# Patient Record
Sex: MALE | Race: WHITE | Employment: OTHER | URBAN - METROPOLITAN AREA
[De-identification: names, ages, dates, MRNs, and addresses within clinical notes are randomized per-mention and may not be internally consistent; named-entity substitution may affect disease eponyms.]

---

## 2017-05-05 ENCOUNTER — HOME HEALTH ADMISSION (OUTPATIENT)
Dept: HOME HEALTH SERVICES | Facility: HOME HEALTH | Age: 77
End: 2017-05-05
Payer: MEDICARE

## 2017-05-08 ENCOUNTER — HOME CARE VISIT (OUTPATIENT)
Dept: SCHEDULING | Facility: HOME HEALTH | Age: 77
End: 2017-05-08
Payer: MEDICARE

## 2017-05-08 PROCEDURE — 3331090001 HH PPS REVENUE CREDIT

## 2017-05-08 PROCEDURE — 400013 HH SOC

## 2017-05-08 PROCEDURE — G0299 HHS/HOSPICE OF RN EA 15 MIN: HCPCS

## 2017-05-08 PROCEDURE — 3331090002 HH PPS REVENUE DEBIT

## 2017-05-09 VITALS
TEMPERATURE: 97.7 F | RESPIRATION RATE: 18 BRPM | OXYGEN SATURATION: 96 % | SYSTOLIC BLOOD PRESSURE: 132 MMHG | DIASTOLIC BLOOD PRESSURE: 78 MMHG | HEART RATE: 66 BPM

## 2017-05-09 PROCEDURE — 3331090002 HH PPS REVENUE DEBIT

## 2017-05-09 PROCEDURE — 3331090001 HH PPS REVENUE CREDIT

## 2017-05-10 ENCOUNTER — HOME CARE VISIT (OUTPATIENT)
Dept: SCHEDULING | Facility: HOME HEALTH | Age: 77
End: 2017-05-10
Payer: MEDICARE

## 2017-05-10 VITALS
SYSTOLIC BLOOD PRESSURE: 110 MMHG | TEMPERATURE: 96.8 F | RESPIRATION RATE: 18 BRPM | HEART RATE: 76 BPM | HEIGHT: 72 IN | BODY MASS INDEX: 32.51 KG/M2 | WEIGHT: 240 LBS | DIASTOLIC BLOOD PRESSURE: 62 MMHG

## 2017-05-10 PROCEDURE — 3331090001 HH PPS REVENUE CREDIT

## 2017-05-10 PROCEDURE — G0151 HHCP-SERV OF PT,EA 15 MIN: HCPCS

## 2017-05-10 PROCEDURE — 3331090002 HH PPS REVENUE DEBIT

## 2017-05-10 PROCEDURE — G0299 HHS/HOSPICE OF RN EA 15 MIN: HCPCS

## 2017-05-11 ENCOUNTER — HOME CARE VISIT (OUTPATIENT)
Dept: SCHEDULING | Facility: HOME HEALTH | Age: 77
End: 2017-05-11
Payer: MEDICARE

## 2017-05-11 VITALS — OXYGEN SATURATION: 98 % | HEART RATE: 93 BPM | SYSTOLIC BLOOD PRESSURE: 120 MMHG | DIASTOLIC BLOOD PRESSURE: 70 MMHG

## 2017-05-11 PROCEDURE — 3331090001 HH PPS REVENUE CREDIT

## 2017-05-11 PROCEDURE — 3331090002 HH PPS REVENUE DEBIT

## 2017-05-11 PROCEDURE — G0152 HHCP-SERV OF OT,EA 15 MIN: HCPCS

## 2017-05-12 ENCOUNTER — HOME CARE VISIT (OUTPATIENT)
Dept: HOME HEALTH SERVICES | Facility: HOME HEALTH | Age: 77
End: 2017-05-12
Payer: MEDICARE

## 2017-05-12 ENCOUNTER — HOME CARE VISIT (OUTPATIENT)
Dept: SCHEDULING | Facility: HOME HEALTH | Age: 77
End: 2017-05-12
Payer: MEDICARE

## 2017-05-12 VITALS
RESPIRATION RATE: 18 BRPM | DIASTOLIC BLOOD PRESSURE: 68 MMHG | HEART RATE: 97 BPM | SYSTOLIC BLOOD PRESSURE: 118 MMHG | TEMPERATURE: 97.3 F

## 2017-05-12 PROCEDURE — G0156 HHCP-SVS OF AIDE,EA 15 MIN: HCPCS

## 2017-05-12 PROCEDURE — G0157 HHC PT ASSISTANT EA 15: HCPCS

## 2017-05-12 PROCEDURE — 3331090002 HH PPS REVENUE DEBIT

## 2017-05-12 PROCEDURE — G0158 HHC OT ASSISTANT EA 15: HCPCS

## 2017-05-12 PROCEDURE — 3331090001 HH PPS REVENUE CREDIT

## 2017-05-13 PROCEDURE — 3331090001 HH PPS REVENUE CREDIT

## 2017-05-13 PROCEDURE — 3331090002 HH PPS REVENUE DEBIT

## 2017-05-14 PROCEDURE — 3331090002 HH PPS REVENUE DEBIT

## 2017-05-14 PROCEDURE — 3331090001 HH PPS REVENUE CREDIT

## 2017-05-15 ENCOUNTER — HOME CARE VISIT (OUTPATIENT)
Dept: SCHEDULING | Facility: HOME HEALTH | Age: 77
End: 2017-05-15
Payer: MEDICARE

## 2017-05-15 VITALS
TEMPERATURE: 97.8 F | SYSTOLIC BLOOD PRESSURE: 116 MMHG | RESPIRATION RATE: 16 BRPM | DIASTOLIC BLOOD PRESSURE: 78 MMHG | DIASTOLIC BLOOD PRESSURE: 70 MMHG | HEART RATE: 70 BPM | TEMPERATURE: 96.4 F | HEART RATE: 70 BPM | SYSTOLIC BLOOD PRESSURE: 114 MMHG | RESPIRATION RATE: 18 BRPM

## 2017-05-15 VITALS
HEART RATE: 102 BPM | RESPIRATION RATE: 18 BRPM | TEMPERATURE: 96.9 F | SYSTOLIC BLOOD PRESSURE: 132 MMHG | DIASTOLIC BLOOD PRESSURE: 78 MMHG

## 2017-05-15 VITALS
RESPIRATION RATE: 18 BRPM | HEART RATE: 70 BPM | TEMPERATURE: 97.8 F | DIASTOLIC BLOOD PRESSURE: 70 MMHG | SYSTOLIC BLOOD PRESSURE: 114 MMHG

## 2017-05-15 VITALS — HEART RATE: 97 BPM | RESPIRATION RATE: 18 BRPM | SYSTOLIC BLOOD PRESSURE: 118 MMHG | DIASTOLIC BLOOD PRESSURE: 68 MMHG

## 2017-05-15 PROCEDURE — G0156 HHCP-SVS OF AIDE,EA 15 MIN: HCPCS

## 2017-05-15 PROCEDURE — 3331090002 HH PPS REVENUE DEBIT

## 2017-05-15 PROCEDURE — G0158 HHC OT ASSISTANT EA 15: HCPCS

## 2017-05-15 PROCEDURE — G0155 HHCP-SVS OF CSW,EA 15 MIN: HCPCS

## 2017-05-15 PROCEDURE — 3331090001 HH PPS REVENUE CREDIT

## 2017-05-16 ENCOUNTER — HOME CARE VISIT (OUTPATIENT)
Dept: HOME HEALTH SERVICES | Facility: HOME HEALTH | Age: 77
End: 2017-05-16
Payer: MEDICARE

## 2017-05-16 ENCOUNTER — HOME CARE VISIT (OUTPATIENT)
Dept: SCHEDULING | Facility: HOME HEALTH | Age: 77
End: 2017-05-16
Payer: MEDICARE

## 2017-05-16 PROCEDURE — G0299 HHS/HOSPICE OF RN EA 15 MIN: HCPCS

## 2017-05-16 PROCEDURE — 3331090001 HH PPS REVENUE CREDIT

## 2017-05-16 PROCEDURE — 3331090002 HH PPS REVENUE DEBIT

## 2017-05-17 ENCOUNTER — HOME CARE VISIT (OUTPATIENT)
Dept: HOME HEALTH SERVICES | Facility: HOME HEALTH | Age: 77
End: 2017-05-17
Payer: MEDICARE

## 2017-05-17 ENCOUNTER — HOME CARE VISIT (OUTPATIENT)
Dept: SCHEDULING | Facility: HOME HEALTH | Age: 77
End: 2017-05-17
Payer: MEDICARE

## 2017-05-17 VITALS
TEMPERATURE: 97 F | HEART RATE: 78 BPM | DIASTOLIC BLOOD PRESSURE: 68 MMHG | RESPIRATION RATE: 18 BRPM | SYSTOLIC BLOOD PRESSURE: 120 MMHG | OXYGEN SATURATION: 94 %

## 2017-05-17 PROCEDURE — G0157 HHC PT ASSISTANT EA 15: HCPCS

## 2017-05-17 PROCEDURE — G0156 HHCP-SVS OF AIDE,EA 15 MIN: HCPCS

## 2017-05-17 PROCEDURE — 3331090001 HH PPS REVENUE CREDIT

## 2017-05-17 PROCEDURE — 3331090002 HH PPS REVENUE DEBIT

## 2017-05-18 ENCOUNTER — HOME CARE VISIT (OUTPATIENT)
Dept: SCHEDULING | Facility: HOME HEALTH | Age: 77
End: 2017-05-18
Payer: MEDICARE

## 2017-05-18 ENCOUNTER — HOSPITAL ENCOUNTER (OUTPATIENT)
Dept: LAB | Age: 77
Discharge: HOME | End: 2017-05-18
Attending: FAMILY MEDICINE
Payer: MEDICARE

## 2017-05-18 VITALS — HEART RATE: 78 BPM | RESPIRATION RATE: 17 BRPM | SYSTOLIC BLOOD PRESSURE: 122 MMHG | DIASTOLIC BLOOD PRESSURE: 72 MMHG

## 2017-05-18 VITALS
DIASTOLIC BLOOD PRESSURE: 64 MMHG | HEART RATE: 94 BPM | TEMPERATURE: 98.2 F | RESPIRATION RATE: 16 BRPM | OXYGEN SATURATION: 97 % | SYSTOLIC BLOOD PRESSURE: 110 MMHG

## 2017-05-18 VITALS
RESPIRATION RATE: 16 BRPM | HEART RATE: 72 BPM | SYSTOLIC BLOOD PRESSURE: 120 MMHG | DIASTOLIC BLOOD PRESSURE: 68 MMHG | TEMPERATURE: 97.8 F

## 2017-05-18 LAB
ALBUMIN SERPL BCP-MCNC: 3.4 G/DL (ref 3.2–4.6)
ALBUMIN/GLOB SERPL: 0.9 {RATIO}
ALP SERPL-CCNC: 60 U/L (ref 50–136)
ALT SERPL-CCNC: 18 U/L (ref 12–65)
ANION GAP BLD CALC-SCNC: 10 MMOL/L
AST SERPL W P-5'-P-CCNC: 17 U/L (ref 15–37)
BILIRUB SERPL-MCNC: 0.2 MG/DL (ref 0.2–1.1)
BUN SERPL-MCNC: 23 MG/DL (ref 8–23)
CALCIUM SERPL-MCNC: 8.8 MG/DL (ref 8.3–10.4)
CHLORIDE SERPL-SCNC: 105 MMOL/L (ref 98–107)
CO2 SERPL-SCNC: 24 MMOL/L (ref 21–32)
CREAT SERPL-MCNC: 0.9 MG/DL (ref 0.8–1.5)
ERYTHROCYTE [DISTWIDTH] IN BLOOD BY AUTOMATED COUNT: 17.2 % (ref 11.9–14.6)
FERRITIN SERPL-MCNC: 956 NG/ML (ref 8–388)
GLOBULIN SER CALC-MCNC: 3.9 G/DL
GLUCOSE SERPL-MCNC: 94 MG/DL (ref 65–100)
HCT VFR BLD AUTO: 34.4 % (ref 41.1–50.3)
HGB BLD-MCNC: 10.9 G/DL (ref 13.6–17.2)
MCH RBC QN AUTO: 30.7 PG (ref 26.1–32.9)
MCHC RBC AUTO-ENTMCNC: 31.7 G/DL (ref 31.4–35)
MCV RBC AUTO: 96.9 FL (ref 79.6–97.8)
PLATELET # BLD AUTO: 249 K/UL (ref 150–450)
PMV BLD AUTO: 8.8 FL (ref 10.8–14.1)
POTASSIUM SERPL-SCNC: 3.8 MMOL/L (ref 3.5–5.1)
PROT SERPL-MCNC: 7.3 G/DL (ref 6.3–8.2)
RBC # BLD AUTO: 3.55 M/UL (ref 4.23–5.67)
SODIUM SERPL-SCNC: 139 MMOL/L (ref 136–145)
TSH SERPL DL<=0.005 MIU/L-ACNC: 3.82 UIU/ML (ref 0.36–3.74)
WBC # BLD AUTO: 15.2 K/UL (ref 4.3–11.1)

## 2017-05-18 PROCEDURE — 84443 ASSAY THYROID STIM HORMONE: CPT | Performed by: FAMILY MEDICINE

## 2017-05-18 PROCEDURE — 3331090002 HH PPS REVENUE DEBIT

## 2017-05-18 PROCEDURE — 85027 COMPLETE CBC AUTOMATED: CPT | Performed by: FAMILY MEDICINE

## 2017-05-18 PROCEDURE — 80053 COMPREHEN METABOLIC PANEL: CPT | Performed by: FAMILY MEDICINE

## 2017-05-18 PROCEDURE — 3331090001 HH PPS REVENUE CREDIT

## 2017-05-18 PROCEDURE — G0299 HHS/HOSPICE OF RN EA 15 MIN: HCPCS

## 2017-05-18 PROCEDURE — 82728 ASSAY OF FERRITIN: CPT | Performed by: FAMILY MEDICINE

## 2017-05-19 ENCOUNTER — HOME CARE VISIT (OUTPATIENT)
Dept: SCHEDULING | Facility: HOME HEALTH | Age: 77
End: 2017-05-19
Payer: MEDICARE

## 2017-05-19 VITALS
HEART RATE: 91 BPM | DIASTOLIC BLOOD PRESSURE: 60 MMHG | SYSTOLIC BLOOD PRESSURE: 102 MMHG | TEMPERATURE: 98.2 F | RESPIRATION RATE: 18 BRPM

## 2017-05-19 PROCEDURE — G0157 HHC PT ASSISTANT EA 15: HCPCS

## 2017-05-19 PROCEDURE — 3331090002 HH PPS REVENUE DEBIT

## 2017-05-19 PROCEDURE — G0158 HHC OT ASSISTANT EA 15: HCPCS

## 2017-05-19 PROCEDURE — 3331090001 HH PPS REVENUE CREDIT

## 2017-05-20 PROCEDURE — 3331090001 HH PPS REVENUE CREDIT

## 2017-05-20 PROCEDURE — 3331090002 HH PPS REVENUE DEBIT

## 2017-05-21 PROCEDURE — 3331090002 HH PPS REVENUE DEBIT

## 2017-05-21 PROCEDURE — 3331090001 HH PPS REVENUE CREDIT

## 2017-05-22 ENCOUNTER — HOME CARE VISIT (OUTPATIENT)
Dept: SCHEDULING | Facility: HOME HEALTH | Age: 77
End: 2017-05-22
Payer: MEDICARE

## 2017-05-22 VITALS
RESPIRATION RATE: 16 BRPM | HEART RATE: 89 BPM | DIASTOLIC BLOOD PRESSURE: 62 MMHG | TEMPERATURE: 97.6 F | SYSTOLIC BLOOD PRESSURE: 108 MMHG

## 2017-05-22 VITALS
DIASTOLIC BLOOD PRESSURE: 78 MMHG | SYSTOLIC BLOOD PRESSURE: 120 MMHG | RESPIRATION RATE: 17 BRPM | HEART RATE: 76 BPM | TEMPERATURE: 97.1 F

## 2017-05-22 PROCEDURE — G0158 HHC OT ASSISTANT EA 15: HCPCS

## 2017-05-22 PROCEDURE — 3331090001 HH PPS REVENUE CREDIT

## 2017-05-22 PROCEDURE — 3331090002 HH PPS REVENUE DEBIT

## 2017-05-22 PROCEDURE — G0156 HHCP-SVS OF AIDE,EA 15 MIN: HCPCS

## 2017-05-23 ENCOUNTER — HOME CARE VISIT (OUTPATIENT)
Dept: SCHEDULING | Facility: HOME HEALTH | Age: 77
End: 2017-05-23
Payer: MEDICARE

## 2017-05-23 PROCEDURE — 3331090002 HH PPS REVENUE DEBIT

## 2017-05-23 PROCEDURE — 3331090001 HH PPS REVENUE CREDIT

## 2017-05-23 PROCEDURE — G0299 HHS/HOSPICE OF RN EA 15 MIN: HCPCS

## 2017-05-24 ENCOUNTER — HOME CARE VISIT (OUTPATIENT)
Dept: HOME HEALTH SERVICES | Facility: HOME HEALTH | Age: 77
End: 2017-05-24
Payer: MEDICARE

## 2017-05-24 VITALS
SYSTOLIC BLOOD PRESSURE: 112 MMHG | DIASTOLIC BLOOD PRESSURE: 70 MMHG | RESPIRATION RATE: 16 BRPM | HEART RATE: 90 BPM | OXYGEN SATURATION: 98 % | TEMPERATURE: 97.5 F

## 2017-05-24 VITALS
HEART RATE: 88 BPM | TEMPERATURE: 98 F | SYSTOLIC BLOOD PRESSURE: 110 MMHG | RESPIRATION RATE: 20 BRPM | DIASTOLIC BLOOD PRESSURE: 56 MMHG

## 2017-05-24 PROCEDURE — 3331090002 HH PPS REVENUE DEBIT

## 2017-05-24 PROCEDURE — 3331090001 HH PPS REVENUE CREDIT

## 2017-05-25 ENCOUNTER — HOME CARE VISIT (OUTPATIENT)
Dept: SCHEDULING | Facility: HOME HEALTH | Age: 77
End: 2017-05-25
Payer: MEDICARE

## 2017-05-25 VITALS
TEMPERATURE: 96.8 F | DIASTOLIC BLOOD PRESSURE: 60 MMHG | SYSTOLIC BLOOD PRESSURE: 120 MMHG | HEART RATE: 80 BPM | RESPIRATION RATE: 18 BRPM

## 2017-05-25 VITALS
SYSTOLIC BLOOD PRESSURE: 120 MMHG | HEART RATE: 80 BPM | RESPIRATION RATE: 20 BRPM | DIASTOLIC BLOOD PRESSURE: 60 MMHG | TEMPERATURE: 96.6 F

## 2017-05-25 VITALS
RESPIRATION RATE: 18 BRPM | DIASTOLIC BLOOD PRESSURE: 66 MMHG | SYSTOLIC BLOOD PRESSURE: 112 MMHG | HEART RATE: 86 BPM | TEMPERATURE: 96.6 F

## 2017-05-25 PROCEDURE — G0151 HHCP-SERV OF PT,EA 15 MIN: HCPCS

## 2017-05-25 PROCEDURE — G0156 HHCP-SVS OF AIDE,EA 15 MIN: HCPCS

## 2017-05-25 PROCEDURE — 3331090001 HH PPS REVENUE CREDIT

## 2017-05-25 PROCEDURE — 3331090002 HH PPS REVENUE DEBIT

## 2017-05-25 PROCEDURE — G0158 HHC OT ASSISTANT EA 15: HCPCS

## 2017-05-26 ENCOUNTER — HOME CARE VISIT (OUTPATIENT)
Dept: HOME HEALTH SERVICES | Facility: HOME HEALTH | Age: 77
End: 2017-05-26
Payer: MEDICARE

## 2017-05-26 PROCEDURE — 3331090002 HH PPS REVENUE DEBIT

## 2017-05-26 PROCEDURE — 3331090001 HH PPS REVENUE CREDIT

## 2017-05-27 PROCEDURE — 3331090001 HH PPS REVENUE CREDIT

## 2017-05-27 PROCEDURE — 3331090002 HH PPS REVENUE DEBIT

## 2017-05-28 PROCEDURE — 3331090002 HH PPS REVENUE DEBIT

## 2017-05-28 PROCEDURE — 3331090001 HH PPS REVENUE CREDIT

## 2017-05-29 ENCOUNTER — HOME CARE VISIT (OUTPATIENT)
Dept: SCHEDULING | Facility: HOME HEALTH | Age: 77
End: 2017-05-29
Payer: MEDICARE

## 2017-05-29 VITALS
DIASTOLIC BLOOD PRESSURE: 64 MMHG | RESPIRATION RATE: 18 BRPM | TEMPERATURE: 97.1 F | SYSTOLIC BLOOD PRESSURE: 114 MMHG | HEART RATE: 94 BPM

## 2017-05-29 PROCEDURE — 3331090002 HH PPS REVENUE DEBIT

## 2017-05-29 PROCEDURE — 3331090001 HH PPS REVENUE CREDIT

## 2017-05-29 PROCEDURE — G0158 HHC OT ASSISTANT EA 15: HCPCS

## 2017-05-30 ENCOUNTER — HOME CARE VISIT (OUTPATIENT)
Dept: HOME HEALTH SERVICES | Facility: HOME HEALTH | Age: 77
End: 2017-05-30
Payer: MEDICARE

## 2017-05-30 PROCEDURE — 3331090002 HH PPS REVENUE DEBIT

## 2017-05-30 PROCEDURE — 3331090001 HH PPS REVENUE CREDIT

## 2017-05-31 PROCEDURE — 3331090001 HH PPS REVENUE CREDIT

## 2017-05-31 PROCEDURE — 3331090002 HH PPS REVENUE DEBIT

## 2017-06-01 ENCOUNTER — HOME CARE VISIT (OUTPATIENT)
Dept: SCHEDULING | Facility: HOME HEALTH | Age: 77
End: 2017-06-01
Payer: MEDICARE

## 2017-06-01 VITALS — HEART RATE: 99 BPM | SYSTOLIC BLOOD PRESSURE: 118 MMHG | DIASTOLIC BLOOD PRESSURE: 60 MMHG | OXYGEN SATURATION: 97 %

## 2017-06-01 VITALS
DIASTOLIC BLOOD PRESSURE: 70 MMHG | HEART RATE: 80 BPM | SYSTOLIC BLOOD PRESSURE: 118 MMHG | RESPIRATION RATE: 18 BRPM | TEMPERATURE: 98.5 F

## 2017-06-01 PROCEDURE — 3331090001 HH PPS REVENUE CREDIT

## 2017-06-01 PROCEDURE — G0156 HHCP-SVS OF AIDE,EA 15 MIN: HCPCS

## 2017-06-01 PROCEDURE — 3331090002 HH PPS REVENUE DEBIT

## 2017-06-01 PROCEDURE — G0152 HHCP-SERV OF OT,EA 15 MIN: HCPCS

## 2017-06-01 PROCEDURE — G0299 HHS/HOSPICE OF RN EA 15 MIN: HCPCS

## 2017-06-02 PROCEDURE — 3331090002 HH PPS REVENUE DEBIT

## 2017-06-02 PROCEDURE — 3331090001 HH PPS REVENUE CREDIT

## 2017-06-03 PROCEDURE — 3331090001 HH PPS REVENUE CREDIT

## 2017-06-03 PROCEDURE — 3331090002 HH PPS REVENUE DEBIT

## 2017-06-04 PROCEDURE — 3331090001 HH PPS REVENUE CREDIT

## 2017-06-04 PROCEDURE — 3331090002 HH PPS REVENUE DEBIT

## 2017-06-05 PROCEDURE — 3331090002 HH PPS REVENUE DEBIT

## 2017-06-05 PROCEDURE — 3331090001 HH PPS REVENUE CREDIT

## 2017-06-06 ENCOUNTER — HOME CARE VISIT (OUTPATIENT)
Dept: SCHEDULING | Facility: HOME HEALTH | Age: 77
End: 2017-06-06
Payer: MEDICARE

## 2017-06-06 VITALS
DIASTOLIC BLOOD PRESSURE: 64 MMHG | SYSTOLIC BLOOD PRESSURE: 110 MMHG | HEART RATE: 93 BPM | RESPIRATION RATE: 17 BRPM | TEMPERATURE: 97.9 F

## 2017-06-06 VITALS
HEART RATE: 93 BPM | SYSTOLIC BLOOD PRESSURE: 110 MMHG | RESPIRATION RATE: 18 BRPM | DIASTOLIC BLOOD PRESSURE: 64 MMHG | TEMPERATURE: 97.9 F

## 2017-06-06 PROCEDURE — 3331090002 HH PPS REVENUE DEBIT

## 2017-06-06 PROCEDURE — G0157 HHC PT ASSISTANT EA 15: HCPCS

## 2017-06-06 PROCEDURE — G0158 HHC OT ASSISTANT EA 15: HCPCS

## 2017-06-06 PROCEDURE — 3331090001 HH PPS REVENUE CREDIT

## 2017-06-07 PROCEDURE — 3331090001 HH PPS REVENUE CREDIT

## 2017-06-07 PROCEDURE — 3331090002 HH PPS REVENUE DEBIT

## 2017-06-08 ENCOUNTER — HOME CARE VISIT (OUTPATIENT)
Dept: SCHEDULING | Facility: HOME HEALTH | Age: 77
End: 2017-06-08
Payer: MEDICARE

## 2017-06-08 VITALS
OXYGEN SATURATION: 97 % | DIASTOLIC BLOOD PRESSURE: 68 MMHG | RESPIRATION RATE: 16 BRPM | HEART RATE: 86 BPM | SYSTOLIC BLOOD PRESSURE: 108 MMHG | TEMPERATURE: 97.2 F

## 2017-06-08 VITALS
SYSTOLIC BLOOD PRESSURE: 124 MMHG | TEMPERATURE: 96.5 F | RESPIRATION RATE: 18 BRPM | HEART RATE: 91 BPM | DIASTOLIC BLOOD PRESSURE: 68 MMHG

## 2017-06-08 VITALS — RESPIRATION RATE: 18 BRPM | HEART RATE: 70 BPM | SYSTOLIC BLOOD PRESSURE: 120 MMHG | DIASTOLIC BLOOD PRESSURE: 70 MMHG

## 2017-06-08 PROCEDURE — G0299 HHS/HOSPICE OF RN EA 15 MIN: HCPCS

## 2017-06-08 PROCEDURE — G0158 HHC OT ASSISTANT EA 15: HCPCS

## 2017-06-08 PROCEDURE — 3331090002 HH PPS REVENUE DEBIT

## 2017-06-08 PROCEDURE — G0157 HHC PT ASSISTANT EA 15: HCPCS

## 2017-06-08 PROCEDURE — 3331090001 HH PPS REVENUE CREDIT

## 2017-06-09 PROCEDURE — 3331090002 HH PPS REVENUE DEBIT

## 2017-06-09 PROCEDURE — 3331090001 HH PPS REVENUE CREDIT

## 2017-06-10 PROCEDURE — 3331090002 HH PPS REVENUE DEBIT

## 2017-06-10 PROCEDURE — 3331090001 HH PPS REVENUE CREDIT

## 2017-06-11 PROCEDURE — 3331090002 HH PPS REVENUE DEBIT

## 2017-06-11 PROCEDURE — 3331090001 HH PPS REVENUE CREDIT

## 2017-06-12 ENCOUNTER — HOME CARE VISIT (OUTPATIENT)
Dept: SCHEDULING | Facility: HOME HEALTH | Age: 77
End: 2017-06-12
Payer: MEDICARE

## 2017-06-12 ENCOUNTER — HOME CARE VISIT (OUTPATIENT)
Dept: HOME HEALTH SERVICES | Facility: HOME HEALTH | Age: 77
End: 2017-06-12
Payer: MEDICARE

## 2017-06-12 VITALS
RESPIRATION RATE: 18 BRPM | HEART RATE: 86 BPM | DIASTOLIC BLOOD PRESSURE: 66 MMHG | SYSTOLIC BLOOD PRESSURE: 114 MMHG | TEMPERATURE: 97.2 F

## 2017-06-12 PROCEDURE — 3331090002 HH PPS REVENUE DEBIT

## 2017-06-12 PROCEDURE — 3331090001 HH PPS REVENUE CREDIT

## 2017-06-12 PROCEDURE — G0158 HHC OT ASSISTANT EA 15: HCPCS

## 2017-06-13 ENCOUNTER — HOME CARE VISIT (OUTPATIENT)
Dept: SCHEDULING | Facility: HOME HEALTH | Age: 77
End: 2017-06-13
Payer: MEDICARE

## 2017-06-13 PROCEDURE — G0299 HHS/HOSPICE OF RN EA 15 MIN: HCPCS

## 2017-06-13 PROCEDURE — 3331090001 HH PPS REVENUE CREDIT

## 2017-06-13 PROCEDURE — 3331090002 HH PPS REVENUE DEBIT

## 2017-06-14 ENCOUNTER — HOME CARE VISIT (OUTPATIENT)
Dept: HOME HEALTH SERVICES | Facility: HOME HEALTH | Age: 77
End: 2017-06-14
Payer: MEDICARE

## 2017-06-14 ENCOUNTER — HOME CARE VISIT (OUTPATIENT)
Dept: SCHEDULING | Facility: HOME HEALTH | Age: 77
End: 2017-06-14
Payer: MEDICARE

## 2017-06-14 VITALS
HEART RATE: 78 BPM | SYSTOLIC BLOOD PRESSURE: 100 MMHG | RESPIRATION RATE: 15 BRPM | TEMPERATURE: 97.5 F | DIASTOLIC BLOOD PRESSURE: 58 MMHG

## 2017-06-14 PROCEDURE — 3331090001 HH PPS REVENUE CREDIT

## 2017-06-14 PROCEDURE — G0151 HHCP-SERV OF PT,EA 15 MIN: HCPCS

## 2017-06-14 PROCEDURE — G0152 HHCP-SERV OF OT,EA 15 MIN: HCPCS

## 2017-06-14 PROCEDURE — 3331090002 HH PPS REVENUE DEBIT

## 2017-06-15 VITALS
RESPIRATION RATE: 18 BRPM | DIASTOLIC BLOOD PRESSURE: 58 MMHG | TEMPERATURE: 97.5 F | SYSTOLIC BLOOD PRESSURE: 100 MMHG | HEART RATE: 78 BPM

## 2017-06-15 PROCEDURE — 3331090001 HH PPS REVENUE CREDIT

## 2017-06-15 PROCEDURE — 3331090002 HH PPS REVENUE DEBIT

## 2017-06-16 ENCOUNTER — HOME CARE VISIT (OUTPATIENT)
Dept: SCHEDULING | Facility: HOME HEALTH | Age: 77
End: 2017-06-16
Payer: MEDICARE

## 2017-06-16 VITALS
TEMPERATURE: 97 F | OXYGEN SATURATION: 69 % | SYSTOLIC BLOOD PRESSURE: 102 MMHG | HEART RATE: 84 BPM | RESPIRATION RATE: 16 BRPM | DIASTOLIC BLOOD PRESSURE: 68 MMHG

## 2017-06-16 PROCEDURE — 3331090001 HH PPS REVENUE CREDIT

## 2017-06-16 PROCEDURE — G0151 HHCP-SERV OF PT,EA 15 MIN: HCPCS

## 2017-06-16 PROCEDURE — 3331090002 HH PPS REVENUE DEBIT

## 2017-06-16 PROCEDURE — 3331090003 HH PPS REVENUE ADJ

## 2017-06-17 PROCEDURE — 3331090002 HH PPS REVENUE DEBIT

## 2017-06-17 PROCEDURE — 3331090001 HH PPS REVENUE CREDIT

## 2017-06-18 PROCEDURE — 3331090001 HH PPS REVENUE CREDIT

## 2017-06-18 PROCEDURE — 3331090002 HH PPS REVENUE DEBIT

## 2017-06-19 PROCEDURE — 3331090001 HH PPS REVENUE CREDIT

## 2017-06-19 PROCEDURE — 3331090002 HH PPS REVENUE DEBIT

## 2017-06-30 PROBLEM — N31.9 NEUROGENIC BLADDER: Status: ACTIVE | Noted: 2017-06-30

## 2017-10-15 ENCOUNTER — HOSPITAL ENCOUNTER (EMERGENCY)
Age: 77
Discharge: HOME | End: 2017-10-15
Attending: EMERGENCY MEDICINE
Payer: MEDICARE

## 2017-10-15 ENCOUNTER — APPOINTMENT (OUTPATIENT)
Dept: GENERAL RADIOLOGY | Age: 77
End: 2017-10-15
Attending: EMERGENCY MEDICINE
Payer: MEDICARE

## 2017-10-15 VITALS
HEART RATE: 76 BPM | BODY MASS INDEX: 33.86 KG/M2 | HEIGHT: 72 IN | WEIGHT: 250 LBS | RESPIRATION RATE: 16 BRPM | OXYGEN SATURATION: 98 % | SYSTOLIC BLOOD PRESSURE: 132 MMHG | TEMPERATURE: 98.1 F | DIASTOLIC BLOOD PRESSURE: 68 MMHG

## 2017-10-15 DIAGNOSIS — N30.00 ACUTE CYSTITIS WITHOUT HEMATURIA: Primary | ICD-10-CM

## 2017-10-15 LAB
ALBUMIN SERPL-MCNC: 3.9 G/DL (ref 3.2–4.6)
ALBUMIN/GLOB SERPL: 1 {RATIO} (ref 1.2–3.5)
ALP SERPL-CCNC: 60 U/L (ref 50–136)
ALT SERPL-CCNC: 24 U/L (ref 12–65)
ANION GAP SERPL CALC-SCNC: 8 MMOL/L (ref 7–16)
AST SERPL-CCNC: 27 U/L (ref 15–37)
BACTERIA URNS QL MICRO: ABNORMAL /HPF
BASOPHILS # BLD: 0.1 K/UL (ref 0–0.2)
BASOPHILS NFR BLD: 1 % (ref 0–2)
BILIRUB SERPL-MCNC: 0.5 MG/DL (ref 0.2–1.1)
BUN SERPL-MCNC: 18 MG/DL (ref 8–23)
CALCIUM SERPL-MCNC: 8.9 MG/DL (ref 8.3–10.4)
CASTS URNS QL MICRO: 0 /LPF
CHLORIDE SERPL-SCNC: 108 MMOL/L (ref 98–107)
CO2 SERPL-SCNC: 25 MMOL/L (ref 21–32)
CREAT SERPL-MCNC: 0.85 MG/DL (ref 0.8–1.5)
CRYSTALS URNS QL MICRO: 0 /LPF
DIFFERENTIAL METHOD BLD: ABNORMAL
EOSINOPHIL # BLD: 0.2 K/UL (ref 0–0.8)
EOSINOPHIL NFR BLD: 2 % (ref 0.5–7.8)
EPI CELLS #/AREA URNS HPF: 0 /HPF
ERYTHROCYTE [DISTWIDTH] IN BLOOD BY AUTOMATED COUNT: 17.1 % (ref 11.9–14.6)
GLOBULIN SER CALC-MCNC: 3.9 G/DL (ref 2.3–3.5)
GLUCOSE SERPL-MCNC: 83 MG/DL (ref 65–100)
HCT VFR BLD AUTO: 36.8 % (ref 41.1–50.3)
HGB BLD-MCNC: 12.4 G/DL (ref 13.6–17.2)
IMM GRANULOCYTES # BLD: 0.2 K/UL (ref 0–0.5)
IMM GRANULOCYTES NFR BLD: 1.5 % (ref 0–5)
LYMPHOCYTES # BLD: 1.8 K/UL (ref 0.5–4.6)
LYMPHOCYTES NFR BLD: 14 % (ref 13–44)
MCH RBC QN AUTO: 30.5 PG (ref 26.1–32.9)
MCHC RBC AUTO-ENTMCNC: 33.7 G/DL (ref 31.4–35)
MCV RBC AUTO: 90.6 FL (ref 79.6–97.8)
MONOCYTES # BLD: 1.2 K/UL (ref 0.1–1.3)
MONOCYTES NFR BLD: 9 % (ref 4–12)
MUCOUS THREADS URNS QL MICRO: 0 /LPF
NEUTS SEG # BLD: 9.4 K/UL (ref 1.7–8.2)
NEUTS SEG NFR BLD: 73 % (ref 43–78)
PLATELET # BLD AUTO: 206 K/UL (ref 150–450)
PMV BLD AUTO: 9.3 FL (ref 10.8–14.1)
POTASSIUM SERPL-SCNC: 4.5 MMOL/L (ref 3.5–5.1)
PROT SERPL-MCNC: 7.8 G/DL (ref 6.3–8.2)
RBC # BLD AUTO: 4.06 M/UL (ref 4.23–5.67)
RBC #/AREA URNS HPF: ABNORMAL /HPF
SODIUM SERPL-SCNC: 141 MMOL/L (ref 136–145)
WBC # BLD AUTO: 12.9 K/UL (ref 4.3–11.1)
WBC URNS QL MICRO: >100 /HPF
YEAST URNS QL MICRO: ABNORMAL

## 2017-10-15 PROCEDURE — 85025 COMPLETE CBC W/AUTO DIFF WBC: CPT | Performed by: EMERGENCY MEDICINE

## 2017-10-15 PROCEDURE — 87086 URINE CULTURE/COLONY COUNT: CPT | Performed by: EMERGENCY MEDICINE

## 2017-10-15 PROCEDURE — 74011250636 HC RX REV CODE- 250/636: Performed by: EMERGENCY MEDICINE

## 2017-10-15 PROCEDURE — 74011250637 HC RX REV CODE- 250/637: Performed by: EMERGENCY MEDICINE

## 2017-10-15 PROCEDURE — 87088 URINE BACTERIA CULTURE: CPT | Performed by: EMERGENCY MEDICINE

## 2017-10-15 PROCEDURE — 81015 MICROSCOPIC EXAM OF URINE: CPT | Performed by: EMERGENCY MEDICINE

## 2017-10-15 PROCEDURE — 74022 RADEX COMPL AQT ABD SERIES: CPT

## 2017-10-15 PROCEDURE — 80053 COMPREHEN METABOLIC PANEL: CPT | Performed by: EMERGENCY MEDICINE

## 2017-10-15 PROCEDURE — 99284 EMERGENCY DEPT VISIT MOD MDM: CPT | Performed by: EMERGENCY MEDICINE

## 2017-10-15 PROCEDURE — 87186 SC STD MICRODIL/AGAR DIL: CPT | Performed by: EMERGENCY MEDICINE

## 2017-10-15 PROCEDURE — 96365 THER/PROPH/DIAG IV INF INIT: CPT | Performed by: EMERGENCY MEDICINE

## 2017-10-15 PROCEDURE — 74011000258 HC RX REV CODE- 258: Performed by: EMERGENCY MEDICINE

## 2017-10-15 PROCEDURE — 81003 URINALYSIS AUTO W/O SCOPE: CPT | Performed by: EMERGENCY MEDICINE

## 2017-10-15 RX ORDER — HYDROCODONE BITARTRATE AND ACETAMINOPHEN 5; 325 MG/1; MG/1
1 TABLET ORAL ONCE
Status: COMPLETED | OUTPATIENT
Start: 2017-10-15 | End: 2017-10-15

## 2017-10-15 RX ORDER — NITROFURANTOIN 25; 75 MG/1; MG/1
100 CAPSULE ORAL 2 TIMES DAILY
Qty: 6 CAP | Refills: 0 | Status: SHIPPED | OUTPATIENT
Start: 2017-10-15 | End: 2017-10-18

## 2017-10-15 RX ADMIN — HYDROCODONE BITARTRATE AND ACETAMINOPHEN 1 TABLET: 5; 325 TABLET ORAL at 14:37

## 2017-10-15 RX ADMIN — CEFTRIAXONE SODIUM 1 G: 1 INJECTION, POWDER, FOR SOLUTION INTRAMUSCULAR; INTRAVENOUS at 15:37

## 2017-10-15 NOTE — ED TRIAGE NOTES
Patient from home via EMS for Sharp stabbing ULQ abdominal pain since last night. EMS reports a \"scratching sound\" lower left lobe. BGL 83, VSS for EMS.

## 2017-10-15 NOTE — ED NOTES
Patient has AAA repair in January and states that he spoke with his physical therapist and that it \"might be a hernia\"

## 2017-10-15 NOTE — DISCHARGE INSTRUCTIONS

## 2017-10-15 NOTE — ED NOTES
I have reviewed discharge instructions with the patient and spouse. The patient and spouse verbalized understanding. Patient left ED via Discharge Method: wheelchair to Home with wife. Opportunity for questions and clarification provided. Patient given 1 scripts.

## 2017-10-16 NOTE — ED PROVIDER NOTES
HPI Comments: Patient complains of LUQ abdominal pain. Bhavya Ambika and comes in a wave. Brief and resolves. He has been having this pain intermittently since aortic aneurysm repair in [facility] in January. He also had a more steady pain in the area today. Nausea with no vomiting. Now resolved. No fever, no cough, no SOB. He has neurogenic bladder and leg weakness after the surgery. He does self caths. Has recurrent UTI's. Patient is a 65 y.o. male presenting with abdominal pain. The history is provided by the patient and medical records. Abdominal Pain    This is a recurrent problem. The current episode started 1 to 2 hours ago. The problem has been gradually improving. The pain is associated with an unknown factor. The pain is located in the LUQ. The quality of the pain is sharp. The pain is moderate. Associated symptoms include back pain. Pertinent negatives include no fever, no diarrhea, no nausea, no vomiting, no constipation, no dysuria and no chest pain. Nothing worsens the pain. The pain is relieved by nothing. Past Medical History:   Diagnosis Date    Aneurysm [facility])     surger in [facility] 5/24/16    Atrial flutter ([address]     ROSENDO guided cardioversion    Thyroid disease        Past Surgical History:   Procedure Laterality Date    CARDIAC SURG PROCEDURE UNLIST  2016    repair of aneurysm in acending and transverse arteries    CARDIAC SURG PROCEDURE UNLIST  2016    valve repair    HX APPENDECTOMY      HX COLONOSCOPY  5/08    diverticulosis    HX ORTHOPAEDIC      repair of broken jaw    HX TONSILLECTOMY           Family History:   Problem Relation Age of Onset    Stroke Mother        Social History     Social History    Marital status:      Spouse name: N/A    Number of children: N/A    Years of education: N/A     Occupational History    Not on file.      Social History Main Topics    Smoking status: Former Smoker     Packs/day: 3.00     Years: 25.00     Types: Cigarettes     Quit date: 1/1/1986    Smokeless tobacco: Former User     Types: Snuff, Chew     Quit date: 1/1/2014    Alcohol use No    Drug use: No    Sexual activity: Yes     Partners: Female     Other Topics Concern    Not on file     Social History Narrative    Lives with wife    Currently uses walker for ambulation post-op - [facility]        Previously employed as  / , [address] works,         PCP: Dr. Elo Youngblood: Review of patient's allergies indicates no known allergies. Review of Systems   Constitutional: Negative for appetite change, chills and fever. HENT: Negative. Respiratory: Negative. Cardiovascular: Positive for leg swelling (chronic). Negative for chest pain. Gastrointestinal: Positive for abdominal pain. Negative for constipation, diarrhea, nausea and vomiting. Genitourinary: Positive for difficulty urinating. Negative for dysuria, flank pain, penile pain and scrotal swelling. Musculoskeletal: Positive for back pain and gait problem. Skin: Negative. Neurological: Positive for weakness and numbness. Vitals:    10/15/17 1236 10/15/17 1659   BP: 139/64 132/68   Pulse: 79 76   Resp: 18 16   Temp: 98.1 °F (36.7 °C)    SpO2: 99% 98%   Weight: 113.4 kg (250 lb)    Height: 6' (1.829 m)             Physical Exam   Constitutional: He is oriented to person, place, and time. obese   HENT:   Head: Normocephalic and atraumatic. Mouth/Throat: Oropharynx is clear and moist.   Eyes: Conjunctivae are normal. Pupils are equal, round, and reactive to light. No scleral icterus. Neck: Normal range of motion. Neck supple. Cardiovascular: Normal rate, regular rhythm, normal heart sounds and intact distal pulses. Pulmonary/Chest: Effort normal and breath sounds normal.   Abdominal: Soft. Bowel sounds are normal. He exhibits no mass. There is no tenderness. There is no rebound and no guarding. His abdomen is much more protuberant on the left side. Large scar on the LUQ from surgery. Ribs prominent on the left side. No mass. No discrete hernia palpable. His abdominal musculature is very weak. No tenderness to palpation. Musculoskeletal:   Compression hose legs. No bony tenderness. Neurological: He is alert and oriented to person, place, and time. Weakness of both lower extremities. Chronic. Gradually improving since January. Unable to ambulate. Skin: Skin is warm and dry. Psychiatric: He has a normal mood and affect. Nursing note and vitals reviewed.        MDM  ED Course       Procedures    Abdominal pain  Labs reviewed  Abd series no free air or air fluid levels  Urine infected  UTI  Rocephin 1 gram IV  Norco 5 po  Rx Macrobid x 3 days  Results and instructions discussed with patient and wife

## 2017-10-19 LAB
BACTERIA SPEC CULT: ABNORMAL
BACTERIA SPEC CULT: ABNORMAL
SERVICE CMNT-IMP: ABNORMAL

## 2018-11-13 ENCOUNTER — HOSPITAL ENCOUNTER (OUTPATIENT)
Dept: PHYSICAL THERAPY | Age: 78
Discharge: HOME | End: 2018-11-13
Payer: MEDICARE

## 2018-11-13 PROCEDURE — 97140 MANUAL THERAPY 1/> REGIONS: CPT

## 2018-11-13 PROCEDURE — 97166 OT EVAL MOD COMPLEX 45 MIN: CPT

## 2018-11-13 PROCEDURE — G8990 OTHER PT/OT CURRENT STATUS: HCPCS

## 2018-11-13 PROCEDURE — G8991 OTHER PT/OT GOAL STATUS: HCPCS

## 2018-11-13 NOTE — THERAPY EVALUATION
Linette Rios : 1940 Primary: [facility] [employer] Hmo Secondary:  Therapy Center at [facility] 
[address] [place] [address], [address] [place] [address] [address], [address] [phone]   [phone] OUTPATIENT OCCUPATIONAL THERAPY: Initial Assessment and Daily Note 2018 ICD-10: Treatment Diagnosis: I89.0, lymphedema not elsewhere classified 
M79.89, Swelling of both LEs 
G62.9, peripheral nerve neuropathy Precautions/Allergies:  
Patient has no known allergies. Fall Risk Score:   
 Ambulatory/Rehab Services H2 Model Falls Risk Assessment Risk Factors: 
     (1)  Gender [Male] Ability to Rise from Chair: 
     (4)  Unable to rise without assistance Falls Prevention Plan: No modifications necessary Total: (5 or greater = High Risk): 5  
  [facility] [employer] Patent #[id number]. Federal Law prohibits the replication, distribution or use without written permission from [facility] MD Orders: eval and treat MEDICAL/REFERRING DIAGNOSIS:  
Lymphedema, not elsewhere classified [I89.0] DATE OF ONSET: 2 years; chronic REFERRING PHYSICIAN: Richard Vilchis* RETURN PHYSICIAN APPOINTMENT: to be determined INITIAL ASSESSMENT:  Mr. Jody Angelo presents with bilateral 2+ pitting edema in his legs, ankles and gaiter areas, 3+ in feet. Pt with positive Stemmer sign and dry skin; otherwise, no tissue changes. Pt with diminished light touch, deep pressure and proprioceptive sensation in legs. He is a paraplegic secondary to surgical complication from 2017 and had been wheelchair bound since. He developed the leg swelling since 2017. He requires sliding board for transfers. He uses a standing frame at home daily. He presents with generalized deconditioning. Mr. Jody Angelo is here for treatment of bilateral lower extremity lymphedema management.   He is wearing class 1 compression knee highs.  He is dependent on lower extremity ADLs due to paraplegia. His wife is his primary caregiver. Mr. Spike Bajwa will benefit from complete decongestive therapy (CDT), a compression pump for home use, and alternative long term management compression garments. Mr. Spike Bajwa is a vet and is going to look into receiving services through the [place] system. I will leave his file open for 60 days to give him a chance to proceed with [facility] services; if unable to go through the [place], Mr. Spike Bajwa plans to return here for CDT course. PLAN OF CARE:  
PROBLEM LIST: 
1. Decreased ADL/Functional Activities 2. Decreased Transfer Abilities 3. Decreased Activity Tolerance 4. Edema/Girth 5. Decreased Knowledge of Precautions 6. Decreased Skin Integrity/Hygeine 7. Decreased [place] with Home Exercise Program INTERVENTIONS PLANNED1. Skin care 2. Compression bandaging 3. Fitting for compression garment(s) 4. Manual therapy/Manual lymph drainage 5. Therapeutic exercise/Therapeutic activities 6. Patient Education 7. Compression pump trial prn 
8.  kinesiotaping TREATMENT PLAN: 
Effective Dates: 11/13/18 TO 1/11/19. Frequency/Duration: 2 times a week for for 60 days  Will adjust frequency and duration as progress indicates. GOALS: (Goals have been discussed and agreed upon with patient.) Short-Term Functional Goals: Time Frame: 30 days 1. The patient/caregiver will verbalize understanding of lymphedema precautions. 2. Patient/caregiver will be independent with skin care regimen to decrease risk of cellulitis. 3. The patient/caregiver will be independent with self-manual lymph drainage techniques and show decrease in limb volume. 5. Patient/caregiver will be independent in lymphatic exercises. Discharge Goals: Time Frame: 60 days 1. Patient's bilateral lower extremity circumferential measurements will decrease 5% on volumetric graph to maximize functional use in ADL's. 2. The patient/caregiver will be independent with home management of lymphedema. 3. Patient/caregiver will be independent donning and doffing bilateral lower extremity compression garment. Rehabilitation Potential For Stated Goals: Fair Regarding Shayan Garcia's therapy, I certify that the treatment plan above will be carried out by a therapist or under their direction. Thank you for this referral, Josemanuel Rossi OT Referring Physician Signature: Princess Brown _________________________  Date _________ The information in this section was collected on 11/13/2018 
 (except where otherwise noted). OCCUPATIONAL PROFILE & HISTORY:  
History of Present Injury/Illness (Reason for Referral): Mr. Wilton Snellen presents with bilateral 2+ pitting edema in his legs, ankles and gaiter areas, 3+ in feet. Pt with positive Stemmer sign and dry skin; otherwise, no tissue changes. Pt with diminished light touch, deep pressure and proprioceptive sensation in legs. He is a paraplegic secondary to surgical complication from January 2017 and had been wheelchair bound since. He developed the leg swelling since January 2017. He requires sliding board for transfers. He uses a standing frame at home daily. He presents with generalized deconditioning. Mr. Wilton Snellen is here for treatment of bilateral lower extremity lymphedema management. He is wearing class 1 compression knee highs. He is dependent on lower extremity ADLs due to paraplegia. His wife is his primary caregiver. Mr. Wilton Snellen will benefit from complete decongestive therapy (CDT), a compression pump for home use, and alternative long term management compression garments. Past Medical History/Comorbidities:  
Mr. Wilton Snellen  has a past medical history of Aneurysm [facility]), Atrial flutter ([facility] [address] and Thyroid disease.   Mr. Wilton Snellen  has a past surgical history that includes hx tonsillectomy; hx orthopaedic; hx appendectomy; hx colonoscopy (5/08); pr cardiac surg procedure unlist (2016); pr cardiac surg procedure unlist (2016); THORACENTESIS holding xarelto (Left, 6/28/2016); ULTRASOUND (Bilateral, 6/28/2016); THORACENTESIS (Left, 6/12/2016); and ULTRASOUND (Bilateral, 6/12/2016). Social History/Living Environment:  
Lives with wife in one level home; home has been retrofitted through the [place] for wheelchair accessibility and safety Prior Level of Function/Work/Activity: 
Independent Dominant Side: LEFT Previous Treatment Approaches:   
      Diuretic Current Medications:   
Current Outpatient Medications:  
  ciprofloxacin HCl (CIPRO) 250 mg tablet, Take 1 Tab by mouth two (2) times a day., Disp: 14 Tab, Rfl: 0 
  carvedilol (COREG) 3.125 mg tablet, Take  by mouth two (2) times daily (with meals). , Disp: , Rfl:  
  furosemide (LASIX) 40 mg tablet, Take  by mouth daily. , Disp: , Rfl:  
  lisinopril (PRINIVIL, ZESTRIL) 10 mg tablet, Take 5 mg by mouth daily. , Disp: , Rfl:  
  potassium chloride SR (K-TAB) 20 mEq tablet, Take 20 mEq by mouth daily. , Disp: , Rfl:  
  psyllium husk (METAMUCIL) 0.4 gram cap, Take  by mouth three (3) times daily. , Disp: , Rfl:  
  sertraline (ZOLOFT) 50 mg tablet, TAKE 1 TABLET EVERY DAY, Disp: 90 Tab, Rfl: 1 
  levothyroxine (SYNTHROID) 100 mcg tablet, TAKE 1 TABLET EVERY DAY BEFORE BREAKFAST, Disp: 90 Tab, Rfl: 1 
  OTHER,NON-FORMULARY,, Air mattress Dx: prevention of decubiti, Disp: 1 Each, Rfl: 0 
  OTHER,NON-FORMULARY,, Ultra light wheelchair with drop arms Dx: hemiplegia, Disp: 1 Each, Rfl: 0 
  OTHER,NON-FORMULARY,, Transfer board Dx: limitation in mobility, Disp: 1 Each, Rfl: 0 
  OTHER,NON-FORMULARY,, Bariatric potty chair w/drop arms  Dx: limitation in mobility, Disp: 1 Each, Rfl: 0 
  OTHER,NON-FORMULARY,, Trapeze bar Dx: limitation in mobility, Disp: 1 Each, Rfl: 0 
  OTHER,NON-FORMULARY,, 14 Fr. In and out urinary catheter.  Dx: hemiplegia, Disp: 150 Each, Rfl: 11 
   aspirin delayed-release 81 mg tablet, Take 81 mg by mouth daily. , Disp: , Rfl:   
        
Date Last Reviewed:  11/13/2018 Complexity Level : Expanded review of therapy/medical records (1-2):  MODERATE COMPLEXITY ASSESSMENT OF OCCUPATIONAL PERFORMANCE:  
Palpation:   
      2+/3+ pitting edema Bilateral lower legs and feet ROM:   
      Impaired; paraplegic Strength:   
      Impaired; paraplegic Special Tests:   
       Positive Stemmer's sign (inability to pinch skin at base of second toe) Skin Integrity:   
      Excessive dryness Sensation:  Impaired; paraplegic and neuropathy Functional Mobility:  Wheelchair dependent Activities of Daily Living Mod to max assist for lower body ADLs Edema/Girth:  2+, 3+ and pitting PRETREATMENT AFFECTED LIMB(s): right lower extremity 
left lower extremity Date:  11/13/18 Right / Left Groin  
[]      []        
 
8 inches  
[]      []        
 
4 inches  
[]      [] PoplitealSpace  
[x]      [x] 41/41.5       
 
8 inches  
[x]      [x] 42/44.5       
 
4 inches  
[x]      [x] 37/38 Ankle  
[x]      [x] 30.5/31 Instep [x]      [x] 30/30.5 Measurements are taken in centimeters:  2.54 cm = 1 inch Cumulative Circumferential Volumetric Graph Measurements RIGHT 180.5 cm LEFT 185.5 cm Physical Skills Involved: 1. Sensation 2. Edema 3. Skin Integrity Cognitive Skills Affected (resulting in the inability to perform in a timely and safe manner): 
1. N/A Psychosocial Skills Affected: 1. Habits/Routines Number of elements that affect the Plan of Care: 3-5:  MODERATE COMPLEXITY CLINICAL DECISION MAKING:  
Outcome Measure: Tool Used: Lymphedema Life Impact Scale Score:  Initial: 36 Most Recent: X (Date: -- ) Interpretation of Score:  The Lymphedema Life Impact Scale (LLIS) is a validated instrument that measures the physical, functional, and psychosocial concerns pertinent to patients with extremity lymphedema. The Scale's questionnaire is administered to patients to gauge impairments, activity limitations, and participation restrictions resulting from their lymphedema. Score 0 1-13 14-26 27-40 41-54 55-67 68 Modifier CH CI CJ CK CL CM CN  
 
? Other PT/OT Primary Functional Limitations:  
  - CURRENT STATUS: CK - 40%-59% impaired, limited or restricted  - GOAL STATUS: CJ - 20%-39% impaired, limited or restricted  - D/C STATUS:  ---------------To be determined--------------- Medical Necessity:  
· Patient is expected to demonstrate progress in reduction of circumferential volumetric graph measurments to reduce risk for cellulitis. Reason for Services/Other Comments: 
· Patient continues to require skilled intervention due to chronic lymphedema bilateral lower extremities. Use of outcome tool(s) and clinical judgement create a POC that gives a: Questionable prediction of patient's progress: MODERATE COMPLEXITY  
TREATMENT:  
(In addition to Assessment/Re-Assessment sessions the following treatments were rendered) Pre-treatment Symptoms/Complaints:  Redness and swelling of bilateral feet; not receding Pain: Initial:  
Pain Intensity 1: 0  Post Session:  0 Occupational Therapy Treatments: 
 
OT eval( X ) OT eval was completed. Pt received information on lymphedema and risk reduction/self management practices as outlined by the National Lymphedema Network. Therapeutic Exercise (   minutes): HEP:  As above; handouts given to patient for all exercises. Manual Therapy: (30 minutes)   Pt was educated on lymphatic pathophysiology, complete decongestive therapy, precautions and skin integrity management. Manual Lymph Drainage: 
        Lymph Nodes:  
 Cervical Supraclavicular Axillary Abdominal Inguinal Popliteal Antecubital  
RIGHT []     []     []     []     []     []     [] LEFT []     []     []     []     []     []     [] Anastamoses: Axillo-axillary Inguino-inguinal Axillo-inguinal Inguino-axillary ANTERIOR []     []     []     [] POSTERIOR []     []     []     []      
RIGHT []     []     []     [] LEFT []     []     []     []      
 
Limbs:   []    RUE     []    LUE     []    RLE    []    LLE Compression: Replaced class 1 compression knee highs after removing for measurements Treatment/Session Assessment:   
· Response to Treatment:  In agreement with POC and goals. Mr. Spike Bajwa is a vet and is going to look into receiving services through the [place] system. I will leave his file open for 60 days to give him a chance to proceed with [facility] services; if unable to go through the [place], Mr. Spike Bajwa plans to return here for CDT course. · Compliance with Program/Exercises: Will assess as treatment progresses. · Recommendations/Intent for next treatment session: \"Next visit will focus on complete decongestive therapy phase 1\". Total Treatment Duration: OT Patient Time In/Time Out Time In: 0100 Time Out: 0200 Chuckie Howard OT

## 2018-11-28 ENCOUNTER — HOSPITAL ENCOUNTER (OUTPATIENT)
Dept: PHYSICAL THERAPY | Age: 78
Discharge: HOME | End: 2018-11-28
Payer: OTHER GOVERNMENT

## 2018-11-28 PROCEDURE — 97162 PT EVAL MOD COMPLEX 30 MIN: CPT

## 2018-11-28 NOTE — THERAPY EVALUATION
Ras Morris : 1940 Primary: [address] [facility] Secondary:  Therapy Center at [facility] [address] [facility], [address] [address], [facility] 91. [phone]   [phone] OUTPATIENT PHYSICAL THERAPY:Initial Assessment 2018 ICD-10: Treatment Diagnosis: Paraplegia, incomplete (G82.22), Difficulty in walking, not elsewhere classified (R26.2), Muscle weakness (generalized) (M62.81) Precautions/Allergies:  
Patient has no known allergies. MD Orders: Evaluate and treat, aquatic therapy  MEDICAL/REFERRING DIAGNOSIS: 
Paraplegia, unspecified [G82.20] DATE OF ONSET: 1-3-18 REFERRING PHYSICIAN: Emiliano ADAMS PHYSICIAN APPOINTMENT: 18 INITIAL ASSESSMENT:  Mr. Jersey Barragan presents with paraplegia following a surgical procedure nearly 1 year ago. Patient exhibits reduced B LE strength, impaired functional mobility, inability to ambulate and reduced independence with ADLs. Patient is likely to benefit from skilled PT intervention to strengthen B LEs in order to improve gait and mobilty. PROBLEM LIST (Impacting functional limitations): 1. Decreased Strength 2. Decreased ADL/Functional Activities 3. Decreased Transfer Abilities 4. Decreased Ambulation Ability/Technique 5. Decreased Balance 6. Decreased [place] with Home Exercise Program INTERVENTIONS PLANNED: 
1. Gait Training 2. Home Exercise Program (HEP) 3. Neuromuscular Re-education/Strengthening 4. Therapeutic Activites 5. Therapeutic Exercise/Strengthening 6. Transfer Training 7. Aquatic Therapy TREATMENT PLAN: 
Effective Dates: 2018 TO 2019. Frequency/Duration: 2 visits per week for 10 weeks (in anticipation of approval of more visits) GOALS: (Goals have been discussed and agreed upon with patient.) Short-Term Functional Goals: Time Frame: 5 weeks 1. Patient will demonstrate 3-/5 B LE strength 2. Patient will be able to tolerate 45 minutes of aquatic therapy 3. Patient will be able to stand with max assist on level ground. Discharge Goals: Time Frame: 10 weeks 1. Patient will demonstrate 3+/5 strength in B LEs 2. Patient will be able to ambulate 10 feet with use of appropriate assistive device 3. Patient will be able to perform stand-pivot transfer with appropriate assistive device Rehabilitation Potential For Stated Goals: Good Regarding Maribel Garcia's therapy, I certify that the treatment plan above will be carried out by a therapist or under their direction. Thank you for this referral, 
 
 
David Melgar, PT Referring Physician Signature:               Marcus Rodriguez M.D. The information in this section was collected on 11-28-18 (except where otherwise noted). HISTORY:  
History of Present Injury/Illness (Reason for Referral): 
Patient underwent two surgeries to address an aortic aneurysm, the second of which occurred in [facility] on 1/31/17. He has been unable to walk due to significant loss of strength in B LEs since this surgery, leaving him unable to ambulate. Was ambulating without any assistive devices prior to this surgery. Utilizes a transfer board at home to transfer into/out of wheelchair, onto/off of toilet and into/out of the shower and into/out of bed. Past Medical History/Comorbidities:  
Mr. Estela Lesch  has a past medical history of Aneurysm [facility]), Atrial flutter ([facility] [address] and Thyroid disease. Mr. Estela Lesch  has a past surgical history that includes hx tonsillectomy; hx orthopaedic; hx appendectomy; hx colonoscopy (5/08); pr cardiac surg procedure unlist (2016); pr cardiac surg procedure unlist (2016); THORACENTESIS holding xarelto (Left, 6/28/2016); ULTRASOUND (Bilateral, 6/28/2016); THORACENTESIS (Left, 6/12/2016); and ULTRASOUND (Bilateral, 6/12/2016). Social History/Living Environment:  
 Patient lives with his spouse. Prior Level of Function/Work/Activity: 
Patient was independent with all mobility prior to surgery. Is modified independent with wheelchair now. Requires assist from his spouse to complete getting dressed with socks and pulling pants on. Ambulatory/Rehab Services H2 Model Falls Risk Assessment Risk Factors: 
     (1)  Gender [Male] Ability to Rise from Chair: 
     (4)  Unable to rise without assistance Falls Prevention Plan: Mobility Assistance Device (specify):  wheelchair, transfer board, shower chair Total: (5 or greater = High Risk): 5  
 ©2010 [facility] [place] . [facility] Patent #[id number]. Federal Law prohibits the replication, distribution or use without written permission from [facility] [employer] Current Medications:   
  
Current Outpatient Medications:  
  ciprofloxacin HCl (CIPRO) 250 mg tablet, Take 1 Tab by mouth two (2) times a day., Disp: 14 Tab, Rfl: 0  - not taking this currently   carvedilol (COREG) 3.125 mg tablet, Take  by mouth two (2) times daily (with meals). , Disp: , Rfl:  
  furosemide (LASIX) 40 mg tablet, Take  by mouth daily. , Disp: , Rfl:  
  lisinopril (PRINIVIL, ZESTRIL) 10 mg tablet, Take 5 mg by mouth daily. , Disp: , Rfl:  
  potassium chloride SR (K-TAB) 20 mEq tablet, Take 20 mEq by mouth daily. , Disp: , Rfl:  
  psyllium husk (METAMUCIL) 0.4 gram cap, Take  by mouth three (3) times daily. , Disp: , Rfl:  
  sertraline (ZOLOFT) 50 mg tablet, TAKE 1 TABLET EVERY DAY, Disp: 90 Tab, Rfl: 1 
  levothyroxine (SYNTHROID) 100 mcg tablet, TAKE 1 TABLET EVERY DAY BEFORE BREAKFAST, Disp: 90 Tab, Rfl: 1 
  OTHER,NON-FORMULARY,, Air mattress Dx: prevention of decubiti, Disp: 1 Each, Rfl: 0 
  OTHER,NON-FORMULARY,, Ultra light wheelchair with drop arms Dx: hemiplegia, Disp: 1 Each, Rfl: 0 
  OTHER,NON-FORMULARY,, Transfer board Dx: limitation in mobility, Disp: 1 Each, Rfl: 0 
 Pawel Berrios,, Bariatric potty chair w/drop arms  Dx: limitation in mobility, Disp: 1 Each, Rfl: 0 
  OTHER,NON-FORMULARY,, Trapeze bar Dx: limitation in mobility, Disp: 1 Each, Rfl: 0 
  OTHER,NON-FORMULARY,, 14 Fr. In and out urinary catheter. Dx: hemiplegia, Disp: 150 Each, Rfl: 11 
  aspirin delayed-release 81 mg tablet, Take 81 mg by mouth daily. , Disp: , Rfl:   
Date Last Reviewed:  11-28-18 Number of Personal Factors/Comorbidities that affect the Plan of Care: 1-2: MODERATE COMPLEXITY EXAMINATION:  
11-28-18 Observation/Orthostatic Postural Assessment:   
      Wears compression stockings on B LEs. Distal swelling to B LEs. Arrives to PT in lightweight wheelchair. Palpation:   
      Swelling noted to B LEs.  
ROM:   
      Passive range of motion to bilateral knees is within normal limits. Hip flexion contracture bilaterally. Ankle range of motion within normal limits bilaterally passively. Strength:   
      Hip flexion: 2/5 Neurological Screen: 
     Sensation along all B LEs dermatomes normal to light touch. Functional Mobility:  
      Transfers: Independent with use of transfer board into/out of wheelchair to/from mat table Wheelchair mobility: Independent with use of B UEs Bed mobility: not assessed as patient sleeps in hospital bed at home Body Structures Involved: 1. Nerves 2. Bones 3. Joints 4. Muscles Body Functions Affected: 1. Neuromusculoskeletal 
2. Movement Related Activities and Participation Affected: 1. Mobility 2. Self Care 3. Domestic Life 4. Interpersonal Interactions and Relationships 5. Community, Social and [place] Number of elements (examined above) that affect the Plan of Care: 4+: HIGH COMPLEXITY CLINICAL PRESENTATION:  
Presentation: Evolving clinical presentation with changing clinical characteristics: MODERATE COMPLEXITY CLINICAL DECISION MAKING:  
Outcome Measure: Tool Used: Lower Extremity Functional Scale (LEFS) Score:  Initial: 16/80 Most Recent: X/80 (Date: -- ) Interpretation of Score: 20 questions each scored on a 5 point scale with 0 representing \"extreme difficulty or unable to perform\" and 4 representing \"no difficulty\". The lower the score, the greater the functional disability. 80/80 represents no disability. Minimal detectable change is 9 points. Score 80 79-63 62-48 47-32 31-16 15-1 0 Modifier CH CI CJ CK CL CM CN Medical Necessity:  
· Skilled intervention continues to be required due to limitations with functional mobility seconadry to B LE weakness. Reason for Services/Other Comments: 
· Patient continues to require skilled intervention due to decreased B LE strength and impaired functional mobility. Use of outcome tool(s) and clinical judgement create a POC that gives a: Questionable prediction of patient's progress: MODERATE COMPLEXITY  
  
 
 
 
TREATMENT:  
(In addition to Assessment/Re-Assessment sessions the following treatments were rendered) Pre-treatment Symptoms/Complaints:  Patient reports that he is excited to try the pool, and that he is hopeful that he will be able to walk again. Patient uses standing frame 45 minutes per day, 3 times per day. Pain: Initial:  
  0/10 Post Session:  0/10 Patient's spouse attended therapy evaluation. Assessment only today, no treatment provided. Treatment/Session Assessment:   
· Response to Treatment:  Patient demonstrates significant B LE weakness. · Compliance with Program/Exercises: Will assess as treatment progresses. · Recommendations/Intent for next treatment session: \"Next visit will focus on aquatic therapy to address B LE weakness and mobility deficits\". Total Treatment Duration: PT Patient Time In/Time Out Time In: 1440 Time Out: 1530 Terrence Lewis PT

## 2018-12-04 ENCOUNTER — HOSPITAL ENCOUNTER (OUTPATIENT)
Dept: PHYSICAL THERAPY | Age: 78
End: 2018-12-04
Payer: OTHER GOVERNMENT

## 2018-12-06 ENCOUNTER — APPOINTMENT (OUTPATIENT)
Dept: PHYSICAL THERAPY | Age: 78
End: 2018-12-06
Payer: OTHER GOVERNMENT

## 2018-12-11 ENCOUNTER — HOSPITAL ENCOUNTER (OUTPATIENT)
Dept: PHYSICAL THERAPY | Age: 78
Discharge: HOME | End: 2018-12-11
Payer: OTHER GOVERNMENT

## 2018-12-11 PROCEDURE — 97113 AQUATIC THERAPY/EXERCISES: CPT

## 2018-12-11 NOTE — PROGRESS NOTES
Janett Ruiz  : 1940  Primary: [address] [facility]  Secondary:  [address]  at [facility]  [address], [address], [address] [facility]  [phone]   [phone]          OUTPATIENT PHYSICAL [address] Last Note and Aquatic Note  2018   ICD-10: Treatment Diagnosis: Paraplegia, incomplete (G82.22), Difficulty in walking, not elsewhere classified (R26.2), Muscle weakness (generalized) (M62.81)  Precautions/Allergies:   Patient has no known allergies. MD Orders: Evaluate and treat, aquatic therapy  MEDICAL/REFERRING DIAGNOSIS:  No admission diagnoses are documented for this encounter. DATE OF ONSET: 1-3-18  REFERRING PHYSICIAN: Emiliano Michel  RETURN PHYSICIAN APPOINTMENT: 18     INITIAL ASSESSMENT:  Mr. Dinesh Cleary presents with paraplegia following a surgical procedure nearly 1 year ago. Patient exhibits reduced B LE strength, impaired functional mobility, inability to ambulate and reduced independence with ADLs. Patient is likely to benefit from skilled PT intervention to strengthen B LEs in order to improve gait and mobilty. PROBLEM LIST (Impacting functional limitations):  1. Decreased Strength  2. Decreased ADL/Functional Activities  3. Decreased Transfer Abilities  4. Decreased Ambulation Ability/Technique  5. Decreased Balance  6. Decreased [place] with Home Exercise Program INTERVENTIONS PLANNED:  1. Gait Training  2. Home Exercise Program (HEP)  3. Neuromuscular Re-education/Strengthening  4. Therapeutic Activites  5. Therapeutic Exercise/Strengthening  6. Transfer Training  7. Aquatic Therapy   TREATMENT PLAN:  Effective Dates: 2018 TO 2019. Frequency/Duration: 2 visits per week for 10 weeks (in anticipation of approval of more visits)   GOALS: (Goals have been discussed and agreed upon with patient.)  Short-Term Functional Goals: Time Frame: 5 weeks  1. Patient will demonstrate 3-/5 B LE strength  2.  Patient will be able to tolerate 45 minutes of aquatic therapy  3. Patient will be able to stand with max assist on level ground. Discharge Goals: Time Frame: 10 weeks  1. Patient will demonstrate 3+/5 strength in B LEs  2. Patient will be able to ambulate 10 feet with use of appropriate assistive device  3. Patient will be able to perform stand-pivot transfer with appropriate assistive device    Rehabilitation Potential For Stated Goals: Good     Regarding Kayce Garcia's therapy, I certify that the treatment plan above will be carried out by a therapist or under their direction. Thank you for this referral,      Arcelia De Los Santos PTA                     The information in this section was collected on 11-28-18 (except where otherwise noted). HISTORY:   History of Present Injury/Illness (Reason for Referral):  Patient underwent two surgeries to address an aortic aneurysm, the second of which occurred in [facility] on 1/31/17. He has been unable to walk due to significant loss of strength in B LEs since this surgery, leaving him unable to ambulate. Was ambulating without any assistive devices prior to this surgery. Utilizes a transfer board at home to transfer into/out of wheelchair, onto/off of toilet and into/out of the shower and into/out of bed. Past Medical History/Comorbidities:   Mr. Kisha Zayas  has a past medical history of Aneurysm [facility]), Atrial flutter ([facility] [address] and Thyroid disease. Mr. Kisha Zayas  has a past surgical history that includes hx tonsillectomy; hx orthopaedic; hx appendectomy; hx colonoscopy (5/08); pr cardiac surg procedure unlist (2016); pr cardiac surg procedure unlist (2016); THORACENTESIS holding xarelto (Left, 6/28/2016); ULTRASOUND (Bilateral, 6/28/2016); THORACENTESIS (Left, 6/12/2016); and ULTRASOUND (Bilateral, 6/12/2016). Social History/Living Environment:    Patient lives with his spouse. Prior Level of Function/Work/Activity:  Patient was independent with all mobility prior to surgery.  Is modified independent with wheelchair now. Requires assist from his spouse to complete getting dressed with socks and pulling pants on. Ambulatory/Rehab Services H2 Model Falls Risk Assessment    Risk Factors:       (1)  Gender [Male] Ability to Rise from Chair:       (4)  Unable to rise without assistance    Falls Prevention Plan: Mobility Assistance Device (specify):  wheelchair, transfer board, shower chair   Total: (5 or greater = High Risk): 5    ©2010 [facility] [place] [address] [place] [address] Patent #[id number]. Federal Law prohibits the replication, distribution or use without written permission from [facility] [employer]     Current Medications:       Current Outpatient Medications:     ciprofloxacin HCl (CIPRO) 250 mg tablet, Take 1 Tab by mouth two (2) times a day., Disp: 14 Tab, Rfl: 0  - not taking this currently    carvedilol (COREG) 3.125 mg tablet, Take  by mouth two (2) times daily (with meals). , Disp: , Rfl:     furosemide (LASIX) 40 mg tablet, Take  by mouth daily. , Disp: , Rfl:     lisinopril (PRINIVIL, ZESTRIL) 10 mg tablet, Take 5 mg by mouth daily. , Disp: , Rfl:     potassium chloride SR (K-TAB) 20 mEq tablet, Take 20 mEq by mouth daily. , Disp: , Rfl:     psyllium husk (METAMUCIL) 0.4 gram cap, Take  by mouth three (3) times daily. , Disp: , Rfl:     sertraline (ZOLOFT) 50 mg tablet, TAKE 1 TABLET EVERY DAY, Disp: 90 Tab, Rfl: 1    levothyroxine (SYNTHROID) 100 mcg tablet, TAKE 1 TABLET EVERY DAY BEFORE BREAKFAST, Disp: 90 Tab, Rfl: 1    OTHER,NON-FORMULARY,, Air mattress Dx: prevention of decubiti, Disp: 1 Each, Rfl: 0    OTHER,NON-FORMULARY,, Ultra light wheelchair with drop arms Dx: hemiplegia, Disp: 1 Each, Rfl: 0    OTHER,NON-FORMULARY,, Transfer board Dx: limitation in mobility, Disp: 1 Each, Rfl: 0    OTHER,NON-FORMULARY,, Bariatric potty chair w/drop arms  Dx: limitation in mobility, Disp: 1 Each, Rfl: 0    OTHER,NON-FORMULARY,, Trapeze bar Dx: limitation in mobility, Disp: 1 Each, Rfl: 0    OTHER,NON-FORMULARY,, 14 Fr. In and out urinary catheter. Dx: hemiplegia, Disp: 150 Each, Rfl: 11    aspirin delayed-release 81 mg tablet, Take 81 mg by mouth daily. , Disp: , Rfl:    Date Last Reviewed:  11-28-18   Number of Personal Factors/Comorbidities that affect the Plan of Care: 1-2: MODERATE COMPLEXITY   EXAMINATION:   11-28-18    Observation/Orthostatic Postural Assessment:          Wears compression stockings on B LEs. Distal swelling to B LEs. Arrives to PT in lightweight wheelchair. Palpation:          Swelling noted to B LEs.   ROM:          Passive range of motion to bilateral knees is within normal limits. Hip flexion contracture bilaterally. Ankle range of motion within normal limits bilaterally passively. Strength:          Hip flexion: 2/5   Neurological Screen:       Sensation along all B LEs dermatomes normal to light touch. Functional Mobility:         Transfers: Independent with use of transfer board into/out of wheelchair to/from mat table        Wheelchair mobility: Independent with use of B UEs        Bed mobility: not assessed as patient sleeps in hospital bed at home   Body Structures Involved:  1. Nerves  2. Bones  3. Joints  4. Muscles Body Functions Affected:  1. Neuromusculoskeletal  2. Movement Related Activities and Participation Affected:  1. Mobility  2. Self Care  3. Domestic Life  4. Interpersonal Interactions and Relationships  5. Community, Social and [place]   Number of elements (examined above) that affect the Plan of Care: 4+: HIGH COMPLEXITY   CLINICAL PRESENTATION:   Presentation: Evolving clinical presentation with changing clinical characteristics: MODERATE COMPLEXITY   CLINICAL DECISION MAKING:   Outcome Measure:    Tool Used: Lower Extremity Functional Scale (LEFS)  Score:  Initial: 16/80 Most Recent: X/80 (Date: -- )   Interpretation of Score: 20 questions each scored on a 5 point scale with 0 representing \"extreme difficulty or unable to perform\" and 4 representing \"no difficulty\". The lower the score, the greater the functional disability. 80/80 represents no disability. Minimal detectable change is 9 points. Score 80 79-63 62-48 47-32 31-16 15-1 0   Modifier CH CI CJ CK CL CM CN       Medical Necessity:   · Skilled intervention continues to be required due to limitations with functional mobility seconadry to B LE weakness. Reason for Services/Other Comments:  · Patient continues to require skilled intervention due to decreased B LE strength and impaired functional mobility. Use of outcome tool(s) and clinical judgement create a POC that gives a: Questionable prediction of patient's progress: MODERATE COMPLEXITY            TREATMENT:   (In addition to Assessment/Re-Assessment sessions the following treatments were rendered)  Pre-treatment Symptoms/Complaints:  Patient reports that he is nervous and excited about the pool. Patient reports no pain. Pain: Initial:     0/10 Post Session:  0/10   Patient's spouse attended therapy session. Patient transferred to and from with chair left with assist with sliding board. Aquatic Therapy ( 50 minutes ) Aquatic treatment performed per flow grid for Decreased muscle strength, Decreased endurance, Decreased range of motion and Decreased activity endurance. Cues provided for body awareness. Assistance by therapist provided for balance and control .       Aquatic Exercise Log       Date  12-11-18 Date   Date   Date   Date     Activity/ Exercise Parameters Parameters Parameters Parameters Parameters   Walking forward        Walking backward        Walking sideways          Marching          Goose Step          Tip toes          Heels          Lunges        Side step squats        LE Exercises Rings in deep          Hip Flex/Ext 2 X 10 B         Hip Abd/Add 2 X 10 B         Hip IR/ER          Calf raises          Knee Flex X 10 B  Sitting on bench with assist Squats          Leg Circles          Step Ups Working on just standing. With diffiuclty. UE Exercises          Squeeze In          Push Down          Pull Down          Bicep/Tricep        Rows/Press outs         Chi Positions          Trunk Rotation        Deep H2O/ Noodles Rings          Stabilization          Arms only          Legs only Rings and sitting on noodle in shallow - 2 laps forward and backward       Cross   Country X 10          Scissors X 10   Bicycle - 2 min         Crab walk        Lower abdominal   work  X 10          Cardio          Jogging        Lap   Swimming          Semi sit to stand  2 x 5   Bearing weight through legs with Mod /max A       Seated on bench  Hamstring curls x 10   Seated marching x 10                                                  Treatment/Session Assessment:  Patient once in water stated he felt good, but he required assist for balance and weight shifting in 4.5 feet. He had a tendency at first to float up and not be able to control where his body was. This improved throughout session, but still required assist for righting himself. · Response to Treatment:  Patient demonstrates significant B LE weakness. · Compliance with Program/Exercises: Will assess as treatment progresses. · Recommendations/Intent for next treatment session: \"Next visit will focus on aquatic therapy to address B LE weakness and mobility deficits\".   Total Treatment Duration: 50 minutes   PT Patient Time In/Time Out  Time In: 1315(Patient 15 minutes late )  Time Out: [address], PTA

## 2018-12-14 ENCOUNTER — HOSPITAL ENCOUNTER (OUTPATIENT)
Dept: PHYSICAL THERAPY | Age: 78
Discharge: HOME | End: 2018-12-14
Payer: OTHER GOVERNMENT

## 2018-12-14 PROCEDURE — 97113 AQUATIC THERAPY/EXERCISES: CPT

## 2018-12-14 NOTE — PROGRESS NOTES
Eugenie Trammell  : 1940  Primary: [address] [facility]  Secondary:  [address] at [facility] HENRY WATSON  [address] [facility], [address] [facility] [address], [facility] [address]  [phone]   [phone]          OUTPATIENT PHYSICAL THERAPY:Daily Note and Aquatic Note  2018   ICD-10: Treatment Diagnosis: Paraplegia, incomplete (G82.22), Difficulty in walking, not elsewhere classified (R26.2), Muscle weakness (generalized) (M62.81)  Precautions/Allergies:   Patient has no known allergies. MD Orders: Evaluate and treat, aquatic therapy  MEDICAL/REFERRING DIAGNOSIS:  Paraplegia, unspecified [G82.20]   DATE OF ONSET: 1-3-18  REFERRING PHYSICIAN: Emiliano Michel  RETURN PHYSICIAN APPOINTMENT: 18     INITIAL ASSESSMENT:  Mr. Delbert Rowland presents with paraplegia following a surgical procedure nearly 1 year ago. Patient exhibits reduced B LE strength, impaired functional mobility, inability to ambulate and reduced independence with ADLs. Patient is likely to benefit from skilled PT intervention to strengthen B LEs in order to improve gait and mobilty. PROBLEM LIST (Impacting functional limitations):  1. Decreased Strength  2. Decreased ADL/Functional Activities  3. Decreased Transfer Abilities  4. Decreased Ambulation Ability/Technique  5. Decreased Balance  6. Decreased [place] with Home Exercise Program INTERVENTIONS PLANNED:  1. Gait Training  2. Home Exercise Program (HEP)  3. Neuromuscular Re-education/Strengthening  4. Therapeutic Activites  5. Therapeutic Exercise/Strengthening  6. Transfer Training  7. Aquatic Therapy   TREATMENT PLAN:  Effective Dates: 2018 TO 2019. Frequency/Duration: 2 visits per week for 10 weeks (in anticipation of approval of more visits)   GOALS: (Goals have been discussed and agreed upon with patient.)  Short-Term Functional Goals: Time Frame: 5 weeks  1. Patient will demonstrate 3-/5 B LE strength  2.  Patient will be able to tolerate 45 minutes of aquatic therapy  3. Patient will be able to stand with max assist on level ground. Discharge Goals: Time Frame: 10 weeks  1. Patient will demonstrate 3+/5 strength in B LEs  2. Patient will be able to ambulate 10 feet with use of appropriate assistive device  3. Patient will be able to perform stand-pivot transfer with appropriate assistive device    Rehabilitation Potential For Stated Goals: Good                       The information in this section was collected on 11-28-18 (except where otherwise noted). HISTORY:   History of Present Injury/Illness (Reason for Referral):  Patient underwent two surgeries to address an aortic aneurysm, the second of which occurred in [facility] on 1/31/17. He has been unable to walk due to significant loss of strength in B LEs since this surgery, leaving him unable to ambulate. Was ambulating without any assistive devices prior to this surgery. Utilizes a transfer board at home to transfer into/out of wheelchair, onto/off of toilet and into/out of the shower and into/out of bed. Past Medical History/Comorbidities:   Mr. Jose Manuel Al  has a past medical [facility] At[facility] flutter ([facility] [address] and Thyroid disease. Mr. Jose Manuel Al  has a past surgical history that includes hx tonsillectomy; hx orthopaedic; hx appendectomy; hx colonoscopy (5/08); pr cardiac surg procedure unlist (2016); pr cardiac surg procedure unlist (2016); THORACENTESIS holding xarelto (Left, 6/28/2016); ULTRASOUND (Bilateral, 6/28/2016); THORACENTESIS (Left, 6/12/2016); and ULTRASOUND (Bilateral, 6/12/2016). Social History/Living Environment:    Patient lives with his spouse. Prior Level of Function/Work/Activity:  Patient was independent with all mobility prior to surgery. Is modified independent with wheelchair now. Requires assist from his spouse to complete getting dressed with socks and pulling pants on.        Ambulatory/Rehab Services H2 Model Falls Risk Assessment    Risk Factors:       (1)  Gender [Male] Ability to Rise from Chair:       (4)  Unable to rise without assistance    Falls Prevention Plan: Mobility Assistance Device (specify):  wheelchair, transfer board, shower chair   Total: (5 or greater = High Risk): 5    ©2010 [facility] of [place] . [place] States Patent #[id number]. Federal Law prohibits the replication, distribution or use without written permission from [facility] of [address] [place] [address]     Current Medications:       Current Outpatient Medications:     ciprofloxacin HCl (CIPRO) 250 mg tablet, Take 1 Tab by mouth two (2) times a day., Disp: 14 Tab, Rfl: 0  - not taking this currently    carvedilol (COREG) 3.125 mg tablet, Take  by mouth two (2) times daily (with meals). , Disp: , Rfl:     furosemide (LASIX) 40 mg tablet, Take  by mouth daily. , Disp: , Rfl:     lisinopril (PRINIVIL, ZESTRIL) 10 mg tablet, Take 5 mg by mouth daily. , Disp: , Rfl:     potassium chloride SR (K-TAB) 20 mEq tablet, Take 20 mEq by mouth daily. , Disp: , Rfl:     psyllium husk (METAMUCIL) 0.4 gram cap, Take  by mouth three (3) times daily. , Disp: , Rfl:     sertraline (ZOLOFT) 50 mg tablet, TAKE 1 TABLET EVERY DAY, Disp: 90 Tab, Rfl: 1    levothyroxine (SYNTHROID) 100 mcg tablet, TAKE 1 TABLET EVERY DAY BEFORE BREAKFAST, Disp: 90 Tab, Rfl: 1    OTHER,NON-FORMULARY,, Air mattress Dx: prevention of decubiti, Disp: 1 Each, Rfl: 0    OTHER,NON-FORMULARY,, Ultra light wheelchair with drop arms Dx: hemiplegia, Disp: 1 Each, Rfl: 0    OTHER,NON-FORMULARY,, Transfer board Dx: limitation in mobility, Disp: 1 Each, Rfl: 0    OTHER,NON-FORMULARY,, Bariatric potty chair w/drop arms  Dx: limitation in mobility, Disp: 1 Each, Rfl: 0    OTHER,NON-FORMULARY,, Trapeze bar Dx: limitation in mobility, Disp: 1 Each, Rfl: 0    OTHER,NON-FORMULARY,, 14 Fr. In and out urinary catheter. Dx: hemiplegia, Disp: 150 Each, Rfl: 11    aspirin delayed-release 81 mg tablet, Take 81 mg by mouth daily. , Disp: , Rfl:    Date Last Reviewed:  12/14/2018   Number of Personal Factors/Comorbidities that affect the Plan of Care: 1-2: MODERATE COMPLEXITY   EXAMINATION:   11-28-18    Observation/Orthostatic Postural Assessment:          Wears compression stockings on B LEs. Distal swelling to B LEs. Arrives to PT in lightweight wheelchair. Palpation:          Swelling noted to B LEs.   ROM:          Passive range of motion to bilateral knees is within normal limits. Hip flexion contracture bilaterally. Ankle range of motion within normal limits bilaterally passively. Strength:          Hip flexion: 2/5   Neurological Screen:       Sensation along all B LEs dermatomes normal to light touch. Functional Mobility:         Transfers: Independent with use of transfer board into/out of wheelchair to/from mat table        Wheelchair mobility: Independent with use of B UEs        Bed mobility: not assessed as patient sleeps in hospital bed at home   Body Structures Involved:  1. Nerves  2. Bones  3. Joints  4. Muscles Body Functions Affected:  1. Neuromusculoskeletal  2. Movement Related Activities and Participation Affected:  1. Mobility  2. Self Care  3. Domestic Life  4. Interpersonal Interactions and Relationships  5. Community, Social and [place]   Number of elements (examined above) that affect the Plan of Care: 4+: HIGH COMPLEXITY   CLINICAL PRESENTATION:   Presentation: Evolving clinical presentation with changing clinical characteristics: MODERATE COMPLEXITY   CLINICAL DECISION MAKING:   Outcome Measure: Tool Used: Lower Extremity Functional Scale (LEFS)  Score:  Initial: 16/80 Most Recent: X/80 (Date: -- )   Interpretation of Score: 20 questions each scored on a 5 point scale with 0 representing \"extreme difficulty or unable to perform\" and 4 representing \"no difficulty\". The lower the score, the greater the functional disability. 80/80 represents no disability. Minimal detectable change is 9 points.   Score 80 79-63 62-48 47-32 31-16 15-1 0   Modifier CH CI CJ CK CL CM CN       Medical Necessity:   · Skilled intervention continues to be required due to limitations with functional mobility seconadry to B LE weakness. Reason for Services/Other Comments:  · Patient continues to require skilled intervention due to decreased B LE strength and impaired functional mobility. Use of outcome tool(s) and clinical judgement create a POC that gives a: Questionable prediction of patient's progress: MODERATE COMPLEXITY            TREATMENT:   (In addition to Assessment/Re-Assessment sessions the following treatments were rendered)  Pre-treatment Symptoms/Complaints:  Patient states he was very tired after last session and slept a long time after. Pt stated that after second surgery he was told that the spinal cord was damaged due to ischemia. Pain: Initial:     0/10 Post Session:  0/10   Patient's spouse attended therapy session. Patient transferred to and from with chair left with assist with sliding board. Aquatic Therapy ( 60 minutes ) Aquatic treatment performed per flow grid for Decreased muscle strength, Decreased endurance, Decreased range of motion and Decreased activity endurance. Cues provided for body awareness. Assistance by therapist provided for balance and control .       Aquatic Exercise Log       Date  12-11-18 Date  12/14/18   Date   Date   Date     Activity/ Exercise Parameters Parameters Parameters Parameters Parameters   Walking forward        Walking backward        Walking sideways          Marching          Goose Step          Tip toes          Heels          Lunges        Side step squats        LE Exercises Rings in deep  Used noodle sometimes in front sometimes behind and 4# wts        Hip Flex/Ext 2 X 10 B 2 x 10        Hip Abd/Add 2 X 10 B 2 x 10        Hip IR/ER          Calf raises          Knee Flex X 10 B  Sitting on bench with assist Seated with 4# and assist 2 x 10        Squats          Leg Circles Step Ups Working on just standing. With diffiuclty. Standing in 4.5 max assist       UE Exercises          Squeeze In          Push Down          Pull Down          Bicep/Tricep        Rows/Press outs         Chi Positions          Trunk Rotation        Deep H2O/ Noodles Rings  noodle        Stabilization  2 min x 2        Arms only          Legs only Rings and sitting on noodle in shallow - 2 laps forward and backward jog1 min      Cross   Country X 10  1 min        Scissors X 10   Bicycle - 2 min 1 min        Crab walk        Lower abdominal   work  X 10  Standing in 4.5 trying to stand erect stretching hip flexors and abs        Supine floating  2 x 2 min with pedaling        Jogging        Lap   Swimming          Semi sit to stand  2 x 5   Bearing weight through legs with Mod /max A       Seated on bench  Hamstring curls x 10   Seated marching x 10    Seated with asssist      Ankle pumps  Assisted in sittting. Treatment/Session Assessment:  Pt requires mod to max assist in water. Pt has significant hip flexion contractures which inhibits standing even in deep water. Also severe swelling in B ankles and feet. · Response to Treatment:  Patient demonstrates significant B LE weakness. · Compliance with Program/Exercises: Will assess as treatment progresses. · Recommendations/Intent for next treatment session: \"Next visit will focus on aquatic therapy to address B LE weakness and mobility deficits\".   Total Treatment Duration: 50 minutes   PT Patient Time In/Time Out  Time In: 1200  Time Out: 0100    Neal Callaway, PT

## 2018-12-18 ENCOUNTER — HOSPITAL ENCOUNTER (OUTPATIENT)
Dept: PHYSICAL THERAPY | Age: 78
Discharge: HOME | End: 2018-12-18
Payer: OTHER GOVERNMENT

## 2018-12-18 PROCEDURE — 97113 AQUATIC THERAPY/EXERCISES: CPT

## 2018-12-18 NOTE — PROGRESS NOTES
[place]  : 1940  Primary: [address] [facility]  Secondary:  [address] at [facility] HENRY WATSON  [address] [facility], [address] [facility] [address], [facility] [address]  [phone]   [phone]          OUTPATIENT PHYSICAL [address] Last Note and Aquatic Note  2018   ICD-10: Treatment Diagnosis: Paraplegia, incomplete (G82.22), Difficulty in walking, not elsewhere classified (R26.2), Muscle weakness (generalized) (M62.81)  Precautions/Allergies:   Patient has no known allergies. MD Orders: Evaluate and treat, aquatic therapy  MEDICAL/REFERRING DIAGNOSIS:  Paraplegia, unspecified [G82.20]   DATE OF ONSET: 1-3-18  REFERRING PHYSICIAN: Emiliano Michel  RETURN PHYSICIAN APPOINTMENT: 18     INITIAL ASSESSMENT:  Mr. Jose Manuel Al presents with paraplegia following a surgical procedure nearly 1 year ago. Patient exhibits reduced B LE strength, impaired functional mobility, inability to ambulate and reduced independence with ADLs. Patient is likely to benefit from skilled PT intervention to strengthen B LEs in order to improve gait and mobilty. PROBLEM LIST (Impacting functional limitations):  1. Decreased Strength  2. Decreased ADL/Functional Activities  3. Decreased Transfer Abilities  4. Decreased Ambulation Ability/Technique  5. Decreased Balance  6. Decreased [place] with Home Exercise Program INTERVENTIONS PLANNED:  1. Gait Training  2. Home Exercise Program (HEP)  3. Neuromuscular Re-education/Strengthening  4. Therapeutic Activites  5. Therapeutic Exercise/Strengthening  6. Transfer Training  7. Aquatic Therapy   TREATMENT PLAN:  Effective Dates: 2018 TO 2019. Frequency/Duration: 2 visits per week for 10 weeks (in anticipation of approval of more visits)   GOALS: (Goals have been discussed and agreed upon with patient.)  Short-Term Functional Goals: Time Frame: 5 weeks  1. Patient will demonstrate 3-/5 B LE strength  2.  Patient will be able to tolerate 45 minutes of aquatic therapy  3. Patient will be able to stand with max assist on level ground. Discharge Goals: Time Frame: 10 weeks  1. Patient will demonstrate 3+/5 strength in B LEs  2. Patient will be able to ambulate 10 feet with use of appropriate assistive device  3. Patient will be able to perform stand-pivot transfer with appropriate assistive device    Rehabilitation Potential For Stated Goals: Good               The information in this section was collected on 11-28-18 (except where otherwise noted). HISTORY:   History of Present Injury/Illness (Reason for Referral):  Patient underwent two surgeries to address an aortic aneurysm, the second of which occurred in [facility] [place] on 1/31/17. He has been unable to walk due to significant loss of strength in B LEs since this surgery, leaving him unable to ambulate. Was ambulating without any assistive devices prior to this surgery. Utilizes a transfer board at home to transfer into/out of wheelchair, onto/off of toilet and into/out of the shower and into/out of bed. Past Medical History/Comorbidities:   Mr. Moises Kpalan  has a past medical history of Aneurysm [facility]), Atrial flutter ([facility] [address] and Thyroid disease. Mr. Moises Kaplan  has a past surgical history that includes hx tonsillectomy; hx orthopaedic; hx appendectomy; hx colonoscopy (5/08); pr cardiac surg procedure unlist (2016); pr cardiac surg procedure unlist (2016); THORACENTESIS holding xarelto (Left, 6/28/2016); ULTRASOUND (Bilateral, 6/28/2016); THORACENTESIS (Left, 6/12/2016); and ULTRASOUND (Bilateral, 6/12/2016). Social History/Living Environment:    Patient lives with his spouse. Prior Level of Function/Work/Activity:  Patient was independent with all mobility prior to surgery. Is modified independent with wheelchair now. Requires assist from his spouse to complete getting dressed with socks and pulling pants on.        Ambulatory/Rehab Services H2 Model Falls Risk Assessment    Risk Factors:       (1)  Gender [Male] Ability to Rise from Chair:       (4)  Unable to rise without assistance    Falls Prevention Plan: Mobility Assistance Device (specify):  wheelchair, transfer board, shower chair   Total: (5 or greater = High Risk): 5    ©2010 [facility] [place] States Patent #[id number]. Federal Law prohibits the replication, distribution or use without written permission from [facility] [employer]     Current Medications:       Current Outpatient Medications:     ciprofloxacin HCl (CIPRO) 250 mg tablet, Take 1 Tab by mouth two (2) times a day., Disp: 14 Tab, Rfl: 0  - not taking this currently    carvedilol (COREG) 3.125 mg tablet, Take  by mouth two (2) times daily (with meals). , Disp: , Rfl:     furosemide (LASIX) 40 mg tablet, Take  by mouth daily. , Disp: , Rfl:     lisinopril (PRINIVIL, ZESTRIL) 10 mg tablet, Take 5 mg by mouth daily. , Disp: , Rfl:     potassium chloride SR (K-TAB) 20 mEq tablet, Take 20 mEq by mouth daily. , Disp: , Rfl:     psyllium husk (METAMUCIL) 0.4 gram cap, Take  by mouth three (3) times daily. , Disp: , Rfl:     sertraline (ZOLOFT) 50 mg tablet, TAKE 1 TABLET EVERY DAY, Disp: 90 Tab, Rfl: 1    levothyroxine (SYNTHROID) 100 mcg tablet, TAKE 1 TABLET EVERY DAY BEFORE BREAKFAST, Disp: 90 Tab, Rfl: 1    OTHER,NON-FORMULARY,, Air mattress Dx: prevention of decubiti, Disp: 1 Each, Rfl: 0    OTHER,NON-FORMULARY,, Ultra light wheelchair with drop arms Dx: hemiplegia, Disp: 1 Each, Rfl: 0    OTHER,NON-FORMULARY,, Transfer board Dx: limitation in mobility, Disp: 1 Each, Rfl: 0    OTHER,NON-FORMULARY,, Bariatric potty chair w/drop arms  Dx: limitation in mobility, Disp: 1 Each, Rfl: 0    OTHER,NON-FORMULARY,, Trapeze bar Dx: limitation in mobility, Disp: 1 Each, Rfl: 0    OTHER,NON-FORMULARY,, 14 Fr. In and out urinary catheter. Dx: hemiplegia, Disp: 150 Each, Rfl: 11    aspirin delayed-release 81 mg tablet, Take 81 mg by mouth daily. , Disp: , Rfl:    Date Last Reviewed: 12/18/2018   Number of Personal Factors/Comorbidities that affect the Plan of Care: 1-2: MODERATE COMPLEXITY   EXAMINATION:   11-28-18    Observation/Orthostatic Postural Assessment:          Wears compression stockings on B LEs. Distal swelling to B LEs. Arrives to PT in lightweight wheelchair. Palpation:          Swelling noted to B LEs.   ROM:          Passive range of motion to bilateral knees is within normal limits. Hip flexion contracture bilaterally. Ankle range of motion within normal limits bilaterally passively. Strength:          Hip flexion: 2/5   Neurological Screen:       Sensation along all B LEs dermatomes normal to light touch. Functional Mobility:         Transfers: Independent with use of transfer board into/out of wheelchair to/from mat table        Wheelchair mobility: Independent with use of B UEs        Bed mobility: not assessed as patient sleeps in hospital bed at home   Body Structures Involved:  1. Nerves  2. Bones  3. Joints  4. Muscles Body Functions Affected:  1. Neuromusculoskeletal  2. Movement Related Activities and Participation Affected:  1. Mobility  2. Self Care  3. Domestic Life  4. Interpersonal Interactions and Relationships  5. Community, Social and [place]   Number of elements (examined above) that affect the Plan of Care: 4+: HIGH COMPLEXITY   CLINICAL PRESENTATION:   Presentation: Evolving clinical presentation with changing clinical characteristics: MODERATE COMPLEXITY   CLINICAL DECISION MAKING:   Outcome Measure: Tool Used: Lower Extremity Functional Scale (LEFS)  Score:  Initial: 16/80 Most Recent: X/80 (Date: -- )   Interpretation of Score: 20 questions each scored on a 5 point scale with 0 representing \"extreme difficulty or unable to perform\" and 4 representing \"no difficulty\". The lower the score, the greater the functional disability. 80/80 represents no disability. Minimal detectable change is 9 points.   Score 80 79-63 62-48 47-32 31-16 15-1 0 Modifier CH CI CJ CK CL CM CN       Medical Necessity:   · Skilled intervention continues to be required due to limitations with functional mobility seconadry to B LE weakness. Reason for Services/Other Comments:  · Patient continues to require skilled intervention due to decreased B LE strength and impaired functional mobility. Use of outcome tool(s) and clinical judgement create a POC that gives a: Questionable prediction of patient's progress: MODERATE COMPLEXITY            TREATMENT:   (In addition to Assessment/Re-Assessment sessions the following treatments were rendered)  Pre-treatment Symptoms/Complaints:  Pt states he was tired and a little sore from last PT session. Presents with B swollen feet. Pain: Initial:     0/10 Post Session:  0/10   Patient's spouse attended therapy session. Patient transferred to and from with chair left with assist with sliding board. Aquatic Therapy (50 minutes ) Aquatic treatment performed per flow grid for Decreased muscle strength, Decreased endurance, Decreased range of motion and Decreased activity endurance. Cues provided for body awareness. Assistance by therapist provided for balance and control .       Aquatic Exercise Log       Date  12-11-18 Date  12/14/18   Date  12/18/18 Date   Date     Activity/ Exercise Parameters Parameters Parameters Parameters Parameters   Walking forward        Walking backward        Walking sideways          Marching          Goose Step          Tip toes          Heels          Lunges        Side step squats        LE Exercises Rings in deep  Used noodle sometimes in front sometimes behind and 4# wts Supine with head support, noodle, and blue rings       Hip Flex/Ext 2 X 10 B 2 x 10 LAQs while supine 4# wts       Hip Abd/Add 2 X 10 B 2 x 10        Hip IR/ER          Calf raises          Knee Flex X 10 B  Sitting on bench with assist Seated with 4# and assist 2 x 10 10 x3 pushing off wall       Squats          Leg Circles Step Ups Working on just standing. With diffiuclty. Standing in 4.5 max assist       UE Exercises          Squeeze In          Push Down          Pull Down          Bicep/Tricep        Rows/Press outs         Chi Positions          Trunk Rotation        Deep H2O/ Noodles Rings  noodle Blue rings in 7' water and 4# wts       Stabilization  2 min x 2 2 min        Arms only          Legs only Rings and sitting on noodle in shallow - 2 laps forward and backward jog1 min Bicycle 3 min     Cross   Country X 10  1 min 3 min       Scissors X 10   Bicycle - 2 min 1 min 3 min       Crab walk        Lower abdominal   work  X 10  Standing in 4.5 trying to stand erect stretching hip flexors and abs        Supine floating  2 x 2 min with pedaling        Jogging        Lap   Swimming          Semi sit to stand  2 x 5   Bearing weight through legs with Mod /max A  In 4. 5' with assist for keeping feet grounded and pushing through feet x 10 with rest break between each     Seated on bench  Hamstring curls x 10   Seated marching x 10    Seated with asssist      Ankle pumps  Assisted in sittting. Heel cord stretch in supine                               Stretching to BLE in all planes while supine with floats. Treatment/Session Assessment:  Pt requires increased assist in water. Able to transfer into and out of lift chair with SBA. More confident in the water . · Response to Treatment:  Patient demonstrates significant B LE weakness. · Compliance with Program/Exercises: Will assess as treatment progresses. · Recommendations/Intent for next treatment session: \"Next visit will focus on aquatic therapy to address B LE weakness and mobility deficits\".   Total Treatment Duration: 50 minutes   PT Patient Time In/Time Out  Time In: 1215  Time Out: 1700 [facility]

## 2018-12-26 ENCOUNTER — HOSPITAL ENCOUNTER (OUTPATIENT)
Dept: PHYSICAL THERAPY | Age: 78
Discharge: HOME | End: 2018-12-26
Payer: OTHER GOVERNMENT

## 2018-12-26 PROCEDURE — 97113 AQUATIC THERAPY/EXERCISES: CPT

## 2018-12-26 NOTE — PROGRESS NOTES
Ac Sidhu  : 1940  Primary: [address] [facility]  Secondary:  [address] at [facility] HENRY WATSON  [address] [facility], [address], [facility].  [phone]   [phone]          OUTPATIENT PHYSICAL THERAPY:Daily Note and Aquatic Note  2018   ICD-10: Treatment Diagnosis: Paraplegia, incomplete (G82.22), Difficulty in walking, not elsewhere classified (R26.2), Muscle weakness (generalized) (M62.81)  Precautions/Allergies:   Patient has no known allergies. MD Orders: Evaluate and treat, aquatic therapy  MEDICAL/REFERRING DIAGNOSIS:  Paraplegia, unspecified [G82.20]   DATE OF ONSET: 1-3-18  REFERRING PHYSICIAN: Emiliano Michel  RETURN PHYSICIAN APPOINTMENT: 18     INITIAL ASSESSMENT:  Mr. Krunal Cornejo presents with paraplegia following a surgical procedure nearly 1 year ago. Patient exhibits reduced B LE strength, impaired functional mobility, inability to ambulate and reduced independence with ADLs. Patient is likely to benefit from skilled PT intervention to strengthen B LEs in order to improve gait and mobilty. PROBLEM LIST (Impacting functional limitations):  1. Decreased Strength  2. Decreased ADL/Functional Activities  3. Decreased Transfer Abilities  4. Decreased Ambulation Ability/Technique  5. Decreased Balance  6. Decreased [place] with Home Exercise Program INTERVENTIONS PLANNED:  1. Gait Training  2. Home Exercise Program (HEP)  3. Neuromuscular Re-education/Strengthening  4. Therapeutic Activites  5. Therapeutic Exercise/Strengthening  6. Transfer Training  7. Aquatic Therapy   TREATMENT PLAN:  Effective Dates: 2018 TO 2019. Frequency/Duration: 2 visits per week for 10 weeks (in anticipation of approval of more visits)   GOALS: (Goals have been discussed and agreed upon with patient.)  Short-Term Functional Goals: Time Frame: 5 weeks  1. Patient will demonstrate 3-/5 B LE strength  2.  Patient will be able to tolerate 45 minutes of aquatic therapy  3. Patient will be able to stand with max assist on level ground. Discharge Goals: Time Frame: 10 weeks  1. Patient will demonstrate 3+/5 strength in B LEs  2. Patient will be able to ambulate 10 feet with use of appropriate assistive device  3. Patient will be able to perform stand-pivot transfer with appropriate assistive device    Rehabilitation Potential For Stated Goals: Good               The information in this section was collected on 11-28-18 (except where otherwise noted). HISTORY:   History of Present Injury/Illness (Reason for Referral):  Patient underwent two surgeries to address an aortic aneurysm, the second of which occurred in [facility] on 1/31/17. He has been unable to walk due to significant loss of strength in B LEs since this surgery, leaving him unable to ambulate. Was ambulating without any assistive devices prior to this surgery. Utilizes a transfer board at home to transfer into/out of wheelchair, onto/off of toilet and into/out of the shower and into/out of bed. Past Medical History/Comorbidities:   Mr. Delbert Rowland  has a past medical history of Aneurysm [facility]), Atrial flutter ([facility] [address] and Thyroid disease. Mr. Delbert Rowland  has a past surgical history that includes hx tonsillectomy; hx orthopaedic; hx appendectomy; hx colonoscopy (5/08); pr cardiac surg procedure unlist (2016); pr cardiac surg procedure unlist (2016); THORACENTESIS holding xarelto (Left, 6/28/2016); ULTRASOUND (Bilateral, 6/28/2016); THORACENTESIS (Left, 6/12/2016); and ULTRASOUND (Bilateral, 6/12/2016). Social History/Living Environment:    Patient lives with his spouse. Prior Level of Function/Work/Activity:  Patient was independent with all mobility prior to surgery. Is modified independent with wheelchair now. Requires assist from his spouse to complete getting dressed with socks and pulling pants on.        Ambulatory/Rehab Services H2 Model Falls Risk Assessment    Risk Factors:       (1)  Gender [Male] Ability to Rise from Chair:       (4)  Unable to rise without assistance    Falls Prevention Plan: Mobility Assistance Device (specify):  wheelchair, transfer board, shower chair   Total: (5 or greater = High Risk): 5    ©2010 [facility] of [place] States Patent #[id number]. Federal Law prohibits the replication, distribution or use without written permission from [facility] of [employer]     Current Medications:       Current Outpatient Medications:     ciprofloxacin HCl (CIPRO) 250 mg tablet, Take 1 Tab by mouth two (2) times a day., Disp: 14 Tab, Rfl: 0  - not taking this currently    carvedilol (COREG) 3.125 mg tablet, Take  by mouth two (2) times daily (with meals). , Disp: , Rfl:     furosemide (LASIX) 40 mg tablet, Take  by mouth daily. , Disp: , Rfl:     lisinopril (PRINIVIL, ZESTRIL) 10 mg tablet, Take 5 mg by mouth daily. , Disp: , Rfl:     potassium chloride SR (K-TAB) 20 mEq tablet, Take 20 mEq by mouth daily. , Disp: , Rfl:     psyllium husk (METAMUCIL) 0.4 gram cap, Take  by mouth three (3) times daily. , Disp: , Rfl:     sertraline (ZOLOFT) 50 mg tablet, TAKE 1 TABLET EVERY DAY, Disp: 90 Tab, Rfl: 1    levothyroxine (SYNTHROID) 100 mcg tablet, TAKE 1 TABLET EVERY DAY BEFORE BREAKFAST, Disp: 90 Tab, Rfl: 1    OTHER,NON-FORMULARY,, Air mattress Dx: prevention of decubiti, Disp: 1 Each, Rfl: 0    OTHER,NON-FORMULARY,, Ultra light wheelchair with drop arms Dx: hemiplegia, Disp: 1 Each, Rfl: 0    OTHER,NON-FORMULARY,, Transfer board Dx: limitation in mobility, Disp: 1 Each, Rfl: 0    OTHER,NON-FORMULARY,, Bariatric potty chair w/drop arms  Dx: limitation in mobility, Disp: 1 Each, Rfl: 0    OTHER,NON-FORMULARY,, Trapeze bar Dx: limitation in mobility, Disp: 1 Each, Rfl: 0    OTHER,NON-FORMULARY,, 14 Fr. In and out urinary catheter. Dx: hemiplegia, Disp: 150 Each, Rfl: 11    aspirin delayed-release 81 mg tablet, Take 81 mg by mouth daily. , Disp: , Rfl:    Date Last Reviewed: 12/26/2018   Number of Personal Factors/Comorbidities that affect the Plan of Care: 1-2: MODERATE COMPLEXITY   EXAMINATION:   11-28-18    Observation/Orthostatic Postural Assessment:          Wears compression stockings on B LEs. Distal swelling to B LEs. Arrives to PT in lightweight wheelchair. Palpation:          Swelling noted to B LEs.   ROM:          Passive range of motion to bilateral knees is within normal limits. Hip flexion contracture bilaterally. Ankle range of motion within normal limits bilaterally passively. Strength:          Hip flexion: 2/5   Neurological Screen:       Sensation along all B LEs dermatomes normal to light touch. Functional Mobility:         Transfers: Independent with use of transfer board into/out of wheelchair to/from mat table        Wheelchair mobility: Independent with use of B UEs        Bed mobility: not assessed as patient sleeps in hospital bed at home   Body Structures Involved:  1. Nerves  2. Bones  3. Joints  4. Muscles Body Functions Affected:  1. Neuromusculoskeletal  2. Movement Related Activities and Participation Affected:  1. Mobility  2. Self Care  3. Domestic Life  4. Interpersonal Interactions and Relationships  5. Community, Social and [place]   Number of elements (examined above) that affect the Plan of Care: 4+: HIGH COMPLEXITY   CLINICAL PRESENTATION:   Presentation: Evolving clinical presentation with changing clinical characteristics: MODERATE COMPLEXITY   CLINICAL DECISION MAKING:   Outcome Measure: Tool Used: Lower Extremity Functional Scale (LEFS)  Score:  Initial: 16/80 Most Recent: X/80 (Date: -- )   Interpretation of Score: 20 questions each scored on a 5 point scale with 0 representing \"extreme difficulty or unable to perform\" and 4 representing \"no difficulty\". The lower the score, the greater the functional disability. 80/80 represents no disability. Minimal detectable change is 9 points.   Score 80 79-63 62-48 47-32 31-16 15-1 0 Modifier CH CI CJ CK CL CM CN       Medical Necessity:   · Skilled intervention continues to be required due to limitations with functional mobility seconadry to B LE weakness. Reason for Services/Other Comments:  · Patient continues to require skilled intervention due to decreased B LE strength and impaired functional mobility. Use of outcome tool(s) and clinical judgement create a POC that gives a: Questionable prediction of patient's progress: MODERATE COMPLEXITY            TREATMENT:   (In addition to Assessment/Re-Assessment sessions the following treatments were rendered)  Pre-treatment Symptoms/Complaints:  Pt states he was very tired after last session. He also has a scape on his knee that is covered but he wanted us to be careful with it. Pain: Initial:     0/10 Post Session:  0/10   Patient's spouse attended therapy session. Patient transferred to and from with chair left with assist with sliding board. Aquatic Therapy (45 minutes ) Aquatic treatment performed per flow grid for Decreased muscle strength, Decreased endurance, Decreased range of motion and Decreased activity endurance. Cues provided for body awareness. Assistance by therapist provided for balance and control .       Aquatic Exercise Log       Date  12-11-18 Date  12/14/18   Date  12/18/18 Date  12/26/18 Date     Activity/ Exercise Parameters Parameters Parameters Parameters Parameters   Walking forward    With noodle in 4.5 with assist 4    Walking backward    4    Walking sideways          Marching          Goose Step          Tip toes          Heels          Lunges        Side step squats        LE Exercises Rings in deep  Used noodle sometimes in front sometimes behind and 4# wts Supine with head support, noodle, and blue rings 4#      Hip Flex/Ext 2 X 10 B 2 x 10 LAQs while supine 4# wts 10 with noodle and assist      Hip Abd/Add 2 X 10 B 2 x 10  2 x 10      Hip IR/ER          Calf raises          Knee Flex X 10 B  Sitting on bench with assist Seated with 4# and assist 2 x 10 10 x3 pushing off wall Supported standing x 10      Squats          Leg Circles          Step Ups Working on just standing. With diffiuclty. Standing in 4.5 max assist   Standing in 4.5 with noodle and support- worked on extending at hip and knees 4 x 5 with multiple rests    UE Exercises          Squeeze In          Push Down          Pull Down          Bicep/Tricep        Rows/Press outs         Chi Positions          Trunk Rotation        Deep H2O/ Noodles Rings  noodle Blue rings in 7' water and 4# wts Noodle  4#      Stabilization  2 min x 2 2 min  2 min      Arms only          Legs only Rings and sitting on noodle in shallow - 2 laps forward and backward jog1 min Bicycle 3 min Jog x 2min    Cross   Country X 10  1 min 3 min 3 min      Scissors X 10   Bicycle - 2 min 1 min 3 min 2 min      Crab walk        Lower abdominal   work  X 10  Standing in 4.5 trying to stand erect stretching hip flexors and abs        Supine floating  2 x 2 min with pedaling  2 min      Jogging        Lap   Swimming          Semi sit to stand  2 x 5   Bearing weight through legs with Mod /max A  In 4. 5' with assist for keeping feet grounded and pushing through feet x 10 with rest break between each     Seated on bench  Hamstring curls x 10   Seated marching x 10    Seated with asssist      Ankle pumps  Assisted in sittting. Heel cord stretch in supine Heel cord stretch in chair                              Stretching to BLE in all planes while supine with floats. Treatment/Session Assessment:  Pt requires increased assist in water. Able to transfer into and out of lift chair with SBA. Posture seems more extended in deep water and when trying to  4.5. · Response to Treatment:  Patient demonstrates significant B LE weakness. · Compliance with Program/Exercises: Will assess as treatment progresses.   · Recommendations/Intent for next treatment session: \"Next visit will focus on aquatic therapy to address B LE weakness and mobility deficits\".   Total Treatment Duration: 50 minutes   PT Patient Time In/Time Out  Time In: 0145  Time Out: 0230    Rupinder Tamayo PT

## 2018-12-28 ENCOUNTER — HOSPITAL ENCOUNTER (OUTPATIENT)
Dept: PHYSICAL THERAPY | Age: 78
Discharge: HOME | End: 2018-12-28
Payer: OTHER GOVERNMENT

## 2018-12-28 PROCEDURE — 97113 AQUATIC THERAPY/EXERCISES: CPT

## 2018-12-28 NOTE — PROGRESS NOTES
Steve Perez  : 1940  Primary: [address] [facility]  Secondary:  [address] at [facility] HENRY WATSON  [address] [facility], [address], [facility] [address]  [phone]   [phone]          OUTPATIENT PHYSICAL [address] Last Note and Aquatic Note  2018   ICD-10: Treatment Diagnosis: Paraplegia, incomplete (G82.22), Difficulty in walking, not elsewhere classified (R26.2), Muscle weakness (generalized) (M62.81)  Precautions/Allergies:   Patient has no known allergies. MD Orders: Evaluate and treat, aquatic therapy  MEDICAL/REFERRING DIAGNOSIS:  Paraplegia, unspecified [G82.20]   DATE OF ONSET: 1-3-18  REFERRING PHYSICIAN: Emiliano Michel  RETURN PHYSICIAN APPOINTMENT: 18     INITIAL ASSESSMENT:  Mr. Mary Jo Salazar presents with paraplegia following a surgical procedure nearly 1 year ago. Patient exhibits reduced B LE strength, impaired functional mobility, inability to ambulate and reduced independence with ADLs. Patient is likely to benefit from skilled PT intervention to strengthen B LEs in order to improve gait and mobilty. PROBLEM LIST (Impacting functional limitations):  1. Decreased Strength  2. Decreased ADL/Functional Activities  3. Decreased Transfer Abilities  4. Decreased Ambulation Ability/Technique  5. Decreased Balance  6. Decreased [place] with Home Exercise Program INTERVENTIONS PLANNED:  1. Gait Training  2. Home Exercise Program (HEP)  3. Neuromuscular Re-education/Strengthening  4. Therapeutic Activites  5. Therapeutic Exercise/Strengthening  6. Transfer Training  7. Aquatic Therapy   TREATMENT PLAN:  Effective Dates: 2018 TO 2019. Frequency/Duration: 2 visits per week for 10 weeks (in anticipation of approval of more visits)   GOALS: (Goals have been discussed and agreed upon with patient.)  Short-Term Functional Goals: Time Frame: 5 weeks  1. Patient will demonstrate 3-/5 B LE strength  2.  Patient will be able to tolerate 45 minutes of aquatic therapy  3. Patient will be able to stand with max assist on level ground. Discharge Goals: Time Frame: 10 weeks  1. Patient will demonstrate 3+/5 strength in B LEs  2. Patient will be able to ambulate 10 feet with use of appropriate assistive device  3. Patient will be able to perform stand-pivot transfer with appropriate assistive device    Rehabilitation Potential For Stated Goals: Good               The information in this section was collected on 11-28-18 (except where otherwise noted). HISTORY:   History of Present Injury/Illness (Reason for Referral):  Patient underwent two surgeries to address an aortic aneurysm, the second of which occurred in [facility] on 1/31/17. He has been unable to walk due to significant loss of strength in B LEs since this surgery, leaving him unable to ambulate. Was ambulating without any assistive devices prior to this surgery. Utilizes a transfer board at home to transfer into/out of wheelchair, onto/off of toilet and into/out of the shower and into/out of bed. Past Medical History/Comorbidities:   Mr. Mg Dejesus  has a past medical history of Aneurysm [facility]), Atrial flutter ([facility] [place] [address] and Thyroid disease. Mr. Mg Dejesus  has a past surgical history that includes hx tonsillectomy; hx orthopaedic; hx appendectomy; hx colonoscopy (5/08); pr cardiac surg procedure unlist (2016); pr cardiac surg procedure unlist (2016); THORACENTESIS holding xarelto (Left, 6/28/2016); ULTRASOUND (Bilateral, 6/28/2016); THORACENTESIS (Left, 6/12/2016); and ULTRASOUND (Bilateral, 6/12/2016). Social History/Living Environment:    Patient lives with his spouse. Prior Level of Function/Work/Activity:  Patient was independent with all mobility prior to surgery. Is modified independent with wheelchair now. Requires assist from his spouse to complete getting dressed with socks and pulling pants on.        Ambulatory/Rehab Services H2 Model Falls Risk Assessment    Risk Factors:       (1)  Gender [Male] Ability to Rise from Chair:       (4)  Unable to rise without assistance    Falls Prevention Plan: Mobility Assistance Device (specify):  wheelchair, transfer board, shower chair   Total: (5 or greater = High Risk): 5    ©2010 [facility] of [place] [employer] Patent #[id number]. Federal Law prohibits the replication, distribution or use without written permission from [facility] of [place]     Current Medications:       Current Outpatient Medications:     ciprofloxacin HCl (CIPRO) 250 mg tablet, Take 1 Tab by mouth two (2) times a day., Disp: 14 Tab, Rfl: 0  - not taking this currently    carvedilol (COREG) 3.125 mg tablet, Take  by mouth two (2) times daily (with meals). , Disp: , Rfl:     furosemide (LASIX) 40 mg tablet, Take  by mouth daily. , Disp: , Rfl:     lisinopril (PRINIVIL, ZESTRIL) 10 mg tablet, Take 5 mg by mouth daily. , Disp: , Rfl:     potassium chloride SR (K-TAB) 20 mEq tablet, Take 20 mEq by mouth daily. , Disp: , Rfl:     psyllium husk (METAMUCIL) 0.4 gram cap, Take  by mouth three (3) times daily. , Disp: , Rfl:     sertraline (ZOLOFT) 50 mg tablet, TAKE 1 TABLET EVERY DAY, Disp: 90 Tab, Rfl: 1    levothyroxine (SYNTHROID) 100 mcg tablet, TAKE 1 TABLET EVERY DAY BEFORE BREAKFAST, Disp: 90 Tab, Rfl: 1    OTHER,NON-FORMULARY,, Air mattress Dx: prevention of decubiti, Disp: 1 Each, Rfl: 0    OTHER,NON-FORMULARY,, Ultra light wheelchair with drop arms Dx: hemiplegia, Disp: 1 Each, Rfl: 0    OTHER,NON-FORMULARY,, Transfer board Dx: limitation in mobility, Disp: 1 Each, Rfl: 0    OTHER,NON-FORMULARY,, Bariatric potty chair w/drop arms  Dx: limitation in mobility, Disp: 1 Each, Rfl: 0    OTHER,NON-FORMULARY,, Trapeze bar Dx: limitation in mobility, Disp: 1 Each, Rfl: 0    OTHER,NON-FORMULARY,, 14 Fr. In and out urinary catheter. Dx: hemiplegia, Disp: 150 Each, Rfl: 11    aspirin delayed-release 81 mg tablet, Take 81 mg by mouth daily. , Disp: , Rfl:    Date Last Reviewed: 12/28/2018   Number of Personal Factors/Comorbidities that affect the Plan of Care: 1-2: MODERATE COMPLEXITY   EXAMINATION:   11-28-18    Observation/Orthostatic Postural Assessment:          Wears compression stockings on B LEs. Distal swelling to B LEs. Arrives to PT in lightweight wheelchair. Palpation:          Swelling noted to B LEs.   ROM:          Passive range of motion to bilateral knees is within normal limits. Hip flexion contracture bilaterally. Ankle range of motion within normal limits bilaterally passively. Strength:          Hip flexion: 2/5   Neurological Screen:       Sensation along all B LEs dermatomes normal to light touch. Functional Mobility:         Transfers: Independent with use of transfer board into/out of wheelchair to/from mat table        Wheelchair mobility: Independent with use of B UEs        Bed mobility: not assessed as patient sleeps in hospital bed at home   Body Structures Involved:  1. Nerves  2. Bones  3. Joints  4. Muscles Body Functions Affected:  1. Neuromusculoskeletal  2. Movement Related Activities and Participation Affected:  1. Mobility  2. Self Care  3. Domestic Life  4. Interpersonal Interactions and Relationships  5. Community, Social and [place]   Number of elements (examined above) that affect the Plan of Care: 4+: HIGH COMPLEXITY   CLINICAL PRESENTATION:   Presentation: Evolving clinical presentation with changing clinical characteristics: MODERATE COMPLEXITY   CLINICAL DECISION MAKING:   Outcome Measure: Tool Used: Lower Extremity Functional Scale (LEFS)  Score:  Initial: 16/80 Most Recent: X/80 (Date: -- )   Interpretation of Score: 20 questions each scored on a 5 point scale with 0 representing \"extreme difficulty or unable to perform\" and 4 representing \"no difficulty\". The lower the score, the greater the functional disability. 80/80 represents no disability. Minimal detectable change is 9 points.   Score 80 79-63 62-48 47-32 31-16 15-1 0 Modifier CH CI CJ CK CL CM CN       Medical Necessity:   · Skilled intervention continues to be required due to limitations with functional mobility seconadry to B LE weakness. Reason for Services/Other Comments:  · Patient continues to require skilled intervention due to decreased B LE strength and impaired functional mobility. Use of outcome tool(s) and clinical judgement create a POC that gives a: Questionable prediction of patient's progress: MODERATE COMPLEXITY            TREATMENT:   (In addition to Assessment/Re-Assessment sessions the following treatments were rendered)  Pre-treatment Symptoms/Complaints: Pt reports not as tired after last session. Pain: Initial:     0/10 Post Session:  0/10   Patient's spouse attended therapy session. Patient transferred to and from with chair left with assist with sliding board. Aquatic Therapy (45 minutes ) Aquatic treatment performed per flow grid for Decreased muscle strength, Decreased endurance, Decreased range of motion and Decreased activity endurance. Cues provided for body awareness. Assistance by therapist provided for balance and control .       Aquatic Exercise Log       Date  12-11-18 Date  12/14/18   Date  12/18/18 Date  12/26/18 Date  12/28/18     Activity/ Exercise Parameters Parameters Parameters Parameters Parameters   Walking forward    With noodle in 4.5 with assist 4    Walking backward    4    Walking sideways          Marching          Goose Step          Tip toes          Heels          Lunges        Side step squats        LE Exercises Rings in deep  Used noodle sometimes in front sometimes behind and 4# wts Supine with head support, noodle, and blue rings 4# 4# with assist     Hip Flex/Ext 2 X 10 B 2 x 10 LAQs while supine 4# wts 10 with noodle and assist 10 with noodle and assist     Hip Abd/Add 2 X 10 B 2 x 10  2 x 10 2 x 10     Hip IR/ER          Calf raises          Knee Flex X 10 B  Sitting on bench with assist Seated with 4# and assist 2 x 10 10 x3 pushing off wall Supported standing x 10 Supported standing x 10     Squats          Leg Circles          Step Ups Working on just standing. With diffiuclty. Standing in 4.5 max assist   Standing in 4.5 with noodle and support- worked on extending at hip and knees 4 x 5 with multiple rests Standing in 4.5 with support and noodle   4 x 5 with rests   UE Exercises          Squeeze In          Push Down          Pull Down          Bicep/Tricep        Rows/Press outs         Chi Positions          Trunk Rotation        Deep H2O/ Noodles Rings  noodle Blue rings in 7' water and 4# wts Noodle  4# Noodle 4#     Stabilization  2 min x 2 2 min  2 min 2 min     Arms only          Legs only Rings and sitting on noodle in shallow - 2 laps forward and backward jog1 min Bicycle 3 min Jog x 2min Jog x 2 min   Cross   Country X 10  1 min 3 min 3 min 3 min     Scissors X 10   Bicycle - 2 min 1 min 3 min 2 min 2 min     Crab walk        Lower abdominal   work  X 10  Standing in 4.5 trying to stand erect stretching hip flexors and abs        Supine floating  2 x 2 min with pedaling  2 min 2 min     Jogging        Lap   Swimming          Semi sit to stand  2 x 5   Bearing weight through legs with Mod /max A  In 4. 5' with assist for keeping feet grounded and pushing through feet x 10 with rest break between each     Seated on bench  Hamstring curls x 10   Seated marching x 10    Seated with asssist      Ankle pumps  Assisted in sittting. Heel cord stretch in supine Heel cord stretch in chair                              Stretching to BLE in all planes while supine with floats. Treatment/Session Assessment:  Pt requires increased assist in water. Hip contractures and decreased muscle firing making standing and wt bearing difficult without support and  Stabilization. · Response to Treatment:  Patient demonstrates significant B LE weakness. · Compliance with Program/Exercises:  Will assess as treatment progresses. · Recommendations/Intent for next treatment session: \"Next visit will focus on aquatic therapy to address B LE weakness and mobility deficits\".   Total Treatment Duration: 45 minutes   PT Patient Time In/Time Out  Time In: 1230  Time Out: 0115    Marilu Rosen, PT

## 2018-12-31 ENCOUNTER — HOSPITAL ENCOUNTER (OUTPATIENT)
Dept: PHYSICAL THERAPY | Age: 78
Discharge: HOME | End: 2018-12-31
Payer: OTHER GOVERNMENT

## 2018-12-31 PROCEDURE — 97113 AQUATIC THERAPY/EXERCISES: CPT

## 2019-01-04 ENCOUNTER — HOSPITAL ENCOUNTER (OUTPATIENT)
Dept: PHYSICAL THERAPY | Age: 79
Discharge: HOME | End: 2019-01-04
Payer: OTHER GOVERNMENT

## 2019-01-08 ENCOUNTER — HOSPITAL ENCOUNTER (OUTPATIENT)
Age: 79
Setting detail: OBSERVATION
Discharge: HOME | End: 2019-01-09
Attending: EMERGENCY MEDICINE | Admitting: INTERNAL MEDICINE
Payer: MEDICARE

## 2019-01-08 ENCOUNTER — HOSPITAL ENCOUNTER (OUTPATIENT)
Dept: PHYSICAL THERAPY | Age: 79
Discharge: HOME | End: 2019-01-08
Payer: OTHER GOVERNMENT

## 2019-01-08 DIAGNOSIS — K62.5 RECTAL BLEEDING: Primary | ICD-10-CM

## 2019-01-08 PROBLEM — K92.2 GI BLEED: Status: ACTIVE | Noted: 2019-01-08

## 2019-01-08 PROBLEM — I50.22 SYSTOLIC CHF, CHRONIC (HCC): Chronic | Status: ACTIVE | Noted: 2019-01-08

## 2019-01-08 LAB
ALBUMIN SERPL-MCNC: 4.3 G/DL (ref 3.2–4.6)
ALBUMIN/GLOB SERPL: 1 {RATIO} (ref 1.2–3.5)
ALP SERPL-CCNC: 61 U/L (ref 50–136)
ALT SERPL-CCNC: 28 U/L (ref 12–65)
ANION GAP SERPL CALC-SCNC: 9 MMOL/L (ref 7–16)
AST SERPL-CCNC: 23 U/L (ref 15–37)
BASOPHILS # BLD: 0.1 K/UL (ref 0–0.2)
BASOPHILS NFR BLD: 1 % (ref 0–2)
BILIRUB SERPL-MCNC: 0.5 MG/DL (ref 0.2–1.1)
BUN SERPL-MCNC: 52 MG/DL (ref 8–23)
CALCIUM SERPL-MCNC: 9 MG/DL (ref 8.3–10.4)
CHLORIDE SERPL-SCNC: 99 MMOL/L (ref 98–107)
CO2 SERPL-SCNC: 26 MMOL/L (ref 21–32)
CREAT SERPL-MCNC: 1.38 MG/DL (ref 0.8–1.5)
DIFFERENTIAL METHOD BLD: ABNORMAL
EOSINOPHIL # BLD: 0.2 K/UL (ref 0–0.8)
EOSINOPHIL NFR BLD: 2 % (ref 0.5–7.8)
ERYTHROCYTE [DISTWIDTH] IN BLOOD BY AUTOMATED COUNT: 16.4 % (ref 11.9–14.6)
GLOBULIN SER CALC-MCNC: 4.2 G/DL (ref 2.3–3.5)
GLUCOSE SERPL-MCNC: 89 MG/DL (ref 65–100)
HCT VFR BLD AUTO: 33.3 % (ref 41.1–50.3)
HGB BLD-MCNC: 10.7 G/DL (ref 13.6–17.2)
IMM GRANULOCYTES # BLD: 0.1 K/UL (ref 0–0.5)
IMM GRANULOCYTES NFR BLD AUTO: 1 % (ref 0–5)
LYMPHOCYTES # BLD: 1.3 K/UL (ref 0.5–4.6)
LYMPHOCYTES NFR BLD: 13 % (ref 13–44)
MCH RBC QN AUTO: 30.3 PG (ref 26.1–32.9)
MCHC RBC AUTO-ENTMCNC: 32.1 G/DL (ref 31.4–35)
MCV RBC AUTO: 94.3 FL (ref 79.6–97.8)
MONOCYTES # BLD: 1.1 K/UL (ref 0.1–1.3)
MONOCYTES NFR BLD: 11 % (ref 4–12)
NEUTS SEG # BLD: 7.4 K/UL (ref 1.7–8.2)
NEUTS SEG NFR BLD: 73 % (ref 43–78)
NRBC # BLD: 0 K/UL (ref 0–0.2)
PLATELET # BLD AUTO: 193 K/UL (ref 150–450)
PMV BLD AUTO: 9.2 FL (ref 9.4–12.3)
POTASSIUM SERPL-SCNC: 4.7 MMOL/L (ref 3.5–5.1)
PROT SERPL-MCNC: 8.5 G/DL (ref 6.3–8.2)
RBC # BLD AUTO: 3.53 M/UL (ref 4.23–5.6)
SODIUM SERPL-SCNC: 134 MMOL/L (ref 136–145)
WBC # BLD AUTO: 10.1 K/UL (ref 4.3–11.1)

## 2019-01-08 PROCEDURE — 99284 EMERGENCY DEPT VISIT MOD MDM: CPT | Performed by: EMERGENCY MEDICINE

## 2019-01-08 PROCEDURE — 80053 COMPREHEN METABOLIC PANEL: CPT

## 2019-01-08 PROCEDURE — C9113 INJ PANTOPRAZOLE SODIUM, VIA: HCPCS | Performed by: INTERNAL MEDICINE

## 2019-01-08 PROCEDURE — 65390000012 HC CONDITION CODE 44 OBSERVATION

## 2019-01-08 PROCEDURE — 74011000250 HC RX REV CODE- 250: Performed by: INTERNAL MEDICINE

## 2019-01-08 PROCEDURE — 85025 COMPLETE CBC W/AUTO DIFF WBC: CPT

## 2019-01-08 PROCEDURE — 74011250636 HC RX REV CODE- 250/636: Performed by: INTERNAL MEDICINE

## 2019-01-08 PROCEDURE — 65270000029 HC RM PRIVATE

## 2019-01-08 PROCEDURE — 96374 THER/PROPH/DIAG INJ IV PUSH: CPT

## 2019-01-08 PROCEDURE — 86900 BLOOD TYPING SEROLOGIC ABO: CPT

## 2019-01-08 RX ORDER — LORAZEPAM 0.5 MG/1
0.5 TABLET ORAL
Status: DISCONTINUED | OUTPATIENT
Start: 2019-01-08 | End: 2019-01-09 | Stop reason: HOSPADM

## 2019-01-08 RX ORDER — NALOXONE HYDROCHLORIDE 0.4 MG/ML
0.4 INJECTION, SOLUTION INTRAMUSCULAR; INTRAVENOUS; SUBCUTANEOUS AS NEEDED
Status: DISCONTINUED | OUTPATIENT
Start: 2019-01-08 | End: 2019-01-09 | Stop reason: HOSPADM

## 2019-01-08 RX ORDER — ONDANSETRON 2 MG/ML
4 INJECTION INTRAMUSCULAR; INTRAVENOUS
Status: DISCONTINUED | OUTPATIENT
Start: 2019-01-08 | End: 2019-01-09 | Stop reason: HOSPADM

## 2019-01-08 RX ORDER — HYDROCODONE BITARTRATE AND ACETAMINOPHEN 5; 325 MG/1; MG/1
1 TABLET ORAL
Status: DISCONTINUED | OUTPATIENT
Start: 2019-01-08 | End: 2019-01-09 | Stop reason: HOSPADM

## 2019-01-08 RX ORDER — ZOLPIDEM TARTRATE 5 MG/1
5 TABLET ORAL
Status: DISCONTINUED | OUTPATIENT
Start: 2019-01-08 | End: 2019-01-09 | Stop reason: HOSPADM

## 2019-01-08 RX ORDER — ACETAMINOPHEN 325 MG/1
650 TABLET ORAL
Status: DISCONTINUED | OUTPATIENT
Start: 2019-01-08 | End: 2019-01-09 | Stop reason: HOSPADM

## 2019-01-08 RX ORDER — LEVOTHYROXINE SODIUM 112 UG/1
112 TABLET ORAL
Status: DISCONTINUED | OUTPATIENT
Start: 2019-01-09 | End: 2019-01-09 | Stop reason: HOSPADM

## 2019-01-08 RX ORDER — DIPHENHYDRAMINE HCL 25 MG
25 CAPSULE ORAL
Status: DISCONTINUED | OUTPATIENT
Start: 2019-01-08 | End: 2019-01-09 | Stop reason: HOSPADM

## 2019-01-08 RX ORDER — CARVEDILOL 3.12 MG/1
3.12 TABLET ORAL 2 TIMES DAILY WITH MEALS
Status: DISCONTINUED | OUTPATIENT
Start: 2019-01-09 | End: 2019-01-09 | Stop reason: HOSPADM

## 2019-01-08 RX ORDER — SODIUM CHLORIDE 0.9 % (FLUSH) 0.9 %
5-40 SYRINGE (ML) INJECTION AS NEEDED
Status: DISCONTINUED | OUTPATIENT
Start: 2019-01-08 | End: 2019-01-09 | Stop reason: HOSPADM

## 2019-01-08 RX ORDER — SODIUM CHLORIDE 9 MG/ML
50 INJECTION, SOLUTION INTRAVENOUS CONTINUOUS
Status: DISCONTINUED | OUTPATIENT
Start: 2019-01-09 | End: 2019-01-09 | Stop reason: HOSPADM

## 2019-01-08 RX ORDER — AMOXICILLIN 250 MG
2 CAPSULE ORAL
Status: DISCONTINUED | OUTPATIENT
Start: 2019-01-08 | End: 2019-01-09 | Stop reason: HOSPADM

## 2019-01-08 RX ORDER — SODIUM CHLORIDE 0.9 % (FLUSH) 0.9 %
5-40 SYRINGE (ML) INJECTION EVERY 8 HOURS
Status: DISCONTINUED | OUTPATIENT
Start: 2019-01-08 | End: 2019-01-09 | Stop reason: HOSPADM

## 2019-01-08 RX ADMIN — Medication 10 ML: at 23:19

## 2019-01-08 RX ADMIN — SODIUM CHLORIDE 40 MG: 9 INJECTION, SOLUTION INTRAMUSCULAR; INTRAVENOUS; SUBCUTANEOUS at 22:48

## 2019-01-08 RX ADMIN — SODIUM CHLORIDE 50 ML/HR: 900 INJECTION, SOLUTION INTRAVENOUS at 23:54

## 2019-01-08 NOTE — ED NOTES
At current, patient with stable VS. Patient doesn't appear to be in any acute distress. Will secure baseline labs ahead of rooming.

## 2019-01-08 NOTE — ED NOTES
Patient to ED with c/c GIB. Patient reports onset of symptoms 3 days ago. Patient reports at the beginning, the blood seemed bright red and more fluid. Over the past few days, patient notes the blood is to more of a dark red. Patient reports the bleeding as moderate, states can see the blood on the stool in small amounts and in bowl but has not been filling the bowl. Patient currently on xarelto for afib. Patient denies any ABD pain, NV, SHOB. Patient denies any weakness/dizziness.

## 2019-01-09 VITALS
TEMPERATURE: 98.1 F | WEIGHT: 250 LBS | SYSTOLIC BLOOD PRESSURE: 103 MMHG | HEART RATE: 78 BPM | BODY MASS INDEX: 33.86 KG/M2 | RESPIRATION RATE: 20 BRPM | HEIGHT: 72 IN | DIASTOLIC BLOOD PRESSURE: 58 MMHG | OXYGEN SATURATION: 94 %

## 2019-01-09 LAB
ABO + RH BLD: NORMAL
ANION GAP SERPL CALC-SCNC: 8 MMOL/L (ref 7–16)
BASOPHILS # BLD: 0.1 K/UL (ref 0–0.2)
BASOPHILS NFR BLD: 1 % (ref 0–2)
BLOOD GROUP ANTIBODIES SERPL: NORMAL
BUN SERPL-MCNC: 48 MG/DL (ref 8–23)
CALCIUM SERPL-MCNC: 8.8 MG/DL (ref 8.3–10.4)
CHLORIDE SERPL-SCNC: 104 MMOL/L (ref 98–107)
CO2 SERPL-SCNC: 26 MMOL/L (ref 21–32)
CREAT SERPL-MCNC: 1.3 MG/DL (ref 0.8–1.5)
DIFFERENTIAL METHOD BLD: ABNORMAL
EOSINOPHIL # BLD: 0.2 K/UL (ref 0–0.8)
EOSINOPHIL NFR BLD: 2 % (ref 0.5–7.8)
ERYTHROCYTE [DISTWIDTH] IN BLOOD BY AUTOMATED COUNT: 16.8 % (ref 11.9–14.6)
GLUCOSE SERPL-MCNC: 102 MG/DL (ref 65–100)
HCT VFR BLD AUTO: 32.3 % (ref 41.1–50.3)
HGB BLD-MCNC: 10 G/DL (ref 13.6–17.2)
HGB BLD-MCNC: 11 G/DL (ref 13.6–17.2)
IMM GRANULOCYTES # BLD: 0.1 K/UL (ref 0–0.5)
IMM GRANULOCYTES NFR BLD AUTO: 1 % (ref 0–5)
LYMPHOCYTES # BLD: 1.3 K/UL (ref 0.5–4.6)
LYMPHOCYTES NFR BLD: 13 % (ref 13–44)
MCH RBC QN AUTO: 29.2 PG (ref 26.1–32.9)
MCHC RBC AUTO-ENTMCNC: 31 G/DL (ref 31.4–35)
MCV RBC AUTO: 94.4 FL (ref 79.6–97.8)
MONOCYTES # BLD: 1.1 K/UL (ref 0.1–1.3)
MONOCYTES NFR BLD: 11 % (ref 4–12)
NEUTS SEG # BLD: 7.1 K/UL (ref 1.7–8.2)
NEUTS SEG NFR BLD: 73 % (ref 43–78)
NRBC # BLD: 0 K/UL (ref 0–0.2)
PLATELET # BLD AUTO: 159 K/UL (ref 150–450)
PMV BLD AUTO: 10.7 FL (ref 9.4–12.3)
POTASSIUM SERPL-SCNC: 4.1 MMOL/L (ref 3.5–5.1)
RBC # BLD AUTO: 3.42 M/UL (ref 4.23–5.6)
SODIUM SERPL-SCNC: 138 MMOL/L (ref 136–145)
SPECIMEN EXP DATE BLD: NORMAL
WBC # BLD AUTO: 9.8 K/UL (ref 4.3–11.1)

## 2019-01-09 PROCEDURE — 74011250637 HC RX REV CODE- 250/637: Performed by: INTERNAL MEDICINE

## 2019-01-09 PROCEDURE — 99218 HC RM OBSERVATION: CPT

## 2019-01-09 PROCEDURE — 85025 COMPLETE CBC W/AUTO DIFF WBC: CPT

## 2019-01-09 PROCEDURE — 74011250636 HC RX REV CODE- 250/636: Performed by: INTERNAL MEDICINE

## 2019-01-09 PROCEDURE — C9113 INJ PANTOPRAZOLE SODIUM, VIA: HCPCS | Performed by: INTERNAL MEDICINE

## 2019-01-09 PROCEDURE — 96376 TX/PRO/DX INJ SAME DRUG ADON: CPT

## 2019-01-09 PROCEDURE — 65390000012 HC CONDITION CODE 44 OBSERVATION

## 2019-01-09 PROCEDURE — 80048 BASIC METABOLIC PNL TOTAL CA: CPT

## 2019-01-09 PROCEDURE — 85018 HEMOGLOBIN: CPT

## 2019-01-09 PROCEDURE — 74011000250 HC RX REV CODE- 250: Performed by: INTERNAL MEDICINE

## 2019-01-09 RX ADMIN — Medication 10 ML: at 06:00

## 2019-01-09 RX ADMIN — CARVEDILOL 3.12 MG: 3.12 TABLET, FILM COATED ORAL at 08:26

## 2019-01-09 RX ADMIN — SODIUM CHLORIDE 40 MG: 9 INJECTION, SOLUTION INTRAMUSCULAR; INTRAVENOUS; SUBCUTANEOUS at 08:26

## 2019-01-09 NOTE — ED PROVIDER NOTES
Patient is a 76-year-old male who is coming in with rectal bleeding. He states this began about 3 days ago. It was bright red blood at first and now it more dark. He does take xarelto for atrial fibrillation. He states that he did not take today's dose because the bleeding. He denies any pain. Denies any vomiting blood. No bleeding from anywhere else. No history of GI bleeds. The history is provided by the patient. Rectal Bleeding Pertinent negatives include no abdominal pain, no chills, no fever, no nausea, no vomiting and no diarrhea. Past Medical History:  
Diagnosis Date  Aneurysm ([address] surger in [facility] 16  Atrial flutter ([facility] [address] ROSENDO guided cardioversion  Thyroid disease Past Surgical History:  
Procedure Laterality Date  CARDIAC SURG PROCEDURE UNLIST  2016  
 repair of aneurysm in acending and transverse arteries  CARDIAC SURG PROCEDURE UNLIST  2016  
 valve repair  HX APPENDECTOMY  HX COLONOSCOPY    
 diverticulosis  HX ORTHOPAEDIC    
 repair of broken jaw  HX TONSILLECTOMY Family History:  
Problem Relation Age of Onset  Stroke Mother Social History Socioeconomic History  Marital status:  Spouse name: Not on file  Number of children: Not on file  Years of education: Not on file  Highest education level: Not on file Social Needs  Financial resource strain: Not on file  Food insecurity - worry: Not on file  Food insecurity - inability: Not on file  Transportation needs - medical: Not on file  Transportation needs - non-medical: Not on file Occupational History  Not on file Tobacco Use  Smoking status: Former Smoker Packs/day: 3.00 Years: 25.00 Pack years: 75.00 Types: Cigarettes Last attempt to quit: 1986 Years since quittin.0  Smokeless tobacco: Former User Types: Snuff, Chew Quit date: 2014 Substance and Sexual Activity  Alcohol use: Yes Comment: rarely  Drug use: No  
 Sexual activity: Yes  
  Partners: Female Other Topics Concern  Not on file Social History Narrative Lives with wife Currently uses walker for ambulation post-op - [facility] Previously employed as  / , [address] works,  PCP: Dr. Merlin Templeton ALLERGIES: Patient has no known allergies. Review of Systems Constitutional: Negative for chills and fever. Respiratory: Negative for cough, choking, chest tightness, shortness of breath, wheezing and stridor. Cardiovascular: Negative for chest pain and palpitations. Gastrointestinal: Positive for anal bleeding and blood in stool. Negative for abdominal pain, diarrhea, nausea and vomiting. Skin: Negative. All other systems reviewed and are negative. Vitals:  
 01/08/19 1648 BP: 107/65 Pulse: 77 Resp: 18 Temp: 98.2 °F (36.8 °C) SpO2: 95% Weight: 113.4 kg (250 lb) Height: 6' (1.829 m) Physical Exam  
Constitutional: He appears well-developed and well-nourished. No distress. HENT:  
Head: Normocephalic and atraumatic. Eyes: Conjunctivae are normal. No scleral icterus. Neck: No tracheal deviation present. Pulmonary/Chest: Effort normal and breath sounds normal. No stridor. No respiratory distress. He has no wheezes. He has no rales. Abdominal: Soft. Bowel sounds are normal. He exhibits no distension and no mass. There is no tenderness. There is no rebound and no guarding. Genitourinary:  
Genitourinary Comments: Hemoccult is positive. Neurological: He is alert. Skin: He is not diaphoretic. Psychiatric: He has a normal mood and affect. His behavior is normal.  
Nursing note and vitals reviewed. MDM Number of Diagnoses or Management Options Diagnosis management comments: Hemoccult was positive he has slight drop in his hemoglobin. Given that he is on the [address] I am paging hospitalist for admission. Codey Burton MD; 1/8/2019 @8:25 PM Voice dictation software was used during the making of this note. This software is not perfect and grammatical and other typographical errors may be present. This note has not been proofread for errors. 
=================================================================== Amount and/or Complexity of Data Reviewed Clinical lab tests: ordered and reviewed (Results for orders placed or performed during the hospital encounter of 01/08/19 
-CBC WITH AUTOMATED DIFF Result                      Value             Ref Range WBC                         10.1              4.3 - 11.1 K* 
     RBC                         3.53 (L)          4.23 - 5.6 M* HGB                         10.7 (L)          13.6 - 17.2 * HCT                         33.3 (L)          41.1 - 50.3 % MCV                         94.3              79.6 - 97.8 * MCH                         30.3              26.1 - 32.9 * MCHC                        32.1              31.4 - 35.0 * RDW                         16.4 (H)          11.9 - 14.6 % PLATELET                    193               150 - 450 K/* MPV                         9.2 (L)           9.4 - 12.3 FL ABSOLUTE NRBC               0.00              0.0 - 0.2 K/* DF                          AUTOMATED NEUTROPHILS                 73                43 - 78 % LYMPHOCYTES                 13                13 - 44 % MONOCYTES                   11                4.0 - 12.0 % EOSINOPHILS                 2                 0.5 - 7.8 % BASOPHILS                   1                 0.0 - 2.0 % IMMATURE GRANULOCYTES       1                 0.0 - 5.0 %   
     ABS. NEUTROPHILS            7.4               1.7 - 8.2 K/* ABS. LYMPHOCYTES            1.3               0.5 - 4.6 K/* ABS. MONOCYTES              1.1               0.1 - 1.3 K/* ABS. EOSINOPHILS            0.2               0.0 - 0.8 K/* ABS. BASOPHILS              0.1               0.0 - 0.2 K/* ABS. IMM. GRANS.            0.1               0.0 - 0.5 K/* 
-METABOLIC PANEL, COMPREHENSIVE Result                      Value             Ref Range Sodium                      134 (L)           136 - 145 mm* Potassium                   4.7               3.5 - 5.1 mm* Chloride                    99                98 - 107 mmo* CO2                         26                21 - 32 mmol* Anion gap                   9                 7 - 16 mmol/L Glucose                     89                65 - 100 mg/* BUN                         52 (H)            8 - 23 MG/DL Creatinine                  1.38              0.8 - 1.5 MG* 
     GFR est AA                  >60               >60 ml/min/1* GFR est non-AA              53 (L)            >60 ml/min/1* Calcium                     9.0               8.3 - 10.4 M* Bilirubin, total            0.5               0.2 - 1.1 MG* ALT (SGPT)                  28                12 - 65 U/L   
     AST (SGOT)                  23                15 - 37 U/L Alk. phosphatase            61                50 - 136 U/L Protein, total              8.5 (H)           6.3 - 8.2 g/* Albumin                     4.3               3.2 - 4.6 g/* Globulin                    4.2 (H)           2.3 - 3.5 g/* A-G Ratio                   1.0 (L)           1.2 - 3.5     ) Procedures

## 2019-01-09 NOTE — PROGRESS NOTES
Bedside report given to [facility], RN. Date 01/08/19 0700 - 01/09/19 4672 01/09/19 0700 - 01/10/19 8196 Shift [id number] 24 Hour Total 5094-0142 7956-1277 24 Hour Total  
INTAKE Shift Total(mL/kg) OUTPUT Urine(mL/kg/hr)  1300(1) 1300(0.5) Urine  700 700 Urine Voided  600 600 Urine Occurrence(s)  1 x 1 x Shift Total(mL/kg)  1300(11.5) 1300(11.5) NET  -1300 -1300 Weight (kg) 113.4 113.4 113.4 113.4 113.4 113.4

## 2019-01-09 NOTE — PHYSICIAN ADVISORY
Letter of Determination:  Outpatient status receiving Observation Services This patient was originally hospitalized as Inpatient Status on 1/8/2018 for acute gastrointestinal bleed. At this time this patient does not appear to meet the medical necessity requirements to support an inpatient level of care. It is our recommendation that this patient's hospitalization status should be changed from INPATIENT to [place] receiving OBSERVATION services via Condition Code 44.  
  
This may change due to the medical condition of the patient and new clinical evidence as the patients care progreses. The final decision regarding the patient's hospitalization status depends on the attending physician's judgement. Starr Rocha MD, SHEY, Physician Advisor [address] [facility].

## 2019-01-09 NOTE — H&P
HOSPITALIST H&P/CONSULTNAME:  Milvia Reynolds Age:  77 y.o. 
:   1940 MRN:   [id number] PCP: Shara Leal MD 
Consulting MD: Treatment Team: Attending Provider: Roxy Garcia MD; Primary Nurse: Sarah Garcia RN 
HPI:  
Pt is a 77 y.o. male with atrial flutter (recently started on Xarelto), hypothyroidism, h/o aortic aneurysm s/p extensive repair, h/o unknown valve repair, CHF (EF 45-50% from ECHO ) who presents to the ED with rectal bleeding x 3 days. Pt reports being diagnosed with Afib/flutter a few days before Ollie and started on Xarelto at that time. He had been doing well until 3 days ago when he began experiencing bright red blood per rectum. He initially noted it mostly on the toilet paper when wiping but it has progressed, now completely coating his stool and dripping into his underwear. Color has changed to dark red but seems to be increasing in amount. He is unable to describe color of stool as it was coated with blood. He denies abd pain, N/V, hematemesis. No NSAID use. Last colonoscopy >10 years ago. His last dose of Xarelto was . In ED, VSS, hgb 10.7 (down from 12.4 10/2017). ED eval revealed blood in the rectal vault. He is being admitted for GI eval. 
 
 
Complete ROS done and is as stated in HPI or otherwise negative Past Medical History:  
Diagnosis Date  Aneurysm ([address] surger in [facility] 16  Atrial flutter ([facility] [address] ROSENDO guided cardioversion  Thyroid disease Past Surgical History:  
Procedure Laterality Date  CARDIAC SURG PROCEDURE UNLIST  2016  
 repair of aneurysm in acending and transverse arteries  CARDIAC SURG PROCEDURE UNLIST  2016  
 valve repair  HX APPENDECTOMY  HX COLONOSCOPY    
 diverticulosis  HX ORTHOPAEDIC    
 repair of broken jaw  HX TONSILLECTOMY Prior to Admission Medications Prescriptions Last Dose Informant Patient Reported? Taking?   
OTHER,NON-FORMULARY,   No No  
 Sig: Bariatric potty chair w/drop arms  Dx: limitation in mobility OTHER,NON-FORMULARY,   No No  
Sig: Trapeze bar Dx: limitation in mobility OTHER,NON-FORMULARY,   No No  
Si Fr. In and out urinary catheter. Dx: hemiplegia OTHER,NON-FORMULARY,   No No  
Sig: Transfer board Dx: limitation in mobility OTHER,NON-FORMULARY,   No No  
Sig: Ultra light wheelchair with drop arms Dx: hemiplegia OTHER,NON-FORMULARY,   No No  
Sig: Air mattress Dx: prevention of decubiti  
carvedilol (COREG) 3.125 mg tablet   Yes No  
Sig: Take  by mouth two (2) times daily (with meals). furosemide (LASIX) 40 mg tablet   Yes No  
Sig: Take 40 mg by mouth two (2) times a day. levothyroxine (SYNTHROID) 100 mcg tablet   No No  
Sig: TAKE 1 TABLET EVERY DAY BEFORE BREAKFAST  
levothyroxine (SYNTHROID) 112 mcg tablet   Yes No  
Sig: Take  by mouth Daily (before breakfast). lisinopril (PRINIVIL, ZESTRIL) 10 mg tablet   Yes No  
Sig: Take 5 mg by mouth daily. potassium chloride SR (K-TAB) 20 mEq tablet   Yes No  
Sig: Take 20 mEq by mouth daily. psyllium husk (METAMUCIL) 0.4 gram cap   Yes No  
Sig: Take  by mouth three (3) times daily. rivaroxaban (XARELTO) 20 mg tab tablet   Yes Yes Sig: Take  by mouth daily. sertraline (ZOLOFT) 50 mg tablet   No No  
Sig: TAKE 1 TABLET EVERY DAY Facility-Administered Medications: None No Known Allergies Social History Tobacco Use  Smoking status: Former Smoker Packs/day: 3.00 Years: 25.00 Pack years: 75.00 Types: Cigarettes Last attempt to quit: 1986 Years since quittin.0  Smokeless tobacco: Former User Types: Snuff, Chew Quit date: 2014 Substance Use Topics  Alcohol use: Yes Comment: rarely Family History Problem Relation Age of Onset  Stroke Mother History personally reviewed. Objective:  
 
Patient Vitals for the past 24 hrs: 
 Temp Pulse Resp BP SpO2  
19 1908     99 % 01/08/19 1907  (!) 109  176/82 95 % 01/08/19 1648 98.2 °F (36.8 °C) 77 18 107/65 95 % Oxygen Therapy O2 Sat (%): 99 % (01/08/19 1908) Pulse via Oximetry: 109 beats per minute (01/08/19 1907) O2 Device: Room air (01/08/19 1908) Physical Exam: 
General:    Alert, cooperative, no distress, appears stated age. Head:   Normocephalic, without obvious abnormality, atraumatic. Nose:  Nares normal. No drainage or sinus tenderness. Lungs:   Clear to auscultation bilaterally. No Wheezing or Rhonchi. No rales. Heart:   Irregularly irregular,  no murmur, rub or gallop. Abdomen:   Soft, non-tender. Not distended. Bowel sounds normal.  
Extremities: BL nonpitting edema Skin:     Texture, turgor normal. No rashes or lesions. Not Jaundiced Neurologic: Alert and oriented x 3, 3+ to 4/5 strength BL LE (chronic) Data Review:  
Recent Results (from the past 24 hour(s)) CBC WITH AUTOMATED DIFF Collection Time: 01/08/19  4:57 PM  
Result Value Ref Range WBC 10.1 4.3 - 11.1 K/uL  
 RBC 3.53 (L) 4.23 - 5.6 M/uL  
 HGB 10.7 (L) 13.6 - 17.2 g/dL HCT 33.3 (L) 41.1 - 50.3 % MCV 94.3 79.6 - 97.8 FL  
 MCH 30.3 26.1 - 32.9 PG  
 MCHC 32.1 31.4 - 35.0 g/dL  
 RDW 16.4 (H) 11.9 - 14.6 % PLATELET 794 216 - 864 K/uL MPV 9.2 (L) 9.4 - 12.3 FL ABSOLUTE NRBC 0.00 0.0 - 0.2 K/uL  
 DF AUTOMATED NEUTROPHILS 73 43 - 78 % LYMPHOCYTES 13 13 - 44 % MONOCYTES 11 4.0 - 12.0 % EOSINOPHILS 2 0.5 - 7.8 % BASOPHILS 1 0.0 - 2.0 % IMMATURE GRANULOCYTES 1 0.0 - 5.0 %  
 ABS. NEUTROPHILS 7.4 1.7 - 8.2 K/UL  
 ABS. LYMPHOCYTES 1.3 0.5 - 4.6 K/UL  
 ABS. MONOCYTES 1.1 0.1 - 1.3 K/UL  
 ABS. EOSINOPHILS 0.2 0.0 - 0.8 K/UL  
 ABS. BASOPHILS 0.1 0.0 - 0.2 K/UL  
 ABS. IMM. GRANS. 0.1 0.0 - 0.5 K/UL METABOLIC PANEL, COMPREHENSIVE Collection Time: 01/08/19  4:57 PM  
Result Value Ref Range Sodium 134 (L) 136 - 145 mmol/L Potassium 4.7 3.5 - 5.1 mmol/L  Chloride 99 98 - 107 mmol/L  
 CO2 26 21 - 32 mmol/L Anion gap 9 7 - 16 mmol/L Glucose 89 65 - 100 mg/dL BUN 52 (H) 8 - 23 MG/DL Creatinine 1.38 0.8 - 1.5 MG/DL  
 GFR est AA >60 >60 ml/min/1.73m2 GFR est non-AA 53 (L) >60 ml/min/1.73m2 Calcium 9.0 8.3 - 10.4 MG/DL Bilirubin, total 0.5 0.2 - 1.1 MG/DL  
 ALT (SGPT) 28 12 - 65 U/L  
 AST (SGOT) 23 15 - 37 U/L Alk. phosphatase 61 50 - 136 U/L Protein, total 8.5 (H) 6.3 - 8.2 g/dL Albumin 4.3 3.2 - 4.6 g/dL Globulin 4.2 (H) 2.3 - 3.5 g/dL A-G Ratio 1.0 (L) 1.2 - 3.5 Imaging Cline Harada Hazel Abide Assessment and Plan: Active Hospital Problems Diagnosis Date Noted  GI bleed 01/08/2019 Pt is a 77 y.o. male with atrial flutter (recently started on Xarelto), hypothyroidism, h/o aortic aneurysm s/p extensive repair, CHF (EF 45-50% from ECHO 11/17) who presents to the ED with rectal bleeding x 3 days. GI bleed Pt presenting with worsening rectal bleeding over past several days. Recent start to Xarelto. Hgb down compared to 2017. No NSAID use, colonoscopy >10 years ago per pt. - Protonix IV BID 
- CLD then NPO after midnight 
- GI consult 
- holding Xarelto 
- follow hgb q8h 
- type and screen 
- gentle IVFs once NPO REECE ? Elevated BUN secondary to GI bleed vs Lasix. Was noted at recent visit to [facility] in [place] and persisted on repeat labs with PCP yesterday. - holding lasix 
- monitor BMP Atrial fibrillation/flutter Recently diagnosed 12/18.  
- holding Xarelto 
- cont Coreg 
- [facility] [place] ? Cardioversion vs ablation later this month Chronic systolic heart failure EF 45-50% from ECHO 11/17. On Lasix BID - dose unknown. - holding Lasix 
- follow BMP 
- follow volume status closely 
- cont Coreg Chronic LE weakness Wheelchair bound since aneurysm repair years ago. Participating in outpt PT. Hypothyroidism 
- cont Synthroid Pt does not know home meds or doses. Wife is to bring these in tomorrow. Proph- SCDs Full code, wife Anuj Barbour is surrogate decision maker Dispo - >2 midnights Signed By: Yao Covington MD   
 January 8, 2019

## 2019-01-09 NOTE — PROGRESS NOTES
made initial visit. Pt was behind the curtain with staff working with him. Wife of pt was sitting in chair outside the curtain.  welcomed them to [facility] and shared information about  services.  provided spiritual care through presence, pastoral conversation, and assurance of prayer.

## 2019-01-09 NOTE — ED NOTES
I have reviewed discharge instructions with the patient. The patient verbalized understanding. Patient left ED via Discharge Method: wheelchair to Home with self. Opportunity for questions and clarification provided. Patient given 0 scripts. To continue your aftercare when you leave the hospital, you may receive an automated call from our care team to check in on how you are doing. This is a free service and part of our promise to provide the best care and service to meet your aftercare needs.  If you have questions, or wish to unsubscribe from this service please call [phone]. Thank you for Choosing our [facility] Emergency Department.

## 2019-01-09 NOTE — DISCHARGE SUMMARY
Hospitalist Discharge Summary Patient ID: Nataly Dove [id number] 
80 y.o. 
1940 Admit date: 1/8/2019  6:37 PM 
Discharge date and time: 1/9/2019 Attending: No att. providers found PCP:  Smooth Tracey MD 
Treatment Team: Primary Nurse: Mali Burton RN; Utilization Review: Sofie Nageotte, RN 
 
Principal Diagnosis GI bleed Hospital Problems as of 1/9/2019 Date Reviewed: 1/7/2019 Codes Class Noted - Resolved POA * (Principal) GI bleed ICD-10-CM: K92.2 ICD-9-CM: 578.9  1/8/2019 - Present Unknown Systolic CHF, chronic (HCC) (Chronic) ICD-10-CM: L67.95 ICD-9-CM: 428.22, 428.0  1/8/2019 - Present Unknown Atrial fibrillation ([facility] [address] ICD-10-CM: I48.91 
ICD-9-CM: 427.31  6/12/2016 - Present Yes Overview Signed 12/30/2016  7:51 AM by Kimmie Jackson MD  
  Post-op aneurysm repair, brief course amiodarone and xarelto stopped with resolution of afib/flutter following cardioversion Acute renal failure (ARF) (HCC) ICD-10-CM: N17.9 ICD-9-CM: 584.9  6/12/2016 - Present Yes Hypothyroidism (Chronic) ICD-10-CM: E03.9 ICD-9-CM: 244.9  2/4/2014 - Present Yes HPI:  'Pt is a 77 y.o. male with atrial flutter (recently started on Xarelto), hypothyroidism, h/o aortic aneurysm s/p extensive repair, h/o unknown valve repair, CHF (EF 45-50% from ECHO 11/17) who presents to the ED with rectal bleeding x 3 days. 
  
Pt reports being diagnosed with Afib/flutter a few days before Marian and started on Xarelto at that time. He had been doing well until 3 days ago when he began experiencing bright red blood per rectum. He initially noted it mostly on the toilet paper when wiping but it has progressed, now completely coating his stool and dripping into his underwear. Color has changed to dark red but seems to be increasing in amount. He is unable to describe color of stool as it was coated with blood.  He denies abd pain, N/V, hematemesis. No NSAID use. Last colonoscopy >10 years ago. His last dose of Xarelto was 1/7. 
  
In ED, VSS, hgb 10.7 (down from 12.4 10/2017). ED eval revealed blood in the rectal vault. He is being admitted for GI eval.' Hospital Course: He was admitted (then downgraded to observaton) for rectal bleeding. No further rectal bleeding occurred. Hgb trended up to 11. He has been of Xarelto since 1/7/19. He decided that he did not want to have GI workup here and wanted to do it at the [place] in [facility]. As he was stable, he will be discharged home and [place] transport will pick him up at 10 AM tomorrow. This was discussed with admission RN at the [facility]. Significant Diagnostic Studies:  
 
 
Labs: Results:  
   
Chemistry Recent Labs 01/09/19 
0558 01/08/19 
1657 01/07/19 
1224 * 89 100*  134* 138  
K 4.1 4.7 4.7  99 100 CO2 26 26 22 BUN 48* 52* 44* CREA 1.30 1.38 1.39* CA 8.8 9.0 9.1 AGAP 8 9  --   
BUCR  --   --  32* AP  --  61 52 TP  --  8.5* 7.2 ALB  --  4.3 4.3 GLOB  --  4.2*  --   
AGRAT  --  1.0* 1.5  
  
CBC w/Diff Recent Labs 01/09/19 
1301 01/09/19 
0450 01/08/19 
1657 WBC  --  9.8 10.1 RBC  --  3.42* 3.53* HGB 11.0* 10.0* 10.7* HCT  --  32.3* 33.3*  
PLT  --  159 193 GRANS  --  73 73 LYMPH  --  13 13 EOS  --  2 2 Cardiac Enzymes No results for input(s): CPK, CKND1, JUANCARLOS in the last 72 hours. No lab exists for component: Allayne Grater Coagulation No results for input(s): PTP, INR, APTT in the last 72 hours. No lab exists for component: INREXT Lipid Panel Lab Results Component Value Date/Time  Cholesterol, total 200 (H) 07/27/2016 08:45 AM  
 HDL Cholesterol 39 (L) 07/27/2016 08:45 AM  
 LDL, calculated 147 (H) 07/27/2016 08:45 AM  
 VLDL, calculated 14 07/27/2016 08:45 AM  
 Triglyceride 70 07/27/2016 08:45 AM  
 CHOL/HDL Ratio 4.2 05/17/2016 03:40 AM  
  
 BNP No results for input(s): BNPP in the last 72 hours. Liver Enzymes Recent Labs 01/08/19 
1657 TP 8.5* ALB 4.3 AP 61 SGOT 23 Thyroid Studies Lab Results Component Value Date/Time TSH 4.370 08/02/2017 09:50 AM  
    
 
 
Discharge Exam: 
Visit Vitals /58 Pulse 78 Temp 98.1 °F (36.7 °C) Resp 20 Ht 6' (1.829 m) Wt 113.4 kg (250 lb) SpO2 94% BMI 33.91 kg/m² Patient Vitals for the past 24 hrs: 
 Temp Pulse Resp BP SpO2  
01/09/19 1127  78  103/58   
01/09/19 0826  79  131/59   
01/09/19 0718 98.1 °F (36.7 °C) 72 20 131/59 94 % 01/09/19 0239  76 20 142/67 97 % 01/08/19 2341  84 18 115/58 91 % 01/08/19 2201  70 16 (!) 191/93 95 % 01/08/19 2131  83 25 (!) 158/96 97 % 01/08/19 1908     99 % 01/08/19 1907  (!) 109  176/82 95 % 01/08/19 1648 98.2 °F (36.8 °C) 77 18 107/65 95 % General appearance: alert, cooperative, no distress, appears stated age, in wheelchair Lungs: clear to auscultation bilaterally Heart: regular rate and rhythm, S1, S2 normal, no murmur, click, rub or gallop Abdomen: soft, non-tender. Bowel sounds normal. No masses,  no organomegaly Extremities: 1+ leg edema Neurologic: Grossly normal 
 
Disposition: home Discharge Condition: stable Patient Instructions:  
Current Discharge Medication List  
  
CONTINUE these medications which have NOT CHANGED Details  
rivaroxaban (XARELTO) 20 mg tab tablet Take  by mouth daily. !! levothyroxine (SYNTHROID) 112 mcg tablet Take  by mouth Daily (before breakfast). carvedilol (COREG) 3.125 mg tablet Take  by mouth two (2) times daily (with meals). furosemide (LASIX) 40 mg tablet Take 40 mg by mouth two (2) times a day. lisinopril (PRINIVIL, ZESTRIL) 10 mg tablet Take 5 mg by mouth daily. potassium chloride SR (K-TAB) 20 mEq tablet Take 20 mEq by mouth daily. psyllium husk (METAMUCIL) 0.4 gram cap Take  by mouth three (3) times daily. sertraline (ZOLOFT) 50 mg tablet TAKE 1 TABLET EVERY DAY Qty: 90 Tab, Refills: 1  
  
!! levothyroxine (SYNTHROID) 100 mcg tablet TAKE 1 TABLET EVERY DAY BEFORE BREAKFAST Qty: 90 Tab, Refills: 1  
  
!! OTHER,NON-FORMULARY, Air mattress Dx: prevention of decubiti 
Qty: 1 Each, Refills: 0  
  
!! OTHER,NON-FORMULARY, Ultra light wheelchair with drop arms Dx: hemiplegia Qty: 1 Each, Refills: 0  
  
!! OTHER,NON-FORMULARY, Transfer board Dx: limitation in mobility Qty: 1 Each, Refills: 0  
  
!! OTHER,NON-FORMULARY, Bariatric potty chair w/drop arms  Dx: limitation in mobility Qty: 1 Each, Refills: 0  
  
!! OTHER,NON-FORMULARY, Trapeze bar Dx: limitation in mobility Qty: 1 Each, Refills: 0  
  
!! OTHER,NON-FORMULARY, 14 Fr. In and out urinary catheter. Dx: hemiplegia Qty: 150 Each, Refills: 11  
  
 !! - Potential duplicate medications found. Please discuss with provider. Activity: Activity as tolerated Diet: Clear liquid until evaluated by GI He is to hold his xarelto and lisinopril until he has GI evaluation. Follow-up Follow-up Appointments Procedures  FOLLOW UP VISIT Appointment in: Other ([facility]) [facility] Standing Status:   Standing Number of Occurrences:   1 Order Specific Question:   Appointment in Answer: Other (Specify) ·  
· Time spent to discharge patient 25 minutes Signed: 
lEmo Palomo.  Mary Ann Luther MD 
1/9/2019 
1:37 PM

## 2019-01-09 NOTE — PROGRESS NOTES
Virtual Utilization Review RN  has determined this patient meets the Condition Code 44 criteria under the Medicare guidelines for change from Inpatient to observation status. Admission status has been changed in the computer system. A copy of the Utilization Review RN documentation/[facility] Account Notes Report and the MOON letter explaining the outpatient/observation services were given to patient/family representative with verbal explanation and verbal understanding was received about information given. Letter signed by patient with date and time. Signed copy placed in the patient's chart for scanning by HIS and copy left at bedside for patient information.  notified.

## 2019-01-09 NOTE — ED NOTES
This RN spoke with Dr Rich Estrada and he states pt will be getting discharged today and pt will be getting transport to [place] tomorrow morning. Pt states wife is picking him up.

## 2019-01-09 NOTE — PROGRESS NOTES
Straight cath Pt, output 700 mL yellow/straw/hazy. Tolerated well. Pt states he self-caths TID at home unless able to directly sit on toilet.

## 2019-01-09 NOTE — DISCHARGE INSTRUCTIONS
Do not take Xarelto until your GI bleeding has been evaluated and corrected. Do not take your lisinopril until after your bleeding has been stopped.

## 2019-01-11 ENCOUNTER — APPOINTMENT (OUTPATIENT)
Dept: PHYSICAL THERAPY | Age: 79
End: 2019-01-11
Payer: OTHER GOVERNMENT

## 2019-01-15 ENCOUNTER — HOSPITAL ENCOUNTER (OUTPATIENT)
Dept: PHYSICAL THERAPY | Age: 79
End: 2019-01-15
Payer: OTHER GOVERNMENT

## 2019-01-17 ENCOUNTER — HOSPITAL ENCOUNTER (OUTPATIENT)
Dept: PHYSICAL THERAPY | Age: 79
Discharge: HOME | End: 2019-01-17
Payer: OTHER GOVERNMENT

## 2019-01-17 NOTE — PROGRESS NOTES
Nicanor Yashira : 1940 [facility] Secondary:  [facility] [address] [facility], [address] [place] [address],[address], [place] [address]. [phone]   [phone] 2019  Patient's wife called to cancel his appointment today. He is home from the hospital, but still does not feel well.  Mehran Mathis, PTA

## 2019-01-22 ENCOUNTER — HOSPITAL ENCOUNTER (OUTPATIENT)
Dept: PHYSICAL THERAPY | Age: 79
Discharge: HOME | End: 2019-01-22
Payer: OTHER GOVERNMENT

## 2019-01-22 PROCEDURE — 97113 AQUATIC THERAPY/EXERCISES: CPT

## 2019-01-22 NOTE — PROGRESS NOTES
Nicanor Ac : 1940 [facility]:  [facility]rapy Center at [facility] HENRY WATSON [address] [facility], [address] [place] [address],[address], [facility] [address] [phone]   [phone] OUTPATIENT PHYSICAL THERAPY:Daily Note and Aquatic Note  2019 ICD-10: Treatment Diagnosis: Paraplegia, incomplete (G82.22), Difficulty in walking, not elsewhere classified (R26.2), Muscle weakness (generalized) (M62.81) Precautions/Allergies:  
Patient has no known allergies. MD Orders: Evaluate and treat, aquatic therapy  MEDICAL/REFERRING DIAGNOSIS: 
Paraplegia, unspecified [G82.20] DATE OF ONSET: 1-3-18 REFERRING PHYSICIAN: Emiliano Rey RETURN PHYSICIAN APPOINTMENT: 18 INITIAL ASSESSMENT:  Mr. Lesia Barth presents with paraplegia following a surgical procedure nearly 1 year ago. Patient exhibits reduced B LE strength, impaired functional mobility, inability to ambulate and reduced independence with ADLs. Patient is likely to benefit from skilled PT intervention to strengthen B LEs in order to improve gait and mobilty. PROBLEM LIST (Impacting functional limitations): 1. Decreased Strength 2. Decreased ADL/Functional Activities 3. Decreased Transfer Abilities 4. Decreased Ambulation Ability/Technique 5. Decreased Balance 6. Decreased [place] with Home Exercise Program INTERVENTIONS PLANNED: 
1. Gait Training 2. Home Exercise Program (HEP) 3. Neuromuscular Re-education/Strengthening 4. Therapeutic Activites 5. Therapeutic Exercise/Strengthening 6. Transfer Training 7. Aquatic Therapy TREATMENT PLAN: 
Effective Dates: 2018 TO 2019. Frequency/Duration: 2 visits per week for 10 weeks (in anticipation of approval of more visits) GOALS: (Goals have been discussed and agreed upon with patient.) Short-Term Functional Goals: Time Frame: 5 weeks 1. Patient will demonstrate 3-/5 B LE strength 2. Patient will be able to tolerate 45 minutes of aquatic therapy 3. Patient will be able to stand with max assist on level ground. Discharge Goals: Time Frame: 10 weeks 1. Patient will demonstrate 3+/5 strength in B LEs 2. Patient will be able to ambulate 10 feet with use of appropriate assistive device 3. Patient will be able to perform stand-pivot transfer with appropriate assistive device Rehabilitation Potential For Stated Goals: Good The information in this section was collected on 11-28-18 (except where otherwise noted). HISTORY:  
History of Present Injury/Illness (Reason for Referral): 
Patient underwent two surgeries to address an aortic aneurysm, the second of which occurred in [facility] on 1/31/17. He has been unable to walk due to significant loss of strength in B LEs since this surgery, leaving him unable to ambulate. Was ambulating without any assistive devices prior to this surgery. Utilizes a transfer board at home to transfer into/out of wheelchair, onto/off of toilet and into/out of the shower and into/out of bed. Past Medical History/Comorbidities:  
Mr. Geri Ortiz  [facility] Atrial flutter ([facility] [address] and Thyroid disease. He also has no past medical history of PUD (peptic ulcer disease). Mr. Geri Ortiz  has a past surgical history that includes hx tonsillectomy; hx orthopaedic; hx appendectomy; hx colonoscopy (5/08); pr cardiac surg procedure unlist (2016); and pr cardiac surg procedure unlist (2016). Social History/Living Environment:  
 Patient lives with his spouse. Prior Level of Function/Work/Activity: 
Patient was independent with all mobility prior to surgery. Is modified independent with wheelchair now. Requires assist from his spouse to complete getting dressed with socks and pulling pants on. Ambulatory/Rehab Services H2 Model Falls Risk Assessment Risk Factors: 
     (1)  Gender [Male] Ability to Rise from Chair: 
     (4)  Unable to rise without assistance Falls Prevention Plan: Mobility Assistance Device (specify):  wheelchair, transfer board, shower chair Total: (5 or greater = High Risk): 5  
 ©2010 [facility] of [place] States Patent #[id number]. Federal Law prohibits the replication, distribution or use without written permission from [facility] of [employer] Current Medications:   
  
Current Outpatient Medications:  
  ciprofloxacin HCl (CIPRO) 250 mg tablet, Take 1 Tab by mouth two (2) times a day., Disp: 14 Tab, Rfl: 0  - not taking this currently   carvedilol (COREG) 3.125 mg tablet, Take  by mouth two (2) times daily (with meals). , Disp: , Rfl:  
  furosemide (LASIX) 40 mg tablet, Take  by mouth daily. , Disp: , Rfl:  
  lisinopril (PRINIVIL, ZESTRIL) 10 mg tablet, Take 5 mg by mouth daily. , Disp: , Rfl:  
  potassium chloride SR (K-TAB) 20 mEq tablet, Take 20 mEq by mouth daily. , Disp: , Rfl:  
  psyllium husk (METAMUCIL) 0.4 gram cap, Take  by mouth three (3) times daily. , Disp: , Rfl:  
  sertraline (ZOLOFT) 50 mg tablet, TAKE 1 TABLET EVERY DAY, Disp: 90 Tab, Rfl: 1 
  levothyroxine (SYNTHROID) 100 mcg tablet, TAKE 1 TABLET EVERY DAY BEFORE BREAKFAST, Disp: 90 Tab, Rfl: 1 
  OTHER,NON-FORMULARY,, Air mattress Dx: prevention of decubiti, Disp: 1 Each, Rfl: 0 
  OTHER,NON-FORMULARY,, Ultra light wheelchair with drop arms Dx: hemiplegia, Disp: 1 Each, Rfl: 0 
  OTHER,NON-FORMULARY,, Transfer board Dx: limitation in mobility, Disp: 1 Each, Rfl: 0 
  OTHER,NON-FORMULARY,, Bariatric potty chair w/drop arms  Dx: limitation in mobility, Disp: 1 Each, Rfl: 0 
  OTHER,NON-FORMULARY,, Trapeze bar Dx: limitation in mobility, Disp: 1 Each, Rfl: 0 
  OTHER,NON-FORMULARY,, 14 Fr. In and out urinary catheter. Dx: hemiplegia, Disp: 150 Each, Rfl: 11 
  aspirin delayed-release 81 mg tablet, Take 81 mg by mouth daily. , Disp: , Rfl:   
Date Last Reviewed:  1/22/2019 Number of Personal Factors/Comorbidities that affect the Plan of Care: 1-2: MODERATE COMPLEXITY EXAMINATION:  
11-28-18 Observation/Orthostatic Postural Assessment:   
      Wears compression stockings on B LEs. Distal swelling to B LEs. Arrives to PT in lightweight wheelchair. Palpation:   
      Swelling noted to B LEs.  
ROM:   
      Passive range of motion to bilateral knees is within normal limits. Hip flexion contracture bilaterally. Ankle range of motion within normal limits bilaterally passively. Strength:   
      Hip flexion: 2/5 Neurological Screen: 
     Sensation along all B LEs dermatomes normal to light touch. Functional Mobility:  
      Transfers: Independent with use of transfer board into/out of wheelchair to/from mat table Wheelchair mobility: Independent with use of B UEs Bed mobility: not assessed as patient sleeps in hospital bed at home Body Structures Involved: 1. Nerves 2. Bones 3. Joints 4. Muscles Body Functions Affected: 1. Neuromusculoskeletal 
2. Movement Related Activities and Participation Affected: 1. Mobility 2. Self Care 3. Domestic Life 4. Interpersonal Interactions and Relationships 5. Community, Social and [place] Number of elements (examined above) that affect the Plan of Care: 4+: HIGH COMPLEXITY CLINICAL PRESENTATION:  
Presentation: Evolving clinical presentation with changing clinical characteristics: MODERATE COMPLEXITY CLINICAL DECISION MAKING:  
Outcome Measure: Tool Used: Lower Extremity Functional Scale (LEFS) Score:  Initial: 16/80 Most Recent: X/80 (Date: -- ) Interpretation of Score: 20 questions each scored on a 5 point scale with 0 representing \"extreme difficulty or unable to perform\" and 4 representing \"no difficulty\". The lower the score, the greater the functional disability. 80/80 represents no disability. Minimal detectable change is 9 points. Score 80 79-63 62-48 47-32 31-16 15-1 0 Modifier CH CI CJ CK CL CM CN Medical Necessity: · Skilled intervention continues to be required due to limitations with functional mobility seconadry to B LE weakness. Reason for Services/Other Comments: 
· Patient continues to require skilled intervention due to decreased B LE strength and impaired functional mobility. Use of outcome tool(s) and clinical judgement create a POC that gives a: Questionable prediction of patient's progress: MODERATE COMPLEXITY  
  
 
 
 
TREATMENT:  
(In addition to Assessment/Re-Assessment sessions the following treatments were rendered) Pre-treatment Symptoms/Complaints:  Pt states he is ready to get back into the water. He has been out and in the hospital over a week in [place] for bleed. He believes that it was from a hemorrhoid as they could not find anything with endoscopy and colonoscopy. Complaints of pain in his lower abdomin area with standing. Pain: Initial:  
  0/10 Post Session:  0/10 Patient's spouse attended therapy session. Patient transferred to and from with chair left with assist with sliding board. Aquatic Therapy (45 minutes ) Aquatic treatment performed per flow grid for Decreased muscle strength, Decreased endurance, Decreased range of motion and Decreased activity endurance. Cues provided for body awareness. Assistance by therapist provided for balance and control . Aquatic Exercise Log Date 
12/31/18 Date 1/22/19 Activity/ Exercise Parameters Walking forward Using noodle and assit in 4 ft Walking backward 4 Walking sideways Marching Goose Step Tip toes Heels Lunges Side step squats LE Exercises 8# with noodle and assist 4# with blue rings 7' and assist one foot on 4.5'side Hip Flex/Ext 10 15 Hip Abd/Add 10 15 Hip IR/ER Calf raises Knee Flex AA  5 for stretching AA for stretching Squats Leg Circles  15/15 Step Ups Standing with assist Standing with assist x2 twice UE Exercises Squeeze In Push Down Pull Down Bicep/Tricep Rows/Press outs  Chi Positions Trunk Rotation Deep H2O/ Noodles Noodle 8# 7' with blue rings and 4# wts Stabilization 2 min x 2 3 x 2 min each Arms only Legs only Jog 2 min Bicycle 2 min [place] 3 min 2 min Scissors 2 min 2 min Crab walk Lower abdominal  
work   2 min DKTC Supine floating With assist for pedaling 3 min Jogging Lap Swimming Semi sit to stand Seated on bench Ankle pumps Stretching and AA for ankle in chair. · Treatment/Session Assessment:   Pt still requiring lots of assist.  He has declined from missing sessions due to hospitalization. States his lower stomach is bothering him when standing. · Response to Treatment:  Patient demonstrates significant B LE weakness. · Compliance with Program/Exercises: Will assess as treatment progresses. · Recommendations/Intent for next treatment session: \"Next visit will focus on aquatic therapy to address B LE weakness and mobility deficits\". Total Treatment Duration: 45 minutes PT Patient Time In/Time Out Time In: 2545 Time Out: 1785 Fior Carlos, KERRY

## 2019-01-25 ENCOUNTER — APPOINTMENT (OUTPATIENT)
Dept: PHYSICAL THERAPY | Age: 79
End: 2019-01-25
Payer: OTHER GOVERNMENT

## 2019-01-25 ENCOUNTER — HOSPITAL ENCOUNTER (OUTPATIENT)
Dept: PHYSICAL THERAPY | Age: 79
Discharge: HOME | End: 2019-01-25
Payer: OTHER GOVERNMENT

## 2019-01-25 PROCEDURE — 97113 AQUATIC THERAPY/EXERCISES: CPT

## 2019-01-25 NOTE — PROGRESS NOTES
Tony Lewis : 1940 Primary: [address] [facility] Secondary:  Therapy Center at [facility] [place] [address] [facility], [address] [facility] [address], [address] [facility] [phone]   [phone] OUTPATIENT PHYSICAL THERAPY:Daily Note and Aquatic Note  2019 ICD-10: Treatment Diagnosis: Paraplegia, incomplete (G82.22), Difficulty in walking, not elsewhere classified (R26.2), Muscle weakness (generalized) (M62.81) Precautions/Allergies:  
Patient has no known allergies. MD Orders: Evaluate and treat, aquatic therapy  MEDICAL/REFERRING DIAGNOSIS: 
Paraplegia, unspecified [G82.20] DATE OF ONSET: 1-3-18 REFERRING PHYSICIAN: Emiliano Menchaca RETURN PHYSICIAN APPOINTMENT: 18 INITIAL ASSESSMENT:  Mr. Samantha Steel presents with paraplegia following a surgical procedure nearly 1 year ago. Patient exhibits reduced B LE strength, impaired functional mobility, inability to ambulate and reduced independence with ADLs. Patient is likely to benefit from skilled PT intervention to strengthen B LEs in order to improve gait and mobilty. PROBLEM LIST (Impacting functional limitations): 1. Decreased Strength 2. Decreased ADL/Functional Activities 3. Decreased Transfer Abilities 4. Decreased Ambulation Ability/Technique 5. Decreased Balance 6. Decreased [place] with Home Exercise Program INTERVENTIONS PLANNED: 
1. Gait Training 2. Home Exercise Program (HEP) 3. Neuromuscular Re-education/Strengthening 4. Therapeutic Activites 5. Therapeutic Exercise/Strengthening 6. Transfer Training 7. Aquatic Therapy TREATMENT PLAN: 
Effective Dates: 2018 TO 2019. Frequency/Duration: 2 visits per week for 10 weeks (in anticipation of approval of more visits) GOALS: (Goals have been discussed and agreed upon with patient.) Short-Term Functional Goals: Time Frame: 5 weeks 1. Patient will demonstrate 3-/5 B LE strength 2. Patient will be able to tolerate 45 minutes of aquatic therapy 3. Patient will be able to stand with max assist on level ground. Discharge Goals: Time Frame: 10 weeks 1. Patient will demonstrate 3+/5 strength in B LEs 2. Patient will be able to ambulate 10 feet with use of appropriate assistive device 3. Patient will be able to perform stand-pivot transfer with appropriate assistive device Rehabilitation Potential For Stated Goals: Good The information in this section was collected on 11-28-18 (except where otherwise noted). HISTORY:  
History of Present Injury/Illness (Reason for Referral): 
Patient underwent two surgeries to address an aortic aneurysm, the second of which occurred in [facility] on 1/31/17. He has been unable to walk due to significant loss of strength in B LEs since this surgery, leaving him unable to ambulate. Was ambulating without any assistive devices prior to this surgery. Utilizes a transfer board at home to transfer into/out of wheelchair, onto/off of toilet and into/out of the shower and into/out of bed. Past Medical History/Comorbidities:  
Mr. Samantha Rust  has a past medical history of Aneurysm [facility]), Atrial flutter ([facility] [address] and Thyroid disease. He also has no past medical history of PUD (peptic ulcer disease). Mr. Samantha Rust  has a past surgical history that includes hx tonsillectomy; hx orthopaedic; hx appendectomy; hx colonoscopy (5/08); pr cardiac surg procedure unlist (2016); and pr cardiac surg procedure unlist (2016). Social History/Living Environment:  
 Patient lives with his spouse. Prior Level of Function/Work/Activity: 
Patient was independent with all mobility prior to surgery. Is modified independent with wheelchair now. Requires assist from his spouse to complete getting dressed with socks and pulling pants on. Ambulatory/Rehab Services H2 Model Falls Risk Assessment Risk Factors: 
     (1)  Gender [Male] Ability to Rise from Chair: (4)  Unable to rise without assistance Falls Prevention Plan: Mobility Assistance Device (specify):  wheelchair, transfer board, shower chair Total: (5 or greater = High Risk): 5  
 ©2010 [facility] [place] . [facility] States Patent #[id number]. Federal Law prohibits the replication, distribution or use without written permission from [facility] of [address] [place] [address] Current Medications:   
  
Current Outpatient Medications:  
  ciprofloxacin HCl (CIPRO) 250 mg tablet, Take 1 Tab by mouth two (2) times a day., Disp: 14 Tab, Rfl: 0  - not taking this currently   carvedilol (COREG) 3.125 mg tablet, Take  by mouth two (2) times daily (with meals). , Disp: , Rfl:  
  furosemide (LASIX) 40 mg tablet, Take  by mouth daily. , Disp: , Rfl:  
  lisinopril (PRINIVIL, ZESTRIL) 10 mg tablet, Take 5 mg by mouth daily. , Disp: , Rfl:  
  potassium chloride SR (K-TAB) 20 mEq tablet, Take 20 mEq by mouth daily. , Disp: , Rfl:  
  psyllium husk (METAMUCIL) 0.4 gram cap, Take  by mouth three (3) times daily. , Disp: , Rfl:  
  sertraline (ZOLOFT) 50 mg tablet, TAKE 1 TABLET EVERY DAY, Disp: 90 Tab, Rfl: 1 
  levothyroxine (SYNTHROID) 100 mcg tablet, TAKE 1 TABLET EVERY DAY BEFORE BREAKFAST, Disp: 90 Tab, Rfl: 1 
  OTHER,NON-FORMULARY,, Air mattress Dx: prevention of decubiti, Disp: 1 Each, Rfl: 0 
  OTHER,NON-FORMULARY,, Ultra light wheelchair with drop arms Dx: hemiplegia, Disp: 1 Each, Rfl: 0 
  OTHER,NON-FORMULARY,, Transfer board Dx: limitation in mobility, Disp: 1 Each, Rfl: 0 
  OTHER,NON-FORMULARY,, Bariatric potty chair w/drop arms  Dx: limitation in mobility, Disp: 1 Each, Rfl: 0 
  OTHER,NON-FORMULARY,, Trapeze bar Dx: limitation in mobility, Disp: 1 Each, Rfl: 0 
  OTHER,NON-FORMULARY,, 14 Fr. In and out urinary catheter. Dx: hemiplegia, Disp: 150 Each, Rfl: 11 
  aspirin delayed-release 81 mg tablet, Take 81 mg by mouth daily. , Disp: , Rfl:   
Date Last Reviewed:  1/25/2019 Number of Personal Factors/Comorbidities that affect the Plan of Care: 1-2: MODERATE COMPLEXITY EXAMINATION:  
11-28-18 Observation/Orthostatic Postural Assessment:   
      Wears compression stockings on B LEs. Distal swelling to B LEs. Arrives to PT in lightweight wheelchair. Palpation:   
      Swelling noted to B LEs.  
ROM:   
      Passive range of motion to bilateral knees is within normal limits. Hip flexion contracture bilaterally. Ankle range of motion within normal limits bilaterally passively. Strength:   
      Hip flexion: 2/5 Neurological Screen: 
     Sensation along all B LEs dermatomes normal to light touch. Functional Mobility:  
      Transfers: Independent with use of transfer board into/out of wheelchair to/from mat table Wheelchair mobility: Independent with use of B UEs Bed mobility: not assessed as patient sleeps in hospital bed at home Body Structures Involved: 1. Nerves 2. Bones 3. Joints 4. Muscles Body Functions Affected: 1. Neuromusculoskeletal 
2. Movement Related Activities and Participation Affected: 1. Mobility 2. Self Care 3. Domestic Life 4. Interpersonal Interactions and Relationships 5. Community, Social and [place] Number of elements (examined above) that affect the Plan of Care: 4+: HIGH COMPLEXITY CLINICAL PRESENTATION:  
Presentation: Evolving clinical presentation with changing clinical characteristics: MODERATE COMPLEXITY CLINICAL DECISION MAKING:  
Outcome Measure: Tool Used: Lower Extremity Functional Scale (LEFS) Score:  Initial: 16/80 Most Recent: X/80 (Date: -- ) Interpretation of Score: 20 questions each scored on a 5 point scale with 0 representing \"extreme difficulty or unable to perform\" and 4 representing \"no difficulty\". The lower the score, the greater the functional disability. 80/80 represents no disability. Minimal detectable change is 9 points.  
Score 80 79-63 62-48 47-32 31-16 15-1 0  
 Modifier CH CI CJ CK CL CM CN Medical Necessity:  
· Skilled intervention continues to be required due to limitations with functional mobility seconadry to B LE weakness. Reason for Services/Other Comments: 
· Patient continues to require skilled intervention due to decreased B LE strength and impaired functional mobility. Use of outcome tool(s) and clinical judgement create a POC that gives a: Questionable prediction of patient's progress: MODERATE COMPLEXITY  
  
 
 
 
TREATMENT:  
(In addition to Assessment/Re-Assessment sessions the following treatments were rendered) Pre-treatment Symptoms/Complaints:  Pt states he is getting back into his standing frame at home several times a day. Pain: Initial:  
  0/10 Post Session:  0/10 Patient's spouse attended therapy session. Patient transferred to and from with chair left with assist with sliding board. Aquatic Therapy (45 minutes ) Aquatic treatment performed per flow grid for Decreased muscle strength, Decreased endurance, Decreased range of motion and Decreased activity endurance. Cues provided for body awareness. Assistance by therapist provided for balance and control . Aquatic Exercise Log Date 
12/31/18 Date 1/22/19 1/25/19 Activity/ Exercise Parameters Walking forward Using noodle and assit in 4 ft  Using noodle with assist 4x Walking backward 4 Walking sideways Marching Goose Step Tip toes Heels Lunges Side step squats LE Exercises 8# with noodle and assist 4# with blue rings 7' and assist one foot on 4.5'side 4# with noocde and asist  
 
Hip Flex/Ext 10 15 15 Hip Abd/Add 10 15 15 Hip IR/ER Calf raises Knee Flex AA  5 for stretching AA for stretching AA for stretch 15 Squats Leg Circles  15/15 15/15 Step Ups Standing with assist Standing with assist x2 twice UE Exercises Squeeze In Push Down Pull Down Bicep/Tricep Rows/Press outs  Chi Positions Trunk Rotation Deep H2O/ Noodles Noodle 8# 7' with blue rings and 4# wts Noodle and 4# Stabilization 2 min x 2 3 x 2 min each 2 min Arms only Legs only Jog 2 min Bicycle 2 min 2 min [place] 3 min 2 min 2 min Scissors 2 min 2 min 2 min Crab walk Lower abdominal  
work   2 min DKTC 2 min Supine floating With assist for pedaling 3 min  Against wall in leg press position for stretch and strength. Min resistance applied 10  
 
Jogging Lap Swimming Semi sit to stand Seated on bench Ankle pumps Stretching and AA for ankle in chair. · Treatment/Session Assessment:  Max asssit in pool. More motion in pool. Tightness in ankle joint, hip joint. · Response to Treatment:  Patient demonstrates significant B LE weakness. · Compliance with Program/Exercises: Will assess as treatment progresses. · Recommendations/Intent for next treatment session: \"Next visit will focus on aquatic therapy to address B LE weakness and mobility deficits\". Total Treatment Duration: 45 minutes PT Patient Time In/Time Out Time In: 1200 Time Out: 1245 Rose Mary Crowley, PT

## 2019-01-29 ENCOUNTER — APPOINTMENT (OUTPATIENT)
Dept: PHYSICAL THERAPY | Age: 79
End: 2019-01-29
Payer: OTHER GOVERNMENT

## 2019-01-29 ENCOUNTER — HOSPITAL ENCOUNTER (OUTPATIENT)
Dept: PHYSICAL THERAPY | Age: 79
Discharge: HOME | End: 2019-01-29
Payer: OTHER GOVERNMENT

## 2019-01-29 PROCEDURE — 97113 AQUATIC THERAPY/EXERCISES: CPT

## 2019-01-29 NOTE — PROGRESS NOTES
Jimena Cerda : 1940 Primary: [address] [facility] Secondary:  Therapy Center at [facility] HENRY WATSON [address] [facility], [address] [facility] [address], [place] [address]. [phone]   [phone] OUTPATIENT PHYSICAL THERAPY:Daily Note and Aquatic Note  2019 ICD-10: Treatment Diagnosis: Paraplegia, incomplete (G82.22), Difficulty in walking, not elsewhere classified (R26.2), Muscle weakness (generalized) (M62.81) Precautions/Allergies:  
Patient has no known allergies. MD Orders: Evaluate and treat, aquatic therapy  MEDICAL/REFERRING DIAGNOSIS: 
Paraplegia, unspecified [G82.20] DATE OF ONSET: 1-3-18 REFERRING PHYSICIAN: Emiliano Love RETURN PHYSICIAN APPOINTMENT: 18 INITIAL ASSESSMENT:  Mr. Janna Vasquez presents with paraplegia following a surgical procedure nearly 1 year ago. Patient exhibits reduced B LE strength, impaired functional mobility, inability to ambulate and reduced independence with ADLs. Patient is likely to benefit from skilled PT intervention to strengthen B LEs in order to improve gait and mobilty. PROBLEM LIST (Impacting functional limitations): 1. Decreased Strength 2. Decreased ADL/Functional Activities 3. Decreased Transfer Abilities 4. Decreased Ambulation Ability/Technique 5. Decreased Balance 6. Decreased [place] with Home Exercise Program INTERVENTIONS PLANNED: 
1. Gait Training 2. Home Exercise Program (HEP) 3. Neuromuscular Re-education/Strengthening 4. Therapeutic Activites 5. Therapeutic Exercise/Strengthening 6. Transfer Training 7. Aquatic Therapy TREATMENT PLAN: 
Effective Dates: 2018 TO 2019. Frequency/Duration: 2 visits per week for 10 weeks (in anticipation of approval of more visits) GOALS: (Goals have been discussed and agreed upon with patient.) Short-Term Functional Goals: Time Frame: 5 weeks 1. Patient will demonstrate 3-/5 B LE strength 2. Patient will be able to tolerate 45 minutes of aquatic therapy 3. Patient will be able to stand with max assist on level ground. Discharge Goals: Time Frame: 10 weeks 1. Patient will demonstrate 3+/5 strength in B LEs 2. Patient will be able to ambulate 10 feet with use of appropriate assistive device 3. Patient will be able to perform stand-pivot transfer with appropriate assistive device Rehabilitation Potential For Stated Goals: Good The information in this section was collected on 11-28-18 (except where otherwise noted). HISTORY:  
History of Present Injury/Illness (Reason for Referral): 
Patient underwent two surgeries to address an aortic aneurysm, the second of which occurred in [facility] on 1/31/17. He has been unable to walk due to significant loss of strength in B LEs since this surgery, leaving him unable to ambulate. Was ambulating without any assistive devices prior to this surgery. Utilizes a transfer board at home to transfer into/out of wheelchair, onto/off of toilet and into/out of the shower and into/out of bed. Past Medical History/Comorbidities:  
Mr. Fanny Harrison  has a past medical history of Aneurysm [facility]), Atrial flutter ([facility] [address] and Thyroid disease. He also has no past medical history of PUD (peptic ulcer disease). Mr. Fanny Harrison  has a past surgical history that includes hx tonsillectomy; hx orthopaedic; hx appendectomy; hx colonoscopy (5/08); pr cardiac surg procedure unlist (2016); and pr cardiac surg procedure unlist (2016). Social History/Living Environment:  
 Patient lives with his spouse. Prior Level of Function/Work/Activity: 
Patient was independent with all mobility prior to surgery. Is modified independent with wheelchair now. Requires assist from his spouse to complete getting dressed with socks and pulling pants on. Ambulatory/Rehab Services H2 Model Falls Risk Assessment Risk Factors: 
     (1)  Gender [Male] Ability to Rise from Chair: (4)  Unable to rise without assistance Falls Prevention Plan: Mobility Assistance Device (specify):  wheelchair, transfer board, shower chair Total: (5 or greater = High Risk): 5  
 ©2010 [facility] [address] [place] [address] Patent #[id number]. Federal Law prohibits the replication, distribution or use without written permission from [facility] [employer] Current Medications:   
  
Current Outpatient Medications:  
  ciprofloxacin HCl (CIPRO) 250 mg tablet, Take 1 Tab by mouth two (2) times a day., Disp: 14 Tab, Rfl: 0  - not taking this currently   carvedilol (COREG) 3.125 mg tablet, Take  by mouth two (2) times daily (with meals). , Disp: , Rfl:  
  furosemide (LASIX) 40 mg tablet, Take  by mouth daily. , Disp: , Rfl:  
  lisinopril (PRINIVIL, ZESTRIL) 10 mg tablet, Take 5 mg by mouth daily. , Disp: , Rfl:  
  potassium chloride SR (K-TAB) 20 mEq tablet, Take 20 mEq by mouth daily. , Disp: , Rfl:  
  psyllium husk (METAMUCIL) 0.4 gram cap, Take  by mouth three (3) times daily. , Disp: , Rfl:  
  sertraline (ZOLOFT) 50 mg tablet, TAKE 1 TABLET EVERY DAY, Disp: 90 Tab, Rfl: 1 
  levothyroxine (SYNTHROID) 100 mcg tablet, TAKE 1 TABLET EVERY DAY BEFORE BREAKFAST, Disp: 90 Tab, Rfl: 1 
  OTHER,NON-FORMULARY,, Air mattress Dx: prevention of decubiti, Disp: 1 Each, Rfl: 0 
  OTHER,NON-FORMULARY,, Ultra light wheelchair with drop arms Dx: hemiplegia, Disp: 1 Each, Rfl: 0 
  OTHER,NON-FORMULARY,, Transfer board Dx: limitation in mobility, Disp: 1 Each, Rfl: 0 
  OTHER,NON-FORMULARY,, Bariatric potty chair w/drop arms  Dx: limitation in mobility, Disp: 1 Each, Rfl: 0 
  OTHER,NON-FORMULARY,, Trapeze bar Dx: limitation in mobility, Disp: 1 Each, Rfl: 0 
  OTHER,NON-FORMULARY,, 14 Fr. In and out urinary catheter. Dx: hemiplegia, Disp: 150 Each, Rfl: 11 
  aspirin delayed-release 81 mg tablet, Take 81 mg by mouth daily. , Disp: , Rfl:   
Date Last Reviewed:  1/31/2019 Number of Personal Factors/Comorbidities that affect the Plan of Care: 1-2: MODERATE COMPLEXITY EXAMINATION:  
11-28-18 Observation/Orthostatic Postural Assessment:   
      Wears compression stockings on B LEs. Distal swelling to B LEs. Arrives to PT in lightweight wheelchair. Palpation:   
      Swelling noted to B LEs.  
ROM:   
      Passive range of motion to bilateral knees is within normal limits. Hip flexion contracture bilaterally. Ankle range of motion within normal limits bilaterally passively. Strength:   
      Hip flexion: 2/5 Neurological Screen: 
     Sensation along all B LEs dermatomes normal to light touch. Functional Mobility:  
      Transfers: Independent with use of transfer board into/out of wheelchair to/from mat table Wheelchair mobility: Independent with use of B UEs Bed mobility: not assessed as patient sleeps in hospital bed at home Body Structures Involved: 1. Nerves 2. Bones 3. Joints 4. Muscles Body Functions Affected: 1. Neuromusculoskeletal 
2. Movement Related Activities and Participation Affected: 1. Mobility 2. Self Care 3. Domestic Life 4. Interpersonal Interactions and Relationships 5. Community, Social and [place] Number of elements (examined above) that affect the Plan of Care: 4+: HIGH COMPLEXITY CLINICAL PRESENTATION:  
Presentation: Evolving clinical presentation with changing clinical characteristics: MODERATE COMPLEXITY CLINICAL DECISION MAKING:  
Outcome Measure: Tool Used: Lower Extremity Functional Scale (LEFS) Score:  Initial: 16/80 Most Recent: X/80 (Date: -- ) Interpretation of Score: 20 questions each scored on a 5 point scale with 0 representing \"extreme difficulty or unable to perform\" and 4 representing \"no difficulty\". The lower the score, the greater the functional disability. 80/80 represents no disability. Minimal detectable change is 9 points.  
Score 80 79-63 62-48 47-32 31-16 15-1 0  
 Modifier CH CI CJ CK CL CM CN Medical Necessity:  
· Skilled intervention continues to be required due to limitations with functional mobility seconadry to B LE weakness. Reason for Services/Other Comments: 
· Patient continues to require skilled intervention due to decreased B LE strength and impaired functional mobility. Use of outcome tool(s) and clinical judgement create a POC that gives a: Questionable prediction of patient's progress: MODERATE COMPLEXITY  
  
 
 
 
TREATMENT:  
(In addition to Assessment/Re-Assessment sessions the following treatments were rendered) Pre-treatment Symptoms/Complaints:   Pt states he has increased his time in the standing frame by 5 minutes each. Pain: Initial:  
  0/10 Post Session:  0/10 Patient's spouse attended therapy session. Patient transferred to and from with chair left with assist with sliding board. Aquatic Therapy (45 minutes ) Aquatic treatment performed per flow grid for Decreased muscle strength, Decreased endurance, Decreased range of motion and Decreased activity endurance. Cues provided for body awareness. Assistance by therapist provided for balance and control . Aquatic Exercise Log Date 
12/31/18 Date 1/22/19 1/25/19 Date 1/29/19 Activity/ Exercise Parameters Walking forward Using noodle and assit in 4 ft  Using noodle with assist 4x Walking backward 4 Walking sideways Marching Goose Step Tip toes Heels Lunges Side step squats LE Exercises 8# with noodle and assist 4# with blue rings 7' and assist one foot on 4.5'side 4# with noocde and asist 4# with blue rings 7' and assist one foot on 4.5'side Hip Flex/Ext 10 15 15 15 Hip Abd/Add 10 15 15 15 Hip IR/ER Calf raises Knee Flex AA  5 for stretching AA for stretching AA for stretch 15 AA for stretch 15 Squats Leg Circles  15/15 15/15 15/15 Step Ups Standing with assist Standing with assist x2 twice   Standing with assist x2 people - x3 UE Exercises Squeeze In Push Down Pull Down Bicep/Tricep Rows/Press outs  Chi Positions Trunk Rotation Deep H2O/ Noodles Noodle 8# 7' with blue rings and 4# wts Noodle and 4# 7' with bluee rings and 4# wts Stabilization 2 min x 2 3 x 2 min each 2 min 2 min Arms only Legs only Jog 2 min Bicycle 2 min 2 min 2 min [employer] [place] 3 min 2 min 2 min 2 min Scissors 2 min 2 min 2 min 2 min Crab walk Lower abdominal  
work   2 min DKTC 2 min DKTC 2 min Sit ups supine with assist x10 Supine floating With assist for pedaling 3 min  Against wall in leg press position for stretch and strength. Min resistance applied 10 Against therapist in leg press position for stretch and strength. Min resistance applied 10  
 
Jogging Lap Swimming Semi sit to stand Seated on bench Ankle pumps Stretching and AA for ankle in chair. · Treatment/Session Assessment:   Pt still requires maximal assist in the water. He is not able to bear weight on his LE. · Response to Treatment:  Patient demonstrates significant B LE weakness. · Compliance with Program/Exercises: Will assess as treatment progresses. · Recommendations/Intent for next treatment session: \"Next visit will focus on aquatic therapy to address B LE weakness and mobility deficits\". Total Treatment Duration: 45 minutes PT Patient Time In/Time Out Time In: 1300 Time Out: 2657 Arden Ferguson PTA

## 2019-01-31 ENCOUNTER — HOSPITAL ENCOUNTER (OUTPATIENT)
Dept: PHYSICAL THERAPY | Age: 79
Discharge: HOME | End: 2019-01-31
Payer: OTHER GOVERNMENT

## 2019-01-31 PROCEDURE — 97113 AQUATIC THERAPY/EXERCISES: CPT

## 2019-01-31 NOTE — PROGRESS NOTES
Blanchard Kussmaul : 1940 Primary: [address] [facility] Secondary:  Therapy Center at [facility] HENRY WATSON [address] [facility], [address] [place] [address], [facility] [address] [phone]   [phone] OUTPATIENT PHYSICAL THERAPY:Daily Note and Aquatic Note  2019 ICD-10: Treatment Diagnosis: Paraplegia, incomplete (G82.22), Difficulty in walking, not elsewhere classified (R26.2), Muscle weakness (generalized) (M62.81) Precautions/Allergies:  
Patient has no known allergies. MD Orders: Evaluate and treat, aquatic therapy  MEDICAL/REFERRING DIAGNOSIS: 
Paraplegia, unspecified [G82.20] DATE OF ONSET: 1-3-18 REFERRING PHYSICIAN: Emiliano ADAMS PHYSICIAN APPOINTMENT: 18 INITIAL ASSESSMENT:  Mr. Iliana Pagan presents with paraplegia following a surgical procedure nearly 1 year ago. Patient exhibits reduced B LE strength, impaired functional mobility, inability to ambulate and reduced independence with ADLs. Patient is likely to benefit from skilled PT intervention to strengthen B LEs in order to improve gait and mobilty. PROBLEM LIST (Impacting functional limitations): 1. Decreased Strength 2. Decreased ADL/Functional Activities 3. Decreased Transfer Abilities 4. Decreased Ambulation Ability/Technique 5. Decreased Balance 6. Decreased [place] with Home Exercise Program INTERVENTIONS PLANNED: 
1. Gait Training 2. Home Exercise Program (HEP) 3. Neuromuscular Re-education/Strengthening 4. Therapeutic Activites 5. Therapeutic Exercise/Strengthening 6. Transfer Training 7. Aquatic Therapy TREATMENT PLAN: 
Effective Dates: 2018 TO 2019. Frequency/Duration: 2 visits per week for 10 weeks (in anticipation of approval of more visits) GOALS: (Goals have been discussed and agreed upon with patient.) Short-Term Functional Goals: Time Frame: 5 weeks 1. Patient will demonstrate 3-/5 B LE strength 2. Patient will be able to tolerate 45 minutes of aquatic therapy 3. Patient will be able to stand with max assist on level ground. Discharge Goals: Time Frame: 10 weeks 1. Patient will demonstrate 3+/5 strength in B LEs 2. Patient will be able to ambulate 10 feet with use of appropriate assistive device 3. Patient will be able to perform stand-pivot transfer with appropriate assistive device Rehabilitation Potential For Stated Goals: Good The information in this section was collected on 11-28-18 (except where otherwise noted). HISTORY:  
History of Present Injury/Illness (Reason for Referral): 
Patient underwent two surgeries to address an aortic aneurysm, the second of which occurred in [facility] on 1/31/17. He has been unable to walk due to significant loss of strength in B LEs since this surgery, leaving him unable to ambulate. Was ambulating without any assistive devices prior to this surgery. Utilizes a transfer board at home to transfer into/out of wheelchair, onto/off of toilet and into/out of the shower and into/out of bed. Past Medical History/Comorbidities:  
Mr. Shayla Lee  has a past medical history of Aneurysm [facility]), Atrial flutter ([facility] [address] and Thyroid disease. He also has no past medical history of PUD (peptic ulcer disease). Mr. Shayla Lee  has a past surgical history that includes hx tonsillectomy; hx orthopaedic; hx appendectomy; hx colonoscopy (5/08); pr cardiac surg procedure unlist (2016); and pr cardiac surg procedure unlist (2016). Social History/Living Environment:  
 Patient lives with his spouse. Prior Level of Function/Work/Activity: 
Patient was independent with all mobility prior to surgery. Is modified independent with wheelchair now. Requires assist from his spouse to complete getting dressed with socks and pulling pants on. Ambulatory/Rehab Services H2 Model Falls Risk Assessment Risk Factors: 
     (1)  Gender [Male] Ability to Rise from Chair: (4)  Unable to rise without assistance Falls Prevention Plan: Mobility Assistance Device (specify):  wheelchair, transfer board, shower chair Total: (5 or greater = High Risk): 5  
 ©2010 [facility] Patent #[id number]. Federal Law prohibits the replication, distribution or use without written permission from [facility] [employer] Current Medications:   
  
Current Outpatient Medications:  
  ciprofloxacin HCl (CIPRO) 250 mg tablet, Take 1 Tab by mouth two (2) times a day., Disp: 14 Tab, Rfl: 0  - not taking this currently   carvedilol (COREG) 3.125 mg tablet, Take  by mouth two (2) times daily (with meals). , Disp: , Rfl:  
  furosemide (LASIX) 40 mg tablet, Take  by mouth daily. , Disp: , Rfl:  
  lisinopril (PRINIVIL, ZESTRIL) 10 mg tablet, Take 5 mg by mouth daily. , Disp: , Rfl:  
  potassium chloride SR (K-TAB) 20 mEq tablet, Take 20 mEq by mouth daily. , Disp: , Rfl:  
  psyllium husk (METAMUCIL) 0.4 gram cap, Take  by mouth three (3) times daily. , Disp: , Rfl:  
  sertraline (ZOLOFT) 50 mg tablet, TAKE 1 TABLET EVERY DAY, Disp: 90 Tab, Rfl: 1 
  levothyroxine (SYNTHROID) 100 mcg tablet, TAKE 1 TABLET EVERY DAY BEFORE BREAKFAST, Disp: 90 Tab, Rfl: 1 
  OTHER,NON-FORMULARY,, Air mattress Dx: prevention of decubiti, Disp: 1 Each, Rfl: 0 
  OTHER,NON-FORMULARY,, Ultra light wheelchair with drop arms Dx: hemiplegia, Disp: 1 Each, Rfl: 0 
  OTHER,NON-FORMULARY,, Transfer board Dx: limitation in mobility, Disp: 1 Each, Rfl: 0 
  OTHER,NON-FORMULARY,, Bariatric potty chair w/drop arms  Dx: limitation in mobility, Disp: 1 Each, Rfl: 0 
  OTHER,NON-FORMULARY,, Trapeze bar Dx: limitation in mobility, Disp: 1 Each, Rfl: 0 
  OTHER,NON-FORMULARY,, 14 Fr. In and out urinary catheter. Dx: hemiplegia, Disp: 150 Each, Rfl: 11 
  aspirin delayed-release 81 mg tablet, Take 81 mg by mouth daily. , Disp: , Rfl:   
Date Last Reviewed:  1/31/2019 Number of Personal Factors/Comorbidities that affect the Plan of Care: 1-2: MODERATE COMPLEXITY EXAMINATION:  
11-28-18 Observation/Orthostatic Postural Assessment:   
      Wears compression stockings on B LEs. Distal swelling to B LEs. Arrives to PT in lightweight wheelchair. Palpation:   
      Swelling noted to B LEs.  
ROM:   
      Passive range of motion to bilateral knees is within normal limits. Hip flexion contracture bilaterally. Ankle range of motion within normal limits bilaterally passively. Strength:   
      Hip flexion: 2/5 Neurological Screen: 
     Sensation along all B LEs dermatomes normal to light touch. Functional Mobility:  
      Transfers: Independent with use of transfer board into/out of wheelchair to/from mat table Wheelchair mobility: Independent with use of B UEs Bed mobility: not assessed as patient sleeps in hospital bed at home Body Structures Involved: 1. Nerves 2. Bones 3. Joints 4. Muscles Body Functions Affected: 1. Neuromusculoskeletal 
2. Movement Related Activities and Participation Affected: 1. Mobility 2. Self Care 3. Domestic Life 4. Interpersonal Interactions and Relationships 5. Community, Social and [place] Number of elements (examined above) that affect the Plan of Care: 4+: HIGH COMPLEXITY CLINICAL PRESENTATION:  
Presentation: Evolving clinical presentation with changing clinical characteristics: MODERATE COMPLEXITY CLINICAL DECISION MAKING:  
Outcome Measure: Tool Used: Lower Extremity Functional Scale (LEFS) Score:  Initial: 16/80 Most Recent: X/80 (Date: -- ) Interpretation of Score: 20 questions each scored on a 5 point scale with 0 representing \"extreme difficulty or unable to perform\" and 4 representing \"no difficulty\". The lower the score, the greater the functional disability. 80/80 represents no disability. Minimal detectable change is 9 points.  
Score 80 79-63 62-48 47-32 31-16 15-1 0  
 Modifier CH CI CJ CK CL CM CN Medical Necessity:  
· Skilled intervention continues to be required due to limitations with functional mobility seconadry to B LE weakness. Reason for Services/Other Comments: 
· Patient continues to require skilled intervention due to decreased B LE strength and impaired functional mobility. Use of outcome tool(s) and clinical judgement create a POC that gives a: Questionable prediction of patient's progress: MODERATE COMPLEXITY  
  
 
 
 
TREATMENT:  
(In addition to Assessment/Re-Assessment sessions the following treatments were rendered) Pre-treatment Symptoms/Complaints:   Pt states he has been doing well. Pain: Initial:  
  0/10 Post Session:  0/10 Patient's spouse attended therapy session. Patient transferred to and from with chair left with assist with sliding board. Aquatic Therapy (50 minutes ) Aquatic treatment performed per flow grid for Decreased muscle strength, Decreased endurance, Decreased range of motion and Decreased activity endurance. Cues provided for body awareness. Assistance by therapist provided for balance and control . Aquatic Exercise Log Date 
12/31/18 Date 1/22/19 1/25/19 Date 1/29/19 Date: 
1-31-19 Activity/ Exercise Parameters Walking forward Using noodle and assit in 4 ft  Using noodle with assist 4x  Using noodle with assist  
X 4 in 4.5 ft Walking backward 4 Walking sideways Marching Goose Step Tip toes Heels Lunges Side step squats LE Exercises 8# with noodle and assist 4# with blue rings 7' and assist one foot on 4.5'side 4# with noocde and asist 4# with blue rings 7' and assist one foot on 4.5'side 4# with blue rings in 7 ft assist of 1 Hip Flex/Ext 10 15 15 15 X 15 B Hip Abd/Add 10 15 15 15 X 15 B Hip IR/ER Calf raises Knee Flex AA  5 for stretching AA for stretching AA for stretch 15 AA for stretch 15 AA for stretch 15 Squats Leg Circles  15/15 15/15 15/15 15/15 B Step Ups Standing with assist Standing with assist x2 twice   Standing with assist x2 people - x3 Standing with A of 2 people x 5 with 10 -20 sec hold UE Exercises Squeeze In Push Down Pull Down Bicep/Tricep Rows/Press outs  Chi Positions Trunk Rotation Deep H2O/ Noodles Noodle 8# 7' with blue rings and 4# wts Noodle and 4# 7' with bluee rings and 4# wts 7 with blue rings and 4# wts Stabilization 2 min x 2 3 x 2 min each 2 min 2 min 2 min Arms only Legs only Jog 2 min Bicycle 2 min 2 min 2 min 2min [employer] 3 min 2 min 2 min 2 min 2 min Scissors 2 min 2 min 2 min 2 min 2 min Crab walk Lower abdominal  
work   2 min DKTC 2 min DKTC 2 min Sit ups supine with assist x10 DKTC - 2 min sit ups x 10 with assist  
 
Supine floating With assist for pedaling 3 min  Against wall in leg press position for stretch and strength. Min resistance applied 10 Against therapist in leg press position for stretch and strength. Min resistance applied 10 Against therapist in leg press position for stretch and strength Min resistance x 10   
 
Jogging Lap Swimming Semi sit to stand Seated on bench Ankle pumps Stretching and AA for ankle in chair. · Treatment/Session Assessment:   Pt still requires maximal assist in the water. He is not able to bear weight on his LE. With the assist of 2 in the water, we are able to get more weight bearing in an upright position. No major complaints. · Response to Treatment:  Patient demonstrates significant B LE weakness. · Compliance with Program/Exercises: Will assess as treatment progresses. · Recommendations/Intent for next treatment session:  \"Next visit will focus on aquatic therapy to address B LE weakness and mobility deficits\". Total Treatment Duration: 50 minutes PT Patient Time In/Time Out Time In: 1100 Time Out: 1150 April Salcedo, PTA

## 2019-02-04 ENCOUNTER — HOSPITAL ENCOUNTER (OUTPATIENT)
Dept: PHYSICAL THERAPY | Age: 79
Discharge: HOME | End: 2019-02-04
Payer: OTHER GOVERNMENT

## 2019-02-04 PROCEDURE — 97110 THERAPEUTIC EXERCISES: CPT

## 2019-02-04 PROCEDURE — 97164 PT RE-EVAL EST PLAN CARE: CPT

## 2019-02-04 NOTE — PROGRESS NOTES
Evonne Montero : 1940 Primary: [address] [facility] Secondary:  Therapy Center at [facility] HENRY WATSON [address] [facility], [address] [facility] [address], [address] [facility] [phone]   [phone] OUTPATIENT PHYSICAL THERAPY:Daily Note and Progress Report 2019 ICD-10: Treatment Diagnosis: Paraplegia, incomplete (G82.22), Difficulty in walking, not elsewhere classified (R26.2), Muscle weakness (generalized) (M62.81) Precautions/Allergies:  
Patient has no known allergies. MD Orders: Evaluate and treat, aquatic therapy  MEDICAL/REFERRING DIAGNOSIS: 
parapalegia DATE OF ONSET: 1-3-18 REFERRING PHYSICIAN: Emiliano ADAMS PHYSICIAN APPOINTMENT: HELEN  
19 ASSESSMENT:  Mr. Brant De presents with paraplegia following a surgical procedure nearly 1 year ago. Patient has attended 10 visits of PT treatments and demonstrates improvement, with improved B LE strength,and  the ability to  a gravity minimized environment. Patient is likely to benefit from continued PT to address these deficits. PROBLEM LIST (Impacting functional limitations): 1. Decreased Strength 2. Decreased ADL/Functional Activities 3. Decreased Transfer Abilities 4. Decreased Ambulation Ability/Technique 5. Decreased Balance 6. Decreased Barber with Home Exercise Program INTERVENTIONS PLANNED: 
1. Gait Training 2. Home Exercise Program (HEP) 3. Neuromuscular Re-education/Strengthening 4. Therapeutic Activites 5. Therapeutic Exercise/Strengthening 6. Transfer Training 7. Aquatic Therapy TREATMENT PLAN: 
Effective Dates: 2019 TO 4/15/2019. Frequency/Duration: 2 visits per week for 10 weeks (in anticipation of approval of more visits) GOALS: (Goals have been discussed and agreed upon with patient.) Short-Term Functional Goals: Time Frame: 5 weeks 1. Patient will demonstrate 3-/5 B LE strength Ongoing, progressing 19 2. Patient will be able to tolerate 45 minutes of aquatic therapy MET 2-4-19 3. Patient will be able to stand with max assist on level ground. Ongoing, able to in pool, but unable on level ground at this time, progressing 2-4-19 Discharge Goals: Time Frame: 10 weeks 1. Patient will demonstrate 3+/5 strength in B LEs 2. Patient will be able to ambulate 10 feet with use of appropriate assistive device 3. Patient will be able to perform stand-pivot transfer with appropriate assistive device Rehabilitation Potential For Stated Goals: Good The information in this section was collected on 11-28-18 (except where otherwise noted). HISTORY:  
History of Present Injury/Illness (Reason for Referral): 
Patient underwent two surgeries to address an aortic aneurysm, the second of which occurred in [facility] on 1/31/17. He has been unable to walk due to significant loss of strength in B LEs since this surgery, leaving him unable to ambulate. Was ambulating without any assistive devices prior to this surgery. Utilizes a transfer board at home to transfer into/out of wheelchair, onto/off of toilet and into/out of the shower and into/out of bed. Past Medical History/Comorbidities:  
Mr. Brant De  has a past medical history of Aneurysm [facility]), Atrial flutter ([facility] [address] and Thyroid disease. He also has no past medical history of PUD (peptic ulcer disease). Mr. Brant De  has a past surgical history that includes hx tonsillectomy; hx orthopaedic; hx appendectomy; hx colonoscopy (5/08); pr cardiac surg procedure unlist (2016); and pr cardiac surg procedure unlist (2016). Social History/Living Environment:  
 Patient lives with his spouse. Prior Level of Function/Work/Activity: 
Patient was independent with all mobility prior to surgery. Is modified independent with wheelchair now. Requires assist from his spouse to complete getting dressed with socks and pulling pants on. Ambulatory/Rehab Services H2 Model Falls Risk Assessment Risk Factors: 
     (1)  Gender [Male] Ability to Rise from Chair: 
     (4)  Unable to rise without assistance Falls Prevention Plan: Mobility Assistance Device (specify):  wheelchair, transfer board, shower chair Total: (5 or greater = High Risk): 5  
 ©2010 [facility] . [place] States Patent #[id number]. Federal Law prohibits the replication, distribution or use without written permission from [facility] [employer] Current Medications:   
  
Current Outpatient Medications:  
  carvedilol (COREG) 3.125 mg tablet, Take  by mouth two (2) times daily (with meals). , Disp: , Rfl:  
  furosemide (LASIX) 40 mg tablet, Take  by mouth daily. , Disp: , Rfl:  
  lisinopril (PRINIVIL, ZESTRIL) 10 mg tablet, Take 5 mg by mouth daily. , Disp: , Rfl:  
  potassium chloride SR (K-TAB) 20 mEq tablet, Take 20 mEq by mouth daily. , Disp: , Rfl:  
  sertraline (ZOLOFT) 50 mg tablet, TAKE 1 TABLET EVERY DAY, Disp: 90 Tab, Rfl: 1 
  levothyroxine (SYNTHROID) 100 mcg tablet, TAKE 1 TABLET EVERY DAY BEFORE BREAKFAST, Disp: 90 Tab, Rfl: 1 
  OTHER,NON-FORMULARY,, Air mattress Dx: prevention of decubiti, Disp: 1 Each, Rfl: 0 
  OTHER,NON-FORMULARY,, Ultra light wheelchair with drop arms Dx: hemiplegia, Disp: 1 Each, Rfl: 0 
  OTHER,NON-FORMULARY,, Transfer board Dx: limitation in mobility, Disp: 1 Each, Rfl: 0 
  OTHER,NON-FORMULARY,, Bariatric potty chair w/drop arms  Dx: limitation in mobility, Disp: 1 Each, Rfl: 0 
  OTHER,NON-FORMULARY,, Trapeze bar Dx: limitation in mobility, Disp: 1 Each, Rfl: 0 
  OTHER,NON-FORMULARY,, 14 Fr. In and out urinary catheter. Dx: hemiplegia, Disp: 150 Each, Rfl: 11 Date Last Reviewed:  2/4/2019 EXAMINATION:  
2-4-19 Observation/Orthostatic Postural Assessment:   
      Compression stockings on B LEs. Distal swelling to B LEs. Arrives to PT in lightweight wheelchair. Palpation: Swelling noted to B LEs.  
ROM:   
      Passive range of motion to bilateral knees is within normal limits. Hip flexion contracture bilaterally. Ankle range of motion within normal limits bilaterally passively. Strength:   
      Hip flexion 2-/5 B Knee extension 2-/5 B Knee flexion 2-/5 B Dorsiflexion 2-/5 B Neurological Screen: 
     Sensation along all B LEs dermatomes normal to light touch. Functional Mobility:  
      Transfers: Independent with use of transfer board Wheelchair mobility: Independent with use of B UEs Bed mobility: Independent with extra time required Body Structures Involved: 1. Nerves 2. Bones 3. Joints 4. Muscles Body Functions Affected: 1. Neuromusculoskeletal 
2. Movement Related Activities and Participation Affected: 1. Mobility 2. Self Care 3. Domestic Life 4. Interpersonal Interactions and Relationships 5. Community, Social and [place] CLINICAL PRESENTATION:  
CLINICAL DECISION MAKING:  
Outcome Measure: Tool Used: Lower Extremity Functional Scale (LEFS) Score:  Initial: 16/80 Most Recent: 14/80 (Date: 2-4-19 ) Interpretation of Score: 20 questions each scored on a 5 point scale with 0 representing \"extreme difficulty or unable to perform\" and 4 representing \"no difficulty\". The lower the score, the greater the functional disability. 80/80 represents no disability. Minimal detectable change is 9 points. Score 80 79-63 62-48 47-32 31-16 15-1 0 Modifier CH CI CJ CK CL CM CN Medical Necessity:  
· Skilled intervention continues to be required due to limitations with functional mobility seconadry to B LE weakness. Reason for Services/Other Comments: 
· Patient continues to require skilled intervention due to decreased B LE strength and impaired functional mobility. TREATMENT:  
(In addition to Assessment/Re-Assessment sessions the following treatments were rendered) Pre-treatment Symptoms/Complaints:   Pt states that he was brunilda to  the pool for the first time in 2 years about 3 weeks ago, and has been able to do so several times since then. Reports that on days that he does not have pool therapy he uses his sanding frame for 3 sessions, 20 minutes per day. Pain: Initial:  
  0/10 Post Session:  0/10 Patient's spouse attended today's therapy session. Therapeutic Exercises (10 minutes) for improved bilateral LE strength and reduced contracture at B hip flexors. Supine push/pull to each LE with patient pushing into manual resistance from PT, x 15 each LE. Passive range of motion to each hip for flexion/extension and hip abd/add in supine. Assisted short arc quads in supine with increased stretch onto hip flexors in supine, 2 x 10 with max A from PT. Ankle dorsiflexoin/plantarflexion in supine without resistance x 15 each LE. Aquatic Therapy (0 minutes ) none today Aquatic Exercise Log Date 
12/31/18 Date 1/22/19 1/25/19 Date 1/29/19 Date: 
1-31-19 Activity/ Exercise Parameters Walking forward Using noodle and assit in 4 ft  Using noodle with assist 4x  Using noodle with assist  
X 4 in 4.5 ft Walking backward 4 Walking sideways Marching Goose Step Tip toes Heels Lunges Side step squats LE Exercises 8# with noodle and assist 4# with blue rings 7' and assist one foot on 4.5'side 4# with noocde and asist 4# with blue rings 7' and assist one foot on 4.5'side 4# with blue rings in 7 ft assist of 1 Hip Flex/Ext 10 15 15 15 X 15 B Hip Abd/Add 10 15 15 15 X 15 B Hip IR/ER Calf raises Knee Flex AA  5 for stretching AA for stretching AA for stretch 15 AA for stretch 15 AA for stretch 15 Squats Leg Circles  15/15 15/15 15/15 15/15 B Step Ups Standing with assist Standing with assist x2 twice   Standing with assist x2 people - x3 Standing with A of 2 people x 5 with 10 -20 sec hold UE Exercises Squeeze In Push Down Pull Down Bicep/Tricep Rows/Press outs  Chi Positions Trunk Rotation Deep H2O/ Noodles Noodle 8# 7' with blue rings and 4# wts Noodle and 4# 7' with bluee rings and 4# wts 7 with blue rings and 4# wts Stabilization 2 min x 2 3 x 2 min each 2 min 2 min 2 min Arms only Legs only Jog 2 min Bicycle 2 min 2 min 2 min 2min [employer] 3 min 2 min 2 min 2 min 2 min Scissors 2 min 2 min 2 min 2 min 2 min Crab walk Lower abdominal  
work   2 min DKTC 2 min DKTC 2 min Sit ups supine with assist x10 DKTC - 2 min sit ups x 10 with assist  
 
Supine floating With assist for pedaling 3 min  Against wall in leg press position for stretch and strength. Min resistance applied 10 Against therapist in leg press position for stretch and strength. Min resistance applied 10 Against therapist in leg press position for stretch and strength Min resistance x 10   
 
Jogging Lap Swimming Semi sit to stand Seated on bench Ankle pumps Stretching and AA for ankle in chair. · Treatment/Session Assessment:   Pt believes that aquatic therapy is helping, and objectively patient does demonstrate improved strength. Patient progressing with therapy. · Response to Treatment:  Patient demonstrates significant B LE weakness. · Compliance with Program/Exercises: Compliant all of the time. · Recommendations/Intent for next treatment session: \"Next visit will focus on aquatic therapy to address B LE weakness and mobility deficits\". Total Treatment Duration: 35 minutes (25 minutes for re-eval, 10 minutes for therapeutic exercises) PT Patient Time In/Time Out Time In: 5806 Time Out: 8154 Vanessa Landrum PT

## 2019-02-05 NOTE — THERAPY RECERTIFICATION
Jaclyn Lopez : 1940 Primary: [address] [facility] Secondary:  Therapy Center at [facility] HENRY WATSON [address] [facility], [address] [facility] [address], [facility] [address] [phone]   [phone] OUTPATIENT PHYSICAL THERAPY:Recertification  ICD-10: Treatment Diagnosis: Paraplegia, incomplete (G82.22), Difficulty in walking, not elsewhere classified (R26.2), Muscle weakness (generalized) (M62.81) Precautions/Allergies:  
Patient has no known allergies. MD Orders: Evaluate and treat, aquatic therapy  MEDICAL/REFERRING DIAGNOSIS: 
parapalegia DATE OF ONSET: 1-3-18 REFERRING PHYSICIAN: Emiliano ADAMS PHYSICIAN APPOINTMENT: TBKENNETH  
19 ASSESSMENT:  Mr. Boo Samaniego presents with paraplegia following a surgical procedure nearly 1 year ago. Patient has attended 10 visits of PT treatments and demonstrates improvement, with improved B LE strength,and  the ability to  a gravity minimized environment. Patient is likely to benefit from continued PT to address these deficits. PROBLEM LIST (Impacting functional limitations): 1. Decreased Strength 2. Decreased ADL/Functional Activities 3. Decreased Transfer Abilities 4. Decreased Ambulation Ability/Technique 5. Decreased Balance 6. Decreased Cotton with Home Exercise Program INTERVENTIONS PLANNED: 
1. Gait Training 2. Home Exercise Program (HEP) 3. Neuromuscular Re-education/Strengthening 4. Therapeutic Activites 5. Therapeutic Exercise/Strengthening 6. Transfer Training 7. Aquatic Therapy TREATMENT PLAN: 
Effective Dates: 2019 TO 4/15/2019. Frequency/Duration: 2 visits per week for 10 weeks (in anticipation of approval of more visits) GOALS: (Goals have been discussed and agreed upon with patient.) Short-Term Functional Goals: Time Frame: 5 weeks 1. Patient will demonstrate 3-/5 B LE strength Ongoing, progressing 19 2.  Patient will be able to tolerate 45 minutes of aquatic therapy MET 2-4-19 3. Patient will be able to stand with max assist on level ground. Ongoing, able to in pool, but unable on level ground at this time, progressing 2-4-19 Discharge Goals: Time Frame: 10 weeks 1. Patient will demonstrate 3+/5 strength in B LEs 2. Patient will be able to ambulate 10 feet with use of appropriate assistive device 3. Patient will be able to perform stand-pivot transfer with appropriate assistive device Rehabilitation Potential For Stated Goals: Good Regarding Manjula Garcia's therapy, I certify that the treatment plan above will be carried out by a therapist or under their direction. Thank you for this referral, 
 
Michelle Gleason PT Referring Physician Signature: Joanne Pearson*         Date The information in this section was collected on 11-28-18 (except where otherwise noted). HISTORY:  
History of Present Injury/Illness (Reason for Referral): 
Patient underwent two surgeries to address an aortic aneurysm, the second of which occurred in [facility] on 1/31/17. He has been unable to walk due to significant loss of strength in B LEs since this surgery, leaving him unable to ambulate. Was ambulating without any assistive devices prior to this surgery. Utilizes a transfer board at home to transfer into/out of wheelchair, onto/off of toilet and into/out of the shower and into/out of bed. Past Medical History/Comorbidities:  
Mr. Dallin Mcgowan  has a past medical history of Aneurysm [facility]), Atrial flutter ([facility] [address] and Thyroid disease. He also has no past medical history of PUD (peptic ulcer disease). Mr. Dallin Mcgowan  has a past surgical history that includes hx tonsillectomy; hx orthopaedic; hx appendectomy; hx colonoscopy (5/08); pr cardiac surg procedure unlist (2016); and pr cardiac surg procedure unlist (2016). Social History/Living Environment:  
 Patient lives with his spouse. Prior Level of Function/Work/Activity: Patient was independent with all mobility prior to surgery. Is modified independent with wheelchair now. Requires assist from his spouse to complete getting dressed with socks and pulling pants on. Ambulatory/Rehab Services H2 Model Falls Risk Assessment Risk Factors: 
     (1)  Gender [Male] Ability to Rise from Chair: 
     (4)  Unable to rise without assistance Falls Prevention Plan: Mobility Assistance Device (specify):  wheelchair, transfer board, shower chair Total: (5 or greater = High Risk): 5  
 ©2010 [facility] [address] [place] [address] States Patent #[id number]. Federal Law prohibits the replication, distribution or use without written permission from [facility] [employer] Current Medications:   
  
Current Outpatient Medications:  
  carvedilol (COREG) 3.125 mg tablet, Take  by mouth two (2) times daily (with meals). , Disp: , Rfl:  
  furosemide (LASIX) 40 mg tablet, Take  by mouth daily. , Disp: , Rfl:  
  lisinopril (PRINIVIL, ZESTRIL) 10 mg tablet, Take 5 mg by mouth daily. , Disp: , Rfl:  
  potassium chloride SR (K-TAB) 20 mEq tablet, Take 20 mEq by mouth daily. , Disp: , Rfl:  
  sertraline (ZOLOFT) 50 mg tablet, TAKE 1 TABLET EVERY DAY, Disp: 90 Tab, Rfl: 1 
  levothyroxine (SYNTHROID) 100 mcg tablet, TAKE 1 TABLET EVERY DAY BEFORE BREAKFAST, Disp: 90 Tab, Rfl: 1 
  OTHER,NON-FORMULARY,, Air mattress Dx: prevention of decubiti, Disp: 1 Each, Rfl: 0 
  OTHER,NON-FORMULARY,, Ultra light wheelchair with drop arms Dx: hemiplegia, Disp: 1 Each, Rfl: 0 
  OTHER,NON-FORMULARY,, Transfer board Dx: limitation in mobility, Disp: 1 Each, Rfl: 0 
  OTHER,NON-FORMULARY,, Bariatric potty chair w/drop arms  Dx: limitation in mobility, Disp: 1 Each, Rfl: 0 
  OTHER,NON-FORMULARY,, Trapeze bar Dx: limitation in mobility, Disp: 1 Each, Rfl: 0 
  OTHER,NON-FORMULARY,, 14 Fr. In and out urinary catheter.  Dx: hemiplegia, Disp: 150 Each, Rfl: 11  
 Date Last Reviewed:  2/5/2019 EXAMINATION:  
2-4-19 Observation/Orthostatic Postural Assessment:   
      Compression stockings on B LEs. Distal swelling to B LEs. Arrives to PT in lightweight wheelchair. Palpation:   
      Swelling noted to B LEs.  
ROM:   
      Passive range of motion to bilateral knees is within normal limits. Hip flexion contracture bilaterally. Ankle range of motion within normal limits bilaterally passively. Strength:   
      Hip flexion 2-/5 B Knee extension 2-/5 B Knee flexion 2-/5 B Dorsiflexion 2-/5 B Neurological Screen: 
     Sensation along all B LEs dermatomes normal to light touch. Functional Mobility:  
      Transfers: Independent with use of transfer board Wheelchair mobility: Independent with use of B UEs Bed mobility: Independent with extra time required Body Structures Involved: 1. Nerves 2. Bones 3. Joints 4. Muscles Body Functions Affected: 1. Neuromusculoskeletal 
2. Movement Related Activities and Participation Affected: 1. Mobility 2. Self Care 3. Domestic Life 4. Interpersonal Interactions and Relationships 5. Community, Social and [place] CLINICAL PRESENTATION:  
CLINICAL DECISION MAKING:  
Outcome Measure: Tool Used: Lower Extremity Functional Scale (LEFS) Score:  Initial: 16/80 Most Recent: 14/80 (Date: 2-4-19 ) Interpretation of Score: 20 questions each scored on a 5 point scale with 0 representing \"extreme difficulty or unable to perform\" and 4 representing \"no difficulty\". The lower the score, the greater the functional disability. 80/80 represents no disability. Minimal detectable change is 9 points. Score 80 79-63 62-48 47-32 31-16 15-1 0 Modifier CH CI CJ CK CL CM CN Medical Necessity:  
· Skilled intervention continues to be required due to limitations with functional mobility seconadry to B LE weakness. Reason for Services/Other Comments: · Patient continues to require skilled intervention due to decreased B LE strength and impaired functional mobility.

## 2019-02-05 NOTE — THERAPY DISCHARGE
Natacha Grande : 1940 Primary: [employer] Secondary:  Therapy Center at [facility] 
[address] [place] [address], [address] [place] [address] [address], [address] [phone]   [phone] OUTPATIENT OCCUPATIONAL THERAPY: Discharge and Discontinuation Summary 2019 ICD-10: Treatment Diagnosis: I89.0, lymphedema not elsewhere classified 
M79.89, Swelling of both LEs 
G62.9, peripheral nerve neuropathy Precautions/Allergies:  
Patient has no known allergies. Fall Risk Score:   
 Ambulatory/Rehab Services H2 Model Falls Risk Assessment Risk Factors: 
     (1)  Gender [Male] Ability to Rise from Chair: 
     (4)  Unable to rise without assistance Falls Prevention Plan: No modifications necessary Total: (5 or greater = High Risk): 5  
  [facility] [employer] Patent #[id number]. Federal Law prohibits the replication, distribution or use without written permission from [facility] [employer] MD Orders: eval and treat MEDICAL/REFERRING DIAGNOSIS:  
Lymphedema, not elsewhere classified [I89.0] DATE OF ONSET: 2 years; chronic REFERRING PHYSICIAN: Sarah Soto* RETURN PHYSICIAN APPOINTMENT: to be determined DISCHARGE/DISCONTINUATION SUMMARY 2019:  Mr. Asif Nolan did not return after his initial evaluation and treatment session 19. Pt was educated on lymphatic pathophysiology, complete decongestive therapy, precautions and skin integrity management. At his initial evaluation, he stated he was going to look into receiving services through the [place] due to his $40 copay per visit with [facility]. He did not return here for therapy; therefore, will be discharged on 2019. INITIAL ASSESSMENT:  Mr. Asif Nolan presents with bilateral 2+ pitting edema in his legs, ankles and gaiter areas, 3+ in feet. Pt with positive Stemmer sign and dry skin; otherwise, no tissue changes.   Pt with diminished light touch, deep pressure and proprioceptive sensation in legs. He is a paraplegic secondary to surgical complication from January 2017 and had been wheelchair bound since. He developed the leg swelling since January 2017. He requires sliding board for transfers. He uses a standing frame at home daily. He presents with generalized deconditioning. Mr. Mike Bennett is here for treatment of bilateral lower extremity lymphedema management. He is wearing class 1 compression knee highs. He is dependent on lower extremity ADLs due to paraplegia. His wife is his primary caregiver. Mr. Mike Bennett will benefit from complete decongestive therapy (CDT), a compression pump for home use, and alternative long term management compression garments. Mr. Mike Bennett is a vet and is going to look into receiving services through the [facility] system. I will leave his file open for 60 days to give him a chance to proceed with [facility] services; if unable to go through the [facility], Mr. Mike Bennett plans to return here for CDT course. PLAN OF CARE:  
PROBLEM LIST: 
1. Decreased ADL/Functional Activities 2. Decreased Transfer Abilities 3. Decreased Activity Tolerance 4. Edema/Girth 5. Decreased Knowledge of Precautions 6. Decreased Skin Integrity/Hygeine 7. Decreased [place] with Home Exercise Program INTERVENTIONS PLANNED1. Skin care 2. Compression bandaging 3. Fitting for compression garment(s) 4. Manual therapy/Manual lymph drainage 5. Therapeutic exercise/Therapeutic activities 6. Patient Education 7. Compression pump trial prn 
8.  kinesiotaping TREATMENT PLAN: 
Effective Dates: 11/13/18 TO 1/11/19. Frequency/Duration: 2 times a week for for 60 days  Will adjust frequency and duration as progress indicates. GOALS: (Goals have been discussed and agreed upon with patient.) Short-Term Functional Goals: Time Frame: 30 days 1. The patient/caregiver will verbalize understanding of lymphedema precautions. Goal met 2. Patient/caregiver will be independent with skin care regimen to decrease risk of cellulitis. Goal met 3. The patient/caregiver will be independent with self-manual lymph drainage techniques and show decrease in limb volume. Not met 5. Patient/caregiver will be independent in lymphatic exercises. Not met Discharge Goals: Time Frame: 60 days 1. Patient's bilateral lower extremity circumferential measurements will decrease 5% on volumetric graph to maximize functional use in ADL's. Not met 2. The patient/caregiver will be independent with home management of lymphedema. Not met 3. Patient/caregiver will be independent donning and doffing bilateral lower extremity compression garment. Not met Thank you for this referral, Amber Rousseau OT

## 2019-02-08 ENCOUNTER — HOSPITAL ENCOUNTER (OUTPATIENT)
Dept: PHYSICAL THERAPY | Age: 79
Discharge: HOME | End: 2019-02-08
Payer: OTHER GOVERNMENT

## 2019-02-08 PROCEDURE — 97113 AQUATIC THERAPY/EXERCISES: CPT

## 2019-02-08 NOTE — PROGRESS NOTES
Светлана Diamond : 1940 Primary: [address] [facility] Secondary:  Therapy Center at [facility] HENRY WATSON [address] [facility], [address] [facility] [address], [address] [facility] [phone]   [phone] OUTPATIENT PHYSICAL THERAPY:Daily Note 2019 ICD-10: Treatment Diagnosis: Paraplegia, incomplete (G82.22), Difficulty in walking, not elsewhere classified (R26.2), Muscle weakness (generalized) (M62.81) Precautions/Allergies:  
Patient has no known allergies. MD Orders: Evaluate and treat, aquatic therapy  MEDICAL/REFERRING DIAGNOSIS: 
Paraplegia, unspecified [G82.20] DATE OF ONSET: 1-3-18 REFERRING PHYSICIAN: Emiliano Walker RETURN PHYSICIAN APPOINTMENT: TBD  
19 ASSESSMENT:  Mr. Nithya Mars presents with paraplegia following a surgical procedure nearly 1 year ago. Patient has attended 10 visits of PT treatments and demonstrates improvement, with improved B LE strength,and  the ability to  a gravity minimized environment. Patient is likely to benefit from continued PT to address these deficits. PROBLEM LIST (Impacting functional limitations): 1. Decreased Strength 2. Decreased ADL/Functional Activities 3. Decreased Transfer Abilities 4. Decreased Ambulation Ability/Technique 5. Decreased Balance 6. Decreased [place] with Home Exercise Program INTERVENTIONS PLANNED: 
1. Gait Training 2. Home Exercise Program (HEP) 3. Neuromuscular Re-education/Strengthening 4. Therapeutic Activites 5. Therapeutic Exercise/Strengthening 6. Transfer Training 7. Aquatic Therapy TREATMENT PLAN: 
Effective Dates: 2019 TO 4/15/2019. Frequency/Duration: 2 visits per week for 10 weeks (in anticipation of approval of more visits) GOALS: (Goals have been discussed and agreed upon with patient.) Short-Term Functional Goals: Time Frame: 5 weeks 1. Patient will demonstrate 3-/5 B LE strength Ongoing, progressing 19 2. Patient will be able to tolerate 45 minutes of aquatic therapy MET 2-4-19 3. Patient will be able to stand with max assist on level ground. Ongoing, able to in pool, but unable on level ground at this time, progressing 2-4-19 Discharge Goals: Time Frame: 10 weeks 1. Patient will demonstrate 3+/5 strength in B LEs 2. Patient will be able to ambulate 10 feet with use of appropriate assistive device 3. Patient will be able to perform stand-pivot transfer with appropriate assistive device Rehabilitation Potential For Stated Goals: Good The information in this section was collected on 11-28-18 (except where otherwise noted). HISTORY:  
History of Present Injury/Illness (Reason for Referral): 
Patient underwent two surgeries to address an aortic aneurysm, the second of which occurred in [facility] on 1/31/17. He has been unable to walk due to significant loss of strength in B LEs since this surgery, leaving him unable to ambulate. Was ambulating without any assistive devices prior to this surgery. Utilizes a transfer board at home to transfer into/out of wheelchair, onto/off of toilet and into/out of the shower and into/out of bed. Past Medical History/Comorbidities:  
Mr. Sara Jansen  has a past medical history of Aneurysm [facility]), Atrial flutter ([facility] [address] and Thyroid disease. He also has no past medical history of PUD (peptic ulcer disease). Mr. Sara Jansen  has a past surgical history that includes hx tonsillectomy; hx orthopaedic; hx appendectomy; hx colonoscopy (5/08); pr cardiac surg procedure unlist (2016); and pr cardiac surg procedure unlist (2016). Social History/Living Environment:  
 Patient lives with his spouse. Prior Level of Function/Work/Activity: 
Patient was independent with all mobility prior to surgery. Is modified independent with wheelchair now. Requires assist from his spouse to complete getting dressed with socks and pulling pants on. Ambulatory/Rehab Services H2 Model Falls Risk Assessment Risk Factors: 
     (1)  Gender [Male] Ability to Rise from Chair: 
     (4)  Unable to rise without assistance Falls Prevention Plan: Mobility Assistance Device (specify):  wheelchair, transfer board, shower chair Total: (5 or greater = High Risk): 5  
 ©2010 [facility] . [place] States Patent #[id number]. Federal Law prohibits the replication, distribution or use without written permission from [facility] [employer] Current Medications:   
  
Current Outpatient Medications:  
  carvedilol (COREG) 3.125 mg tablet, Take  by mouth two (2) times daily (with meals). , Disp: , Rfl:  
  furosemide (LASIX) 40 mg tablet, Take  by mouth daily. , Disp: , Rfl:  
  lisinopril (PRINIVIL, ZESTRIL) 10 mg tablet, Take 5 mg by mouth daily. , Disp: , Rfl:  
  potassium chloride SR (K-TAB) 20 mEq tablet, Take 20 mEq by mouth daily. , Disp: , Rfl:  
  sertraline (ZOLOFT) 50 mg tablet, TAKE 1 TABLET EVERY DAY, Disp: 90 Tab, Rfl: 1 
  levothyroxine (SYNTHROID) 100 mcg tablet, TAKE 1 TABLET EVERY DAY BEFORE BREAKFAST, Disp: 90 Tab, Rfl: 1 
  OTHER,NON-FORMULARY,, Air mattress Dx: prevention of decubiti, Disp: 1 Each, Rfl: 0 
  OTHER,NON-FORMULARY,, Ultra light wheelchair with drop arms Dx: hemiplegia, Disp: 1 Each, Rfl: 0 
  OTHER,NON-FORMULARY,, Transfer board Dx: limitation in mobility, Disp: 1 Each, Rfl: 0 
  OTHER,NON-FORMULARY,, Bariatric potty chair w/drop arms  Dx: limitation in mobility, Disp: 1 Each, Rfl: 0 
  OTHER,NON-FORMULARY,, Trapeze bar Dx: limitation in mobility, Disp: 1 Each, Rfl: 0 
  OTHER,NON-FORMULARY,, 14 Fr. In and out urinary catheter. Dx: hemiplegia, Disp: 150 Each, Rfl: 11 Date Last Reviewed:  2/8/2019 EXAMINATION:  
2-4-19 Observation/Orthostatic Postural Assessment:   
      Compression stockings on B LEs. Distal swelling to B LEs. Arrives to PT in lightweight wheelchair. Palpation: Swelling noted to B LEs.  
ROM:   
      Passive range of motion to bilateral knees is within normal limits. Hip flexion contracture bilaterally. Ankle range of motion within normal limits bilaterally passively. Strength:   
      Hip flexion 2-/5 B Knee extension 2-/5 B Knee flexion 2-/5 B Dorsiflexion 2-/5 B Neurological Screen: 
     Sensation along all B LEs dermatomes normal to light touch. Functional Mobility:  
      Transfers: Independent with use of transfer board Wheelchair mobility: Independent with use of B UEs Bed mobility: Independent with extra time required Body Structures Involved: 1. Nerves 2. Bones 3. Joints 4. Muscles Body Functions Affected: 1. Neuromusculoskeletal 
2. Movement Related Activities and Participation Affected: 1. Mobility 2. Self Care 3. Domestic Life 4. Interpersonal Interactions and Relationships 5. Community, Social and [place] CLINICAL PRESENTATION:  
CLINICAL DECISION MAKING:  
Outcome Measure: Tool Used: Lower Extremity Functional Scale (LEFS) Score:  Initial: 16/80 Most Recent: 14/80 (Date: 2-4-19 ) Interpretation of Score: 20 questions each scored on a 5 point scale with 0 representing \"extreme difficulty or unable to perform\" and 4 representing \"no difficulty\". The lower the score, the greater the functional disability. 80/80 represents no disability. Minimal detectable change is 9 points. Score 80 79-63 62-48 47-32 31-16 15-1 0 Modifier CH CI CJ CK CL CM CN Medical Necessity:  
· Skilled intervention continues to be required due to limitations with functional mobility seconadry to B LE weakness. Reason for Services/Other Comments: 
· Patient continues to require skilled intervention due to decreased B LE strength and impaired functional mobility. TREATMENT:  
(In addition to Assessment/Re-Assessment sessions the following treatments were rendered) Pre-treatment Symptoms/Complaints:   Pt stated he had increased his standing frame time at home by 15 minutes. Pain: Initial:  
  0/10 Post Session:  0/10 Patient's spouse attended today's therapy session. Pt bumped his knee on wall and it began to bleed. Was dressed with guaze and Tegaderm. Aquatic Therapy (50 minutes): Aquatic treatment performed per flow grid for Decreased muscle strength, Decreased endurance, Decreased range of motion, Ease of movement and Low impact and reduced weight bearing activity. Cues provided for exercises and posture. Assistance by therapist provided for standing and stabilization in the pool. Patient has difficulty with standing alignment. Aquatic Exercise Log Date 
12/31/18 Date 1/22/19 1/25/19 Date 1/29/19 Date: 
1-31-19 Date 
2-8-19 Activity/ Exercise Parameters Walking forward Using noodle and assit in 4 ft  Using noodle with assist 4x  Using noodle with assist  
X 4 in 4.5 ft Using noodle with assist in 4.5 depth 6 Walking backward 4     3 Walking sideways      6 Marching Goose Step Tip toes Heels Lunges Side step squats LE Exercises 8# with noodle and assist 4# with blue rings 7' and assist one foot on 4.5'side 4# with noocde and asist 4# with blue rings 7' and assist one foot on 4.5'side 4# with blue rings in 7 ft assist of 1  8# using bars Hip Flex/Ext 10 15 15 15 X 15 B 15 Hip Abd/Add 10 15 15 15 X 15 B 15 Hip IR/ER Calf raises Knee Flex AA  5 for stretching AA for stretching AA for stretch 15 AA for stretch 15 AA for stretch 15 AA for stretching x 15 Squats Leg Circles  15/15 15/15 15/15 15/15 B Step Ups Standing with assist Standing with assist x2 twice   Standing with assist x2 people - x3 Standing with A of 2 people x 5 with 10 -20 sec hold UE Exercises Squeeze In Push Down Pull Down Bicep/Tricep Rows/Press outs  Chi Positions Trunk Rotation Deep H2O/ Noodles Noodle 8# 7' with blue rings and 4# wts Noodle and 4# 7' with bluee rings and 4# wts 7 with blue rings and 4# wts 7' with noodle 8# Stabilization 2 min x 2 3 x 2 min each 2 min 2 min 2 min 2 min Knee flex 2 x 10 Single leg hip ext 2 x 10 Arms only Legs only Jog 2 min Bicycle 2 min 2 min 2 min 2min 2 min [place] 3 min 2 min 2 min 2 min 2 min 2 min Scissors 2 min 2 min 2 min 2 min 2 min  2 min Crab walk Lower abdominal  
work   2 min DKTC 2 min DKTC 2 min Sit ups supine with assist x10 DKTC - 2 min sit ups x 10 with assist   
 
Supine floating With assist for pedaling 3 min  Against wall in leg press position for stretch and strength. Min resistance applied 10 Against therapist in leg press position for stretch and strength. Min resistance applied 10 Against therapist in leg press position for stretch and strength Min resistance x 10    
 
Jogging Lap Swimming Semi sit to stand Seated on bench Ankle pumps Stretching and AA for ankle in chair. Ankle stretches in chair Knee flex stretch      In chair · Treatment/Session Assessment:   Did well in the water today. Good he is increasing time in stander at home. · Response to Treatment:  Patient demonstrates significant B LE weakness. · Compliance with Program/Exercises: Compliant all of the time. · Recommendations/Intent for next treatment session: \"Next visit will focus on aquatic therapy to address B LE weakness and mobility deficits\". Total Treatment Duration: 50 minutes aquatic therapy PT Patient Time In/Time Out Time In: 1865 Time Out: 5346 Rose Mary Crowley, PT

## 2019-02-11 ENCOUNTER — APPOINTMENT (OUTPATIENT)
Dept: PHYSICAL THERAPY | Age: 79
End: 2019-02-11
Payer: OTHER GOVERNMENT

## 2019-02-12 ENCOUNTER — HOSPITAL ENCOUNTER (OUTPATIENT)
Dept: PHYSICAL THERAPY | Age: 79
Discharge: HOME | End: 2019-02-12
Payer: OTHER GOVERNMENT

## 2019-02-12 PROCEDURE — 97113 AQUATIC THERAPY/EXERCISES: CPT

## 2019-02-12 NOTE — PROGRESS NOTES
Shahzad Mckee : 1940 Primary: [address] [facility] Secondary:  Therapy Center at [facility] HENRY WATSON [address] [facility], 301 [facility] [address], [facility] [phone]   [phone] OUTPATIENT PHYSICAL THERAPY:Daily Note 2019 ICD-10: Treatment Diagnosis: Paraplegia, incomplete (G82.22), Difficulty in walking, not elsewhere classified (R26.2), Muscle weakness (generalized) (M62.81) Precautions/Allergies:  
Patient has no known allergies. MD Orders: Evaluate and treat, aquatic therapy  MEDICAL/REFERRING DIAGNOSIS: 
Paraplegia, unspecified [G82.20] DATE OF ONSET: 1-3-18 REFERRING PHYSICIAN: Emiliano De La Rosa RETURN PHYSICIAN APPOINTMENT: TBD  
19 ASSESSMENT:  Mr. Grisel Go presents with paraplegia following a surgical procedure nearly 1 year ago. Patient has attended 10 visits of PT treatments and demonstrates improvement, with improved B LE strength,and  the ability to  a gravity minimized environment. Patient is likely to benefit from continued PT to address these deficits. PROBLEM LIST (Impacting functional limitations): 1. Decreased Strength 2. Decreased ADL/Functional Activities 3. Decreased Transfer Abilities 4. Decreased Ambulation Ability/Technique 5. Decreased Balance 6. Decreased [place] with Home Exercise Program INTERVENTIONS PLANNED: 
1. Gait Training 2. Home Exercise Program (HEP) 3. Neuromuscular Re-education/Strengthening 4. Therapeutic Activites 5. Therapeutic Exercise/Strengthening 6. Transfer Training 7. Aquatic Therapy TREATMENT PLAN: 
Effective Dates: 2019 TO 4/15/2019. Frequency/Duration: 2 visits per week for 10 weeks (in anticipation of approval of more visits) GOALS: (Goals have been discussed and agreed upon with patient.) Short-Term Functional Goals: Time Frame: 5 weeks 1. Patient will demonstrate 3-/5 B LE strength Ongoing, progressing 19 2. Patient will be able to tolerate 45 minutes of aquatic therapy MET 2-4-19 3. Patient will be able to stand with max assist on level ground. Ongoing, able to in pool, but unable on level ground at this time, progressing 2-4-19 Discharge Goals: Time Frame: 10 weeks 1. Patient will demonstrate 3+/5 strength in B LEs 2. Patient will be able to ambulate 10 feet with use of appropriate assistive device 3. Patient will be able to perform stand-pivot transfer with appropriate assistive device Rehabilitation Potential For Stated Goals: Good The information in this section was collected on 11-28-18 (except where otherwise noted). HISTORY:  
History of Present Injury/Illness (Reason for Referral): 
Patient underwent two surgeries to address an aortic aneurysm, the second of which occurred in [facility] on 1/31/17. He has been unable to walk due to significant loss of strength in B LEs since this surgery, leaving him unable to ambulate. Was ambulating without any assistive devices prior to this surgery. Utilizes a transfer board at home to transfer into/out of wheelchair, onto/off of toilet and into/out of the shower and into/out of bed. Past Medical History/Comorbidities:  
Mr. Boo Samaniego  has a past medical history of Aneurysm [facility]), Atrial flutter ([facility] [address] and Thyroid disease. He also has no past medical history of PUD (peptic ulcer disease). Mr. Boo Samaniego  has a past surgical history that includes hx tonsillectomy; hx orthopaedic; hx appendectomy; hx colonoscopy (5/08); pr cardiac surg procedure unlist (2016); and pr cardiac surg procedure unlist (2016). Social History/Living Environment:  
 Patient lives with his spouse. Prior Level of Function/Work/Activity: 
Patient was independent with all mobility prior to surgery. Is modified independent with wheelchair now. Requires assist from his spouse to complete getting dressed with socks and pulling pants on. Ambulatory/Rehab Services H2 Model Falls Risk Assessment Risk Factors: 
     (1)  Gender [Male] Ability to Rise from Chair: 
     (4)  Unable to rise without assistance Falls Prevention Plan: Mobility Assistance Device (specify):  wheelchair, transfer board, shower chair Total: (5 or greater = High Risk): 5  
 ©2010 [facility] [place] . [facility] States Patent #[id number]. Federal Law prohibits the replication, distribution or use without written permission from [facility] [employer] Current Medications:   
  
Current Outpatient Medications:  
  carvedilol (COREG) 3.125 mg tablet, Take  by mouth two (2) times daily (with meals). , Disp: , Rfl:  
  furosemide (LASIX) 40 mg tablet, Take  by mouth daily. , Disp: , Rfl:  
  lisinopril (PRINIVIL, ZESTRIL) 10 mg tablet, Take 5 mg by mouth daily. , Disp: , Rfl:  
  potassium chloride SR (K-TAB) 20 mEq tablet, Take 20 mEq by mouth daily. , Disp: , Rfl:  
  sertraline (ZOLOFT) 50 mg tablet, TAKE 1 TABLET EVERY DAY, Disp: 90 Tab, Rfl: 1 
  levothyroxine (SYNTHROID) 100 mcg tablet, TAKE 1 TABLET EVERY DAY BEFORE BREAKFAST, Disp: 90 Tab, Rfl: 1 
  OTHER,NON-FORMULARY,, Air mattress Dx: prevention of decubiti, Disp: 1 Each, Rfl: 0 
  OTHER,NON-FORMULARY,, Ultra light wheelchair with drop arms Dx: hemiplegia, Disp: 1 Each, Rfl: 0 
  OTHER,NON-FORMULARY,, Transfer board Dx: limitation in mobility, Disp: 1 Each, Rfl: 0 
  OTHER,NON-FORMULARY,, Bariatric potty chair w/drop arms  Dx: limitation in mobility, Disp: 1 Each, Rfl: 0 
  OTHER,NON-FORMULARY,, Trapeze bar Dx: limitation in mobility, Disp: 1 Each, Rfl: 0 
  OTHER,NON-FORMULARY,, 14 Fr. In and out urinary catheter. Dx: hemiplegia, Disp: 150 Each, Rfl: 11 Date Last Reviewed:  2/12/2019 EXAMINATION:  
2-4-19 Observation/Orthostatic Postural Assessment:   
      Compression stockings on B LEs. Distal swelling to B LEs. Arrives to PT in lightweight wheelchair. Palpation: Swelling noted to B LEs.  
ROM:   
      Passive range of motion to bilateral knees is within normal limits. Hip flexion contracture bilaterally. Ankle range of motion within normal limits bilaterally passively. Strength:   
      Hip flexion 2-/5 B Knee extension 2-/5 B Knee flexion 2-/5 B Dorsiflexion 2-/5 B Neurological Screen: 
     Sensation along all B LEs dermatomes normal to light touch. Functional Mobility:  
      Transfers: Independent with use of transfer board Wheelchair mobility: Independent with use of B UEs Bed mobility: Independent with extra time required Body Structures Involved: 1. Nerves 2. Bones 3. Joints 4. Muscles Body Functions Affected: 1. Neuromusculoskeletal 
2. Movement Related Activities and Participation Affected: 1. Mobility 2. Self Care 3. Domestic Life 4. Interpersonal Interactions and Relationships 5. Community, Social and [place] CLINICAL PRESENTATION:  
CLINICAL DECISION MAKING:  
Outcome Measure: Tool Used: Lower Extremity Functional Scale (LEFS) Score:  Initial: 16/80 Most Recent: 14/80 (Date: 2-4-19 ) Interpretation of Score: 20 questions each scored on a 5 point scale with 0 representing \"extreme difficulty or unable to perform\" and 4 representing \"no difficulty\". The lower the score, the greater the functional disability. 80/80 represents no disability. Minimal detectable change is 9 points. Score 80 79-63 62-48 47-32 31-16 15-1 0 Modifier CH CI CJ CK CL CM CN Medical Necessity:  
· Skilled intervention continues to be required due to limitations with functional mobility seconadry to B LE weakness. Reason for Services/Other Comments: 
· Patient continues to require skilled intervention due to decreased B LE strength and impaired functional mobility. TREATMENT:  
(In addition to Assessment/Re-Assessment sessions the following treatments were rendered) Pre-treatment Symptoms/Complaints:   Pt with no new complaints today. Pain: Initial:  
  0/10 Post Session:  0/10 Patient's spouse attended today's therapy session. Pt came in with tegaderm on his left knee from last aquatic session. New tegaderm applied. Aquatic Therapy (45 minutes): Aquatic treatment performed per flow grid for Decreased muscle strength, Decreased endurance, Decreased range of motion, Ease of movement and Low impact and reduced weight bearing activity. Cues provided for exercises and posture. Assistance by therapist provided for standing and stabilization in the pool. Patient has difficulty with standing alignment. Aquatic Exercise Log 
 
 
 Date: 
1-31-19 Date 
2-8-19 Date 2/12/19 Activity/ Exercise Walking forward Using noodle with assist  
X 4 in 4.5 ft Using noodle with assist in 4.5 depth 6 Walking backward  3 Walking sideways  6 Marching Goose Step Tip toes Heels Lunges Side step squats LE Exercises 4# with blue rings in 7 ft assist of 1  8# using bars 4# with blue rings in 7 ft assist of 1 Hip Flex/Ext X 15 B 15 15 Hip Abd/Add X 15 B 15 15 Hip IR/ER Calf raises Knee Flex AA for stretch 15 AA for stretching x 15 Squats Leg Circles 15/15 B  15/15 Step Ups Standing with A of 2 people x 5 with 10 -20 sec hold UE Exercises Squeeze In Push Down Pull Down Bicep/Tricep Rows/Press outs  Chi Positions Trunk Rotation Deep H2O/ Noodles 7 with blue rings and 4# wts 7' with noodle 8# 7' with blue rings and 4# wts Stabilization 2 min 2 min Knee flex 2 x 10 Single leg hip ext 2 x 10 Arms only Legs only 2min 2 min Bicycle 3 min [employer] 2 min 2 min 3 min Scissors 2 min  2 min 3 min Crab walk Lower abdominal  
work  DKTC - 2 min sit ups x 10 with assist  DKTC - 2 min sit ups x 10 with assist  
 
Supine floating Against therapist in leg press position for stretch and strength Min resistance x 10     
 
Jogging Lap Swimming Semi sit to stand    Sit to stand x 3 with 2 therapist support Seated on bench Ankle pumps  Ankle stretches in chair Knee flex stretch  In chair Supine in water Hip flexor stretches   Supine x 2 holding 15 sec each · Treatment/Session Assessment:   Pt continues to work hard in the pool  Showing improvement with increased standing time and slightly less support. · Response to Treatment:  Patient demonstrates significant B LE weakness. · Compliance with Program/Exercises: Compliant all of the time. · Recommendations/Intent for next treatment session: \"Next visit will focus on aquatic therapy to address B LE weakness and mobility deficits\". Total Treatment Duration:  45 minutes aquatic therapy PT Patient Time In/Time Out Time In: 1215 Time Out: 1300 Travis Martínez, KERRY

## 2019-02-14 ENCOUNTER — HOSPITAL ENCOUNTER (OUTPATIENT)
Dept: PHYSICAL THERAPY | Age: 79
Discharge: HOME | End: 2019-02-14
Payer: OTHER GOVERNMENT

## 2019-02-14 PROCEDURE — 97113 AQUATIC THERAPY/EXERCISES: CPT

## 2019-02-14 NOTE — PROGRESS NOTES
Evonne Montero : 1940 Primary: [address] [facility] Secondary:  Therapy Center at [facility] HENRY WATSON [address] [facility], [address] [facility] [address], [address] [facility] [phone]   [phone] OUTPATIENT PHYSICAL THERAPY:Daily Note 2019 ICD-10: Treatment Diagnosis: Paraplegia, incomplete (G82.22), Difficulty in walking, not elsewhere classified (R26.2), Muscle weakness (generalized) (M62.81) Precautions/Allergies:  
Patient has no known allergies. MD Orders: Evaluate and treat, aquatic therapy  MEDICAL/REFERRING DIAGNOSIS: 
Paraplegia, unspecified [G82.20] DATE OF ONSET: 1-3-18 REFERRING PHYSICIAN: Emiliano ADAMS PHYSICIAN APPOINTMENT: TBD  
19 ASSESSMENT:  Mr. Brant De presents with paraplegia following a surgical procedure nearly 1 year ago. Patient has attended 10 visits of PT treatments and demonstrates improvement, with improved B LE strength,and  the ability to  a gravity minimized environment. Patient is likely to benefit from continued PT to address these deficits. PROBLEM LIST (Impacting functional limitations): 1. Decreased Strength 2. Decreased ADL/Functional Activities 3. Decreased Transfer Abilities 4. Decreased Ambulation Ability/Technique 5. Decreased Balance 6. Decreased [place] with Home Exercise Program INTERVENTIONS PLANNED: 
1. Gait Training 2. Home Exercise Program (HEP) 3. Neuromuscular Re-education/Strengthening 4. Therapeutic Activites 5. Therapeutic Exercise/Strengthening 6. Transfer Training 7. Aquatic Therapy TREATMENT PLAN: 
Effective Dates: 2019 TO 4/15/2019. Frequency/Duration: 2 visits per week for 10 weeks (in anticipation of approval of more visits) GOALS: (Goals have been discussed and agreed upon with patient.) Short-Term Functional Goals: Time Frame: 5 weeks 1. Patient will demonstrate 3-/5 B LE strength Ongoing, progressing 19 2. Patient will be able to tolerate 45 minutes of aquatic therapy MET 2-4-19 3. Patient will be able to stand with max assist on level ground. Ongoing, able to in pool, but unable on level ground at this time, progressing 2-4-19 Discharge Goals: Time Frame: 10 weeks 1. Patient will demonstrate 3+/5 strength in B LEs 2. Patient will be able to ambulate 10 feet with use of appropriate assistive device 3. Patient will be able to perform stand-pivot transfer with appropriate assistive device Rehabilitation Potential For Stated Goals: Good The information in this section was collected on 11-28-18 (except where otherwise noted). HISTORY:  
History of Present Injury/Illness (Reason for Referral): 
Patient underwent two surgeries to address an aortic aneurysm, the second of which occurred in \[facility]\"" on 1/31/17. He has been unable to walk due to significant loss of strength in B LEs since this surgery, leaving him unable to ambulate. Was ambulating without any assistive devices prior to this surgery. Utilizes a transfer board at home to transfer into/out of wheelchair, onto/off of toilet and into/out of the shower and into/out of bed. Past Medical History/Comorbidities:  
Mr. Mat Astorga  has a past medical history of Aneurysm [facility]), Atrial flutter ([facility] [address] and Thyroid disease. He also has no past medical history of PUD (peptic ulcer disease). Mr. Mat Astorga  has a past surgical history that includes hx tonsillectomy; hx orthopaedic; hx appendectomy; hx colonoscopy (5/08); pr cardiac surg procedure unlist (2016); and pr cardiac surg procedure unlist (2016). Social History/Living Environment:  
 Patient lives with his spouse. Prior Level of Function/Work/Activity: 
Patient was independent with all mobility prior to surgery. Is modified independent with wheelchair now. Requires assist from his spouse to complete getting dressed with socks and pulling pants on. Ambulatory/Rehab Services H2 Model Falls Risk Assessment Risk Factors: 
     (1)  Gender [Male] Ability to Rise from Chair: 
     (4)  Unable to rise without assistance Falls Prevention Plan: Mobility Assistance Device (specify):  wheelchair, transfer board, shower chair Total: (5 or greater = High Risk): 5  
 ©2010 [facility] [place] . [place] States Patent #[id number]. Federal Law prohibits the replication, distribution or use without written permission from [facility] [employer] Current Medications:   
  
Current Outpatient Medications:  
  carvedilol (COREG) 3.125 mg tablet, Take  by mouth two (2) times daily (with meals). , Disp: , Rfl:  
  furosemide (LASIX) 40 mg tablet, Take  by mouth daily. , Disp: , Rfl:  
  lisinopril (PRINIVIL, ZESTRIL) 10 mg tablet, Take 5 mg by mouth daily. , Disp: , Rfl:  
  potassium chloride SR (K-TAB) 20 mEq tablet, Take 20 mEq by mouth daily. , Disp: , Rfl:  
  sertraline (ZOLOFT) 50 mg tablet, TAKE 1 TABLET EVERY DAY, Disp: 90 Tab, Rfl: 1 
  levothyroxine (SYNTHROID) 100 mcg tablet, TAKE 1 TABLET EVERY DAY BEFORE BREAKFAST, Disp: 90 Tab, Rfl: 1 
  OTHER,NON-FORMULARY,, Air mattress Dx: prevention of decubiti, Disp: 1 Each, Rfl: 0 
  OTHER,NON-FORMULARY,, Ultra light wheelchair with drop arms Dx: hemiplegia, Disp: 1 Each, Rfl: 0 
  OTHER,NON-FORMULARY,, Transfer board Dx: limitation in mobility, Disp: 1 Each, Rfl: 0 
  OTHER,NON-FORMULARY,, Bariatric potty chair w/drop arms  Dx: limitation in mobility, Disp: 1 Each, Rfl: 0 
  OTHER,NON-FORMULARY,, Trapeze bar Dx: limitation in mobility, Disp: 1 Each, Rfl: 0 
  OTHER,NON-FORMULARY,, 14 Fr. In and out urinary catheter. Dx: hemiplegia, Disp: 150 Each, Rfl: 11 Date Last Reviewed:  2/14/2019 EXAMINATION:  
2-4-19 Observation/Orthostatic Postural Assessment:   
      Compression stockings on B LEs. Distal swelling to B LEs. Arrives to PT in lightweight wheelchair. Palpation: Swelling noted to B LEs.  
ROM:   
      Passive range of motion to bilateral knees is within normal limits. Hip flexion contracture bilaterally. Ankle range of motion within normal limits bilaterally passively. Strength:   
      Hip flexion 2-/5 B Knee extension 2-/5 B Knee flexion 2-/5 B Dorsiflexion 2-/5 B Neurological Screen: 
     Sensation along all B LEs dermatomes normal to light touch. Functional Mobility:  
      Transfers: Independent with use of transfer board Wheelchair mobility: Independent with use of B UEs Bed mobility: Independent with extra time required Body Structures Involved: 1. Nerves 2. Bones 3. Joints 4. Muscles Body Functions Affected: 1. Neuromusculoskeletal 
2. Movement Related Activities and Participation Affected: 1. Mobility 2. Self Care 3. Domestic Life 4. Interpersonal Interactions and Relationships 5. Community, Social and [place] CLINICAL PRESENTATION:  
CLINICAL DECISION MAKING:  
Outcome Measure: Tool Used: Lower Extremity Functional Scale (LEFS) Score:  Initial: 16/80 Most Recent: 14/80 (Date: 2-4-19 ) Interpretation of Score: 20 questions each scored on a 5 point scale with 0 representing \"extreme difficulty or unable to perform\" and 4 representing \"no difficulty\". The lower the score, the greater the functional disability. 80/80 represents no disability. Minimal detectable change is 9 points. Score 80 79-63 62-48 47-32 31-16 15-1 0 Modifier CH CI CJ CK CL CM CN Medical Necessity:  
· Skilled intervention continues to be required due to limitations with functional mobility seconadry to B LE weakness. Reason for Services/Other Comments: 
· Patient continues to require skilled intervention due to decreased B LE strength and impaired functional mobility. TREATMENT:  
(In addition to Assessment/Re-Assessment sessions the following treatments were rendered) Pre-treatment Symptoms/Complaints:   Pt states he feels not as tight today. Pain: Initial:  
  0/10 Post Session:  0/10 Patient's spouse attended today's therapy session. Pt came in with tegaderm on his left knee from last aquatic session. Aquatic Therapy (45 minutes): Aquatic treatment performed per flow grid for Decreased muscle strength, Decreased endurance, Decreased range of motion, Ease of movement and Low impact and reduced weight bearing activity. Cues provided for exercises and posture. Assistance by therapist provided for standing and stabilization in the pool. Patient has difficulty with standing alignment. Aquatic Exercise Log 
 
 
 Date: 
1-31-19 Date 
2-8-19 Date 2/12/19 Date 2/14/19 Activity/ Exercise Walking forward Using noodle with assist  
X 4 in 4.5 ft Using noodle with assist in 4.5 depth 
6  Using noodle with assist in 4.5 depth 
4 Sitting on noodle 3.5' x 4 laps Walking backward  3  Sitting on noodle 3.5' x 4 laps Walking sideways  6  Sitting on noodle 3.5' x 4 laps Marching Goose Step Tip toes Heels Lunges Side step squats LE Exercises 4# with blue rings in 7 ft assist of 1  8# using bars 4# with blue rings in 7 ft assist of 1  4# with blue rings in 7 ft assist of 1 Hip Flex/Ext X 15 B 15 15 20 Hip Abd/Add X 15 B 15 15 20 Hip IR/ER Calf raises Knee Flex AA for stretch 15 AA for stretching x 15  AA for stretching x 15 Squats Leg Circles 15/15 B  15/15 15/15 Step Ups Standing with A of 2 people x 5 with 10 -20 sec hold    Standing in 3.5' x 4 holding 10 sec each with assist x2 UE Exercises Squeeze In Push Down Pull Down Bicep/Tricep Rows/Press outs  Chi Positions Trunk Rotation Deep H2O/ Noodles 7 with blue rings and 4# wts 7' with noodle 8# 7' with blue rings and 4# wts 7' with blue rings and 4# wts Stabilization 2 min 2 min Knee flex 2 x 10 Single leg hip ext 2 x 10  2 min Knee flex 2 x 10 Single leg hip ext 2 x 10 Arms only Legs only 2min 2 min Bicycle 3 min Bicycle 3 min plus 30 sec fast   
[employer] 2 min 2 min 3 min 3 min plus 30 sec fast  
 
Scissors 2 min  2 min 3 min 3 min plus 30 sec fast  
 
Crab walk Lower abdominal  
work  DKTC - 2 min sit ups x 10 with assist  DKTC - 2 min sit ups x 10 with assist DKTC - 2 min sit ups x 10 with assist  
 
Supine floating Against therapist in leg press position for stretch and strength Min resistance x 10      
 
Jogging Lap Swimming Semi sit to stand    Sit to stand x 3 with 2 therapist support Seated on bench Ankle pumps  Ankle stretches in chair Knee flex stretch  In chair Supine in water Hip flexor stretches   Supine x 2 holding 15 sec each · Treatment/Session Assessment:   Pt continues to work hard in the pool  Showing improvement with increased standing time and slightly less support. · Response to Treatment:  Patient demonstrates significant B LE weakness. · Compliance with Program/Exercises: Compliant all of the time. · Recommendations/Intent for next treatment session: \"Next visit will focus on aquatic therapy to address B LE weakness and mobility deficits\". Total Treatment Duration:  60 minutes aquatic therapy PT Patient Time In/Time Out Time In: 1120 Time Out: 1220 Susen Sicard, KERRY

## 2019-02-19 ENCOUNTER — HOSPITAL ENCOUNTER (OUTPATIENT)
Dept: PHYSICAL THERAPY | Age: 79
Discharge: HOME | End: 2019-02-19
Payer: OTHER GOVERNMENT

## 2019-02-19 PROCEDURE — 97113 AQUATIC THERAPY/EXERCISES: CPT

## 2019-02-19 NOTE — PROGRESS NOTES
Taryn Montalvo : 1940 Primary: [address] [facility] Secondary:  Therapy Center at [facility] HENRY WATSON [address] [facility], [address] [facility] [address], [place] [address]. [phone]   [phone] OUTPATIENT PHYSICAL THERAPY:Daily Note 2019 ICD-10: Treatment Diagnosis: Paraplegia, incomplete (G82.22), Difficulty in walking, not elsewhere classified (R26.2), Muscle weakness (generalized) (M62.81) Precautions/Allergies:  
Patient has no known allergies. MD Orders: Evaluate and treat, aquatic therapy  MEDICAL/REFERRING DIAGNOSIS: 
Paraplegia, unspecified [G82.20] DATE OF ONSET: 1-3-18 REFERRING PHYSICIAN: Emiliano Soliz RETURN PHYSICIAN APPOINTMENT: TBD  
19 ASSESSMENT:  Mr. Kashmir Cochran presents with paraplegia following a surgical procedure nearly 1 year ago. Patient has attended 10 visits of PT treatments and demonstrates improvement, with improved B LE strength,and  the ability to  a gravity minimized environment. Patient is likely to benefit from continued PT to address these deficits. PROBLEM LIST (Impacting functional limitations): 1. Decreased Strength 2. Decreased ADL/Functional Activities 3. Decreased Transfer Abilities 4. Decreased Ambulation Ability/Technique 5. Decreased Balance 6. Decreased [place] with Home Exercise Program INTERVENTIONS PLANNED: 
1. Gait Training 2. Home Exercise Program (HEP) 3. Neuromuscular Re-education/Strengthening 4. Therapeutic Activites 5. Therapeutic Exercise/Strengthening 6. Transfer Training 7. Aquatic Therapy TREATMENT PLAN: 
Effective Dates: 2019 TO 4/15/2019. Frequency/Duration: 2 visits per week for 10 weeks (in anticipation of approval of more visits) GOALS: (Goals have been discussed and agreed upon with patient.) Short-Term Functional Goals: Time Frame: 5 weeks 1. Patient will demonstrate 3-/5 B LE strength Ongoing, progressing 19 2. Patient will be able to tolerate 45 minutes of aquatic therapy MET 2-4-19 3. Patient will be able to stand with max assist on level ground. Ongoing, able to in pool, but unable on level ground at this time, progressing 2-4-19 Discharge Goals: Time Frame: 10 weeks 1. Patient will demonstrate 3+/5 strength in B LEs 2. Patient will be able to ambulate 10 feet with use of appropriate assistive device 3. Patient will be able to perform stand-pivot transfer with appropriate assistive device Rehabilitation Potential For Stated Goals: Good The information in this section was collected on 11-28-18 (except where otherwise noted). HISTORY:  
History of Present Injury/Illness (Reason for Referral): 
Patient underwent two surgeries to address an aortic aneurysm, the second of which occurred in [facility] on 1/31/17. He has been unable to walk due to significant loss of strength in B LEs since this surgery, leaving him unable to ambulate. Was ambulating without any assistive devices prior to this surgery. Utilizes a transfer board at home to transfer into/out of wheelchair, onto/off of toilet and into/out of the shower and into/out of bed. Past Medical History/Comorbidities:  
Mr. Boo Samaniego  has a past medical history of Aneurysm [facility]), Atrial flutter ([facility] [address] and Thyroid disease. He also has no past medical history of PUD (peptic ulcer disease). Mr. Boo Samaniego  has a past surgical history that includes hx tonsillectomy; hx orthopaedic; hx appendectomy; hx colonoscopy (5/08); pr cardiac surg procedure unlist (2016); and pr cardiac surg procedure unlist (2016). Social History/Living Environment:  
 Patient lives with his spouse. Prior Level of Function/Work/Activity: 
Patient was independent with all mobility prior to surgery. Is modified independent with wheelchair now. Requires assist from his spouse to complete getting dressed with socks and pulling pants on. Ambulatory/Rehab Services H2 Model Falls Risk Assessment Risk Factors: 
     (1)  Gender [Male] Ability to Rise from Chair: 
     (4)  Unable to rise without assistance Falls Prevention Plan: Mobility Assistance Device (specify):  wheelchair, transfer board, shower chair Total: (5 or greater = High Risk): 5  
 ©2010 [facility] [place] . [place] States Patent #[id number]. Federal Law prohibits the replication, distribution or use without written permission from [facility] [employer] Current Medications:   
  
Current Outpatient Medications:  
  carvedilol (COREG) 3.125 mg tablet, Take  by mouth two (2) times daily (with meals). , Disp: , Rfl:  
  furosemide (LASIX) 40 mg tablet, Take  by mouth daily. , Disp: , Rfl:  
  lisinopril (PRINIVIL, ZESTRIL) 10 mg tablet, Take 5 mg by mouth daily. , Disp: , Rfl:  
  potassium chloride SR (K-TAB) 20 mEq tablet, Take 20 mEq by mouth daily. , Disp: , Rfl:  
  sertraline (ZOLOFT) 50 mg tablet, TAKE 1 TABLET EVERY DAY, Disp: 90 Tab, Rfl: 1 
  levothyroxine (SYNTHROID) 100 mcg tablet, TAKE 1 TABLET EVERY DAY BEFORE BREAKFAST, Disp: 90 Tab, Rfl: 1 
  OTHER,NON-FORMULARY,, Air mattress Dx: prevention of decubiti, Disp: 1 Each, Rfl: 0 
  OTHER,NON-FORMULARY,, Ultra light wheelchair with drop arms Dx: hemiplegia, Disp: 1 Each, Rfl: 0 
  OTHER,NON-FORMULARY,, Transfer board Dx: limitation in mobility, Disp: 1 Each, Rfl: 0 
  OTHER,NON-FORMULARY,, Bariatric potty chair w/drop arms  Dx: limitation in mobility, Disp: 1 Each, Rfl: 0 
  OTHER,NON-FORMULARY,, Trapeze bar Dx: limitation in mobility, Disp: 1 Each, Rfl: 0 
  OTHER,NON-FORMULARY,, 14 Fr. In and out urinary catheter. Dx: hemiplegia, Disp: 150 Each, Rfl: 11 Date Last Reviewed:  2/21/2019 EXAMINATION:  
2-4-19 Observation/Orthostatic Postural Assessment:   
      Compression stockings on B LEs. Distal swelling to B LEs. Arrives to PT in lightweight wheelchair. Palpation: Swelling noted to B LEs.  
ROM:   
      Passive range of motion to bilateral knees is within normal limits. Hip flexion contracture bilaterally. Ankle range of motion within normal limits bilaterally passively. Strength:   
      Hip flexion 2-/5 B Knee extension 2-/5 B Knee flexion 2-/5 B Dorsiflexion 2-/5 B Neurological Screen: 
     Sensation along all B LEs dermatomes normal to light touch. Functional Mobility:  
      Transfers: Independent with use of transfer board Wheelchair mobility: Independent with use of B UEs Bed mobility: Independent with extra time required Body Structures Involved: 1. Nerves 2. Bones 3. Joints 4. Muscles Body Functions Affected: 1. Neuromusculoskeletal 
2. Movement Related Activities and Participation Affected: 1. Mobility 2. Self Care 3. Domestic Life 4. Interpersonal Interactions and Relationships 5. Community, Social and [place] CLINICAL PRESENTATION:  
CLINICAL DECISION MAKING:  
Outcome Measure: Tool Used: Lower Extremity Functional Scale (LEFS) Score:  Initial: 16/80 Most Recent: 14/80 (Date: 2-4-19 ) Interpretation of Score: 20 questions each scored on a 5 point scale with 0 representing \"extreme difficulty or unable to perform\" and 4 representing \"no difficulty\". The lower the score, the greater the functional disability. 80/80 represents no disability. Minimal detectable change is 9 points. Score 80 79-63 62-48 47-32 31-16 15-1 0 Modifier CH CI CJ CK CL CM CN Medical Necessity:  
· Skilled intervention continues to be required due to limitations with functional mobility seconadry to B LE weakness. Reason for Services/Other Comments: 
· Patient continues to require skilled intervention due to decreased B LE strength and impaired functional mobility. TREATMENT:  
(In addition to Assessment/Re-Assessment sessions the following treatments were rendered) Pre-treatment Symptoms/Complaints:   Pt continues to feel not as tight during therapy. He is using his stander more at home. Pain: Initial:  
  0/10 Post Session:  0/10 Patient's spouse attended today's therapy session. Water proof bandaid applied to left knee. Aquatic Therapy (45 minutes): Aquatic treatment performed per flow grid for Decreased muscle strength, Decreased endurance, Decreased range of motion, Ease of movement and Low impact and reduced weight bearing activity. Cues provided for exercises and posture. Assistance by therapist provided for standing and stabilization in the pool. Patient has difficulty with standing alignment. Aquatic Exercise Log 
 
 
 Date: 
1-31-19 Date 
2-8-19 Date 2/12/19 Date 2/14/19 Date 2/19/19 Activity/ Exercise Walking forward Using noodle with assist  
X 4 in 4.5 ft Using noodle with assist in 4.5 depth 
6  Using noodle with assist in 4.5 depth 
4 Sitting on noodle 3.5' x 4 laps Walking backward  3  Sitting on noodle 3.5' x 4 laps Walking sideways  6  Sitting on noodle 3.5' x 4 laps Marching Goose Step Tip toes Heels Lunges Side step squats LE Exercises 4# with blue rings in 7 ft assist of 1  8# using bars 4# with blue rings in 7 ft assist of 1  4# with blue rings in 7 ft assist of 1 4# with blue rings in 7 ft assist of 1 Hip Flex/Ext X 15 B 15 15 20 20 Hip Abd/Add X 15 B 15 15 20 20 Hip IR/ER Calf raises Knee Flex AA for stretch 15 AA for stretching x 15  AA for stretching x 15 AA for stretching x 15 Squats Leg Circles 15/15 B  15/15 15/15 15/15 Step Ups Standing with A of 2 people x 5 with 10 -20 sec hold    Standing in 3.5' x 4 holding 10 sec each with assist x2 UE Exercises Squeeze In Push Down Pull Down Bicep/Tricep Rows/Press outs  Chi Positions Trunk Rotation Deep H2O/ Noodles 7 with blue rings and 4# wts 7' with noodle 8# 7' with blue rings and 4# wts 7' with blue rings and 4# wts 7' with blue rings and 4# wts Stabilization 2 min 2 min Knee flex 2 x 10 Single leg hip ext 2 x 10  2 min Knee flex 2 x 10 Single leg hip ext 2 x 10 2 min Knee flex 2 x 10 Single leg hip ext 2 x 10 Arms only Legs only 2min 2 min Bicycle 3 min Bicycle 3 min plus 30 sec fast  Bicycle 3 min plus 30 sec fast  
[facility] 2 min 2 min 3 min 3 min plus 30 sec fast 3 min plus 30 sec fast  
 
Scissors 2 min  2 min 3 min 3 min plus 30 sec fast 3 min plus 30 sec fast  
 
Crab walk Lower abdominal  
work  DKTC - 2 min sit ups x 10 with assist  DKTC - 2 min sit ups x 10 with assist DKTC - 2 min sit ups x 10 with assist   
 
Supine floating Against therapist in leg press position for stretch and strength Min resistance x 10       
 
Jogging Lap Swimming Semi sit to stand    Sit to stand x 3 with 2 therapist support   Sit to stand x 5 with 2 therapist support Seated on bench Ankle pumps  Ankle stretches in chair Knee flex stretch  In chair Supine in water Hip flexor stretches   Supine x 2 holding 15 sec each  X 3 in 7' · Treatment/Session Assessment:   Pt continues to work hard in the pool. He is not as tight in his LE and able to stand more erect. Pt does not have another appointment this week due to [facility] for them to \"get heart back in rhythm\". · Response to Treatment:  Patient demonstrates significant B LE weakness. · Compliance with Program/Exercises: Compliant all of the time. · Recommendations/Intent for next treatment session: \"Next visit will focus on aquatic therapy to address B LE weakness and mobility deficits\". Total Treatment Duration:  60 minutes aquatic therapy PT Patient Time In/Time Out Time In: 1200 Time Out: 1300 Maryam Galdamez, KERRY

## 2019-02-22 ENCOUNTER — APPOINTMENT (OUTPATIENT)
Dept: PHYSICAL THERAPY | Age: 79
End: 2019-02-22
Payer: OTHER GOVERNMENT

## 2019-02-25 ENCOUNTER — HOSPITAL ENCOUNTER (OUTPATIENT)
Dept: PHYSICAL THERAPY | Age: 79
Discharge: HOME | End: 2019-02-25
Payer: OTHER GOVERNMENT

## 2019-03-01 ENCOUNTER — APPOINTMENT (OUTPATIENT)
Dept: PHYSICAL THERAPY | Age: 79
End: 2019-03-01

## 2019-03-05 ENCOUNTER — APPOINTMENT (OUTPATIENT)
Dept: PHYSICAL THERAPY | Age: 79
End: 2019-03-05

## 2019-03-07 ENCOUNTER — APPOINTMENT (OUTPATIENT)
Dept: PHYSICAL THERAPY | Age: 79
End: 2019-03-07

## 2019-03-12 ENCOUNTER — APPOINTMENT (OUTPATIENT)
Dept: PHYSICAL THERAPY | Age: 79
End: 2019-03-12

## 2019-03-14 ENCOUNTER — APPOINTMENT (OUTPATIENT)
Dept: PHYSICAL THERAPY | Age: 79
End: 2019-03-14

## 2019-03-19 ENCOUNTER — HOSPITAL ENCOUNTER (OUTPATIENT)
Dept: PHYSICAL THERAPY | Age: 79
End: 2019-03-19

## 2019-03-19 ENCOUNTER — APPOINTMENT (OUTPATIENT)
Dept: PHYSICAL THERAPY | Age: 79
End: 2019-03-19

## 2019-03-21 ENCOUNTER — APPOINTMENT (OUTPATIENT)
Dept: PHYSICAL THERAPY | Age: 79
End: 2019-03-21

## 2019-04-02 ENCOUNTER — APPOINTMENT (OUTPATIENT)
Dept: PHYSICAL THERAPY | Age: 79
End: 2019-04-02
Payer: OTHER GOVERNMENT

## 2019-04-04 ENCOUNTER — APPOINTMENT (OUTPATIENT)
Dept: PHYSICAL THERAPY | Age: 79
End: 2019-04-04
Payer: OTHER GOVERNMENT

## 2019-04-09 ENCOUNTER — APPOINTMENT (OUTPATIENT)
Dept: PHYSICAL THERAPY | Age: 79
End: 2019-04-09
Payer: OTHER GOVERNMENT

## 2019-04-11 ENCOUNTER — APPOINTMENT (OUTPATIENT)
Dept: PHYSICAL THERAPY | Age: 79
End: 2019-04-11
Payer: OTHER GOVERNMENT

## 2019-04-16 ENCOUNTER — APPOINTMENT (OUTPATIENT)
Dept: PHYSICAL THERAPY | Age: 79
End: 2019-04-16

## 2019-05-07 ENCOUNTER — HOSPITAL ENCOUNTER (OUTPATIENT)
Dept: PHYSICAL THERAPY | Age: 79
Discharge: HOME | End: 2019-05-07
Payer: OTHER GOVERNMENT

## 2019-05-07 PROCEDURE — 97110 THERAPEUTIC EXERCISES: CPT

## 2019-05-07 PROCEDURE — 97164 PT RE-EVAL EST PLAN CARE: CPT

## 2019-05-07 NOTE — THERAPY RECERTIFICATION
Mariusz Larios : 1940 Primary: [facility] Jayson Matter Secondary:  Therapy Center at [facility] HENRY BLACKWELLA [address] [facility], [address] [place] [address],[address], [place] [address]. [phone]   [phone] OUTPATIENT PHYSICAL THERAPY:Recertification 7533 ICD-10: Treatment Diagnosis: Paraplegia, incomplete (G82.22), Difficulty in walking, not elsewhere classified (R26.2), Muscle weakness (generalized) (M62.81) Precautions/Allergies:  
Patient has no known allergies. MD Orders: Evaluate and treat, aquatic therapy  MEDICAL/REFERRING DIAGNOSIS: 
Paraplegia, unspecified [G82.20] DATE OF ONSET: 1-3-18 REFERRING PHYSICIAN: Emiliano Rao RETURN PHYSICIAN APPOINTMENT: TBD  
19 ASSESSMENT:  Mr. Maryan Calloway presents with paraplegia following a surgical procedure over 1 year ago. Patient has been unable to attend PT since 2019. He continues to exhibit impaired functional mobility, with <3/5 B LE strength, and requires a transfer board to move from the wheelchair to the table and back. Pt is currently unable to stand and requires wheelchair for all mobility. Continues to require assistance from his spouse in order to change clothes. Patient is a good candidate for continued PT to regain use of B LEs in order to stand and support bodyweight. PROBLEM LIST (Impacting functional limitations): 1. Decreased Strength 2. Decreased ADL/Functional Activities 3. Decreased Transfer Abilities 4. Decreased Ambulation Ability/Technique 5. Decreased Balance 6. Decreased [place] with Home Exercise Program INTERVENTIONS PLANNED: 
1. Gait Training 2. Home Exercise Program (HEP) 3. Neuromuscular Re-education/Strengthening 4. Therapeutic Activites 5. Therapeutic Exercise/Strengthening 6. Transfer Training 7. Aquatic Therapy TREATMENT PLAN: 
Effective Dates: 19 to 2019. Frequency/Duration: 2 visits per week for 8 weeks (in anticipation of approval of more visits) GOALS: (Goals have been discussed and agreed upon with patient.) Short-Term Functional Goals: Time Frame: 5 weeks 1. Patient will demonstrate 3-/5 B LE strength Ongoing, progressing 5-7-19 2. Patient will be able to tolerate 45 minutes of aquatic therapy MET 2-4-19 3. Patient will be able to stand with max assist on level ground. Ongoing, currently unable 5-7-19 Discharge Goals: Time Frame: 10 weeks 1. Patient will demonstrate 3+/5 strength in B LEs 2. Patient will be able to ambulate 10 feet with use of appropriate assistive device Unable, ongoing, 5-7-19 3. Patient will be able to perform stand-pivot transfer with appropriate assistive device Rehabilitation Potential For Stated Goals: Good Regarding Gale Garcia's therapy, I certify that the treatment plan above will be carried out by a therapist or under their direction. Thank you for this referral, 
 
Jayro Catalan PT Referring Physician Signature: Adriana Valera*         Date The information in this section was collected on 11-28-18 (except where otherwise noted). HISTORY:  
History of Present Injury/Illness (Reason for Referral): 
Patient underwent two surgeries to address an aortic aneurysm, the second of which occurred in [facility] on 1/31/17. He has been unable to walk due to significant loss of strength in B LEs since this surgery, leaving him unable to ambulate. Was ambulating without any assistive devices prior to this surgery. Utilizes a transfer board at home to transfer into/out of wheelchair, onto/off of toilet and into/out of the shower and into/out of bed. Past Medical History/Comorbidities:  
Mr. Mary Boyer  has a [facility] Atrial flutter ([facility] [address] and Thyroid disease. He also has no past medical history of PUD (peptic ulcer disease).   Mr. Mary Boyer  has a past surgical history that includes hx tonsillectomy; hx orthopaedic; hx appendectomy; hx colonoscopy (5/08); pr cardiac surg procedure unlist (2016); and pr cardiac surg procedure unlist (2016). Social History/Living Environment:  
 Patient lives with his spouse. Prior Level of Function/Work/Activity: 
Patient was independent with all mobility prior to surgery. Is modified independent with wheelchair now. Requires assist from his spouse to complete getting dressed with socks and pulling pants on. Ambulatory/Rehab Services H2 Model Falls Risk Assessment Risk Factors: 
     (1)  Gender [Male] Ability to Rise from Chair: 
     (4)  Unable to rise without assistance Falls Prevention Plan: Mobility Assistance Device (specify):  wheelchair, transfer board, shower chair Total: (5 or greater = High Risk): 5  
 ©2010 [facility] [address] [place] [address] States Patent #[id number]. Federal Law prohibits the replication, distribution or use without written permission from [facility] [employer] Current Medications:   
  
Current Outpatient Medications:  
  carvedilol (COREG) 3.125 mg tablet, Take  by mouth two (2) times daily (with meals). , Disp: , Rfl:  
  furosemide (LASIX) 40 mg tablet, Take  by mouth daily. , Disp: , Rfl:  
  lisinopril (PRINIVIL, ZESTRIL) 10 mg tablet, Take 5 mg by mouth daily. , Disp: , Rfl:  
  potassium chloride SR (K-TAB) 20 mEq tablet, Take 20 mEq by mouth daily. , Disp: , Rfl:  
  sertraline (ZOLOFT) 50 mg tablet, TAKE 1 TABLET EVERY DAY, Disp: 90 Tab, Rfl: 1 
  levothyroxine (SYNTHROID) 100 mcg tablet, TAKE 1 TABLET EVERY DAY BEFORE BREAKFAST, Disp: 90 Tab, Rfl: 1 
  OTHER,NON-FORMULARY,, Air mattress Dx: prevention of decubiti, Disp: 1 Each, Rfl: 0 
  OTHER,NON-FORMULARY,, Ultra light wheelchair with drop arms Dx: hemiplegia, Disp: 1 Each, Rfl: 0 
  OTHER,NON-FORMULARY,, Transfer board Dx: limitation in mobility, Disp: 1 Each, Rfl: 0 
  OTHER,NON-FORMULARY,, Bariatric potty chair w/drop arms  Dx: limitation in mobility, Disp: 1 Each, Rfl: 0 
   OTHER,NON-FORMULARY,, Trapeze bar Dx: limitation in mobility, Disp: 1 Each, Rfl: 0 
  OTHER,NON-FORMULARY,, 14 Fr. In and out urinary catheter. Dx: hemiplegia, Disp: 150 Each, Rfl: 11 Date Last Reviewed:  5/7/2019 EXAMINATION:  
5-7-19 Observation/Orthostatic Postural Assessment:   
      Arrives to PT in lightweight wheelchair in no obvious distress. Palpation:   
      Swelling noted to B LEs.  
ROM:   
      Passive range of motion to bilateral knees is within normal limits. Hip flexion contracture bilaterally. Ankle range of motion within normal limits bilaterally passively. Strength:   
      Hip flexion 2/5 B Knee extension - can feel palpable quadriceps contraction but unable to lift against gravity Dorsiflexion 3-/5 B Neurological Screen: 
     Sensation along all B LEs dermatomes normal to light touch. Functional Mobility:  
      Transfers: Independent with use of transfer board, but requires extra time. Wheelchair mobility: Independent with use of B UEs Bed mobility: Required min-mod assist for supine <> sit on treatment table Body Structures Involved: 1. Nerves 2. Bones 3. Joints 4. Muscles Body Functions Affected: 1. Neuromusculoskeletal 
2. Movement Related Activities and Participation Affected: 1. Mobility 2. Self Care 3. Domestic Life 4. Interpersonal Interactions and Relationships 5. Community, Social and Faxon Catlett CLINICAL PRESENTATION:  
CLINICAL DECISION MAKING:  
Outcome Measure: Tool Used: Lower Extremity Functional Scale (LEFS) Score:  Initial: 16/80 14/80 (Date: 2-4-19 ) Most recent: 15/80 (5/8/19) Interpretation of Score: 20 questions each scored on a 5 point scale with 0 representing \"extreme difficulty or unable to perform\" and 4 representing \"no difficulty\". The lower the score, the greater the functional disability. 80/80 represents no disability. Minimal detectable change is 9 points. Score 80 79-63 62-48 47-32 31-16 15-1 0 Modifier CH CI CJ CK CL CM CN Medical Necessity:  
· Skilled intervention continues to be required due to limitations with functional mobility seconadry to B LE weakness. Reason for Services/Other Comments: 
· Patient continues to require skilled intervention due to decreased B LE strength and impaired functional mobility.

## 2019-05-08 NOTE — PROGRESS NOTES
Myron Bingham : 1940 Payor: Varun Leroy / Plan: Bonny Galindo / Product Type: Federal Funded Programs /  [address]  at [address] [place] [address] [address] [facility], [address] [place] [address],[address], [address] [facility] [phone]   [phone] OUTPATIENT PHYSICAL THERAPY: Daily Treatment Note 19 ICD-10: Treatment Diagnosis: Parplegia, incomplete (G82.22), Difficulty in walking, not elsewhere classified (R26.2), Muscle weakness (generalized) (M62.81) Precautions/Allergies:  
Patient has no known allergies. MD Orders: Evaluate and treat, aquatic therapy MEDICAL/REFERRING DIAGNOSIS: 
Paraplegia, unspecified [G82.20] DATE OF ONSET: 1-3-18 REFERRING PHYSICIAN: Emiliano Michel* RETURN PHYSICIAN APPOINTMENT: TBD Pre-treatment Symptoms/Complaints:  Pt is excited to get back into the pool in order to resume strengthening his legs. Pain: Initial:   None reported Post Session:  None after PT, but felt fatigued Medications Last Reviewed:  19 Updated Objective Findings:  
See recertification and progress report from today TREATMENT:  
 
THERAPEUTIC EXERCISE (30 minutes) to improve B LE strength and function. Manually resisted unilateral extension and flexion of hip and knee with alternating reversals to improve B LE strength 3 x 10 with patient in semi-supine position. Manually resisted hip ABD/ADD isometrics in hook-lying to improve bilateral hip adductor and abductor strengthening. Active-assisted hip adduction and abduction in semi-supine to each LE to reduce muscle tightness in B hips and reduce B knee flexion contracture. MANUAL THERAPY (4 minutes) to improve B ankle ROM. Grade 3-4 ankle dorsiflexion physio mobilizations in semi-supine bilaterally to reduce ankle stiffness and swelling. HEP: None provided today Treatment/Session Summary: · Response to Treatment:  Patient able to elicit force through B LEs with resisted hip/knee extension and flexion, but is not able to move LEs against gravity at this time. Anthony Arce · Communication/Consultation:  None today · Equipment provided today:  None today · Recommendations/Intent for next treatment session: Next visit will focus on aquatic therapy to improve B LE strength and facilitate improvement with functional mobility. Treatment Plan of Care Effective Dates: 5/7/19  to 7/2/2019. Frequency/Duration: 2 visits per week for 8 weeks. Visit Count:  17 Total Treatment Billable Duration:  45 minutes PT Patient Time In/Time Out Time In: 2122 Time Out: 9480 Lola Fothergill, PT

## 2019-05-14 ENCOUNTER — HOSPITAL ENCOUNTER (OUTPATIENT)
Dept: PHYSICAL THERAPY | Age: 79
Discharge: HOME | End: 2019-05-14
Payer: OTHER GOVERNMENT

## 2019-05-14 PROCEDURE — 97113 AQUATIC THERAPY/EXERCISES: CPT

## 2019-05-14 NOTE — PROGRESS NOTES
Gonzalo Garcia : 1940 Payor: Heather Herman / Plan: Mg Sale / Product Type: Federal Funded Programs /  [address]  at [facility] HENRY WATSON 11[address] [facility], [address] [place] [address],[address], [facility] [address] 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  
Patient has no known allergies. MD Orders: Evaluate and treat, aquatic therapy MEDICAL/REFERRING DIAGNOSIS: 
Paraplegia, unspecified [G82.20] DATE OF ONSET: 1-3-18 REFERRING PHYSICIAN: Ahmed, Mehr Darletta Dancer* RETURN PHYSICIAN APPOINTMENT: TBD Pre-treatment Symptoms/Complaints:  Pt is excited about getting back into the water. He states he has not been approved for more appointment except one more. Assisted pt and his wife with clothes changing in restroom. Pain: Initial:   None reported Post Session:  None after PT, but felt fatigued Medications Last Reviewed:  19 Updated Objective Findings:  
None Today TREATMENT:  
Aquatic Therapy (45 minutes): Aquatic treatment performed per flow grid for Decreased muscle strength, Decreased endurance, Decreased range of motion, Decreased activity endurance, Decompression, Ease of movement and Low impact and reduced weight bearing activity. Cues provided for posture and exercises. Assistance by therapist provided for balance and exercises. Aquatic Exercise Log Date 19 Date Date Date Date Activity/ Exercise Parameters Parameters Parameters Parameters Parameters Walking forward Walking backward Walking sideways Marching Goose Step Tip toes Heels Lunges Side step squats LE Exercises 7' with one foot on step Hip Flex/Ext 10      
 
 Hip Abd/Add 10 Hip IR/ER Calf raises Knee Flex Squats Leg Circles 10/10 Step Ups UE Exercises Squeeze In Push Down Pull Down Bicep/Tricep Rows/Press outs  Chi Positions Trunk Rotation Deep H2O/ Noodles 7' with blue rings and assist       
 
Stabilization 5 min Arms only Legs only [place] 5 min Scissors 5 min Crab walk Lower abdominal  
work  DKTC 5 min Cardio Jogging Lap Swimming Stretches Hamstrings Heelcords Piriformis Neck THERAPEUTIC EXERCISE ( minutes) to improve B LE strength and function. Manually resisted unilateral extension and flexion of hip and knee with alternating reversals to improve B LE strength 3 x 10 with patient in semi-supine position. Manually resisted hip ABD/ADD isometrics in hook-lying to improve bilateral hip adductor and abductor strengthening. Active-assisted hip adduction and abduction in semi-supine to each LE to reduce muscle tightness in B hips and reduce B knee flexion contracture. MANUAL THERAPY (minutes) to improve B ankle ROM. Grade 3-4 ankle dorsiflexion physio mobilizations in semi-supine bilaterally to reduce ankle stiffness and swelling. HEP: None provided today Treatment/Session Summary: · Response to Treatment:  Pt did well with mobility today. He has regressed since last PT session a few months ago. . 
· Communication/Consultation:  None today · Equipment provided today:  None today · Recommendations/Intent for next treatment session: Next visit will focus on aquatic therapy to improve B LE strength and facilitate improvement with functional mobility. Treatment Plan of Care Effective Dates: 5/7/19  to 7/2/2019. Frequency/Duration: 2 visits per week for 8 weeks.      Visit Count:  16 
 
 Total Treatment Billable Duration:  45 minutes PT Patient Time In/Time Out Time In: 1115 Time Out: 1799 Arlen Canchola PTA

## 2019-05-21 ENCOUNTER — HOSPITAL ENCOUNTER (OUTPATIENT)
Dept: PHYSICAL THERAPY | Age: 79
Discharge: HOME | End: 2019-05-21
Payer: OTHER GOVERNMENT

## 2019-05-21 PROCEDURE — 97113 AQUATIC THERAPY/EXERCISES: CPT

## 2019-05-21 NOTE — PROGRESS NOTES
David Triplett : 1940 Payor: Venkat Reyez / Plan: Jessica Varela / Product Type: Federal Funded Programs /  [facility] [place]  at [facility] HENRY WATSON [address] [facility], [address] [place] [address],[address], [facility] [address] 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  
Patient has no known allergies. MD Orders: Evaluate and treat, aquatic therapy MEDICAL/REFERRING DIAGNOSIS: 
Paraplegia, unspecified [G82.20] DATE OF ONSET: 1-3-18 REFERRING PHYSICIAN: Emiliano Michel* RETURN PHYSICIAN APPOINTMENT: TBD Pre-treatment Symptoms/Complaints:  Pt did much better with transfer over to and from the chair today. Pain: Initial:   None reported Post Session:  None after PT, but felt fatigued Medications Last Reviewed:  19 Updated Objective Findings:  
None Today TREATMENT:  
Aquatic Therapy (45 minutes): Aquatic treatment performed per flow grid for Decreased muscle strength, Decreased endurance, Decreased range of motion, Decreased activity endurance, Decompression, Ease of movement and Low impact and reduced weight bearing activity. Cues provided for posture and exercises. Assistance by therapist provided for balance and exercises. Aquatic Exercise Log Date 19 Date 19 Date Date Date Activity/ Exercise Parameters Parameters Parameters Parameters Parameters Walking forward Walking backward Walking sideways Marching Goose Step Tip toes Heels Lunges Side step squats LE Exercises 7' with one foot on step 7' with one foot on step Hip Flex/Ext 10 10 Hip Abd/Add 10 10 Hip IR/ER Calf raises Knee Flex Squats Leg Circles 10/10 10/10 Step Ups  Sit to stand 2 person assist x 4 holding 30 secs each. UE Exercises Squeeze In Push Down Pull Down Bicep/Tricep Rows/Press outs  Chi Positions Trunk Rotation  3 x 15 secs each Deep H2O/ Noodles 7' with blue rings and assist  7' with blue rings and assist      
 
Stabilization 5 min 5 min Arms only Legs only [employer] Country 5 min 5 min Scissors 5 min 5 min Crab walk Lower abdominal  
work  DKTC 5 min DKTC 5 min Cardio Jogging Lap Swimming Stretches Hamstrings  3 x 15 secs each Heelcords Piriformis  3 x 15 secs each Neck THERAPEUTIC EXERCISE ( minutes) to improve B LE strength and function. Manually resisted unilateral extension and flexion of hip and knee with alternating reversals to improve B LE strength 3 x 10 with patient in semi-supine position. Manually resisted hip ABD/ADD isometrics in hook-lying to improve bilateral hip adductor and abductor strengthening. Active-assisted hip adduction and abduction in semi-supine to each LE to reduce muscle tightness in B hips and reduce B knee flexion contracture. MANUAL THERAPY (minutes) to improve B ankle ROM. Grade 3-4 ankle dorsiflexion physio mobilizations in semi-supine bilaterally to reduce ankle stiffness and swelling. HEP: None provided today Treatment/Session Summary: · Response to Treatment:  Pt tolerated exercises well. He is improving with sit to stand with 2 person assist for posture and balance. · Communication/Consultation:  None today · Equipment provided today:  None today · Recommendations/Intent for next treatment session: Next visit will focus on aquatic therapy to improve B LE strength and facilitate improvement with functional mobility. Treatment Plan of Care Effective Dates: 5/7/19  to 7/2/2019. Frequency/Duration: 2 visits per week for 8 weeks. Visit Count:  19 Total Treatment Billable Duration:  45 minutes PT Patient Time In/Time Out Time In: 1215 Time Out: 1300 Laurie Arellano PTA

## 2019-06-19 ENCOUNTER — HOSPITAL ENCOUNTER (OUTPATIENT)
Dept: PHYSICAL THERAPY | Age: 79
Discharge: HOME | End: 2019-06-19
Payer: COMMERCIAL

## 2019-06-19 PROCEDURE — 97164 PT RE-EVAL EST PLAN CARE: CPT

## 2019-06-19 NOTE — PROGRESS NOTES
Myron Bingahm : 1940 Payor: Varun Leroy / Plan: Bonny Galindo / Product Type: Federal Funded Programs /  [address]  at [address] [place] [address] Rd [address] [facility], [address] [place] [address],[address], [facility] [address] 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  
Patient has no known allergies. MD Orders: Evaluate and treat, aquatic therapy MEDICAL/REFERRING DIAGNOSIS: 
parapalegia DATE OF ONSET: 1-3-18 REFERRING PHYSICIAN: Emiliano Michel* RETURN PHYSICIAN APPOINTMENT: TBD Pre-treatment Symptoms/Complaints: Patient reports that he hopes to be able to walk next summer. Pain: Initial:   None reported Post Session:  No pain, just fatigued Medications Last Reviewed:  19 Updated Objective Findings:  
See recertification note from today TREATMENT:  
Re-evaluation (30 minutes): See progress note/re-certification note from 19 for specifics of objective findings. HEP: None provided today Treatment/Session Summary: · Response to Treatment:  Pt able to transfer independently using slideboard/transfer board. Did require assistance with getting up and onto treatment table. · Communication/Consultation:  None today · Equipment provided today:  None today · Recommendations/Intent for next treatment session: Next visit will focus on aquatic therapy to improve B LE strength and facilitate improvement with functional mobility. Treatment Plan of Care Effective Dates: 19  to 2019. Frequency/Duration: 2 visits per week for 8 weeks. Visit Count:  20 Total Treatment Billable Duration: 30 minutes PT Patient Time In/Time Out Time In: 3108 Time Out: 1105 Joe Mo PT

## 2019-06-19 NOTE — THERAPY RECERTIFICATION
Abbie Garcia : 1940 Primary: [facility] Malden Secondary:  Therapy Center at [facility] HENRY BLACKWELLA [address] [facility], [address] [place] [address],[address], [place] [address]. [phone]   [phone] OUTPATIENT PHYSICAL THERAPY:Recertification  ICD-10: Treatment Diagnosis: Paraplegia, incomplete (G82.22), Difficulty in walking, not elsewhere classified (R26.2), Muscle weakness (generalized) (M62.81) Precautions/Allergies:  
Patient has no known allergies. MD Orders: Evaluate and treat, aquatic therapy  MEDICAL/REFERRING DIAGNOSIS: 
parapalegia DATE OF ONSET: 1-3-18 REFERRING PHYSICIAN: Emiliano Nickerson RETURN PHYSICIAN APPOINTMENT: TBD  
 
19 ASSESSMENT:  Mr. Brigitte Rice presents with paraplegia following a surgical procedure over 1 year ago. Patient has been unable to attend PT since May 21, 2019. He has made slight progress with B LE strength, but remains reliant on a wheelchair for mobility and a transfer board to transfer, using bilateral upper extremities to propel himself into and out of wheelchair. Patient was ambulatory prior to the surgery he experienced >1 year ago, and is hopeful to return to that level again. Patient is a good candidate for continued PT to regain use of B LEs in order to stand and support bodyweight. PROBLEM LIST (Impacting functional limitations): 1. Decreased Strength 2. Decreased ADL/Functional Activities 3. Decreased Transfer Abilities 4. Decreased Ambulation Ability/Technique 5. Decreased Balance 6. Decreased [place] with Home Exercise Program INTERVENTIONS PLANNED: 
1. Gait Training 2. Home Exercise Program (HEP) 3. Neuromuscular Re-education/Strengthening 4. Therapeutic Activites 5. Therapeutic Exercise/Strengthening 6. Transfer Training 7. Aquatic Therapy TREATMENT PLAN: 
Effective Dates: 19 to 2019. Frequency/Duration: 2 visits per week for 8 weeks (in anticipation of approval of more visits) GOALS: (Goals have been discussed and agreed upon with patient.) Short-Term Functional Goals: Time Frame: 5 weeks 1. Patient will demonstrate 3-/5 B LE strength Ongoing, MET for L LE but progressing with R LE 6-19-19 2. Patient will be able to tolerate 45 minutes of aquatic therapy MET 2-4-19 3. Patient will be able to stand with max assist on level ground. Ongoing, currently unable 6-19-19 Discharge Goals: Time Frame: 10 weeks 1. Patient will demonstrate 3+/5 strength in B LEs. Ongoing, progressing 6-19-19 2. Patient will be able to ambulate 10 feet with use of appropriate assistive device Unable, ongoing, 5-7-19 3. Patient will be able to perform stand-pivot transfer with appropriate assistive device Rehabilitation Potential For Stated Goals: Good Regarding Nuvia Garcia's therapy, I certify that the treatment plan above will be carried out by a therapist or under their direction. Thank you for this referral, 
 
Christ Gonzalez, PT Referring Physician Signature: Marie Morton*          Date Ambulatory/Rehab Services H2 Model Falls Risk Assessment Risk Factors: 
     (1)  Gender [Male] Ability to Rise from Chair: 
     (4)  Unable to rise without assistance Falls Prevention Plan: Mobility Assistance Device (specify):  wheelchair, transfer board, shower chair Total: (5 or greater = High Risk): 5  
 ©2010 [facility] [address] [place] [address] Patent #[id number]. Federal Law prohibits the replication, distribution or use without written permission from [facility] Current Medications:   
  
Current Outpatient Medications:  
  carvedilol (COREG) 3.125 mg tablet, Take  by mouth two (2) times daily (with meals). , Disp: , Rfl:  
  furosemide (LASIX) 40 mg tablet, Take  by mouth daily. , Disp: , Rfl:  
  lisinopril (PRINIVIL, ZESTRIL) 10 mg tablet, Take 5 mg by mouth daily. , Disp: , Rfl:  
   potassium chloride SR (K-TAB) 20 mEq tablet, Take 20 mEq by mouth daily. , Disp: , Rfl:  
  sertraline (ZOLOFT) 50 mg tablet, TAKE 1 TABLET EVERY DAY, Disp: 90 Tab, Rfl: 1 
  levothyroxine (SYNTHROID) 100 mcg tablet, TAKE 1 TABLET EVERY DAY BEFORE BREAKFAST, Disp: 90 Tab, Rfl: 1 
  OTHER,NON-FORMULARY,, Air mattress Dx: prevention of decubiti, Disp: 1 Each, Rfl: 0 
  OTHER,NON-FORMULARY,, Ultra light wheelchair with drop arms Dx: hemiplegia, Disp: 1 Each, Rfl: 0 
  OTHER,NON-FORMULARY,, Transfer board Dx: limitation in mobility, Disp: 1 Each, Rfl: 0 
  OTHER,NON-FORMULARY,, Bariatric potty chair w/drop arms  Dx: limitation in mobility, Disp: 1 Each, Rfl: 0 
  OTHER,NON-FORMULARY,, Trapeze bar Dx: limitation in mobility, Disp: 1 Each, Rfl: 0 
  OTHER,NON-FORMULARY,, 14 Fr. In and out urinary catheter. Dx: hemiplegia, Disp: 150 Each, Rfl: 11 Date Last Reviewed:  6/19/2019 EXAMINATION:  
6-19-19 Observation/Orthostatic Postural Assessment:   
      Arrives to PT in lightweight wheelchair in no obvious distress. Pt joined by spouse during re-evaluation. Pt wearing compression stockings on B LEs to control swelling. Palpation:   
       
ROM:   
      Passive range of motion to bilateral knees is within normal limits. Hip flexion contracture bilaterally. Ankle range of motion within normal limits bilaterally passively. Strength:   
      Hip flexion 2+ to 3-/5 B Knee extension 2-/5 B Knee flexion - unable to resist manual resistance but can feel palpable contraction bilaterally Dorsiflexion 3/5 L, 3-/5 R Neurological Screen: 
     Sensation along all B LEs dermatomes normal to light touch. Functional Mobility:  
      Transfers: Independent with use of transfer board, but requires extra time. Wheelchair mobility: Independent with use of B UEs Bed mobility: Required mod A for bed mobility onto/off of treatment table Body Structures Involved: 1. Nerves 2. Bones 3. Joints 4. Muscles Body Functions Affected: 1. Neuromusculoskeletal 
2. Movement Related Activities and Participation Affected: 1. Mobility 2. Self Care 3. Domestic Life 4. Interpersonal Interactions and Relationships 5. Community, Social and [place] CLINICAL PRESENTATION:  
CLINICAL DECISION MAKING:  
Outcome Measure: Tool Used: Lower Extremity Functional Scale (LEFS) Score:  Initial: 16/80 14/80 (Date: 2-4-19 ) Most recent: 10/80 (6-19-19) 
 15/80 (5/8/19) Interpretation of Score: 20 questions each scored on a 5 point scale with 0 representing \"extreme difficulty or unable to perform\" and 4 representing \"no difficulty\". The lower the score, the greater the functional disability. 80/80 represents no disability. Minimal detectable change is 9 points. Score 80 79-63 62-48 47-32 31-16 15-1 0 Modifier CH CI CJ CK CL CM CN Medical Necessity:  
· Skilled intervention continues to be required due to impaired use of B LEs. Reason for Services/Other Comments: 
· Patient continues to require skilled intervention due to decreased B LE strength and impaired functional mobility.

## 2019-06-26 ENCOUNTER — HOSPITAL ENCOUNTER (OUTPATIENT)
Dept: PHYSICAL THERAPY | Age: 79
Discharge: HOME | End: 2019-06-26
Payer: COMMERCIAL

## 2019-06-26 PROCEDURE — 97113 AQUATIC THERAPY/EXERCISES: CPT

## 2019-06-26 NOTE — PROGRESS NOTES
Keven Sessions : 1940 Payor: Chloe Law / Plan: Dalton Wagoner / Product Type: Federal Funded Programs /  [address]  at [facility] HENRY WATSON [address] [facility], [address] [place] [address],[address], [facility] [address]. 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  
Patient has no known allergies. MD Orders: Evaluate and treat, aquatic therapy MEDICAL/REFERRING DIAGNOSIS: 
Paraplegia, unspecified [G82.20] DATE OF ONSET: 1-3-18 REFERRING PHYSICIAN: Emiliano Michel* RETURN PHYSICIAN APPOINTMENT: TBD Pre-treatment Symptoms/Complaints:  Pt very glad to be back in therapy. He states he has been getting into his stander and doing his ex at home. Pain: Initial:   None reported Post Session:  None after PT, but felt fatigued Medications Last Reviewed:  19 Updated Objective Findings:  
None Today TREATMENT:  
Aquatic Therapy (45 minutes): Aquatic treatment performed per flow grid for Decreased muscle strength, Decreased endurance, Decreased range of motion, Decreased activity endurance, Decompression, Ease of movement and Low impact and reduced weight bearing activity. Cues provided for posture and exercises. Assistance by therapist provided for balance and exercises. Aquatic Exercise Log Date 19 Date 19 Date 19 Date Date Activity/ Exercise Parameters Parameters Parameters Parameters Parameters Walking forward   In 4.5 with railing x 3 Walking backward   X 3 in 4.5 Walking sideways Marching Goose Step Tip toes Heels Lunges Side step squats LE Exercises 7' with one foot on step 7' with one foot on step  7' with one foot on step Hip Flex/Ext 10 10 10 Hip Abd/Add 10 10 10 
10 in chair Hip ext   10 Calf raises Knee ext   2 x 10 in chair Squats Leg Circles 10/10 10/10 10/10 Step Ups  Sit to stand 2 person assist x 4 holding 30 secs each. UE Exercises Squeeze In Push Down Pull Down Bicep/Tricep Rows/Press outs  Chi Positions Trunk Rotation  3 x 15 secs each Deep H2O/ Noodles 7' with blue rings and assist  7' with blue rings and assist  7' with noodle and 4# Stabilization 5 min 5 min Arms only Legs only [employer] [place] 5 min 5 min 3 min Scissors 5 min 5 min 3 min Crab walk Lower abdominal  
work  DKTC 5 min DKTC 5 min DKTC 5 min Cardio Jogging Lap Swimming Stretches Hamstrings  3 x 15 secs each Heelcords Piriformis  3 x 15 secs each Neck Used step for hip flex stretch. HEP: None provided today Treatment/Session Summary: · Response to Treatment:  Active hip ext and adduction improved. · Communication/Consultation:  None today · Equipment provided today:  None today · Recommendations/Intent for next treatment session: Next visit will focus on aquatic therapy to improve B LE strength and facilitate improvement with functional mobility. Treatment Plan of Care Effective Dates: 5/7/19  to 7/2/2019. Frequency/Duration: 2 visits per week for 8 weeks. Visit Count:  21 Total Treatment Billable Duration:  45 minutes Diana Iqbal PT

## 2019-07-02 ENCOUNTER — HOSPITAL ENCOUNTER (OUTPATIENT)
Dept: PHYSICAL THERAPY | Age: 79
Discharge: HOME | End: 2019-07-02
Payer: COMMERCIAL

## 2019-07-02 PROCEDURE — 97113 AQUATIC THERAPY/EXERCISES: CPT

## 2019-07-02 NOTE — PROGRESS NOTES
Haley Navas : 1940 Payor: Morgan Beatty / Plan: Ani Hernandes / Product Type: Federal Funded Programs /  [address]  at [facility] HENRY WATSON 11[address] [facility], [address] [place] [address],[address], [facility] [address]. 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  
Patient has no known allergies. MD Orders: Evaluate and treat, aquatic therapy MEDICAL/REFERRING DIAGNOSIS: 
Paraplegia, unspecified [G82.20] DATE OF ONSET: 1-3-18 REFERRING PHYSICIAN: Emiliano Michel* RETURN PHYSICIAN APPOINTMENT: TBD Pre-treatment Symptoms/Complaints:  Patient transferred to chair left by sliding board Pain: Initial:   None reported Post Session:  None after PT, but felt fatigued Medications Last Reviewed:  19 Updated Objective Findings:  
None Today TREATMENT:  
Aquatic Therapy (45 minutes): Aquatic treatment performed per flow grid for Decreased muscle strength, Decreased endurance, Decreased range of motion, Decreased activity endurance, Decompression, Ease of movement and Low impact and reduced weight bearing activity. Cues provided for posture and exercises. Assistance by therapist provided for balance and exercises. Aquatic Exercise Log Date 19 Date 19 Date 
19 Date Date Activity/ Exercise Parameters Parameters Parameters Parameters Parameters Walking forward   3 laps with mod/ max A Walking backward Walking sideways Marching Goose Step Tip toes Heels Lunges Side step squats LE Exercises 7' with one foot on step 7' with one foot on step  7 ft with one foot on step Hip Flex/Ext 10 10 X 10 B Hip Abd/Add 10 10 X 10 B    
 
 Hip IR/ER Calf raises Knee Flex Squats Leg Circles 10/10 10/10 10 /10 b Step Ups  Sit to stand 2 person assist x 4 holding 30 secs each. Sit to stand x 5 in 3.5 ft - holding 30 sec Then 2 more for 30 sec after stretching UE Exercises Squeeze In Push Down Pull Down Bicep/Tricep Rows/Press outs  Chi Positions Trunk Rotation  3 x 15 secs each 3 x 15 sec B Deep H2O/ Noodles 7' with blue rings and assist  7' with blue rings and assist  7 ft with rings and assist     
 
Stabilization 5 min 5 min 5 min Arms only Legs only [employer] [place] 5 min 5 min 5 min Scissors 5 min 5 min 5 min Crab walk Lower abdominal  
work  DKTC 5 min DKTC 5 min DKTC  - 3 min Crunches x 10 Cardio Jogging Lap Swimming Stretches   At step Hamstrings  3 x 15 secs each 3 x 15 sec hold B Heelcords   Hip flexor 2 x 15 sec hold B - supine in 4.5 ft    
 
Piriformis  3 x 15 secs each 3 x 15 sec hold Neck THERAPEUTIC EXERCISE ( minutes) to improve B LE strength and function. Manually resisted unilateral extension and flexion of hip and knee with alternating reversals to improve B LE strength 3 x 10 with patient in semi-supine position. Manually resisted hip ABD/ADD isometrics in hook-lying to improve bilateral hip adductor and abductor strengthening. Active-assisted hip adduction and abduction in semi-supine to each LE to reduce muscle tightness in B hips and reduce B knee flexion contracture. MANUAL THERAPY (minutes) to improve B ankle ROM. Grade 3-4 ankle dorsiflexion physio mobilizations in semi-supine bilaterally to reduce ankle stiffness and swelling. HEP: None provided today Treatment/Session Summary: · Response to Treatment:  Pt tolerated exercises well.  He is improving with sit to stand. Able to perform in 3.5 ft today and 4.5 ft with less difficulty. h 
· Communication/Consultation:  None today · Equipment provided today:  None today · Recommendations/Intent for next treatment session: Next visit will focus on aquatic therapy to improve B LE strength and facilitate improvement with functional mobility. Visit Count:  22 TREATMENT PLAN: 
Effective Dates: 5/7/19 to 7/2/2019. Frequency/Duration: 2 visits per week for 8 weeks (in anticipation of approval of more visits) Total Treatment Billable Duration:  45 minutes PT Patient Time In/Time Out Time In: 1085 Time Out: 2496 Lambert Johnson, PTA

## 2019-07-09 ENCOUNTER — HOSPITAL ENCOUNTER (OUTPATIENT)
Dept: PHYSICAL THERAPY | Age: 79
Discharge: HOME | End: 2019-07-09
Payer: COMMERCIAL

## 2019-07-09 PROCEDURE — 97113 AQUATIC THERAPY/EXERCISES: CPT

## 2019-07-09 NOTE — PROGRESS NOTES
Jh Carrington : 1940 Payor: John Renteria / Plan: Alex Beech / Product Type: Federal Funded Programs /  [address]  at [facility] HENRY WATSON 11[address] [facility], [address] [place] [address],[address], [facility] [address]. [phone]   [phone] OUTPATIENT PHYSICAL THERAPY: Daily Treatment and Aquatic Note 2019 ICD-10: Treatment Diagnosis: Parplegia, incomplete (G82.22), Difficulty in walking, not elsewhere classified (R26.2), Muscle weakness (generalized) (M62.81) Precautions/Allergies:  
Patient has no known allergies. MD Orders: Evaluate and treat, aquatic therapy MEDICAL/REFERRING DIAGNOSIS: 
Paraplegia, unspecified [G82.20] DATE OF ONSET: 1-3-18 REFERRING PHYSICIAN: Emiliano Michel* RETURN PHYSICIAN APPOINTMENT: TBD Pre-treatment Symptoms/Complaints:  Patient transferred to and from chair left by sliding board Pain: Initial:   None reported Post Session:  None after PT Medications Last Reviewed:  19 Updated Objective Findings:  
None Today TREATMENT:  
Aquatic Therapy (60 minutes): Aquatic treatment performed per flow grid for Decreased muscle strength, Decreased endurance, Decreased range of motion, Decreased activity endurance, Decompression, Ease of movement and Low impact and reduced weight bearing activity. Cues provided for posture and exercises. Assistance by therapist provided for balance and exercises. Aquatic Exercise Log Date 19 Date 19 Date 
19 Date 
19 Date Activity/ Exercise Parameters Parameters Parameters Parameters Parameters Walking forward   3 laps with mod/ max A Walking backward Walking sideways Marching Goose Step Tip toes Heels Lunges Side step squats LE Exercises 7' with one foot on step 7' with one foot on step  7 ft with one foot on step  7' with one foot on step Hip Flex/Ext 10 10 X 10 B 15 B   
 
 Hip Abd/Add 10 10 X 10 B 15 B Hip IR/ER Calf raises Knee Flex    15 B Squats Leg Circles 10/10 10/10 10 /10 b 15/15 B Step Ups  Sit to stand 2 person assist x 4 holding 30 secs each. Sit to stand x 5 in 3.5 ft - holding 30 sec Then 2 more for 30 sec after stretching Squat to  4. 5' holding rail x 5 holding 15 sec each with mod assist    
UE Exercises Squeeze In Push Down Pull Down Bicep/Tricep Rows/Press outs  Chi Positions Trunk Rotation  3 x 15 secs each 3 x 15 sec B Deep H2O/ Noodles 7' with blue rings and assist  7' with blue rings and assist  7 ft with rings and assist  7' with rings and assist    
 
Stabilization 5 min 5 min 5 min  5 min Arms only    Sitting on noodle using arms and legs to move forward and backward x 4 laps each with assist    
 
Legs only    Bicycle 5 min [employer] 5 min 5 min 5 min  5 min Scissors 5 min 5 min 5 min  5 min Crab walk Lower abdominal  
work  DKTC 5 min DKTC 5 min DKTC  - 3 min Crunches x 10 Cardio Jogging Lap Swimming Stretches   At step  7' with blue rings in place Hamstrings  3 x 15 secs each 3 x 15 sec hold B Heelcords   Hip flexor 2 x 15 sec hold B - supine in 4.5 ft    
 
Piriformis  3 x 15 secs each 3 x 15 sec hold                      Hip flexors x 5 holding 15 sec each Neck THERAPEUTIC EXERCISE ( minutes) to improve B LE strength and function. Manually resisted unilateral extension and flexion of hip and knee with alternating reversals to improve B LE strength 3 x 10 with patient in semi-supine position. Manually resisted hip ABD/ADD isometrics in hook-lying to improve bilateral hip adductor and abductor strengthening. Active-assisted hip adduction and abduction in semi-supine to each LE to reduce muscle tightness in B hips and reduce B knee flexion contracture. MANUAL THERAPY (minutes) to improve B ankle ROM. Grade 3-4 ankle dorsiflexion physio mobilizations in semi-supine bilaterally to reduce ankle stiffness and swelling. HEP: None provided today Treatment/Session Summary: · Response to Treatment:   Pt fatigued after PT.   
· Communication/Consultation:  None today · Equipment provided today:  None today · Recommendations/Intent for next treatment session: Next visit will focus on aquatic therapy to improve B LE strength and facilitate improvement with functional mobility. Visit Count:  23 TREATMENT PLAN: 
Effective Dates: 6/19/19 to 8/14/2019. Frequency/Duration: 2 visits per week for 8 weeks (in anticipation of approval of more visits) Total Treatment Billable Duration:  60 minutes PT Patient Time In/Time Out Time In: 7424 Time Out: 0482 Huber Bingham, PTA

## 2019-07-11 ENCOUNTER — HOSPITAL ENCOUNTER (OUTPATIENT)
Dept: PHYSICAL THERAPY | Age: 79
Discharge: HOME | End: 2019-07-11
Payer: COMMERCIAL

## 2019-07-11 PROCEDURE — 97113 AQUATIC THERAPY/EXERCISES: CPT

## 2019-07-11 NOTE — PROGRESS NOTES
Ej Cruz : 1940 Payor: Sergio Centeno / Plan: Loretta Ayers / Product Type: Federal Funded Programs /  [address] [place]  at [facility] HENRY WATSON 1101 [facility], [address] [place] [address],[address], [place] [address]. [phone]   [phone] OUTPATIENT PHYSICAL THERAPY: Daily Treatment and Aquatic Note 2019 ICD-10: Treatment Diagnosis: Parplegia, incomplete (G82.22), Difficulty in walking, not elsewhere classified (R26.2), Muscle weakness (generalized) (M62.81) Precautions/Allergies:  
Patient has no known allergies. MD Orders: Evaluate and treat, aquatic therapy MEDICAL/REFERRING DIAGNOSIS: 
Paraplegia, unspecified [G82.20] DATE OF ONSET: 1-3-18 REFERRING PHYSICIAN: Emiliano Michel* RETURN PHYSICIAN APPOINTMENT: TBD Pre-treatment Symptoms/Complaints:  Patient transferred to and from chair left by sliding board Pain: Initial:   None reported Post Session:  None after PT Medications Last Reviewed:  19 Updated Objective Findings:  
None Today TREATMENT:  
Aquatic Therapy (60 minutes): Aquatic treatment performed per flow grid for Decreased muscle strength, Decreased endurance, Decreased range of motion, Decreased activity endurance, Decompression, Ease of movement and Low impact and reduced weight bearing activity. Cues provided for posture and exercises. Assistance by therapist provided for balance and exercises. Aquatic Exercise Log Date 19 Date 19 Date 
19 Date 
19 Date 19 Activity/ Exercise Parameters Parameters Parameters Parameters Parameters Walking forward   3 laps with mod/ max A Walking backward Walking sideways Marching Goose Step Tip toes Heels Lunges Side step squats LE Exercises 7' with one foot on step 7' with one foot on step  7 ft with one foot on step  7' with one foot on step  7' with one foot on step Hip Flex/Ext 10 10 X 10 B 15 B 15 B Hip Abd/Add 10 10 X 10 B 15 B 15 B Hip IR/ER Calf raises Knee Flex    15 B 15 B Squats Leg Circles 10/10 10/10 10 /10 b 15/15 B 15/15 Step Ups  Sit to stand 2 person assist x 4 holding 30 secs each. Sit to stand x 5 in 3.5 ft - holding 30 sec Then 2 more for 30 sec after stretching Squat to  4. 5' holding rail x 5 holding 15 sec each with mod assist  Squat to  4. 5' holding rail x 5 holding 15 sec each with mod assist  
UE Exercises Squeeze In Push Down Pull Down Bicep/Tricep Rows/Press outs  Chi Positions Trunk Rotation  3 x 15 secs each 3 x 15 sec B Deep H2O/ Noodles 7' with blue rings and assist  7' with blue rings and assist  7 ft with rings and assist  7' with rings and assist  7' with rings and assist   
 
Stabilization 5 min 5 min 5 min  5 min 5 min Arms only    Sitting on noodle using arms and legs to move forward and backward x 4 laps each with assist  Sitting on noodle using arms and legs to move forward and backward x 4 laps each with assist  
 
Legs only    Bicycle 5 min Bicycle 4 min [employer] 5 min 5 min 5 min  5 min 5 min Scissors 5 min 5 min 5 min  5 min 5 min Crab walk Lower abdominal  
work  DKTC 5 min DKTC 5 min DKTC  - 3 min Crunches x 10   DKTC 4 min Cardio Jogging Lap Swimming Stretches   At step  7' with blue rings in place 7' with blue rings in place Hamstrings  3 x 15 secs each 3 x 15 sec hold B Heelcords   Hip flexor 2 x 15 sec hold B - supine in 4.5 ft    
 
Piriformis  3 x 15 secs each 3 x 15 sec hold                      Hip flexors x 5 holding 15 sec each Hip flexors x 5 holding 15 sec each Neck       
 
using arms and legs to move forward and backward x 4 laps each with assist 
THERAPEUTIC EXERCISE ( minutes) to improve B LE strength and function. Manually resisted unilateral extension and flexion of hip and knee with alternating reversals to improve B LE strength 3 x 10 with patient in semi-supine position. Manually resisted hip ABD/ADD isometrics in hook-lying to improve bilateral hip adductor and abductor strengthening. Active-assisted hip adduction and abduction in semi-supine to each LE to reduce muscle tightness in B hips and reduce B knee flexion contracture. MANUAL THERAPY (minutes) to improve B ankle ROM. Grade 3-4 ankle dorsiflexion physio mobilizations in semi-supine bilaterally to reduce ankle stiffness and swelling. HEP: None provided today Treatment/Session Summary: · Response to Treatment:   Pt fatigued after PT.   
· Communication/Consultation:  None today · Equipment provided today:  None today · Recommendations/Intent for next treatment session: Next visit will focus on aquatic therapy to improve B LE strength and facilitate improvement with functional mobility. Visit Count:  24 TREATMENT PLAN: 
Effective Dates: 6/19/19 to 8/14/2019. Frequency/Duration: 2 visits per week for 8 weeks (in anticipation of approval of more visits) Total Treatment Billable Duration:  60 minutes PT Patient Time In/Time Out Time In: 4565 Time Out: 2656 Cristal Paul, PTA

## 2019-07-16 ENCOUNTER — HOSPITAL ENCOUNTER (OUTPATIENT)
Dept: PHYSICAL THERAPY | Age: 79
Discharge: HOME | End: 2019-07-16
Payer: COMMERCIAL

## 2019-07-16 PROCEDURE — 97113 AQUATIC THERAPY/EXERCISES: CPT

## 2019-07-17 NOTE — PROGRESS NOTES
Carolynn Nicky  : 1940  Payor: Devaughn Osgood / Plan: Juan Jose Borges / Product Type: Federal Funded Programs /  [address]  at [facility] HENRY WATSON  [address] [place] [address], [address], [place] [address].  [phone]   [phone]       OUTPATIENT PHYSICAL THERAPY: Daily Treatment and Aquatic Note 2019       ICD-10: Treatment Diagnosis: Anabelle Jennifer, incomplete (G82.22), Difficulty in walking, not elsewhere classified (R26.2), Muscle weakness (generalized) (M62.81)  Precautions/Allergies:   Patient has no known allergies. MD Orders: Evaluate and treat, aquatic therapy MEDICAL/REFERRING DIAGNOSIS:  Paraplegia, unspecified [G82.20]   DATE OF ONSET: 1-3-18  REFERRING PHYSICIAN: Emiliano Michel*  RETURN PHYSICIAN APPOINTMENT: TBD       Pre-treatment Symptoms/Complaints:  Patient transferred to and from chair left by sliding board  Pain: Initial:   None reported Post Session:  None after PT   Medications Last Reviewed:  19    Updated Objective Findings:   None Today        TREATMENT:   Aquatic Therapy (45 minutes): Aquatic treatment performed per flow grid for Decreased muscle strength, Decreased endurance, Decreased range of motion, Decreased activity endurance, Decompression, Ease of movement and Low impact and reduced weight bearing activity. Cues provided for posture and exercises. Assistance by therapist provided for balance and exercises.        Aquatic Exercise Log       Date  19 Date  19 Date  19 Date  19 Date  19 Date  19   Activity/ Exercise Parameters Parameters Parameters Parameters Parameters    Walking forward   3 laps with mod/ max A      Walking backward         Walking sideways           Marching           Goose Step           Tip toes           Heels           Lunges         Side step squats         LE Exercises 7' with one foot on step 7' with one foot on step  7 ft with one foot on step  7' with one foot on step  7' with one foot on step 7' with one foot on step     Hip Flex/Ext 10 10 X 10 B 15 B 15 B 15 B     Hip Abd/Add 10 10 X 10 B 15 B 15 B 15 B     Hip IR/ER           Calf raises           Knee Flex    15 B 15 B 15 B     Squats           Leg Circles 10/10 10/10 10 /10 b 15/15 B 15/15 15/15     Step Ups  Sit to stand 2 person assist x 4 holding 30 secs each. Sit to stand x 5 in 3.5 ft - holding 30 sec   Then 2 more for 30 sec after stretching Squat to  4. 5' holding rail x 5 holding 15 sec each with mod assist  Squat to  4. 5' holding rail x 5 holding 15 sec each with mod assist Squat to  4. 5' holding rail x 5 holding 15 sec each with mod assist   UE Exercises           Squeeze In           Push Down           Pull Down           Bicep/Tricep         Rows/Press outs          Chi Positions           Trunk Rotation  3 x 15 secs each 3 x 15 sec B      Deep H2O/ Noodles 7' with blue rings and assist  7' with blue rings and assist  7 ft with rings and assist  7' with rings and assist  7' with rings and assist  7' with rings and assist      Stabilization 5 min 5 min 5 min  5 min 5 min 5 min     Arms only    Sitting on noodle using arms and legs to move forward and backward x 4 laps each with assist  Sitting on noodle using arms and legs to move forward and backward x 4 laps each with assist Sitting on noodle using arms and legs to move forward and backward x 4 laps each with assist     Legs only    Bicycle 5 min Bicycle 4 min Bicycle 5 min   Cross   Country 5 min 5 min 5 min  5 min 5 min 5 min     Scissors 5 min 5 min 5 min  5 min 5 min 5 min     Crab walk         Lower abdominal   work  DKTC 5 min DKTC 5 min DKTC  - 3 min   Crunches x 10   DKTC 4 min DKTC 5 min     Cardio           Jogging         Lap   Swimming           Stretches   At step  7' with blue rings in place 7' with blue rings in place 7' with blue rings in place     Hamstrings  3 x 15 secs each 3 x 15 sec hold B        Heelcords   Hip flexor 2 x 15 sec hold B - supine in 4.5 ft        Piriformis  3 x 15 secs each 3 x 15 sec hold                      Hip flexors x 5 holding 15 sec each Hip flexors x 5 holding 15 sec each Hip flexors x 5 holding 15 sec each     Neck           THERAPEUTIC EXERCISE ( minutes) to improve B LE strength and function. Manually resisted unilateral extension and flexion of hip and knee with alternating reversals to improve B LE strength 3 x 10 with patient in semi-supine position. Manually resisted hip ABD/ADD isometrics in hook-lying to improve bilateral hip adductor and abductor strengthening. Active-assisted hip adduction and abduction in semi-supine to each LE to reduce muscle tightness in B hips and reduce B knee flexion contracture. MANUAL THERAPY (minutes) to improve B ankle ROM. Grade 3-4 ankle dorsiflexion physio mobilizations in semi-supine bilaterally to reduce ankle stiffness and swelling. HEP: None provided today    Treatment/Session Summary:    · Response to Treatment:   Pt fatigued after PT.    · Communication/Consultation:  None today  · Equipment provided today:  None today  · Recommendations/Intent for next treatment session: Next visit will focus on aquatic therapy to improve B LE strength and facilitate improvement with functional mobility. Visit Count:  25    TREATMENT PLAN:  Effective Dates: 6/19/19 to 8/14/2019.   Frequency/Duration: 2 visits per week for 8 weeks (in anticipation of approval of more visits)    Total Treatment Billable Duration:  45 minutes  PT Patient Time In/Time Out  Time In: 4401  Time Out: [address] PTA

## 2019-07-18 ENCOUNTER — HOSPITAL ENCOUNTER (OUTPATIENT)
Dept: PHYSICAL THERAPY | Age: 79
Discharge: HOME | End: 2019-07-18
Payer: COMMERCIAL

## 2019-07-18 PROCEDURE — 97113 AQUATIC THERAPY/EXERCISES: CPT

## 2019-07-18 NOTE — PROGRESS NOTES
Reina Brock  : 1940  Payor: Danuta Mallory / Plan: Bruce Fitzpatrick / Product Type: Federal Funded Programs /  [address]  at [facility] HENRY WATSON  [address] [facility], [address], [place] [address].  [phone]   [phone]       OUTPATIENT PHYSICAL THERAPY: Daily Treatment and Aquatic Note 2019       ICD-10: Treatment Diagnosis: Adelfo Racer, incomplete (G82.22), Difficulty in walking, not elsewhere classified (R26.2), Muscle weakness (generalized) (M62.81)  Precautions/Allergies:   Patient has no known allergies. MD Orders: Evaluate and treat, aquatic therapy MEDICAL/REFERRING DIAGNOSIS:  Paraplegia, unspecified [G82.20]   DATE OF ONSET: 1-3-18  REFERRING PHYSICIAN: Emiliano Michel*  RETURN PHYSICIAN APPOINTMENT: TBD       Pre-treatment Symptoms/Complaints:  Patient transferred to and from chair left by sliding board. He sates he is hurting some on his left scapula area. Pain: Initial:   None reported Post Session:  None after PT   Medications Last Reviewed:  19    Updated Objective Findings:   None Today        TREATMENT:   Aquatic Therapy (45 minutes): Aquatic treatment performed per flow grid for Decreased muscle strength, Decreased endurance, Decreased range of motion, Decreased activity endurance, Decompression, Ease of movement and Low impact and reduced weight bearing activity. Cues provided for posture and exercises. Assistance by therapist provided for balance and exercises.        Aquatic Exercise Log       Date  19 Date  19   Activity/ Exercise     Walking forward     Walking backward     Walking sideways       Marching       Goose Step       Tip toes       Heels       Lunges     Side step squats     LE Exercises 7' with one foot on step 7' with one foot on step     Hip Flex/Ext 15 B 15 B     Hip Abd/Add 15 B 15 B     Hip IR/ER       Calf raises       Knee Flex 15 B 15 B     Squats       Leg Circles 15/15 15/15 B     Step Ups Squat to  4.5' holding rail x 5 holding 15 sec each with mod assist Squat to  4. 5' holding rail x 5 holding 15 sec each with mod assist   UE Exercises       Squeeze In       Push Down       Pull Down       Bicep/Tricep     Rows/Press outs      Chi Positions       Trunk Rotation     Deep H2O/ Noodles 7' with rings and assist  7' with rings and assist     Stabilization 5 min 5 min     Arms only Sitting on noodle using arms and legs to move forward and backward x 4 laps each with assist Sitting on noodle using arms and legs to move forward and backward x 4 laps each with assist     Legs only Bicycle 5 min Bicycle 5 min   Cross   Country 5 min 5 min     Scissors 5 min 5 min     Crab walk     Lower abdominal   work  DKTC 5 min DKTC 5 min     Cardio       Jogging     Lap   Swimming       Stretches 7' with blue rings in place 7' with blue rings in place     Hamstrings       Heelcords       Piriformis Hip flexors x 5 holding 15 sec each Hip flexors x 5 holding 15 sec each     Neck       THERAPEUTIC EXERCISE ( minutes) to improve B LE strength and function. Manually resisted unilateral extension and flexion of hip and knee with alternating reversals to improve B LE strength 3 x 10 with patient in semi-supine position. Manually resisted hip ABD/ADD isometrics in hook-lying to improve bilateral hip adductor and abductor strengthening. Active-assisted hip adduction and abduction in semi-supine to each LE to reduce muscle tightness in B hips and reduce B knee flexion contracture. MANUAL THERAPY (minutes) to improve B ankle ROM. Grade 3-4 ankle dorsiflexion physio mobilizations in semi-supine bilaterally to reduce ankle stiffness and swelling.     HEP: None provided today    Treatment/Session Summary:    · Response to Treatment:   Pt continues to slowly improve with PT.    · Communication/Consultation:  None today  · Equipment provided today:  None today  · Recommendations/Intent for next treatment session: Next visit will focus on aquatic therapy to improve B LE strength and facilitate improvement with functional mobility. Visit Count:  26    TREATMENT PLAN:  Effective Dates: 6/19/19 to 8/14/2019.   Frequency/Duration: 2 visits per week for 8 weeks (in anticipation of approval of more visits)    Total Treatment Billable Duration:  45 minutes  PT Patient Time In/Time Out  Time In: 0498  Time Out: [address] [facility]

## 2019-07-23 ENCOUNTER — HOSPITAL ENCOUNTER (OUTPATIENT)
Dept: PHYSICAL THERAPY | Age: 79
Discharge: HOME | End: 2019-07-23
Payer: COMMERCIAL

## 2019-07-23 PROCEDURE — 97113 AQUATIC THERAPY/EXERCISES: CPT

## 2019-07-23 NOTE — PROGRESS NOTES
Amira Stovall  : 1940  Payor: Subhash Tesfaye / Plan: Olive Factor / Product Type: Federal Funded Programs /  [address]  at [facility] HENRY WATSON  [address] [place] [address], [address], [address] [facility]  [phone]   [phone]       OUTPATIENT PHYSICAL THERAPY: Daily Treatment and Aquatic Note 2019       ICD-10: Treatment Diagnosis: Simon Filmeon, incomplete (G82.22), Difficulty in walking, not elsewhere classified (R26.2), Muscle weakness (generalized) (M62.81)  Precautions/Allergies:   Patient has no known allergies. MD Orders: Evaluate and treat, aquatic therapy MEDICAL/REFERRING DIAGNOSIS:  Paraplegia, unspecified [G82.20]   DATE OF ONSET: 1-3-18  REFERRING PHYSICIAN: Emiliano Michel*  RETURN PHYSICIAN APPOINTMENT: TBD       Pre-treatment Symptoms/Complaints:  Patient transferred to and from chair left by sliding board. He has no new complaints of pain today. Pain: Initial:   None reported Post Session:  None after PT   Medications Last Reviewed:  19    Updated Objective Findings:   None Today        TREATMENT:   Aquatic Therapy (60 minutes): Aquatic treatment performed per flow grid for Decreased muscle strength, Decreased endurance, Decreased range of motion, Decreased activity endurance, Decompression, Ease of movement and Low impact and reduced weight bearing activity. Cues provided for posture and exercises. Assistance by therapist provided for balance and exercises.        Aquatic Exercise Log       Date  19 Date  19 Date  19   Activity/ Exercise      Walking forward      Walking backward      Walking sideways        [employer]  Step        Tip toes        Heels        Lunges      Side step squats      LE Exercises 7' with one foot on step 7' with one foot on step 7' with one foot on step      Hip Flex/Ext 15 B 15 B 15 B     Hip Abd/Add 15 B 15 B 15 B     Hip IR/ER        Calf raises        Knee Flex 15 B 15 B 15 B     Squats   Pushing with B feet on side of pool x 20 reps     Leg Circles 15/15 15/15 B 15/15 B     Step Ups Squat to  4. 5' holding rail x 5 holding 15 sec each with mod assist Squat to  4. 5' holding rail x 5 holding 15 sec each with mod assist Squat to  4. 5' holding rail x 5 holding 15 sec each with mod assist   UE Exercises        Squeeze In        Push Down        Pull Down        Bicep/Tricep      Rows/Press outs       Chi Positions        Trunk Rotation      Deep H2O/ Noodles 7' with rings and assist  7' with rings and assist 7' with rings and assist     Stabilization 5 min 5 min 5 min     Arms only Sitting on noodle using arms and legs to move forward and backward x 4 laps each with assist Sitting on noodle using arms and legs to move forward and backward x 4 laps each with assist Sitting on noodle using arms and legs to move noodle forward and backward x 4 laps each with assist     Legs only Bicycle 5 min Bicycle 5 min Bicycle 5 min   Cross   Country 5 min 5 min 5 min     Scissors 5 min 5 min 5 min     Crab walk      Lower abdominal   work  DKTC 5 min DKTC 5 min DKTC 5 min     Cardio        Jogging      Lap   Swimming        Stretches 7' with blue rings in place 7' with blue rings in place 7' with blue rings in place      Hamstrings        Heelcords        Piriformis Hip flexors x 5 holding 15 sec each Hip flexors x 5 holding 15 sec each Hip flexors x 5 holding 15 sec each     Neck        THERAPEUTIC EXERCISE ( minutes) to improve B LE strength and function. Manually resisted unilateral extension and flexion of hip and knee with alternating reversals to improve B LE strength 3 x 10 with patient in semi-supine position. Manually resisted hip ABD/ADD isometrics in hook-lying to improve bilateral hip adductor and abductor strengthening. Active-assisted hip adduction and abduction in semi-supine to each LE to reduce muscle tightness in B hips and reduce B knee flexion contracture.     MANUAL THERAPY (minutes) to improve B ankle ROM. Grade 3-4 ankle dorsiflexion physio mobilizations in semi-supine bilaterally to reduce ankle stiffness and swelling. HEP: None provided today    Treatment/Session Summary:    · Response to Treatment:   Pt continues to slowly improve with PT.    · Communication/Consultation:  None today  · Equipment provided today:  None today  · Recommendations/Intent for next treatment session: Next visit will focus on aquatic therapy to improve B LE strength and facilitate improvement with functional mobility. Visit Count:  27    TREATMENT PLAN:  Effective Dates: 6/19/19 to 8/14/2019.   Frequency/Duration: 2 visits per week for 8 weeks (in anticipation of approval of more visits)    Total Treatment Billable Duration:  60 minutes  PT Patient Time In/Time Out  Time In: 2363  Time Out: [address], [facility]

## 2019-07-25 ENCOUNTER — HOSPITAL ENCOUNTER (OUTPATIENT)
Dept: PHYSICAL THERAPY | Age: 79
Discharge: HOME | End: 2019-07-25
Payer: COMMERCIAL

## 2019-07-25 PROCEDURE — 97164 PT RE-EVAL EST PLAN CARE: CPT

## 2019-07-25 PROCEDURE — 97110 THERAPEUTIC EXERCISES: CPT

## 2019-07-25 NOTE — THERAPY RECERTIFICATION
Saadia Denton  : 1940  Primary: Vani Leary Ioanas Tejal Issa  Secondary:  [address]  at [facility] HENRY WATSON  [address] [facility], [address] [place] [address],[address], [address]  [phone]   [phone]          OUTPATIENT PHYSICAL [address]    ICD-10: Treatment Diagnosis: Paraplegia, incomplete (G82.22), Difficulty in walking, not elsewhere classified (R26.2), Muscle weakness (generalized) (M62.81)  Precautions/Allergies:   Patient has no known allergies. MD Orders: Evaluate and treat, aquatic therapy  MEDICAL/REFERRING DIAGNOSIS:  Paraplegia, unspecified [G82.20]   DATE OF ONSET: 1-3-18  REFERRING PHYSICIAN: Emiliano Michel  RETURN PHYSICIAN APPOINTMENT: TBD     19 ASSESSMENT:  Mr. Isela Park has been attending aquatic physical therapy to address paraplegia in order to improve B LE strength and function in hopes of returning to ambulation. Patient does demonstrate some progress with B LE strength, albeit the return of strength is progressing slowly. Patient is currently unable to stand or ambulate on level ground. He is also limited by B LE hip flexion contractures. Patient will benefit from continued PT in order to address these deficits in hopes of returning to ambulation on ground. PROBLEM LIST (Impacting functional limitations):  1. Decreased Strength  2. Decreased ADL/Functional Activities  3. Decreased Transfer Abilities  4. Decreased Ambulation Ability/Technique  5. Decreased Balance  6. Decreased [place] with Home Exercise Program INTERVENTIONS PLANNED:  1. Gait Training  2. Home Exercise Program (HEP)  3. Neuromuscular Re-education/Strengthening  4. Therapeutic Activites  5. Therapeutic Exercise/Strengthening  6. Transfer Training  7. Aquatic Therapy   TREATMENT PLAN:  Effective Dates: 19 to 2019.   Frequency/Duration: 2 visits per week for 8 weeks (in anticipation of approval of more visits)   GOALS: (Goals have been discussed and agreed upon with patient.)  Short-Term Functional Goals: Time Frame: 5 weeks  1. Patient will demonstrate 3-/5 B LE strength Ongoing, MET for L LE but progressing with R LE 6-19-19  2. Patient will be able to tolerate 45 minutes of aquatic therapy MET 2-4-19  3. Patient will be able to stand with max assist on level ground. Ongoing, currently unable 7-25-19  Discharge Goals: Time Frame: 10 weeks  1. Patient will demonstrate 3+/5 strength in B LEs. Ongoing, progressing 6-19-19  2. Patient will be able to ambulate 10 feet with use of appropriate assistive device Unable, ongoing, 5-7-19  3. Patient will be able to perform stand-pivot transfer with appropriate assistive device    Rehabilitation Potential For Stated Goals: Good     Regarding [facility] therapy, I certify that the treatment plan above will be carried out by a therapist or under their direction. Thank you for this referral,    Branden Garza PT       Referring Physician Signature: Emiliano Michel*          Date                                Ambulatory/Rehab Services H2 Model Falls Risk Assessment    Risk Factors:       (1)  Gender [Male] Ability to Rise from Chair:       (4)  Unable to rise without assistance    Falls Prevention Plan: Mobility Assistance Device (specify):  wheelchair, transfer board, shower chair   Total: (5 or greater = High Risk): 5    ©2010 [facility] [address] [place] [address] Patent #[id number]. Federal Law prohibits the replication, distribution or use without written permission from [facility]     Current Medications:       Current Outpatient Medications:     carvedilol (COREG) 3.125 mg tablet, Take  by mouth two (2) times daily (with meals). , Disp: , Rfl:     furosemide (LASIX) 40 mg tablet, Take  by mouth daily. , Disp: , Rfl:     lisinopril (PRINIVIL, ZESTRIL) 10 mg tablet, Take 5 mg by mouth daily. , Disp: , Rfl:    potassium chloride SR (K-TAB) 20 mEq tablet, Take 20 mEq by mouth daily. , Disp: , Rfl:     sertraline (ZOLOFT) 50 mg tablet, TAKE 1 TABLET EVERY DAY, Disp: 90 Tab, Rfl: 1    levothyroxine (SYNTHROID) 100 mcg tablet, TAKE 1 TABLET EVERY DAY BEFORE BREAKFAST, Disp: 90 Tab, Rfl: 1    OTHER,NON-FORMULARY,, Air mattress Dx: prevention of decubiti, Disp: 1 Each, Rfl: 0    OTHER,NON-FORMULARY,, Ultra light wheelchair with drop arms Dx: hemiplegia, Disp: 1 Each, Rfl: 0    OTHER,NON-FORMULARY,, Transfer board Dx: limitation in mobility, Disp: 1 Each, Rfl: 0    OTHER,NON-FORMULARY,, Bariatric potty chair w/drop arms  Dx: limitation in mobility, Disp: 1 Each, Rfl: 0    OTHER,NON-FORMULARY,, Trapeze bar Dx: limitation in mobility, Disp: 1 Each, Rfl: 0    OTHER,NON-FORMULARY,, 14 Fr. In and out urinary catheter. Dx: hemiplegia, Disp: 150 Each, Rfl: 11   Date Last Reviewed:  7/25/2019   EXAMINATION:   7-25-19    Observation/Orthostatic Postural Assessment:          Arrives to PT in lightweight wheelchair in no obvious distress. Pt joined by spouse during re-evaluation. Pt wearing compression stockings on B LEs to control swelling. Palpation:            ROM:          Passive range of motion to bilateral knees is within normal limits. Hip flexion contracture bilaterally. Ankle range of motion within normal limits bilaterally passively. Strength:          Hip flexion 3-/5 to 3/5 B        Knee extension 2-/5 B         Knee flexion 2/5 B        Dorsiflexion 3/5 B  Neurological Screen:       Sensation along all B LEs dermatomes normal to light touch. Functional Mobility:         Transfers: Independent with use of transfer board, but requires extra time. Wheelchair mobility: Independent with use of B UEs        Bed mobility: Required mod A for bed mobility onto/off of treatment table   Body Structures Involved:  1. Nerves  2. Bones  3. Joints  4. Muscles Body Functions Affected:  1. Neuromusculoskeletal  2.  Movement Related Activities and Participation Affected:  1. Mobility  2. Self Care  3. Domestic Life  4. Interpersonal Interactions and Relationships  5. Community, Social and Civic Life   CLINICAL PRESENTATION:   CLINICAL DECISION MAKING:   Outcome Measure: Tool Used: Lower Extremity Functional Scale (LEFS)  Score:  Initial: 16/80 14/80 (Date: 2-4-19 ) Most recent: 10/80 (6-19-19)   15/80 (5/8/19)   Interpretation of Score: 20 questions each scored on a 5 point scale with 0 representing \"extreme difficulty or unable to perform\" and 4 representing \"no difficulty\". The lower the score, the greater the functional disability. 80/80 represents no disability. Minimal detectable change is 9 points. Score 80 79-63 62-48 47-32 31-16 15-1 0   Modifier CH CI CJ CK CL CM CN       Medical Necessity:   · Skilled intervention continues to be required due to impaired use of B LEs. Reason for Services/Other Comments:  · Patient continues to require skilled intervention due to decreased B LE strength and impaired functional mobility.

## 2019-07-29 NOTE — PROGRESS NOTES
Kayce Montejo  : 1940  Payor: Marci Mcconnell / Plan: Ajith Cortes / Product Type: Muriel Camarillo /  [address]  at [facility] HENRY WATSON  [address] [facility], [address] [place] [address],[address], [facility] [address]  [phone]   [phone]       OUTPATIENT PHYSICAL THERAPY: Daily Treatment Note 2019       ICD-10: Treatment Diagnosis: Parplegia, incomplete (G82.22), Difficulty in walking, not elsewhere classified (R26.2), Muscle weakness (generalized) (M62.81)  Precautions/Allergies:   Patient has no known allergies. MD Orders: Evaluate and treat, aquatic therapy MEDICAL/REFERRING DIAGNOSIS:  Paraplegia, unspecified [G82.20]   DATE OF ONSET: 1-3-18  REFERRING PHYSICIAN: Emiliano Michel*  RETURN PHYSICIAN APPOINTMENT: TBD       Pre-treatment Symptoms/Complaints:  No complaints of pain today. See  Re-certification note from same day. Pain: Initial:   None reported Post Session:  None after PT   Medications Last Reviewed:  19    Updated Objective Findings:   None Today        TREATMENT:   Aquatic Therapy (0 minutes): none today    Aquatic Exercise Log       Date  19 Date  19 Date  19   Activity/ Exercise      Walking forward      Walking backward      Walking sideways        Marching        Goose Step        Tip toes        Heels        Lunges      Side step squats      LE Exercises 7' with one foot on step 7' with one foot on step 7' with one foot on step      Hip Flex/Ext 15 B 15 B 15 B     Hip Abd/Add 15 B 15 B 15 B     Hip IR/ER        Calf raises        Knee Flex 15 B 15 B 15 B     Squats   Pushing with B feet on side of pool x 20 reps     Leg Circles 15/15 15/15 B 15/15 B     Step Ups Squat to  4. 5' holding rail x 5 holding 15 sec each with mod assist Squat to  4. 5' holding rail x 5 holding 15 sec each with mod assist Squat to  4. 5' holding rail x 5 holding 15 sec each with mod assist   UE Exercises        Squeeze In        Push Down Pull Down        Bicep/Tricep      Rows/Press outs       Chi Positions        Trunk Rotation      Deep H2O/ Noodles 7' with rings and assist  7' with rings and assist 7' with rings and assist     Stabilization 5 min 5 min 5 min     Arms only Sitting on noodle using arms and legs to move forward and backward x 4 laps each with assist Sitting on noodle using arms and legs to move forward and backward x 4 laps each with assist Sitting on noodle using arms and legs to move noodle forward and backward x 4 laps each with assist     Legs only Bicycle 5 min Bicycle 5 min Bicycle 5 min   Cross   [place] 5 min 5 min 5 min     Scissors 5 min 5 min 5 min     Crab walk      Lower abdominal   work  DKTC 5 min DKTC 5 min DKTC 5 min     Cardio        Jogging      Lap   Swimming        Stretches 7' with blue rings in place 7' with blue rings in place 7' with blue rings in place      Hamstrings        Heelcords        Piriformis Hip flexors x 5 holding 15 sec each Hip flexors x 5 holding 15 sec each Hip flexors x 5 holding 15 sec each     Neck        THERAPEUTIC EXERCISE (20  minutes) to improve B LE strength and function. Manually resisted unilateral extension and flexion of hip and knee with alternating reversals to improve B LE strength 3 x 10 with patient in semi-supine position. Passive stretching to bilateral hip adductors to reduce tightness and discomfort. Glute sets in hook-lying to improve B glute strength in order to improve bed mobility and progress to bridge. MANUAL THERAPY ( 0minutes) none today    HEP: None provided today    Treatment/Session Summary:    · Response to Treatment:   Pt demonstrates slow progress with B LE strength, but unable to perform a bridge at this time.    · Communication/Consultation:  None today  · Equipment provided today:  None today  · Recommendations/Intent for next treatment session: Next visit will focus on aquatic therapy to improve B LE strength and facilitate improvement with functional mobility. Visit Count:  28    TREATMENT PLAN:  Effective Dates: 7/25/19 to 9/19/2019.   Frequency/Duration: 2 visits per week for 8 weeks (in anticipation of approval of more visits)    Total Treatment Billable Duration:  30 minutes (20 minutes of therapeutic exercises, 10 minutes for re-certfiication)  PT Patient Time In/Time Out  Time In: 1400  Time Out: [address], PT

## 2019-07-30 ENCOUNTER — HOSPITAL ENCOUNTER (OUTPATIENT)
Dept: PHYSICAL THERAPY | Age: 79
Discharge: HOME | End: 2019-07-30
Payer: COMMERCIAL

## 2019-07-30 PROCEDURE — 97113 AQUATIC THERAPY/EXERCISES: CPT

## 2019-07-30 NOTE — PROGRESS NOTES
Aurelia Ontiveros  : 1940  Payor: Migue Robbins / Plan: Keila Bellamy / Product Type: Federal Funded Programs /  [facility] [place]  at [facility] HENRY WATSON  [address] [facility], [address], [place] [address].  [phone]   [phone]       OUTPATIENT PHYSICAL THERAPY: Daily Treatment and Aquatic Note 2019       ICD-10: Treatment Diagnosis: Leigh Pick, incomplete (G82.22), Difficulty in walking, not elsewhere classified (R26.2), Muscle weakness (generalized) (M62.81)  Precautions/Allergies:   Patient has no known allergies. MD Orders: Evaluate and treat, aquatic therapy MEDICAL/REFERRING DIAGNOSIS:  Paraplegia, unspecified [G82.20]   DATE OF ONSET: 1-3-18  REFERRING PHYSICIAN: Emiliano Michel*  RETURN PHYSICIAN APPOINTMENT: TBD       Pre-treatment Symptoms/Complaints:  Patient transferred to and from chair left by sliding board. He has no new complaints of pain today. Pain: Initial:   None reported Post Session:  None after PT   Medications Last Reviewed:  2019    Updated Objective Findings:   None Today        TREATMENT:   Aquatic Therapy (45 minutes): Aquatic treatment performed per flow grid for Decreased muscle strength, Decreased endurance, Decreased range of motion, Decreased activity endurance, Decompression, Ease of movement and Low impact and reduced weight bearing activity. Cues provided for posture and exercises. Assistance by therapist provided for balance and exercises.        Aquatic Exercise Log       Date  19 Date  19 Date  19 Date  19   Activity/ Exercise       LE Exercises 7' with one foot on step 7' with one foot on step 7' with one foot on step  7' with one foot on step      Hip Flex/Ext 15 B 15 B 15 B 15 B     Hip Abd/Add 15 B 15 B 15 B 15 B     Hip IR/ER         Calf raises         Knee Flex 15 B 15 B 15 B 15 B     Squats   Pushing with B feet on side of pool x 20 reps Pushing with B feet on side of pool x 20 reps  Squats with feet on the wall x 10 reps     Leg Circles 15/15 15/15 B 15/15 B 15/15 B     Step Ups Squat to  4. 5' holding rail x 5 holding 15 sec each with mod assist Squat to  4. 5' holding rail x 5 holding 15 sec each with mod assist Squat to  4. 5' holding rail x 5 holding 15 sec each with mod assist Squat to  4. 5' holding rail x 5 holding 15 sec each with mod assist   UE Exercises         Squeeze In         Push Down         Pull Down         Bicep/Tricep       Rows/Press outs        Chi Positions         Trunk Rotation       Deep H2O/ Noodles 7' with rings and assist  7' with rings and assist 7' with rings and assist 7' with rings and assist     Stabilization 5 min 5 min 5 min 5 min     Arms only Sitting on noodle using arms and legs to move forward and backward x 4 laps each with assist Sitting on noodle using arms and legs to move forward and backward x 4 laps each with assist Sitting on noodle using arms and legs to move noodle forward and backward x 4 laps each with assist      Legs only Bicycle 5 min Bicycle 5 min Bicycle 5 min Bicycle 5 min   Cross   Country 5 min 5 min 5 min 5 min     Scissors 5 min 5 min 5 min 5 min     Crab walk       Lower abdominal   work  DKTC 5 min DKTC 5 min DKTC 5 min DKTC 5 min     Cardio         Jogging       Lap   Swimming         Stretches 7' with blue rings in place 7' with blue rings in place 7' with blue rings in place  7' with blue rings in place     Hamstrings         Heelcords         Piriformis Hip flexors x 5 holding 15 sec each Hip flexors x 5 holding 15 sec each Hip flexors x 5 holding 15 sec each Hip flexors x 5 holding 15 sec each     Neck         THERAPEUTIC EXERCISE ( minutes) to improve B LE strength and function. Manually resisted unilateral extension and flexion of hip and knee with alternating reversals to improve B LE strength 3 x 10 with patient in semi-supine position.  Manually resisted hip ABD/ADD isometrics in hook-lying to improve bilateral hip adductor and abductor strengthening. Active-assisted hip adduction and abduction in semi-supine to each LE to reduce muscle tightness in B hips and reduce B knee flexion contracture. MANUAL THERAPY (minutes) to improve B ankle ROM. Grade 3-4 ankle dorsiflexion physio mobilizations in semi-supine bilaterally to reduce ankle stiffness and swelling. HEP: None provided today    Treatment/Session Summary:    · Response to Treatment:   Pt continues to slowly improve with PT.    · Communication/Consultation:  None today  · Equipment provided today:  None today  · Recommendations/Intent for next treatment session: Next visit will focus on aquatic therapy to improve B LE strength and facilitate improvement with functional mobility.          Visit Count:  29    TREATMENT PLAN:  Effective Dates: 7/25/19 to 9/19/2019.  Frequency/Duration: 2 visits per week for 8 weeks (in anticipation of approval of more visits)    Total Treatment Billable Duration:  45 minutes  PT Patient Time In/Time Out  Time In: 1300  Time Out: [address] [facility], [facility]

## 2019-08-01 ENCOUNTER — HOSPITAL ENCOUNTER (OUTPATIENT)
Dept: PHYSICAL THERAPY | Age: 79
Discharge: HOME | End: 2019-08-01
Payer: COMMERCIAL

## 2019-08-01 PROCEDURE — 97113 AQUATIC THERAPY/EXERCISES: CPT

## 2019-08-01 NOTE — PROGRESS NOTES
Ej Cruz : 1940 Payor: Sergio Centeno / Plan: Loretta Ayers / Product Type: Federal Funded Programs /  [facility] [place]  at [facility] HENRY WATSON [address] [facility], [address] [place] [address],[address], [address] [facility] [phone]   [phone] OUTPATIENT PHYSICAL THERAPY: Daily Treatment and Aquatic Note 2019 ICD-10: Treatment Diagnosis: Parplegia, incomplete (G82.22), Difficulty in walking, not elsewhere classified (R26.2), Muscle weakness (generalized) (M62.81) Precautions/Allergies:  
Patient has no known allergies. MD Orders: Evaluate and treat, aquatic therapy MEDICAL/REFERRING DIAGNOSIS: 
Paraplegia, unspecified [G82.20] DATE OF ONSET: 1-3-18 REFERRING PHYSICIAN: Emiliano Michel* RETURN PHYSICIAN APPOINTMENT: TBD Pre-treatment Symptoms/Complaints:  Patient transferred to and from chair left by sliding board. He talked about using his stander at home and how he falls asleep. Talked with him and his wife about taking his BP at different times while in [address] [place] [address]. Pain: Initial:   None reported Post Session:  None after PT Medications Last Reviewed:  2019 Updated Objective Findings:  
None Today TREATMENT:  
Aquatic Therapy (45 minutes): Aquatic treatment performed per flow grid for Decreased muscle strength, Decreased endurance, Decreased range of motion, Decreased activity endurance, Decompression, Ease of movement and Low impact and reduced weight bearing activity. Cues provided for posture and exercises. Assistance by therapist provided for balance and exercises. Aquatic Exercise Log Date 19 Date 19 Date 19 Date 19 Date  Activity/ Exercise LE Exercises 7' with one foot on step 7' with one foot on step 7' with one foot on step  7' with one foot on step  7' with one foot on step Hip Flex/Ext 15 B 15 B 15 B 15 B 15 B Hip Abd/Add 15 B 15 B 15 B 15 B 15 B Hip IR/ER       
 
 Calf raises Knee Flex 15 B 15 B 15 B 15 B 15 B Squats   Pushing with B feet on side of pool x 20 reps Pushing with B feet on side of pool x 20 reps Squats with feet on the wall x 10 reps Pushing with B feet on side of pool x 20 reps Leg Circles 15/15 15/15 B 15/15 B 15/15 B 15/15 B Step Ups Squat to  4. 5' holding rail x 5 holding 15 sec each with mod assist Squat to  4. 5' holding rail x 5 holding 15 sec each with mod assist Squat to  4. 5' holding rail x 5 holding 15 sec each with mod assist Squat to  4. 5' holding rail x 5 holding 15 sec each with mod assist Squat to  4. 5' holding rail x 5 holding 15 sec each with mod assist  
UE Exercises Squeeze In Push Down Pull Down Bicep/Tricep Rows/Press outs  Chi Positions Trunk Rotation Deep H2O/ Noodles 7' with rings and assist  7' with rings and assist 7' with rings and assist 7' with rings and assist 7' with rings and assist  
 
Stabilization 5 min 5 min 5 min 5 min 5 min Arms only Sitting on noodle using arms and legs to move forward and backward x 4 laps each with assist Sitting on noodle using arms and legs to move forward and backward x 4 laps each with assist Sitting on noodle using arms and legs to move noodle forward and backward x 4 laps each with assist    
 
Legs only Bicycle 5 min Bicycle 5 min Bicycle 5 min Bicycle 5 min Bicycle 5 min [place] 5 min 5 min 5 min 5 min 5 min Scissors 5 min 5 min 5 min 5 min 5 min Crab walk Lower abdominal  
work  DKTC 5 min DKTC 5 min DKTC 5 min DKTC 5 min DKTC 5 min Cardio Jogging Lap Swimming Stretches 7' with blue rings in place 7' with blue rings in place 7' with blue rings in place  7' with blue rings in place 7' with blue rings in place Hamstrings Heelcords Piriformis Hip flexors x 5 holding 15 sec each Hip flexors x 5 holding 15 sec each Hip flexors x 5 holding 15 sec each Hip flexors x 5 holding 15 sec each Hip flexors x 5 holding 15 sec each Neck THERAPEUTIC EXERCISE ( minutes) to improve B LE strength and function. Manually resisted unilateral extension and flexion of hip and knee with alternating reversals to improve B LE strength 3 x 10 with patient in semi-supine position. Manually resisted hip ABD/ADD isometrics in hook-lying to improve bilateral hip adductor and abductor strengthening. Active-assisted hip adduction and abduction in semi-supine to each LE to reduce muscle tightness in B hips and reduce B knee flexion contracture. MANUAL THERAPY (minutes) to improve B ankle ROM. Grade 3-4 ankle dorsiflexion physio mobilizations in semi-supine bilaterally to reduce ankle stiffness and swelling. HEP: None provided today Treatment/Session Summary: · Response to Treatment:   Pt tolerated standing better today. Still requires lots of assist.    
· Communication/Consultation:  None today · Equipment provided today:  None today · Recommendations/Intent for next treatment session: Next visit will focus on aquatic therapy to improve B LE strength and facilitate improvement with functional mobility. Visit Count:  30 TREATMENT PLAN: 
Effective Dates: 7/25/19 to 9/19/2019.  Frequency/Duration: 2 visits per week for 8 weeks (in anticipation of approval of more visits) Total Treatment Billable Duration:  45 minutes PT Patient Time In/Time Out Time In: 9046 Time Out: 6402 Basilia Lopez PTA

## 2019-08-06 ENCOUNTER — HOSPITAL ENCOUNTER (OUTPATIENT)
Dept: PHYSICAL THERAPY | Age: 79
Discharge: HOME | End: 2019-08-06
Payer: COMMERCIAL

## 2019-08-06 PROCEDURE — 97113 AQUATIC THERAPY/EXERCISES: CPT

## 2019-08-08 ENCOUNTER — HOSPITAL ENCOUNTER (OUTPATIENT)
Dept: PHYSICAL THERAPY | Age: 79
Discharge: HOME | End: 2019-08-08
Payer: COMMERCIAL

## 2019-08-08 PROCEDURE — 97113 AQUATIC THERAPY/EXERCISES: CPT

## 2019-08-08 NOTE — PROGRESS NOTES
Minnie Lauren : 1940 Payor: Chris Addison / Plan: Cristopher Ugarte / Product Type: Federal Funded Programs /  [address]  at [facility] HENRY WATSON [address] [facility], [address] [place] [address],[address], [facility] [address] 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  
Patient has no known allergies. MD Orders: Evaluate and treat, aquatic therapy MEDICAL/REFERRING DIAGNOSIS: 
Paraplegia, unspecified [G82.20] DATE OF ONSET: 1-3-18 REFERRING PHYSICIAN: Emiliano Michel* RETURN PHYSICIAN APPOINTMENT: TBD Pre-treatment Symptoms/Complaints:  Pt states he is hurting in his groin and stomach today but no more than normal.    
Pain: Initial:   None reported Post Session:  None after PT Medications Last Reviewed:  2019 Updated Objective Findings:  
None Today TREATMENT:  
Aquatic Therapy (60 minutes): Aquatic treatment performed per flow grid for Decreased muscle strength, Decreased endurance, Decreased range of motion, Decreased activity endurance, Decompression, Ease of movement and Low impact and reduced weight bearing activity. Cues provided for posture and exercises. Assistance by therapist provided for balance and exercises. Aquatic Exercise Log Date  Date 19 Activity/ Exercise  5# wts during treatment LE Exercises 7' with one foot on step 7' with one foot on step Hip Flex/Ext 15 B 15 B Hip Abd/Add 15 B 15 B Hip IR/ER Calf raises Knee Flex 15 B 15 B Squats Pushing with B feet on side of pool x 20 reps Leg Circles 15/15 B 15/15 B Step Ups Squat to  4. 5' holding rail x 5 holding 15 sec each with mod assist Squat to  4. 5' holding rail x 5 holding 15 sec each with mod assist  
UE Exercises Squeeze In Push Down Pull Down Bicep/Tricep Rows/Press outs  Chi Positions Trunk Rotation Deep H2O/ Noodles 7' with rings and assist 7' with rings and assist   
 
Stabilization 5 min 5 min Arms only Legs only Bicycle 5 min Bicycle 5 min [place] 5 min 5 min Scissors 5 min 5 min Crab walk Lower abdominal  
work  DKTC 5 min DKTC 5 min Cardio Jogging  Sitting on noodle in 3. 5' with 4# wts Forward x 3 Backward x3 Lap Swimming Stretches 7' with blue rings in place 7' with blue rings in place Hamstrings Heelcords Piriformis Hip flexors x 5 holding 15 sec each Hip flexors x 5 holding 15 sec each Neck THERAPEUTIC EXERCISE ( minutes) to improve B LE strength and function. Manually resisted unilateral extension and flexion of hip and knee with alternating reversals to improve B LE strength 3 x 10 with patient in semi-supine position. Manually resisted hip ABD/ADD isometrics in hook-lying to improve bilateral hip adductor and abductor strengthening. Active-assisted hip adduction and abduction in semi-supine to each LE to reduce muscle tightness in B hips and reduce B knee flexion contracture. MANUAL THERAPY (minutes) to improve B ankle ROM. Grade 3-4 ankle dorsiflexion physio mobilizations in semi-supine bilaterally to reduce ankle stiffness and swelling. HEP: None provided today Treatment/Session Summary: · Response to Treatment:   Pt did well with added weights today. He was exhausted at the end of the session. · Communication/Consultation:  None today · Equipment provided today:  None today · Recommendations/Intent for next treatment session: Next visit will focus on aquatic therapy to improve B LE strength and facilitate improvement with functional mobility. Visit Count:  32 TREATMENT PLAN: 
 Effective Dates: 7/25/19 to 9/19/2019.  Frequency/Duration: 2 visits per week for 8 weeks (in anticipation of approval of more visits) Total Treatment Billable Duration:  60 minutes PT Patient Time In/Time Out Time In: 1310 Time Out: 1410 [employer], [facility]

## 2019-10-30 NOTE — PROGRESS NOTES
Ollie Pemberton : 1940 Primary: [facility] Secondary:  Therapy Center at [facility] [address], [address] [place] [address],[address], [place] [address]. [phone]   [phone] OUTPATIENT PHYSICAL THERAPY:Discontinuation Summary ICD-10: Treatment Diagnosis: Paraplegia, incomplete (G82.22), Difficulty in walking, not elsewhere classified (R26.2), Muscle weakness (generalized) (M62.81) Precautions/Allergies:  
Patient has no known allergies. MD Orders: Evaluate and treat, aquatic therapy  MEDICAL/REFERRING DIAGNOSIS: 
Paraplegia, unspecified [G82.20] DATE OF ONSET: 1-3-18 REFERRING PHYSICIAN: Emiliano Oden RETURN PHYSICIAN APPOINTMENT: HELEN Ollie Pemberton has been seen in physical therapy from 19 to 19 for 28 visits. Treatment has been discontinued at this time due to patient failing to return for additional treatment. The below goals were not met due to ongoing B LE weakness. Thank you for this referral.    
  
 
GOALS: (Goals have been discussed and agreed upon with patient.) Short-Term Functional Goals 1. Patient will demonstrate 3-/5 B LE strength Ongoing, MET for L LE but progressing with R LE 19 2. Patient will be able to tolerate 45 minutes of aquatic therapy MET 19 3. Patient will be able to stand with max assist on level ground. Ongoing, currently unable 19 Discharge Goals 1. Patient will demonstrate 3+/5 strength in B LEs. Ongoing, progressing 19 2. Patient will be able to ambulate 10 feet with use of appropriate assistive device Unable, ongoing, 19 3. Patient will be able to perform stand-pivot transfer with appropriate assistive device

## 2019-11-07 ENCOUNTER — HOSPITAL ENCOUNTER (OUTPATIENT)
Dept: PHYSICAL THERAPY | Age: 79
Discharge: HOME | End: 2019-11-07
Payer: COMMERCIAL

## 2019-11-07 PROCEDURE — 97163 PT EVAL HIGH COMPLEX 45 MIN: CPT

## 2019-11-07 PROCEDURE — 97110 THERAPEUTIC EXERCISES: CPT

## 2019-11-07 NOTE — THERAPY EVALUATION
[facility]  : 1940  Primary: Paul Ramirez [facility] Nathaniel Loredo  Secondary:  [address]  [facility]  [address] [place] [address],  Lissette Galeano, [address] [place] [address]  [phone]   [phone]       OUTPATIENT PHYSICAL THERAPY:Initial Assessment 2019     ICD-10: Treatment Diagnosis: Paraplegia, incomplete (G82.22), Difficulty in walking, not elsewhere classified (R26.2), Muscle weakness (generalized) (M62.81)  Precautions/Allergies:   Patient has no known allergies. TREATMENT PLAN:  Effective Dates: 2019 TO 2020 (90 days). Frequency/Duration: 2 times a week for 90 Day(s)   15 visits approved MEDICAL/REFERRING DIAGNOSIS:  Paraplegia, unspecified [G82.20]   DATE OF ONSET:   REFERRING PHYSICIAN: Unknown, Provider, MD LA Orders: Aquatherapy 2/week x 6 weeks. Return MD Appointment: uncertain     INITIAL ASSESSMENT:  Mr. Carl Recio presents with decreased mobility. Pt is a know patient of this clinic and has utilized aquatic therapy several times. It has been several months since he has had therapy. He continues to focus on wanting to return to walking. He is a good candidate for skilled therapy to make some improvements toward his goal that would improve his independence at home. PROBLEM LIST (Impacting functional limitations):  1. Decreased Strength  2. Decreased ADL/Functional Activities  3. Decreased Ambulation Ability/Technique  4. Decreased Activity Tolerance  5. Decreased Flexibility/Joint Mobility INTERVENTIONS PLANNED: (Treatment may consist of any combination of the following)  1. Family Education  2. Home Exercise Program (HEP)  3. Therapeutic Activites  4. Therapeutic Exercise/Strengthening  5. aquatic therapy     GOALS: (Goals have been discussed and agreed upon with patient.)  Short-Term Functional Goals: Time Frame: 4 weeks  1. Pt to tolerate lying prone x 10 minutes  2.  Pt to tolerate 45 minutes of aquatic therapy 1/week consistently  Discharge Goals: Time Frame: 8 weeks.  1. Pt to stand using UE support with mod assist of 3 for 1 minute or greater. 2. Pt to continue using stander at home daily. 3. Pt to transfer sit to supine with min assist of 1  4. Pt to pull up pants after toileting in less than 5 minutes to move toward independent toileting  5. Pt independent in high level HEP to maintain gains made in PT    OUTCOME MEASURE:   Tool Used: Lower Extremity Functional Scale (LEFS)  Score:  Initial: 18/80 Most Recent: X/80 (Date: -- )   Interpretation of Score: 20 questions each scored on a 5 point scale with 0 representing \"extreme difficulty or unable to perform\" and 4 representing \"no difficulty\". The lower the score, the greater the functional disability. 80/80 represents no disability. Minimal detectable change is 9 points. Tool Used: [address] [place] [id number] AM-[facility] Basic Mobility Outpatient Short Form  Will administer next visit or 2  Score:  Initial:  Most Recent: X (Date: -- )   Interpretation of Tool:  Represents activities that are increasingly more difficult (i.e. Activities within room or building, activities outside, recreation or sports). MEDICAL NECESSITY:   · Skilled intervention continues to be required due to paraplegia and goal of ambulation. .  REASON FOR SERVICES/OTHER COMMENTS:  · Patient continues to require present interventions due to patient's inability to stand indpendently, toilet independently. .  Total Duration:       Rehabilitation Potential For Stated Goals: Kristi Pierce therapy, I certify that the treatment plan above will be carried out by a therapist or under their direction.   Thank you for this referral,    Leydi Burns PT      Referring Physician Signature: Unknown, Provider, MD _______________________________ Date _____________       PAIN/SUBJECTIVE:   Initial:   no pain Post Session:  No pain   HISTORY:   History of Injury/Illness (Reason for Referral):  Pt states he does everything at home for himself except for putting on his socks, compression socks and pulling his pants up after going to the restroom. He states he can pull up his pants but it takes 10 minutes and is much quicker with help. His caregiver has mobility issues as well. Past Medical History/Comorbidities:   Mr. Tila Dunbar  has a past medical history of Aneurysm ([address] Atrial flutter ([address] Congestive heart failure ([address] and Thyroid disease. He also has no past medical history of PUD (peptic ulcer disease). Mr. Tila Dunbar  has a past surgical history that includes hx tonsillectomy; hx orthopaedic; hx appendectomy; hx colonoscopy (5/08); pr cardiac surg procedure unlist (2016); and pr cardiac surg procedure unlist (2016). Social History/Living Environment:     lives with wife who has mobility issues as well. Prior Level of Function/Work/Activity:  Wheel chair ambulation, transfers level independently with let straps  Previous Treatment Approaches:          Pt has been seen in this clinic multiple times mostly in the pool. Personal Factors: Other factors that influence how disability is experienced by the patient:  Mr Tila Dunbar is adamant that he wants to walk and he will not hear of it any other way. He worked hard during eval and has worked hard in the pool in past times that he was seen here. Ambulatory/Rehab Services H2 Model Falls Risk Assessment   Risk Factors:       (1)  Gender [Male] Ability to Rise from Chair:       (4)  Unable to rise without assistance   Falls Prevention Plan:       No modifications necessary   Total: (5 or greater = High Risk): 5   ©2010 [facility]. All Rights Reserved. [facility] Patent #[id number].  Federal Law prohibits the replication, distribution or use without written permission from [facility] [employer]   Current Medications:       Current Outpatient Medications:     cephALEXin (KEFLEX) 500 mg capsule, Take 500 mg by mouth two (2) times a day., Disp: , Rfl:     ergocalciferol (VITAMIN D2) 50,000 unit capsule, Take 50,000 Units by mouth., Disp: , Rfl:     aspirin delayed-release 81 mg tablet, Take  by mouth daily. , Disp: , Rfl:     b complex vitamins (B COMPLEX 1) tablet, Take 1 Tab by mouth daily. , Disp: , Rfl:     carvedilol (COREG) 6.25 mg tablet, Take 1 Tab by mouth two (2) times daily (with meals). , Disp: 180 Tab, Rfl: 3    levothyroxine (SYNTHROID) 112 mcg tablet, Take  by mouth Daily (before breakfast). , Disp: , Rfl:     furosemide (LASIX) 40 mg tablet, Take 40 mg by mouth two (2) times a day., Disp: , Rfl:     potassium chloride SR (K-TAB) 20 mEq tablet, Take 20 mEq by mouth daily. , Disp: , Rfl:     psyllium husk (METAMUCIL) 0.4 gram cap, Take  by mouth three (3) times daily. , Disp: , Rfl:     sertraline (ZOLOFT) 50 mg tablet, TAKE 1 TABLET EVERY DAY, Disp: 90 Tab, Rfl: 1    OTHER,NON-FORMULARY,, Air mattress Dx: prevention of decubiti, Disp: 1 Each, Rfl: 0    OTHER,NON-FORMULARY,, Ultra light wheelchair with drop arms Dx: hemiplegia, Disp: 1 Each, Rfl: 0    OTHER,NON-FORMULARY,, Transfer board Dx: limitation in mobility, Disp: 1 Each, Rfl: 0    OTHER,NON-FORMULARY,, Bariatric potty chair w/drop arms  Dx: limitation in mobility, Disp: 1 Each, Rfl: 0    OTHER,NON-FORMULARY,, Trapeze bar Dx: limitation in mobility, Disp: 1 Each, Rfl: 0    OTHER,NON-FORMULARY,, 14 Fr. In and out urinary catheter. Dx: hemiplegia, Disp: 150 Each, Rfl: 11   Date Last Reviewed:  Nov 7, 2019   Number of Personal Factors/Comorbidities that affect the Plan of Care: 3+: HIGH COMPLEXITY   EXAMINATION:   Observation/Orthostatic Postural Assessment:          Pt in wheelchair upon contact. ROM:          Unable to measure ROM but able to assess tightness in hip flexors,  Hamstring tightness L>R,  B ankle tightness,    Strength:         Strength in UE grossly WFL. Unable to formal strength test.  Pt had trace of Hip abd and add bilaterally. He can abduct his legs easier than adduct.   He cannot hold legs adducted enough to wt bear in standing position without assist.  Some glut firing but not beyond neutral hip ext. B Hip flexion in sitting 2-/5   B hamstring in prone 0/5. Functional Mobility:   Transfers level independently using sliding board and leg straps on thigh to lift LE and position them. Seated scooting on mat table independently . Sit to supine with mod assist of 1,  Supine to prone on 2 pillows with mod assist of 2. Sit to stand using UE rails Mod to max assist of 4- pt had difficulty holding spinal and hip posture erect. Body Structures Involved:  1. Nerves  2. Joints  3. Muscles Body Functions Affected:  1. Neuromusculoskeletal  2. Movement Related Activities and Participation Affected:  1. Mobility  2. Self Care  3.  Domestic Life   Number of elements (examined above) that affect the Plan of Care: 4+: HIGH COMPLEXITY   CLINICAL PRESENTATION:   Presentation: Evolving clinical presentation with unstable and unpredictable characteristics: HIGH COMPLEXITY   CLINICAL DECISION MAKING:   Use of outcome tool(s) and clinical judgement create a POC that gives a: Difficult prediction of patient's progress: HIGH COMPLEXITY

## 2019-11-11 NOTE — PROGRESS NOTES
Tomás Berry  : 1940  Payor: Coretta  / Plan: Hay Gama / Product Type: Federal Funded Programs /  [address]  at [facility] HENRY WATSON  [address] [facility], [address], [address] [place] [address]  [phone]   [phone]       OUTPATIENT PHYSICAL THERAPY: Daily Treatment Note 2019  Visit Count: 1     ICD-10: Treatment Diagnosis: Paraplegia, incomplete (G82.22), Difficulty in walking, not elsewhere classified (R26.2), Muscle weakness (generalized) (M62.81)  Precautions/Allergies:   Patient has no known allergies. TREATMENT PLAN:  Effective Dates: 2019 TO 2020 (90 days). Frequency/Duration: 2 times a week for 90 Day(s) MEDICAL/REFERRING DIAGNOSIS:  Paraplegia, unspecified [G82.20]   DATE OF ONSET:   REFERRING PHYSICIAN: Unknown, Provider, MD LA Orders: aquatherapy eval and treat  Return MD Appointment: uncertain       Pre-treatment Symptoms/Complaints:  Pt wanting to walk  Pain: Initial:   no pain Post Session:  No pain   Medications Last Reviewed:  19    Updated Objective Findings:   See evaluation note from today     TREATMENT:     THERAPEUTIC EXERCISE: (25 minutes):  Exercises per grid below to improve mobility, strength, balance and coordination. Required moderate visual, verbal, manual and tactile cues to promote proper body alignment, promote proper body posture, promote proper body mechanics and promote proper body breathing techniques. Progressed repetitions and complexity of movement as indicated. Date:  19 Date:   Date:     Activity/Exercise Parameters Parameters Parameters   Prone lying over pillows 5 min     Prone propping 3 x 30 seconds     quadruped position 45 seconds  Mod assist to get into position     Scooting on mat table in sitting 4 trips                             HEP: continue with stander. [employer]    Treatment/Session Summary:    · Response to Treatment:  Pt did well with mat mobility.   Pt was exerting himself as he became a bit short of breath and required rests. Land therapy is more taxing than aquatic therapy but the aquatic therapy allows for more movement. Also due to Mr Valeria Oppenheim weight he is easier to assit in the water. So both are helfpful in his plan of care. .  · Communication/Consultation:  None today  · Equipment provided today:  None today  · Recommendations/Intent for next treatment session: Next visit will focus on aquatics and land therapy to address strength, mobility and flexibility to improve independence. Total Treatment Billable Duration:  25 minutes therapeutic exercise.        Letty Rahman, JOSE ALEJANDRO    Future Appointments   Date Time Provider Jamel Susan   11/11/2019  2:45 PM MD JERRY Dumas UCDG UCD   11/15/2019 12:00 PM SFKENNETH CATH/CV FLOAT RN SFRICL D   11/19/2019  1:45 PM Cassie Vasquez PT [facility]   11/21/2019 12:30 PM LEDIY SUTHERLANDS [facility]   11/26/2019  1:45 PM Cassie Chao, JOSE ALEJANDRO [facility]   12/3/2019  1:45 PM Cassie Chao, PT [facility]   12/5/2019  1:00 PM Inna [place], PTA [facility]   12/10/2019  2:00 PM Jayleen Hernadez, PT [facility]   12/12/2019  1:00 PM Inna [place], PTA [facility]   12/17/2019  1:45 PM Jayleen Hernadez, PT [facility]

## 2019-11-15 ENCOUNTER — HOSPITAL ENCOUNTER (OUTPATIENT)
Dept: CARDIAC CATH/INVASIVE PROCEDURES | Age: 79
Discharge: HOME | End: 2019-11-15
Attending: INTERNAL MEDICINE | Admitting: INTERNAL MEDICINE
Payer: COMMERCIAL

## 2019-11-15 VITALS
HEART RATE: 58 BPM | WEIGHT: 270 LBS | HEIGHT: 70 IN | SYSTOLIC BLOOD PRESSURE: 100 MMHG | OXYGEN SATURATION: 95 % | BODY MASS INDEX: 38.65 KG/M2 | RESPIRATION RATE: 18 BRPM | DIASTOLIC BLOOD PRESSURE: 55 MMHG

## 2019-11-15 LAB
ANION GAP SERPL CALC-SCNC: 6 MMOL/L (ref 7–16)
ATRIAL RATE: 98 BPM
BUN SERPL-MCNC: 35 MG/DL (ref 8–23)
CALCIUM SERPL-MCNC: 9 MG/DL (ref 8.3–10.4)
CALCULATED R AXIS, ECG10: -33 DEGREES
CALCULATED T AXIS, ECG11: 43 DEGREES
CHLORIDE SERPL-SCNC: 108 MMOL/L (ref 98–107)
CO2 SERPL-SCNC: 27 MMOL/L (ref 21–32)
CREAT SERPL-MCNC: 1.37 MG/DL (ref 0.8–1.5)
DIAGNOSIS, 93000: NORMAL
ERYTHROCYTE [DISTWIDTH] IN BLOOD BY AUTOMATED COUNT: 19.1 % (ref 11.9–14.6)
GLUCOSE SERPL-MCNC: 105 MG/DL (ref 65–100)
HCT VFR BLD AUTO: 37.1 % (ref 41.1–50.3)
HGB BLD-MCNC: 11.5 G/DL (ref 13.6–17.2)
INR PPP: 1.1
MAGNESIUM SERPL-MCNC: 2.4 MG/DL (ref 1.8–2.4)
MCH RBC QN AUTO: 30.7 PG (ref 26.1–32.9)
MCHC RBC AUTO-ENTMCNC: 31 G/DL (ref 31.4–35)
MCV RBC AUTO: 98.9 FL (ref 79.6–97.8)
NRBC # BLD: 0 K/UL (ref 0–0.2)
PLATELET # BLD AUTO: 126 K/UL (ref 150–450)
PMV BLD AUTO: 9.4 FL (ref 9.4–12.3)
POTASSIUM SERPL-SCNC: 4.2 MMOL/L (ref 3.5–5.1)
PROTHROMBIN TIME: 14.9 SEC (ref 11.7–14.5)
Q-T INTERVAL, ECG07: 418 MS
QRS DURATION, ECG06: 90 MS
QTC CALCULATION (BEZET), ECG08: 466 MS
RBC # BLD AUTO: 3.75 M/UL (ref 4.23–5.6)
SODIUM SERPL-SCNC: 141 MMOL/L (ref 136–145)
VENTRICULAR RATE, ECG03: 75 BPM
WBC # BLD AUTO: 9.5 K/UL (ref 4.3–11.1)

## 2019-11-15 PROCEDURE — 93312 ECHO TRANSESOPHAGEAL: CPT

## 2019-11-15 PROCEDURE — 99152 MOD SED SAME PHYS/QHP 5/>YRS: CPT

## 2019-11-15 PROCEDURE — 80048 BASIC METABOLIC PNL TOTAL CA: CPT

## 2019-11-15 PROCEDURE — 99153 MOD SED SAME PHYS/QHP EA: CPT

## 2019-11-15 PROCEDURE — 93005 ELECTROCARDIOGRAM TRACING: CPT | Performed by: INTERNAL MEDICINE

## 2019-11-15 PROCEDURE — 85027 COMPLETE CBC AUTOMATED: CPT

## 2019-11-15 PROCEDURE — 74011250636 HC RX REV CODE- 250/636: Performed by: INTERNAL MEDICINE

## 2019-11-15 PROCEDURE — 85610 PROTHROMBIN TIME: CPT

## 2019-11-15 PROCEDURE — 83735 ASSAY OF MAGNESIUM: CPT

## 2019-11-15 RX ORDER — FENTANYL CITRATE 50 UG/ML
25-100 INJECTION, SOLUTION INTRAMUSCULAR; INTRAVENOUS
Status: DISCONTINUED | OUTPATIENT
Start: 2019-11-15 | End: 2019-11-15 | Stop reason: HOSPADM

## 2019-11-15 RX ORDER — SODIUM CHLORIDE 9 MG/ML
75 INJECTION, SOLUTION INTRAVENOUS CONTINUOUS
Status: DISCONTINUED | OUTPATIENT
Start: 2019-11-15 | End: 2019-11-15 | Stop reason: HOSPADM

## 2019-11-15 RX ORDER — SODIUM CHLORIDE 0.9 % (FLUSH) 0.9 %
5 SYRINGE (ML) INJECTION AS NEEDED
Status: DISCONTINUED | OUTPATIENT
Start: 2019-11-15 | End: 2019-11-15 | Stop reason: HOSPADM

## 2019-11-15 RX ORDER — MIDAZOLAM HYDROCHLORIDE 1 MG/ML
.5-5 INJECTION, SOLUTION INTRAMUSCULAR; INTRAVENOUS
Status: DISCONTINUED | OUTPATIENT
Start: 2019-11-15 | End: 2019-11-15 | Stop reason: HOSPADM

## 2019-11-15 RX ADMIN — MIDAZOLAM 2 MG: 1 INJECTION INTRAMUSCULAR; INTRAVENOUS at 13:46

## 2019-11-15 RX ADMIN — FENTANYL CITRATE 50 MCG: 50 INJECTION, SOLUTION INTRAMUSCULAR; INTRAVENOUS at 13:46

## 2019-11-15 RX ADMIN — MIDAZOLAM 2 MG: 1 INJECTION INTRAMUSCULAR; INTRAVENOUS at 14:01

## 2019-11-15 RX ADMIN — MIDAZOLAM 2 MG: 1 INJECTION INTRAMUSCULAR; INTRAVENOUS at 13:49

## 2019-11-15 NOTE — DISCHARGE INSTRUCTIONS
AFTER YOUR TRANSESOPHAGEAL ECHOCARDIOGRAM    Be sure someone else drives you home. You may feel drowsy for several hours. Do not eat or drink for at least two hours after your procedure. Your throat will be numb and there is a risk you might have difficulty swallowing for a while. Be careful when you do eat or drink for the first time especially with hot fluids since you could easily burn your throat. Call your doctor if:    · You are bleeding from your throat or mouth. · You have trouble breathing all of a sudden. · You have chest pain or any pain that spreads to your neck, jaw, or arms. · You have questions or concerns. · You have a fever greater than 101°F.    Doctor: Gagandeep Jansen [phone]    Special Instructions:    No driving for 24 hours.

## 2019-11-15 NOTE — PROGRESS NOTES
Discharge instructions given per orders, voiced good understanding of post ROSENDO care, medications & follow up care.  Denies any questions

## 2019-11-15 NOTE — PROGRESS NOTES
Pt arrived, via wheelchaIR planned ROSENDO for Dr Ramón Spencer. Consent signed, Procedure discussed with pt all questions answered voiced understanding. Medications and history discussed with pt. Pt prepped per ordersThe patient has a fraility score of 5-MILDLY FRAIL, based on pt wheelchair bound.       1

## 2019-11-15 NOTE — PROGRESS NOTES
Report received from Kayce Bloom Cath Lab RN. Procedural findings communicated. Intra procedural  medication administration reviewed. Progression of care discussed.          Routine post procedural vital signs and site assessment initiated yes

## 2019-11-15 NOTE — PROGRESS NOTES
Pre watchman ROSENDO  ASA III Mallmapati II  Versed 6mg  Fentanyl 50mcg  Pt tolerated procedure well

## 2019-11-19 ENCOUNTER — HOSPITAL ENCOUNTER (OUTPATIENT)
Dept: PHYSICAL THERAPY | Age: 79
Discharge: HOME | End: 2019-11-19
Payer: COMMERCIAL

## 2019-11-19 PROCEDURE — 97110 THERAPEUTIC EXERCISES: CPT

## 2019-11-21 ENCOUNTER — HOSPITAL ENCOUNTER (OUTPATIENT)
Dept: PHYSICAL THERAPY | Age: 79
Discharge: HOME | End: 2019-11-21
Payer: COMMERCIAL

## 2019-11-21 PROCEDURE — 97113 AQUATIC THERAPY/EXERCISES: CPT

## 2019-11-21 NOTE — PROGRESS NOTES
[facility]  : 1940  Payor: Orville Almazan / Plan: Jj Chatterjee / Product Type: Federal Funded Programs /  [address]  at [facility] HENRY WATSON  [address] [facility], [address], [address] [place] [address]  [phone]   [phone]       OUTPATIENT PHYSICAL THERAPY: Daily Treatment and Aquatic Note 2019  Visit Count: 3     ICD-10: Treatment Diagnosis: Paraplegia, incomplete (G82.22), Difficulty in walking, not elsewhere classified (R26.2), Muscle weakness (generalized) (M62.81)  Precautions/Allergies:   Patient has no known allergies. TREATMENT PLAN:  Effective Dates: 2019 TO 2020 (90 days). Frequency/Duration: 2 times a week for 90 Day(s) MEDICAL/REFERRING DIAGNOSIS:  Paraplegia, unspecified [G82.20]   DATE OF ONSET:   REFERRING PHYSICIAN: Unknown, Provider, MD LA Orders: aquatherapy eval and treat  Return MD Appointment: uncertain       Pre-treatment Symptoms/Complaints:  Pt states he is ready to get in the pool. He states he has missed it. Pain: Initial:   no pain Post Session:  No pain   Medications Last Reviewed:  2019    Updated Objective Findings:   None Today     TREATMENT:       Aquatic Therapy (60 minutes): Aquatic treatment performed per flow grid for Decreased muscle strength, Decreased endurance, Decreased range of motion, Decreased activity endurance, Decompression, Ease of movement and Low impact and reduced weight bearing activity. Cues provided for posture and exercises. Assistance by therapist provided for balance and exercises.        Aquatic Exercise Log          Date   Date:  19   Activity/ Exercise      LE Exercises 7' with one foot on step Supine in 4.5 ft       Hip Flex/Ext 15 B X 10 B      Hip Abd/Add 15 B X 10 B      Hip IR/ER         Calf raises         Knee Flex 15 B X 10 B      Squats Pushing with B feet on side of pool x 20 reps Pushing out with legs x 20       Leg Circles 15/15 B       Step Ups Squat to  4. 5' holding rail x 5 holding 15 sec each with mod assist Squat to stand holding to rail in 4.5 ft   5 x 15 sec hold   Then with assist of 2 PTA's   UE Exercises         Squeeze In         Push Down         Pull Down         Bicep/Tricep      Rows/Press outs       Chi Positions         Trunk Rotation      Deep H2O/ Noodles 7' with rings and assist 7ft with rings and weights and assist      Stabilization 5 min 5 min       Arms only         Legs only Bicycle 5 min Bicycle - 4 min   Cross   Country 5 min 3 min       Scissors 5 min 3 min       Crab walk      Lower abdominal   work  DKTC 5 min DKTC - 2 min       Cardio         Jogging      Lap   Swimming         Stretches 7' with blue rings in place    Lower trunk rotation    3 x 15 sec hold to each side with assist of another PTA    Hip flexor stretch    2 x 20 sec hold to each side with assist of another PTA       Piriformis Hip flexors x 5 holding 15 sec each    T- hang   Leg pull 2 x 10 sec each leg  Then pelvic pull   2 x 15 sec hold       Neck               HEP: continue with stander. [employer] Portal    Treatment/Session Summary:    · Response to Treatment:  Pt worked hard with therapy today. Esequiel Kothari He has multiple tightness and weakness throughout LE. Patient and wife needed therapist assist to change after session today. He was tired, but not totally fatigued. Will continue to work on strength and mobility on alexsander next land visit and work in aquatics to support land activities. · Communication/Consultation:  None today  · Equipment provided today:  None today  · Recommendations/Intent for next treatment session: Next visit will focus on aquatics and land therapy to address strength, mobility and flexibility to improve independence. Total Treatment Billable Duration:  55 minutes plus 10 minutes assisting patient and wife to change.     PT Patient Time In/Time Out  Time In: 6807  Time Out: [address] [place], PTA    Future Appointments   Date Time Provider Department [place]   11/26/2019  1:45 PM Neeraj Pali, PT [facility]   12/3/2019  1:45 PM Neeraj Pali, PT [facility]   12/5/2019  1:00 PM [place], PTA [facility]   12/10/2019  2:00 PM Captain Cook Pali, PT [facility]   12/12/2019  1:00 PM Khanh Rios, PTA [facility]   12/17/2019  1:45 PM Neeraj Pali, PT [facility]   2/12/2020 11:45 AM Paty Cuadra MD [facility]

## 2019-11-26 ENCOUNTER — HOSPITAL ENCOUNTER (OUTPATIENT)
Dept: PHYSICAL THERAPY | Age: 79
Discharge: HOME | End: 2019-11-26
Payer: COMMERCIAL

## 2019-11-26 PROCEDURE — 97110 THERAPEUTIC EXERCISES: CPT

## 2019-11-26 NOTE — PROGRESS NOTES
Alyssa Velasquez  : 1940  Payor: Kandy Scale / Plan: Noris Jennifer / Product Type: Federal Funded Programs /  [address]  at [facility] HENRY WATSON  [address] [facility], [address], [place] [address].  [phone]   [phone]       OUTPATIENT PHYSICAL THERAPY: Daily Treatment Note 2019  Visit Count: 4     ICD-10: Treatment Diagnosis: Paraplegia, incomplete (G82.22), Difficulty in walking, not elsewhere classified (R26.2), Muscle weakness (generalized) (M62.81)  Precautions/Allergies:   Patient has no known allergies. TREATMENT PLAN:  Effective Dates: 2019 TO 2020 (90 days). Frequency/Duration: 2 times a week for 90 Day(s) MEDICAL/REFERRING DIAGNOSIS:  Paraplegia, unspecified [G82.20]   DATE OF ONSET:   REFERRING PHYSICIAN: Unknown, Provider, MD LA Orders: aquatherapy eval and treat  Return MD Appointment: uncertain       Pre-treatment Symptoms/Complaints:  Pt states he was in the stander yesterday and he is doing fine today. Pain: Initial:   no pain Post Session:  No pain   Medications Last Reviewed:  2019    Updated Objective Findings:   None Today     TREATMENT:     THERAPEUTIC EXERCISE: (45 minutes):  Exercises per grid below to improve mobility, strength, balance and coordination. Required moderate visual, verbal, manual and tactile cues to promote proper body alignment, promote proper body posture, promote proper body mechanics and promote proper body breathing techniques. Progressed repetitions and complexity of movement as indicated.    Date:  19 Date:  19 Date:  19   Activity/Exercise Parameters Parameters Parameters   Prone lying over pillows 5 min 5 min    Prone propping 3 x 30 seconds     quadruped position 45 seconds  Mod assist to get into position     Scooting on mat table in sitting 4 trips 6 trip     Seated lean forward as in sit to stand  3 x 5 3 x 5   Sit to supine  independent with leg straps X 1   Supine bridge position Seated hip add ball squeeze  2 x 10    Attempted standing   See below See below   Supine to sidelying   X 2    Sidelying with wedge between legs   Active hip flex knee toward chest and return with assist x 10  Hip abd- 10x until fatigue 2 sessions  Over pressure for stretching. Stretching ROM in prone- hip flex, knee flex, ankle pumps, In supine- hip add/abd, HS , hip flex  Attempted standing with mod to max assist of 3- Pt stood with knees blocked using UE for majority but some wt bearing through LE's  Sit to partial stand- lifting hips from wc seat with min assist of 1 x 3  HEP: continue with stander. [employer] Portal    Treatment/Session Summary:    · Response to Treatment:  Pt did well with therapy today. Rested several times. muscle initiation takes good deal of effort from patient. .He has multiple tightness and weakness throughout LE but was able to attempt all of the above. Will continue to work on strength and mobility on alexsander next land visit and work in aquatics to support land activities. · Communication/Consultation:  None today  · Equipment provided today:  None today  · Recommendations/Intent for next treatment session: Next visit will focus on aquatics and land therapy to address strength, mobility and flexibility to improve independence. Total Treatment Billable Duration:  45 minutes therapeutic exercise.   PT Patient Time In/Time Out  Time In: 0145  Time Out: 0230    Jennifer Hector PT     Future Appointments   Date Time Provider Jamel Davis   12/3/2019  1:45 PM Fanta Prieto [facility]   12/5/2019  1:00 PM Ronnie Mathis PTA [facility]   12/10/2019  2:00 PM Fanta Prieto PT [facility]   12/12/2019  1:00 PM Ronnie Mathis PTA [facility]   12/17/2019  1:45 PM Fanta Prieto PT [facility]   1/8/2020 11:45 AM Richelle Cuadra MD [facility]

## 2019-12-03 ENCOUNTER — HOSPITAL ENCOUNTER (OUTPATIENT)
Dept: PHYSICAL THERAPY | Age: 79
Discharge: HOME | End: 2019-12-03
Payer: COMMERCIAL

## 2019-12-03 PROCEDURE — 97110 THERAPEUTIC EXERCISES: CPT

## 2019-12-03 NOTE — PROGRESS NOTES
Winnie Catherine  : 1940  Payor: 4meee / Plan: [place] / Product Type: Federal Funded Programs /  [address]  at [facility]  [address] [facility], [address], [place] [address].  [phone]   [phone]       OUTPATIENT PHYSICAL THERAPY: Daily Treatment Note 2019  Visit Count: 5     ICD-10: Treatment Diagnosis: Paraplegia, incomplete (G82.22), Difficulty in walking, not elsewhere classified (R26.2), Muscle weakness (generalized) (M62.81)  Precautions/Allergies:   Patient has no known allergies. TREATMENT PLAN:  Effective Dates: 2019 TO 2020 (90 days). Frequency/Duration: 2 times a week for 90 Day(s) MEDICAL/REFERRING DIAGNOSIS:  Paraplegia, unspecified [G82.20]   DATE OF ONSET:   REFERRING PHYSICIAN: Unknown, Provider, MD [place] Orders: aquatherapy eval and treat  Return MD Appointment: uncertain       Pre-treatment Symptoms/Complaints:  Pt states he was in the stander for 30 min the last 2 days. Pain: Initial:   no pain Post Session:  No pain   Medications Last Reviewed:  12/3/2019    Updated Objective Findings:   None Today     TREATMENT:     THERAPEUTIC EXERCISE: (45 minutes):  Exercises per grid below to improve mobility, strength, balance and coordination. Required moderate visual, verbal, manual and tactile cues to promote proper body alignment, promote proper body posture, promote proper body mechanics and promote proper body breathing techniques. Progressed repetitions and complexity of movement as indicated.    Date:  19 Date:  19 Date:  19 Date  12/3/19   Activity/Exercise Parameters Parameters Parameters    Prone lying over pillows 5 min 5 min     Prone propping 3 x 30 seconds      quadruped position 45 seconds  Mod assist to get into position      Scooting on mat table in sitting 4 trips 6 trip   4 trips   Seated lean forward as in sit to stand  3 x 5 3 x 5 Mod- max assist of 2 using walker x 5    Sit to supine  independent with leg straps X 1 X 1   Supine bridge position       Seated hip add ball squeeze  2 x 10     Attempted standing   See below See below    Supine to sidelying   X 2  X 2   Sidelying with wedge between legs   Active hip flex knee toward chest and return with assist x 10  Hip abd- 10x until fatigue 2 sessions  Over pressure for stretching. Powder board    Sidelying - hip flex/ - knee to chest  Several sessions to fatigue  Knee ext / knee flex  Hip abd  Ankle PF/DF                 Stretching ROM in prone- hip flex, knee flex, ankle pumps, In supine- hip add/abd, HS , hip flex  Attempted standing with mod to max assist of 3- Pt stood with knees blocked using UE for majority but some wt bearing through LE's    HEP: continue with stander. [employer] Portal    Treatment/Session Summary:    · Response to Treatment:  Mat scooting is taxing for pt- increased breathing and effort. Pt did better with standing using mat table and walker with mod to max assist of 2. Did well with powder board - mix of active, active assistive and passive with overstretch. · Communication/Consultation:  None today  · Equipment provided today:  None today  · Recommendations/Intent for next treatment session: Next visit will focus on aquatics and land therapy to address strength, mobility and flexibility to improve independence. Total Treatment Billable Duration:  60 minutes therapeutic exercise.   PT Patient Time In/Time Out  Time In: 0145  Time Out: 7166    Maira Troncoso PT     Future Appointments   Date Time Provider Jamel Davis   12/5/2019  1:00 PM Ebenezer Davies [facility]   12/10/2019  2:00 PM JOSE ALEJANDRO [facility]   12/12/2019  1:00 PM KERRY Davies [facility]   12/17/2019  1:45 PM JOSE ALEJANDRO [facility]   1/8/2020 11:45 AM Ilana Cuadra MD [address]

## 2019-12-05 ENCOUNTER — HOSPITAL ENCOUNTER (OUTPATIENT)
Dept: PHYSICAL THERAPY | Age: 79
Discharge: HOME | End: 2019-12-05
Payer: COMMERCIAL

## 2019-12-05 NOTE — PROGRESS NOTES
Myron Zachery  : 1940  Primary: Chloe Tripletts Anabella Arciniega  Secondary:  Therapy Center at [facility]  [address] [facility], [address] [place] [address],[address] [facility], [place] [address].  [phone]   [phone]      Pt's wife called to cancel today. She states that he is having \"bleeding issues\". Will follow up on the next visit.      Cj Clifford, PTA

## 2019-12-10 ENCOUNTER — HOSPITAL ENCOUNTER (OUTPATIENT)
Dept: PHYSICAL THERAPY | Age: 79
Discharge: HOME | End: 2019-12-10
Payer: COMMERCIAL

## 2019-12-10 PROCEDURE — 97110 THERAPEUTIC EXERCISES: CPT

## 2019-12-10 NOTE — PROGRESS NOTES
Isidro Geronimo  : 1940  Payor: Nahun Rothamn / Plan: [employer] / Product Type: Federal Funded Programs /  [address] [place] [address]  at [facility]EN[facility] WATSON  [address] [place] [address], [address], [place] [address].  [phone]   [phone]       OUTPATIENT PHYSICAL THERAPY: Daily Treatment Note 2019  Visit Count: 6     ICD-10: Treatment Diagnosis: Paraplegia, incomplete (G82.22), Difficulty in walking, not elsewhere classified (R26.2), Muscle weakness (generalized) (M62.81)  Precautions/Allergies:   Patient has no known allergies. TREATMENT PLAN:  Effective Dates: 2019 TO 2020 (90 days). Frequency/Duration: 2 times a week for 90 Day(s) MEDICAL/REFERRING DIAGNOSIS:  Paraplegia, unspecified [G82.20]   DATE OF ONSET:   REFERRING PHYSICIAN: Unknown, Provider, MD [place] Orders: aquatherapy eval and treat  Return MD Appointment: uncertain       Pre-treatment Symptoms/Complaints:  Pt states his hip flexor area still is quite painful getting into the stander. Pain: Initial:   no pain Post Session:  No pain   Medications Last Reviewed:  12/10/2019    Updated Objective Findings:   None Today     TREATMENT:     THERAPEUTIC EXERCISE: (45 minutes):  Exercises per grid below to improve mobility, strength, balance and coordination. Required moderate visual, verbal, manual and tactile cues to promote proper body alignment, promote proper body posture, promote proper body mechanics and promote proper body breathing techniques. Progressed repetitions and complexity of movement as indicated.    Date:  19 Date:  19 Date:  19 Date  12/3/19 Date  12/10/19   Activity/Exercise Parameters Parameters Parameters     Prone lying over pillows 5 min 5 min      Prone propping 3 x 30 seconds       quadruped position 45 seconds  Mod assist to get into position    Worked in quadruped for approx 10 minutes at 3 different times   Scooting on mat table in sitting 4 trips 6 trip   4 trips    Seated lean forward as in sit to stand  3 x 5 3 x 5 Mod- max assist of 2 using walker x 5  Mod- max assist of 2 using walker 6 reps   Sit to supine  independent with leg straps X 1 X 1 X 1   Supine bridge position        Seated hip add ball squeeze  2 x 10      Attempted standing   See below See below  As above   Supine to sidelying   X 2  X 2 x2   Sidelying with wedge between legs   Active hip flex knee toward chest and return with assist x 10  Hip abd- 10x until fatigue 2 sessions  Over pressure for stretching. Powder board    Sidelying - hip flex/ - knee to chest  Several sessions to fatigue  Knee ext / knee flex  Hip abd  Ankle PF/DF    Supine to quadruped     X 3   W/c to mat table transfer     No sliding board able to transfer independently. Pt able to transfer from supine to side with min assist and into quadruped with min assist.  Pt able to sustain quadruped position but stretching of feet into plantar flexion and UE strength being reported limiting factors. Pt was able to progress LE forward and back as in crawling and moved up and back on mat table. He was also able to progress LE side ways with assist adducting hip. This activity is very taxing to him and he becomes quite short of breath. HEP: continue with stander. [employer]    Treatment/Session Summary:    · Response to Treatment:  Mat work is very taxing as is attempted standing and pt becomes short of breath. He works hard and is making some prgress with getting hips off of mat in partial sit to stand. He states he is getting into his stander at home most days Pt did better with standing using mat table and walker with mod to max assist of 2. Did well with powder board - mix of active, active assistive and passive with overstretch.   · Communication/Consultation:  None today  · Equipment provided today:  None today  · Recommendations/Intent for next treatment session: Next visit will focus on aquatics and land therapy to address strength, mobility and flexibility to improve independence. Total Treatment Billable Duration:  45 minutes therapeutic exercise.   PT Patient Time In/Time Out  Time In: 0145  Time Out: 0230    Starr Carcamo PT     Future Appointments   Date Time Provider Jamel Davis   12/12/2019  1:00 PM Ebenezer Casiano [facility]   12/17/2019  1:45 PM Neeraj Centeno PT [facility]   1/8/2020 11:45 AM Paty Cuadra MD [facility]

## 2019-12-12 ENCOUNTER — APPOINTMENT (OUTPATIENT)
Dept: PHYSICAL THERAPY | Age: 79
End: 2019-12-12
Payer: COMMERCIAL

## 2019-12-12 ENCOUNTER — HOSPITAL ENCOUNTER (OUTPATIENT)
Dept: PHYSICAL THERAPY | Age: 79
Discharge: HOME | End: 2019-12-12
Payer: COMMERCIAL

## 2019-12-12 PROCEDURE — 97113 AQUATIC THERAPY/EXERCISES: CPT

## 2019-12-13 ENCOUNTER — APPOINTMENT (OUTPATIENT)
Dept: PHYSICAL THERAPY | Age: 79
End: 2019-12-13

## 2019-12-17 ENCOUNTER — APPOINTMENT (OUTPATIENT)
Dept: PHYSICAL THERAPY | Age: 79
End: 2019-12-17
Payer: COMMERCIAL

## 2019-12-19 ENCOUNTER — HOSPITAL ENCOUNTER (OUTPATIENT)
Dept: PHYSICAL THERAPY | Age: 79
Discharge: HOME | End: 2019-12-19

## 2019-12-19 NOTE — PROGRESS NOTES
John Rose  : 1940  Primary: Ramona Dutton  Secondary:  Therapy Center at [address], [address] [place] [address],[address], [address] [facility]  [phone]   [phone]      Pt's wife called to cancel appointment today. Will follow up with him on the next visit.      Laurie Arellano, PTA

## 2019-12-23 ENCOUNTER — APPOINTMENT (OUTPATIENT)
Dept: PHYSICAL THERAPY | Age: 79
End: 2019-12-23
Payer: COMMERCIAL

## 2019-12-26 ENCOUNTER — HOSPITAL ENCOUNTER (OUTPATIENT)
Dept: PHYSICAL THERAPY | Age: 79
Discharge: HOME | End: 2019-12-26
Payer: COMMERCIAL

## 2019-12-26 PROCEDURE — 97110 THERAPEUTIC EXERCISES: CPT

## 2019-12-26 NOTE — PROGRESS NOTES
Tomás Berry  : 1940  Payor: Coretta  / Plan: [place] / Product Type: Federal Funded Programs /  [address] [place] [address]  at [facility]  [address] [facility], [address], [place] [address].  [phone]   [phone]       OUTPATIENT PHYSICAL THERAPY: Daily Treatment Note 2019  Visit Count: 8     ICD-10: Treatment Diagnosis: Paraplegia, incomplete (G82.22), Difficulty in walking, not elsewhere classified (R26.2), Muscle weakness (generalized) (M62.81)  Precautions/Allergies:   Patient has no known allergies. TREATMENT PLAN:  Effective Dates: 2019 TO 2020 (90 days). Frequency/Duration: 2 times a week for 90 Day(s) MEDICAL/REFERRING DIAGNOSIS:  Paraplegia, unspecified [G82.20]   DATE OF ONSET:   REFERRING PHYSICIAN: Unknown, Provider, MD [place] Orders: aquatherapy eval and treat  Return MD Appointment: uncertain       Pre-treatment Symptoms/Complaints:  Pt states he went to [place] for annual check up. He saw a therapist there who made suggestions about the use of his stander and gave him pulleys for his shoulders. Pain: Initial:   no pain Post Session:  No pain   Medications Last Reviewed:  2019    Updated Objective Findings:   None Today     TREATMENT:     THERAPEUTIC EXERCISE: (60 minutes):  Exercises per grid below to improve mobility, strength, balance and coordination. Required moderate visual, verbal, manual and tactile cues to promote proper body alignment, promote proper body posture, promote proper body mechanics and promote proper body breathing techniques. Progressed repetitions and complexity of movement as indicated.    Date:  19 Date:  19 Date:  19 Date  12/3/19 Date  12/10/19 Date  19   Activity/Exercise Parameters Parameters Parameters      Prone lying over pillows 5 min 5 min       Prone propping 3 x 30 seconds        quadruped position 45 seconds  Mod assist to get into position    Worked in quadruped for approx 10 minutes at 3 different times    Scooting on mat table in sitting 4 trips 6 trip   4 trips  4 trips   Seated lean forward as in sit to stand  3 x 5 3 x 5 Mod- max assist of 2 using walker x 5  Mod- max assist of 2 using walker 6 reps    Sit to supine  independent with leg straps X 1 X 1 X 1 X 2 using straps with min assist   Supine bridge position      With assist - tried bridging and was able to get contraction on both sides and decrease wt under buttocks  But no lift. L stronger than R   Seated hip add ball squeeze  2 x 10       Attempted standing   See below See below  As above See below   Supine to sidelying   X 2  X 2 x2 X 2 with min assist   Sidelying with wedge between legs   Active hip flex knee toward chest and return with assist x 10  Hip abd- 10x until fatigue 2 sessions  Over pressure for stretching. Active hip flex knee flex with manual resist hip ext and knee ext with manual resist  2 x 8 reps   Powder board    Sidelying - hip flex/ - knee to chest  Several sessions to fatigue  Knee ext / knee flex  Hip abd  Ankle PF/DF     Supine to quadruped     X 3    W/c to mat table transfer     No sliding board able to transfer independently. X 2 independent with standby for safety   Lying supine with manual stretching for LE to be in extended position- worked on knee ext and hips in neutral vs flexed. Pt has hip flexion tightness, abductor tightness, HS tightness and legs are in flexed resting position. Pt cannot hold legs adducted to stay in neutral in bridging position. Sidelying clam shell x 8 each min lift. Sit to stand with mod - max assist of 2 from  Raised mat using walker. Stood 30-45 sec each time. 3 with shoes , 3 without. Shoulder squeezer with black band x 25- given black band for home. HEP: continue with stander. [employer] Portal    Treatment/Session Summary:    · Response to Treatment:  Did better with sit to stand but still requires max assist to stand.   Changes to stander routine sound like they will assist in stretching. Communication/Consultation:  None today  · Equipment provided today:  None today  · Recommendations/Intent for next treatment session: Next visit will focus on aquatics and land therapy to address strength, mobility and flexibility to improve independence. Total Treatment Billable Duration:  60 minutes therapeutic exercise.   PT Patient Time In/Time Out  Time In: 0145  Time Out: [address], PT     Future Appointments   Date Time Provider Jamel Davis   12/31/2019 11:15 AM [facility]   1/2/2020  1:00 PM KERRY Wolf   1/7/2020  1:45 PM JOSE ALEJANDRO [facility]   1/8/2020 11:45 AM Tan Spencer MD [facility]   1/9/2020  1:00 [facility]

## 2019-12-30 ENCOUNTER — APPOINTMENT (OUTPATIENT)
Dept: PHYSICAL THERAPY | Age: 79
End: 2019-12-30
Payer: COMMERCIAL

## 2019-12-31 ENCOUNTER — HOSPITAL ENCOUNTER (OUTPATIENT)
Dept: PHYSICAL THERAPY | Age: 79
Discharge: HOME | End: 2019-12-31
Payer: COMMERCIAL

## 2019-12-31 PROCEDURE — 97110 THERAPEUTIC EXERCISES: CPT

## 2019-12-31 NOTE — PROGRESS NOTES
Gonzalo Garcia  : 1940  Payor: Heather Herman / Plan: [place] [employer] / Product Type: Federal Funded Programs /  [facility] [address]  at [facility] W[facility]  [address] [facility], [address], [address] [facility]  [phone]   [phone]       OUTPATIENT PHYSICAL THERAPY: Daily Treatment Note 2019  Visit Count: 9     ICD-10: Treatment Diagnosis: Paraplegia, incomplete (G82.22), Difficulty in walking, not elsewhere classified (R26.2), Muscle weakness (generalized) (M62.81)  Precautions/Allergies:   Patient has no known allergies. TREATMENT PLAN:  Effective Dates: 2019 TO 2020 (90 days). Frequency/Duration: 2 times a week for 90 Day(s) MEDICAL/REFERRING DIAGNOSIS:  Paraplegia, unspecified [G82.20]   DATE OF ONSET:   REFERRING PHYSICIAN: Unknown, Provider, MD [place] Orders: aquatherapy eval and treat  Return MD Appointment: uncertain       Pre-treatment Symptoms/Complaints:  Pt states his shoulders have been hurting particularly his L one. Pain: Initial:   not rated Post Session:  Not rated   Medications Last Reviewed:  2019    Updated Objective Findings:   None Today     TREATMENT:     THERAPEUTIC EXERCISE: (60 minutes):  Exercises per grid below to improve mobility, strength, balance and coordination. Required moderate visual, verbal, manual and tactile cues to promote proper body alignment, promote proper body posture, promote proper body mechanics and promote proper body breathing techniques. Progressed repetitions and complexity of movement as indicated.    Date:  19 Date:  19 Date:  19 Date  12/3/19 Date  12/10/19 Date  19 Date  19   Activity/Exercise Parameters Parameters Parameters       Prone lying over pillows 5 min 5 min        Prone propping 3 x 30 seconds         quadruped position 45 seconds  Mod assist to get into position    Worked in quadruped for approx 10 minutes at 3 different times     Scooting on mat table in sitting 4 trips 6 trip 4 trips  4 trips 2 trips   Seated lean forward as in sit to stand  3 x 5 3 x 5 Mod- max assist of 2 using walker x 5  Mod- max assist of 2 using walker 6 reps     Sit to supine  independent with leg straps X 1 X 1 X 1 X 2 using straps with min assist X 2    Supine bridge position      With assist - tried bridging and was able to get contraction on both sides and decrease wt under buttocks  But no lift. L stronger than R With assist t band tied around thighs to hold in abducted position. Bridging - contract but no lift     Seated hip add ball squeeze  2 x 10        Attempted standing   See below See below  As above See below Held due to shoulder pain   Supine to sidelying   X 2  X 2 x2 X 2 with min assist X 2   Sidelying with wedge between legs   Active hip flex knee toward chest and return with assist x 10  Hip abd- 10x until fatigue 2 sessions  Over pressure for stretching. Active hip flex knee flex with manual resist hip ext and knee ext with manual resist  2 x 8 reps Active hip flex - knee to chest and back with manual resist  2 x 10   Powder board    Sidelying - hip flex/ - knee to chest  Several sessions to fatigue  Knee ext / knee flex  Hip abd  Ankle PF/DF      Supine to quadruped     X 3     W/c to mat table transfer     No sliding board able to transfer independently. X 2 independent with standby for safety    Lying supine with manual stretching for LE to be in extended position- worked on knee ext and hips in neutral vs flexed. Pt has hip flexion tightness, abductor tightness, HS tightness and legs are in flexed resting position. Wand ex for shoulders for ext, flexion overhead in sitting- flexion in supine. HEP: continue with stander. [employer] Portal    Treatment/Session Summary:    · Response to Treatment:  Pt had a bit more shortness of breath with UE activity. Not sure if it was a change of if it was related to using UE more overhead.  Communication/Consultation:  None today  · Equipment provided today:  None today  · Recommendations/Intent for next treatment session: Next visit will focus on aquatics and land therapy to address strength, mobility and flexibility to improve independence. Total Treatment Billable Duration:  45 minutes therapeutic exercise.   PT Patient Time In/Time Out  Time In: 1115  Time Out: [address],      Future Appointments   Date Time Provider Jamel Davis   1/2/2020  1:00 PM Karol Paniagua, [place] [facility]   1/7/2020  1:45 PM Abbie Fairchild, PT [facility]   1/8/2020 11:45 AM [facility]   1/9/2020  1:00 PM Karol Paniagua, KERRY HERNANDEZTYAEL [facility]   1/14/2020  1:00 PM Karol Paniagua, KERRY PAULINOORPTYAEL [facility]   1/16/2020  1:45 PM Abbie Fairchild, PT [facility]   1/21/2020  1:45 PM Abbie Fairchild, PT [facility]

## 2020-01-01 ENCOUNTER — HOSPITAL ENCOUNTER (EMERGENCY)
Age: 80
Discharge: HOME | End: 2020-11-25
Attending: EMERGENCY MEDICINE
Payer: MEDICARE

## 2020-01-01 ENCOUNTER — HOSPITAL ENCOUNTER (EMERGENCY)
Age: 80
Discharge: HOME | End: 2020-10-01
Attending: EMERGENCY MEDICINE
Payer: OTHER GOVERNMENT

## 2020-01-01 ENCOUNTER — APPOINTMENT (OUTPATIENT)
Dept: ULTRASOUND IMAGING | Age: 80
DRG: 948 | End: 2020-01-01
Attending: FAMILY MEDICINE
Payer: MEDICARE

## 2020-01-01 ENCOUNTER — PATIENT OUTREACH (OUTPATIENT)
Dept: CASE MANAGEMENT | Age: 80
End: 2020-01-01

## 2020-01-01 ENCOUNTER — HOSPITAL ENCOUNTER (OUTPATIENT)
Dept: INFUSION THERAPY | Age: 80
Discharge: HOME | End: 2020-12-23
Payer: MEDICARE

## 2020-01-01 ENCOUNTER — HOSPITAL ENCOUNTER (OUTPATIENT)
Dept: LAB | Age: 80
Discharge: HOME | End: 2020-12-22
Payer: MEDICARE

## 2020-01-01 ENCOUNTER — APPOINTMENT (OUTPATIENT)
Dept: GENERAL RADIOLOGY | Age: 80
End: 2020-01-01
Attending: EMERGENCY MEDICINE
Payer: MEDICARE

## 2020-01-01 ENCOUNTER — APPOINTMENT (OUTPATIENT)
Dept: GENERAL RADIOLOGY | Age: 80
DRG: 948 | End: 2020-01-01
Attending: EMERGENCY MEDICINE
Payer: MEDICARE

## 2020-01-01 ENCOUNTER — APPOINTMENT (OUTPATIENT)
Dept: CT IMAGING | Age: 80
End: 2020-01-01
Attending: EMERGENCY MEDICINE
Payer: MEDICARE

## 2020-01-01 ENCOUNTER — APPOINTMENT (OUTPATIENT)
Dept: GENERAL RADIOLOGY | Age: 80
End: 2020-01-01
Attending: EMERGENCY MEDICINE
Payer: OTHER GOVERNMENT

## 2020-01-01 ENCOUNTER — HOSPITAL ENCOUNTER (EMERGENCY)
Age: 80
Discharge: HOME | End: 2020-12-27
Attending: EMERGENCY MEDICINE
Payer: MEDICARE

## 2020-01-01 ENCOUNTER — HOSPITAL ENCOUNTER (EMERGENCY)
Age: 80
Discharge: HOME | End: 2020-05-01
Attending: EMERGENCY MEDICINE
Payer: MEDICARE

## 2020-01-01 ENCOUNTER — APPOINTMENT (OUTPATIENT)
Dept: CT IMAGING | Age: 80
DRG: 948 | End: 2020-01-01
Attending: EMERGENCY MEDICINE
Payer: MEDICARE

## 2020-01-01 ENCOUNTER — HOSPITAL ENCOUNTER (INPATIENT)
Age: 80
LOS: 1 days | Discharge: NURSING, REHAB OR LONG-TERM CARE | DRG: 948 | End: 2020-10-19
Attending: EMERGENCY MEDICINE | Admitting: FAMILY MEDICINE
Payer: MEDICARE

## 2020-01-01 ENCOUNTER — HOSPITAL ENCOUNTER (EMERGENCY)
Age: 80
Discharge: HOME | End: 2020-04-15
Attending: EMERGENCY MEDICINE
Payer: MEDICARE

## 2020-01-01 VITALS
DIASTOLIC BLOOD PRESSURE: 57 MMHG | SYSTOLIC BLOOD PRESSURE: 84 MMHG | RESPIRATION RATE: 20 BRPM | TEMPERATURE: 97.9 F | OXYGEN SATURATION: 100 % | HEART RATE: 79 BPM

## 2020-01-01 VITALS
HEIGHT: 72 IN | RESPIRATION RATE: 16 BRPM | DIASTOLIC BLOOD PRESSURE: 61 MMHG | OXYGEN SATURATION: 97 % | WEIGHT: 270 LBS | BODY MASS INDEX: 36.57 KG/M2 | TEMPERATURE: 98.4 F | HEART RATE: 92 BPM | SYSTOLIC BLOOD PRESSURE: 124 MMHG

## 2020-01-01 VITALS
HEIGHT: 71 IN | WEIGHT: 270 LBS | TEMPERATURE: 98.2 F | DIASTOLIC BLOOD PRESSURE: 73 MMHG | HEART RATE: 83 BPM | SYSTOLIC BLOOD PRESSURE: 126 MMHG | RESPIRATION RATE: 20 BRPM | OXYGEN SATURATION: 97 % | BODY MASS INDEX: 37.8 KG/M2

## 2020-01-01 VITALS
DIASTOLIC BLOOD PRESSURE: 59 MMHG | HEIGHT: 71 IN | RESPIRATION RATE: 18 BRPM | WEIGHT: 267.63 LBS | SYSTOLIC BLOOD PRESSURE: 115 MMHG | BODY MASS INDEX: 37.47 KG/M2 | TEMPERATURE: 97.9 F | HEART RATE: 69 BPM | OXYGEN SATURATION: 98 %

## 2020-01-01 VITALS
HEIGHT: 72 IN | OXYGEN SATURATION: 98 % | TEMPERATURE: 98.4 F | DIASTOLIC BLOOD PRESSURE: 55 MMHG | WEIGHT: 265 LBS | SYSTOLIC BLOOD PRESSURE: 102 MMHG | HEART RATE: 70 BPM | BODY MASS INDEX: 35.89 KG/M2 | RESPIRATION RATE: 18 BRPM

## 2020-01-01 VITALS
BODY MASS INDEX: 37.8 KG/M2 | DIASTOLIC BLOOD PRESSURE: 54 MMHG | TEMPERATURE: 98 F | OXYGEN SATURATION: 97 % | HEART RATE: 96 BPM | HEIGHT: 71 IN | WEIGHT: 270 LBS | SYSTOLIC BLOOD PRESSURE: 102 MMHG | RESPIRATION RATE: 16 BRPM

## 2020-01-01 VITALS
HEIGHT: 71 IN | DIASTOLIC BLOOD PRESSURE: 58 MMHG | OXYGEN SATURATION: 100 % | TEMPERATURE: 98 F | RESPIRATION RATE: 18 BRPM | BODY MASS INDEX: 37.8 KG/M2 | SYSTOLIC BLOOD PRESSURE: 102 MMHG | HEART RATE: 74 BPM | WEIGHT: 270 LBS

## 2020-01-01 DIAGNOSIS — M25.431 PAIN AND SWELLING OF RIGHT WRIST: ICD-10-CM

## 2020-01-01 DIAGNOSIS — K57.92 ACUTE DIVERTICULITIS: Primary | ICD-10-CM

## 2020-01-01 DIAGNOSIS — M25.531 PAIN AND SWELLING OF RIGHT WRIST: ICD-10-CM

## 2020-01-01 DIAGNOSIS — D72.829 LEUKOCYTOSIS, UNSPECIFIED TYPE: ICD-10-CM

## 2020-01-01 DIAGNOSIS — R19.7 DIARRHEA, UNSPECIFIED TYPE: Primary | ICD-10-CM

## 2020-01-01 DIAGNOSIS — R11.0 NAUSEA WITHOUT VOMITING: ICD-10-CM

## 2020-01-01 DIAGNOSIS — M75.52 BURSITIS OF LEFT SHOULDER: Primary | ICD-10-CM

## 2020-01-01 DIAGNOSIS — N17.9 ACUTE KIDNEY INJURY (HCC): ICD-10-CM

## 2020-01-01 DIAGNOSIS — R41.0 DELIRIUM: Primary | ICD-10-CM

## 2020-01-01 DIAGNOSIS — M25.532 ACUTE PAIN OF LEFT WRIST: Primary | ICD-10-CM

## 2020-01-01 DIAGNOSIS — M25.531 RIGHT WRIST PAIN: Primary | ICD-10-CM

## 2020-01-01 LAB
ABO + RH BLD: NORMAL
ALBUMIN SERPL-MCNC: 2.9 G/DL (ref 3.2–4.6)
ALBUMIN SERPL-MCNC: 3.2 G/DL (ref 3.2–4.6)
ALBUMIN SERPL-MCNC: 3.4 G/DL (ref 3.2–4.6)
ALBUMIN/GLOB SERPL: 0.7 {RATIO} (ref 1.2–3.5)
ALBUMIN/GLOB SERPL: 0.8 {RATIO} (ref 1.2–3.5)
ALBUMIN/GLOB SERPL: 0.8 {RATIO} (ref 1.2–3.5)
ALP SERPL-CCNC: 101 U/L (ref 50–136)
ALP SERPL-CCNC: 73 U/L (ref 50–136)
ALP SERPL-CCNC: 95 U/L (ref 50–136)
ALT SERPL-CCNC: 12 U/L (ref 12–65)
ALT SERPL-CCNC: 19 U/L (ref 12–65)
ALT SERPL-CCNC: 20 U/L (ref 12–65)
ANION GAP SERPL CALC-SCNC: 3 MMOL/L (ref 7–16)
ANION GAP SERPL CALC-SCNC: 6 MMOL/L (ref 7–16)
ANION GAP SERPL CALC-SCNC: 7 MMOL/L (ref 7–16)
ANION GAP SERPL CALC-SCNC: 7 MMOL/L (ref 7–16)
ANION GAP SERPL CALC-SCNC: 8 MMOL/L (ref 7–16)
ANION GAP SERPL CALC-SCNC: 9 MMOL/L (ref 7–16)
APPEARANCE UR: ABNORMAL
APPEARANCE UR: CLEAR
AST SERPL-CCNC: 13 U/L (ref 15–37)
AST SERPL-CCNC: 14 U/L (ref 15–37)
AST SERPL-CCNC: 8 U/L (ref 15–37)
ATRIAL RATE: 159 BPM
BACTERIA SPEC CULT: NORMAL
BACTERIA URNS QL MICRO: 0 /HPF
BASOPHILS # BLD: 0 K/UL (ref 0–0.2)
BASOPHILS # BLD: 0.1 K/UL (ref 0–0.2)
BASOPHILS # BLD: 0.1 K/UL (ref 0–0.2)
BASOPHILS NFR BLD: 0 % (ref 0–2)
BILIRUB SERPL-MCNC: 0.8 MG/DL (ref 0.2–1.1)
BILIRUB UR QL: ABNORMAL
BILIRUB UR QL: NEGATIVE
BLD PROD TYP BPU: NORMAL
BLD PROD TYP BPU: NORMAL
BLOOD GROUP ANTIBODIES SERPL: NORMAL
BPU ID: NORMAL
BPU ID: NORMAL
BUN SERPL-MCNC: 33 MG/DL (ref 8–23)
BUN SERPL-MCNC: 40 MG/DL (ref 8–23)
BUN SERPL-MCNC: 48 MG/DL (ref 8–23)
BUN SERPL-MCNC: 50 MG/DL (ref 8–23)
BUN SERPL-MCNC: 53 MG/DL (ref 8–23)
BUN SERPL-MCNC: 57 MG/DL (ref 8–23)
BUN SERPL-MCNC: 69 MG/DL (ref 8–23)
BUN SERPL-MCNC: 83 MG/DL (ref 8–23)
CALCIUM SERPL-MCNC: 7.7 MG/DL (ref 8.3–10.4)
CALCIUM SERPL-MCNC: 7.9 MG/DL (ref 8.3–10.4)
CALCIUM SERPL-MCNC: 8 MG/DL (ref 8.3–10.4)
CALCIUM SERPL-MCNC: 8.4 MG/DL (ref 8.3–10.4)
CALCIUM SERPL-MCNC: 8.5 MG/DL (ref 8.3–10.4)
CALCIUM SERPL-MCNC: 8.6 MG/DL (ref 8.3–10.4)
CALCIUM SERPL-MCNC: 8.6 MG/DL (ref 8.3–10.4)
CALCIUM SERPL-MCNC: 8.8 MG/DL (ref 8.3–10.4)
CALCULATED R AXIS, ECG10: -8 DEGREES
CALCULATED T AXIS, ECG11: 66 DEGREES
CASTS URNS QL MICRO: ABNORMAL /LPF
CHLORIDE SERPL-SCNC: 100 MMOL/L (ref 98–107)
CHLORIDE SERPL-SCNC: 101 MMOL/L (ref 98–107)
CHLORIDE SERPL-SCNC: 104 MMOL/L (ref 98–107)
CHLORIDE SERPL-SCNC: 105 MMOL/L (ref 98–107)
CHLORIDE SERPL-SCNC: 108 MMOL/L (ref 98–107)
CHLORIDE SERPL-SCNC: 108 MMOL/L (ref 98–107)
CHLORIDE SERPL-SCNC: 109 MMOL/L (ref 98–107)
CHLORIDE SERPL-SCNC: 98 MMOL/L (ref 98–107)
CO2 SERPL-SCNC: 23 MMOL/L (ref 21–32)
CO2 SERPL-SCNC: 24 MMOL/L (ref 21–32)
CO2 SERPL-SCNC: 26 MMOL/L (ref 21–32)
CO2 SERPL-SCNC: 27 MMOL/L (ref 21–32)
CO2 SERPL-SCNC: 28 MMOL/L (ref 21–32)
COLOR UR: ABNORMAL
COLOR UR: YELLOW
COVID-19 RAPID TEST, COVR: NOT DETECTED
COVID-19 RAPID TEST, COVR: NOT DETECTED
CREAT SERPL-MCNC: 1.13 MG/DL (ref 0.8–1.5)
CREAT SERPL-MCNC: 1.17 MG/DL (ref 0.8–1.5)
CREAT SERPL-MCNC: 1.32 MG/DL (ref 0.8–1.5)
CREAT SERPL-MCNC: 1.35 MG/DL (ref 0.8–1.5)
CREAT SERPL-MCNC: 1.35 MG/DL (ref 0.8–1.5)
CREAT SERPL-MCNC: 1.49 MG/DL (ref 0.8–1.5)
CREAT SERPL-MCNC: 1.77 MG/DL (ref 0.8–1.5)
CREAT SERPL-MCNC: 2 MG/DL (ref 0.8–1.5)
CROSSMATCH RESULT,%XM: NORMAL
CROSSMATCH RESULT,%XM: NORMAL
CRP SERPL-MCNC: 8 MG/DL (ref 0–0.9)
DIAGNOSIS, 93000: NORMAL
DIFFERENTIAL METHOD BLD: ABNORMAL
EOSINOPHIL # BLD: 0.1 K/UL (ref 0–0.8)
EOSINOPHIL # BLD: 0.2 K/UL (ref 0–0.8)
EOSINOPHIL NFR BLD: 1 % (ref 0.5–7.8)
EOSINOPHIL NFR BLD: 2 % (ref 0.5–7.8)
EOSINOPHIL NFR BLD: 2 % (ref 0.5–7.8)
EPI CELLS #/AREA URNS HPF: 0 /HPF
ERYTHROCYTE [DISTWIDTH] IN BLOOD BY AUTOMATED COUNT: 19.2 % (ref 11.9–14.6)
ERYTHROCYTE [DISTWIDTH] IN BLOOD BY AUTOMATED COUNT: 19.5 % (ref 11.9–14.6)
ERYTHROCYTE [DISTWIDTH] IN BLOOD BY AUTOMATED COUNT: 20 % (ref 11.9–14.6)
ERYTHROCYTE [DISTWIDTH] IN BLOOD BY AUTOMATED COUNT: 20.1 % (ref 11.9–14.6)
ERYTHROCYTE [DISTWIDTH] IN BLOOD BY AUTOMATED COUNT: 20.2 % (ref 11.9–14.6)
ERYTHROCYTE [DISTWIDTH] IN BLOOD BY AUTOMATED COUNT: 20.2 % (ref 11.9–14.6)
ERYTHROCYTE [DISTWIDTH] IN BLOOD BY AUTOMATED COUNT: 20.3 % (ref 11.9–14.6)
ERYTHROCYTE [DISTWIDTH] IN BLOOD BY AUTOMATED COUNT: 20.4 % (ref 11.9–14.6)
ERYTHROCYTE [DISTWIDTH] IN BLOOD BY AUTOMATED COUNT: 22.5 % (ref 11.9–14.6)
ERYTHROCYTE [SEDIMENTATION RATE] IN BLOOD: 95 MM/HR (ref 0–20)
GLOBULIN SER CALC-MCNC: 3.9 G/DL (ref 2.3–3.5)
GLOBULIN SER CALC-MCNC: 4.1 G/DL (ref 2.3–3.5)
GLOBULIN SER CALC-MCNC: 4.4 G/DL (ref 2.3–3.5)
GLUCOSE SERPL-MCNC: 100 MG/DL (ref 65–100)
GLUCOSE SERPL-MCNC: 104 MG/DL (ref 65–100)
GLUCOSE SERPL-MCNC: 113 MG/DL (ref 65–100)
GLUCOSE SERPL-MCNC: 122 MG/DL (ref 65–100)
GLUCOSE SERPL-MCNC: 127 MG/DL (ref 65–100)
GLUCOSE SERPL-MCNC: 162 MG/DL (ref 65–100)
GLUCOSE SERPL-MCNC: 84 MG/DL (ref 65–100)
GLUCOSE SERPL-MCNC: 87 MG/DL (ref 65–100)
GLUCOSE UR STRIP.AUTO-MCNC: NEGATIVE MG/DL
GLUCOSE UR STRIP.AUTO-MCNC: NEGATIVE MG/DL
HCT VFR BLD AUTO: 25.4 % (ref 41.1–50.3)
HCT VFR BLD AUTO: 26.7 % (ref 41.1–50.3)
HCT VFR BLD AUTO: 26.7 % (ref 41.1–50.3)
HCT VFR BLD AUTO: 26.8 % (ref 41.1–50.3)
HCT VFR BLD AUTO: 27.7 % (ref 41.1–50.3)
HCT VFR BLD AUTO: 28.7 % (ref 41.1–50.3)
HCT VFR BLD AUTO: 29.3 % (ref 41.1–50.3)
HCT VFR BLD AUTO: 29.8 % (ref 41.1–50.3)
HCT VFR BLD AUTO: 31.9 % (ref 41.1–50.3)
HGB BLD-MCNC: 8.2 G/DL (ref 13.6–17.2)
HGB BLD-MCNC: 8.4 G/DL (ref 13.6–17.2)
HGB BLD-MCNC: 8.7 G/DL (ref 13.6–17.2)
HGB BLD-MCNC: 8.9 G/DL (ref 13.6–17.2)
HGB BLD-MCNC: 9.2 G/DL (ref 13.6–17.2)
HGB BLD-MCNC: 9.4 G/DL (ref 13.6–17.2)
HGB BLD-MCNC: 9.8 G/DL (ref 13.6–17.2)
HGB UR QL STRIP: ABNORMAL
HGB UR QL STRIP: NEGATIVE
HISTORY CHECKED?,CKHIST: NORMAL
IMM GRANULOCYTES # BLD AUTO: 0.1 K/UL (ref 0–0.5)
IMM GRANULOCYTES # BLD AUTO: 0.3 K/UL (ref 0–0.5)
IMM GRANULOCYTES # BLD AUTO: 0.4 K/UL (ref 0–0.5)
IMM GRANULOCYTES # BLD AUTO: 0.4 K/UL (ref 0–0.5)
IMM GRANULOCYTES # BLD AUTO: 0.8 K/UL (ref 0–0.5)
IMM GRANULOCYTES # BLD AUTO: 0.9 K/UL (ref 0–0.5)
IMM GRANULOCYTES NFR BLD AUTO: 1 % (ref 0–5)
IMM GRANULOCYTES NFR BLD AUTO: 2 % (ref 0–5)
IMM GRANULOCYTES NFR BLD AUTO: 3 % (ref 0–5)
IMM GRANULOCYTES NFR BLD AUTO: 4 % (ref 0–5)
KETONES UR QL STRIP.AUTO: ABNORMAL MG/DL
KETONES UR QL STRIP.AUTO: NEGATIVE MG/DL
LACTATE SERPL-SCNC: 0.4 MMOL/L (ref 0.4–2)
LACTATE SERPL-SCNC: 0.9 MMOL/L (ref 0.4–2)
LEUKOCYTE ESTERASE UR QL STRIP.AUTO: ABNORMAL
LEUKOCYTE ESTERASE UR QL STRIP.AUTO: NEGATIVE
LYMPHOCYTES # BLD: 0.6 K/UL (ref 0.5–4.6)
LYMPHOCYTES # BLD: 0.7 K/UL (ref 0.5–4.6)
LYMPHOCYTES # BLD: 0.7 K/UL (ref 0.5–4.6)
LYMPHOCYTES # BLD: 0.8 K/UL (ref 0.5–4.6)
LYMPHOCYTES # BLD: 0.9 K/UL (ref 0.5–4.6)
LYMPHOCYTES # BLD: 1 K/UL (ref 0.5–4.6)
LYMPHOCYTES NFR BLD: 13 % (ref 13–44)
LYMPHOCYTES NFR BLD: 4 % (ref 13–44)
LYMPHOCYTES NFR BLD: 4 % (ref 13–44)
LYMPHOCYTES NFR BLD: 5 % (ref 13–44)
LYMPHOCYTES NFR BLD: 6 % (ref 13–44)
LYMPHOCYTES NFR BLD: 6 % (ref 13–44)
LYMPHOCYTES NFR BLD: 7 % (ref 13–44)
LYMPHOCYTES NFR BLD: 8 % (ref 13–44)
MCH RBC QN AUTO: 27.9 PG (ref 26.1–32.9)
MCH RBC QN AUTO: 28.2 PG (ref 26.1–32.9)
MCH RBC QN AUTO: 28.2 PG (ref 26.1–32.9)
MCH RBC QN AUTO: 28.7 PG (ref 26.1–32.9)
MCH RBC QN AUTO: 28.8 PG (ref 26.1–32.9)
MCH RBC QN AUTO: 28.9 PG (ref 26.1–32.9)
MCH RBC QN AUTO: 29.1 PG (ref 26.1–32.9)
MCH RBC QN AUTO: 29.1 PG (ref 26.1–32.9)
MCH RBC QN AUTO: 29.4 PG (ref 26.1–32.9)
MCHC RBC AUTO-ENTMCNC: 30.4 G/DL (ref 31.4–35)
MCHC RBC AUTO-ENTMCNC: 30.7 G/DL (ref 31.4–35)
MCHC RBC AUTO-ENTMCNC: 31.3 G/DL (ref 31.4–35)
MCHC RBC AUTO-ENTMCNC: 31.4 G/DL (ref 31.4–35)
MCHC RBC AUTO-ENTMCNC: 31.5 G/DL (ref 31.4–35)
MCHC RBC AUTO-ENTMCNC: 32.1 G/DL (ref 31.4–35)
MCHC RBC AUTO-ENTMCNC: 32.3 G/DL (ref 31.4–35)
MCV RBC AUTO: 89.6 FL (ref 79.6–97.8)
MCV RBC AUTO: 89.7 FL (ref 79.6–97.8)
MCV RBC AUTO: 91 FL (ref 79.6–97.8)
MCV RBC AUTO: 91.1 FL (ref 79.6–97.8)
MCV RBC AUTO: 91.8 FL (ref 79.6–97.8)
MCV RBC AUTO: 91.8 FL (ref 79.6–97.8)
MCV RBC AUTO: 91.9 FL (ref 79.6–97.8)
MCV RBC AUTO: 92.4 FL (ref 79.6–97.8)
MCV RBC AUTO: 92.7 FL (ref 79.6–97.8)
MM INDURATION POC: 0 0MM (ref 0–5)
MM INDURATION POC: 0 MM (ref 0–5)
MM INDURATION POC: 0 MM (ref 0–5)
MONOCYTES # BLD: 0.7 K/UL (ref 0.1–1.3)
MONOCYTES # BLD: 1.3 K/UL (ref 0.1–1.3)
MONOCYTES # BLD: 1.4 K/UL (ref 0.1–1.3)
MONOCYTES # BLD: 1.4 K/UL (ref 0.1–1.3)
MONOCYTES # BLD: 1.5 K/UL (ref 0.1–1.3)
MONOCYTES # BLD: 1.5 K/UL (ref 0.1–1.3)
MONOCYTES # BLD: 1.6 K/UL (ref 0.1–1.3)
MONOCYTES # BLD: 1.8 K/UL (ref 0.1–1.3)
MONOCYTES NFR BLD: 10 % (ref 4–12)
MONOCYTES NFR BLD: 12 % (ref 4–12)
MONOCYTES NFR BLD: 12 % (ref 4–12)
MONOCYTES NFR BLD: 13 % (ref 4–12)
MONOCYTES NFR BLD: 13 % (ref 4–12)
MONOCYTES NFR BLD: 7 % (ref 4–12)
MONOCYTES NFR BLD: 8 % (ref 4–12)
MONOCYTES NFR BLD: 9 % (ref 4–12)
NEUTS SEG # BLD: 11.4 K/UL (ref 1.7–8.2)
NEUTS SEG # BLD: 11.8 K/UL (ref 1.7–8.2)
NEUTS SEG # BLD: 19.3 K/UL (ref 1.7–8.2)
NEUTS SEG # BLD: 19.8 K/UL (ref 1.7–8.2)
NEUTS SEG # BLD: 3.9 K/UL (ref 1.7–8.2)
NEUTS SEG # BLD: 9 K/UL (ref 1.7–8.2)
NEUTS SEG # BLD: 9.3 K/UL (ref 1.7–8.2)
NEUTS SEG # BLD: 9.5 K/UL (ref 1.7–8.2)
NEUTS SEG NFR BLD: 70 % (ref 43–78)
NEUTS SEG NFR BLD: 76 % (ref 43–78)
NEUTS SEG NFR BLD: 79 % (ref 43–78)
NEUTS SEG NFR BLD: 79 % (ref 43–78)
NEUTS SEG NFR BLD: 80 % (ref 43–78)
NEUTS SEG NFR BLD: 82 % (ref 43–78)
NEUTS SEG NFR BLD: 83 % (ref 43–78)
NEUTS SEG NFR BLD: 85 % (ref 43–78)
NITRITE UR QL STRIP.AUTO: NEGATIVE
NITRITE UR QL STRIP.AUTO: NEGATIVE
NRBC # BLD: 0 K/UL (ref 0–0.2)
NRBC # BLD: 0.02 K/UL (ref 0–0.2)
PH UR STRIP: 5.5 [PH] (ref 5–9)
PH UR STRIP: 5.5 [PH] (ref 5–9)
PLATELET # BLD AUTO: 112 K/UL (ref 150–450)
PLATELET # BLD AUTO: 116 K/UL (ref 150–450)
PLATELET # BLD AUTO: 123 K/UL (ref 150–450)
PLATELET # BLD AUTO: 132 K/UL (ref 150–450)
PLATELET # BLD AUTO: 134 K/UL (ref 150–450)
PLATELET # BLD AUTO: 136 K/UL (ref 150–450)
PLATELET # BLD AUTO: 138 K/UL (ref 150–450)
PLATELET # BLD AUTO: 173 K/UL (ref 150–450)
PLATELET # BLD AUTO: 177 K/UL (ref 150–450)
PMV BLD AUTO: 8.2 FL (ref 9.4–12.3)
PMV BLD AUTO: 8.6 FL (ref 9.4–12.3)
PMV BLD AUTO: 8.9 FL (ref 9.4–12.3)
PMV BLD AUTO: 9 FL (ref 9.4–12.3)
PMV BLD AUTO: 9.2 FL (ref 9.4–12.3)
PMV BLD AUTO: 9.3 FL (ref 9.4–12.3)
PMV BLD AUTO: 9.4 FL (ref 9.4–12.3)
PMV BLD AUTO: 9.4 FL (ref 9.4–12.3)
PMV BLD AUTO: 9.6 FL (ref 9.4–12.3)
POTASSIUM SERPL-SCNC: 3.5 MMOL/L (ref 3.5–5.1)
POTASSIUM SERPL-SCNC: 3.7 MMOL/L (ref 3.5–5.1)
POTASSIUM SERPL-SCNC: 3.7 MMOL/L (ref 3.5–5.1)
POTASSIUM SERPL-SCNC: 3.8 MMOL/L (ref 3.5–5.1)
POTASSIUM SERPL-SCNC: 3.8 MMOL/L (ref 3.5–5.1)
POTASSIUM SERPL-SCNC: 4 MMOL/L (ref 3.5–5.1)
POTASSIUM SERPL-SCNC: 4.2 MMOL/L (ref 3.5–5.1)
POTASSIUM SERPL-SCNC: 4.2 MMOL/L (ref 3.5–5.1)
PPD POC: NEGATIVE NEGATIVE
PROCALCITONIN SERPL-MCNC: 0.06 NG/ML
PROT SERPL-MCNC: 7 G/DL (ref 6.3–8.2)
PROT SERPL-MCNC: 7.1 G/DL (ref 6.3–8.2)
PROT SERPL-MCNC: 7.8 G/DL (ref 6.3–8.2)
PROT UR STRIP-MCNC: 100 MG/DL
PROT UR STRIP-MCNC: NEGATIVE MG/DL
Q-T INTERVAL, ECG07: 420 MS
QRS DURATION, ECG06: 94 MS
QTC CALCULATION (BEZET), ECG08: 446 MS
RBC # BLD AUTO: 2.79 M/UL (ref 4.23–5.6)
RBC # BLD AUTO: 2.89 M/UL (ref 4.23–5.6)
RBC # BLD AUTO: 2.89 M/UL (ref 4.23–5.6)
RBC # BLD AUTO: 2.91 M/UL (ref 4.23–5.6)
RBC # BLD AUTO: 3.09 M/UL (ref 4.23–5.6)
RBC # BLD AUTO: 3.19 M/UL (ref 4.23–5.6)
RBC # BLD AUTO: 3.2 M/UL (ref 4.23–5.6)
RBC # BLD AUTO: 3.27 M/UL (ref 4.23–5.6)
RBC # BLD AUTO: 3.47 M/UL (ref 4.23–5.6)
RBC #/AREA URNS HPF: >100 /HPF
SARS COV-2, XPGCVT: NEGATIVE
SERVICE CMNT-IMP: NORMAL
SODIUM SERPL-SCNC: 132 MMOL/L (ref 136–145)
SODIUM SERPL-SCNC: 132 MMOL/L (ref 136–145)
SODIUM SERPL-SCNC: 134 MMOL/L (ref 136–145)
SODIUM SERPL-SCNC: 138 MMOL/L (ref 136–145)
SODIUM SERPL-SCNC: 139 MMOL/L (ref 136–145)
SODIUM SERPL-SCNC: 140 MMOL/L (ref 136–145)
SODIUM SERPL-SCNC: 141 MMOL/L (ref 136–145)
SODIUM SERPL-SCNC: 141 MMOL/L (ref 136–145)
SOURCE, COVRS: NORMAL
SOURCE, COVRS: NORMAL
SP GR UR REFRACTOMETRY: 1.01 (ref 1–1.02)
SP GR UR REFRACTOMETRY: 1.02 (ref 1–1.02)
SPECIMEN EXP DATE BLD: NORMAL
STATUS OF UNIT,%ST: NORMAL
STATUS OF UNIT,%ST: NORMAL
UNIT DIVISION, %UDIV: 0
UNIT DIVISION, %UDIV: 0
URATE SERPL-MCNC: 16.4 MG/DL (ref 2.6–6)
UROBILINOGEN UR QL STRIP.AUTO: 0.2 EU/DL (ref 0.2–1)
UROBILINOGEN UR QL STRIP.AUTO: 1 EU/DL (ref 0.2–1)
VENTRICULAR RATE, ECG03: 68 BPM
WBC # BLD AUTO: 11.6 K/UL (ref 4.3–11.1)
WBC # BLD AUTO: 11.9 K/UL (ref 4.3–11.1)
WBC # BLD AUTO: 12 K/UL (ref 4.3–11.1)
WBC # BLD AUTO: 13.5 K/UL (ref 4.3–11.1)
WBC # BLD AUTO: 14.4 K/UL (ref 4.3–11.1)
WBC # BLD AUTO: 14.5 K/UL (ref 4.3–11.1)
WBC # BLD AUTO: 22.8 K/UL (ref 4.3–11.1)
WBC # BLD AUTO: 23.8 K/UL (ref 4.3–11.1)
WBC # BLD AUTO: 5.6 K/UL (ref 4.3–11.1)
WBC URNS QL MICRO: >100 /HPF

## 2020-01-01 PROCEDURE — 2709999900 HC NON-CHARGEABLE SUPPLY

## 2020-01-01 PROCEDURE — 99284 EMERGENCY DEPT VISIT MOD MDM: CPT

## 2020-01-01 PROCEDURE — 74011250636 HC RX REV CODE- 250/636: Performed by: INTERNAL MEDICINE

## 2020-01-01 PROCEDURE — 80053 COMPREHEN METABOLIC PANEL: CPT

## 2020-01-01 PROCEDURE — 74011000258 HC RX REV CODE- 258: Performed by: INTERNAL MEDICINE

## 2020-01-01 PROCEDURE — 99218 HC RM OBSERVATION: CPT

## 2020-01-01 PROCEDURE — 74011250637 HC RX REV CODE- 250/637: Performed by: INTERNAL MEDICINE

## 2020-01-01 PROCEDURE — 74011250636 HC RX REV CODE- 250/636: Performed by: FAMILY MEDICINE

## 2020-01-01 PROCEDURE — 96375 TX/PRO/DX INJ NEW DRUG ADDON: CPT

## 2020-01-01 PROCEDURE — 99285 EMERGENCY DEPT VISIT HI MDM: CPT

## 2020-01-01 PROCEDURE — 80048 BASIC METABOLIC PNL TOTAL CA: CPT

## 2020-01-01 PROCEDURE — 96372 THER/PROPH/DIAG INJ SC/IM: CPT

## 2020-01-01 PROCEDURE — 93970 EXTREMITY STUDY: CPT

## 2020-01-01 PROCEDURE — 74011250637 HC RX REV CODE- 250/637: Performed by: FAMILY MEDICINE

## 2020-01-01 PROCEDURE — 85025 COMPLETE CBC W/AUTO DIFF WBC: CPT

## 2020-01-01 PROCEDURE — 84550 ASSAY OF BLOOD/URIC ACID: CPT

## 2020-01-01 PROCEDURE — C1751 CATH, INF, PER/CENT/MIDLINE: HCPCS

## 2020-01-01 PROCEDURE — 74011000258 HC RX REV CODE- 258: Performed by: FAMILY MEDICINE

## 2020-01-01 PROCEDURE — 96374 THER/PROPH/DIAG INJ IV PUSH: CPT

## 2020-01-01 PROCEDURE — 77030040393 HC DRSG OPTIFOAM GENT MDII -B

## 2020-01-01 PROCEDURE — 74011250636 HC RX REV CODE- 250/636: Performed by: EMERGENCY MEDICINE

## 2020-01-01 PROCEDURE — 84145 PROCALCITONIN (PCT): CPT

## 2020-01-01 PROCEDURE — 71045 X-RAY EXAM CHEST 1 VIEW: CPT

## 2020-01-01 PROCEDURE — 87635 SARS-COV-2 COVID-19 AMP PRB: CPT

## 2020-01-01 PROCEDURE — 74011000258 HC RX REV CODE- 258: Performed by: EMERGENCY MEDICINE

## 2020-01-01 PROCEDURE — 86900 BLOOD TYPING SEROLOGIC ABO: CPT

## 2020-01-01 PROCEDURE — 65390000012 HC CONDITION CODE 44 OBSERVATION

## 2020-01-01 PROCEDURE — 96376 TX/PRO/DX INJ SAME DRUG ADON: CPT

## 2020-01-01 PROCEDURE — 97530 THERAPEUTIC ACTIVITIES: CPT

## 2020-01-01 PROCEDURE — 87086 URINE CULTURE/COLONY COUNT: CPT

## 2020-01-01 PROCEDURE — 73030 X-RAY EXAM OF SHOULDER: CPT

## 2020-01-01 PROCEDURE — 36591 DRAW BLOOD OFF VENOUS DEVICE: CPT

## 2020-01-01 PROCEDURE — 73130 X-RAY EXAM OF HAND: CPT

## 2020-01-01 PROCEDURE — 77030040830 HC CATH URETH FOL MDII -A

## 2020-01-01 PROCEDURE — 74011000302 HC RX REV CODE- 302: Performed by: FAMILY MEDICINE

## 2020-01-01 PROCEDURE — 36592 COLLECT BLOOD FROM PICC: CPT

## 2020-01-01 PROCEDURE — 86923 COMPATIBILITY TEST ELECTRIC: CPT

## 2020-01-01 PROCEDURE — 77030018667 ADMN ST IV BLD FENW -A

## 2020-01-01 PROCEDURE — 97165 OT EVAL LOW COMPLEX 30 MIN: CPT

## 2020-01-01 PROCEDURE — 86580 TB INTRADERMAL TEST: CPT | Performed by: FAMILY MEDICINE

## 2020-01-01 PROCEDURE — 96365 THER/PROPH/DIAG IV INF INIT: CPT

## 2020-01-01 PROCEDURE — 74176 CT ABD & PELVIS W/O CONTRAST: CPT

## 2020-01-01 PROCEDURE — 99282 EMERGENCY DEPT VISIT SF MDM: CPT

## 2020-01-01 PROCEDURE — 74011250637 HC RX REV CODE- 250/637: Performed by: EMERGENCY MEDICINE

## 2020-01-01 PROCEDURE — 87040 BLOOD CULTURE FOR BACTERIA: CPT

## 2020-01-01 PROCEDURE — 36430 TRANSFUSION BLD/BLD COMPNT: CPT

## 2020-01-01 PROCEDURE — 36415 COLL VENOUS BLD VENIPUNCTURE: CPT

## 2020-01-01 PROCEDURE — 97535 SELF CARE MNGMENT TRAINING: CPT

## 2020-01-01 PROCEDURE — 81001 URINALYSIS AUTO W/SCOPE: CPT

## 2020-01-01 PROCEDURE — 86140 C-REACTIVE PROTEIN: CPT

## 2020-01-01 PROCEDURE — 74011636320 HC RX REV CODE- 636/320: Performed by: EMERGENCY MEDICINE

## 2020-01-01 PROCEDURE — 73110 X-RAY EXAM OF WRIST: CPT

## 2020-01-01 PROCEDURE — 74011636637 HC RX REV CODE- 636/637: Performed by: EMERGENCY MEDICINE

## 2020-01-01 PROCEDURE — P9016 RBC LEUKOCYTES REDUCED: HCPCS

## 2020-01-01 PROCEDURE — 83605 ASSAY OF LACTIC ACID: CPT

## 2020-01-01 PROCEDURE — 81003 URINALYSIS AUTO W/O SCOPE: CPT

## 2020-01-01 PROCEDURE — 85652 RBC SED RATE AUTOMATED: CPT

## 2020-01-01 PROCEDURE — 65270000029 HC RM PRIVATE

## 2020-01-01 PROCEDURE — 85027 COMPLETE CBC AUTOMATED: CPT

## 2020-01-01 PROCEDURE — 99283 EMERGENCY DEPT VISIT LOW MDM: CPT

## 2020-01-01 PROCEDURE — 93005 ELECTROCARDIOGRAM TRACING: CPT | Performed by: EMERGENCY MEDICINE

## 2020-01-01 PROCEDURE — 70450 CT HEAD/BRAIN W/O DYE: CPT

## 2020-01-01 PROCEDURE — 97163 PT EVAL HIGH COMPLEX 45 MIN: CPT

## 2020-01-01 RX ORDER — OXYCODONE HYDROCHLORIDE 5 MG/1
5 TABLET ORAL
Status: COMPLETED | OUTPATIENT
Start: 2020-01-01 | End: 2020-01-01

## 2020-01-01 RX ORDER — ACETAMINOPHEN 500 MG
1000 TABLET ORAL
Status: COMPLETED | OUTPATIENT
Start: 2020-01-01 | End: 2020-01-01

## 2020-01-01 RX ORDER — METRONIDAZOLE 500 MG/1
500 TABLET ORAL EVERY 12 HOURS
Status: DISCONTINUED | OUTPATIENT
Start: 2020-01-01 | End: 2020-01-01 | Stop reason: HOSPADM

## 2020-01-01 RX ORDER — MUPIROCIN 20 MG/G
OINTMENT TOPICAL
COMMUNITY
Start: 2020-03-03

## 2020-01-01 RX ORDER — PREDNISONE 20 MG/1
20 TABLET ORAL 2 TIMES DAILY
Qty: 10 TAB | Refills: 0 | Status: SHIPPED | OUTPATIENT
Start: 2020-01-01 | End: 2020-01-01

## 2020-01-01 RX ORDER — HYDROMORPHONE HYDROCHLORIDE 1 MG/ML
0.5 INJECTION, SOLUTION INTRAMUSCULAR; INTRAVENOUS; SUBCUTANEOUS
Status: COMPLETED | OUTPATIENT
Start: 2020-01-01 | End: 2020-01-01

## 2020-01-01 RX ORDER — HYDROMORPHONE HYDROCHLORIDE 1 MG/ML
1 INJECTION, SOLUTION INTRAMUSCULAR; INTRAVENOUS; SUBCUTANEOUS
Status: COMPLETED | OUTPATIENT
Start: 2020-01-01 | End: 2020-01-01

## 2020-01-01 RX ORDER — PREDNISONE 20 MG/1
40 TABLET ORAL DAILY
Qty: 8 TAB | Refills: 0 | Status: SHIPPED | OUTPATIENT
Start: 2020-01-01 | End: 2020-01-01

## 2020-01-01 RX ORDER — ONDANSETRON 2 MG/ML
4 INJECTION INTRAMUSCULAR; INTRAVENOUS
Status: COMPLETED | OUTPATIENT
Start: 2020-01-01 | End: 2020-01-01

## 2020-01-01 RX ORDER — MELOXICAM 7.5 MG/1
7.5 TABLET ORAL DAILY
Qty: 10 TAB | Refills: 0 | Status: SHIPPED | OUTPATIENT
Start: 2020-01-01

## 2020-01-01 RX ORDER — SERTRALINE HYDROCHLORIDE 50 MG/1
50 TABLET, FILM COATED ORAL DAILY
Status: DISCONTINUED | OUTPATIENT
Start: 2020-01-01 | End: 2020-01-01 | Stop reason: HOSPADM

## 2020-01-01 RX ORDER — LEVOTHYROXINE SODIUM 112 UG/1
112 TABLET ORAL
Status: DISCONTINUED | OUTPATIENT
Start: 2020-01-01 | End: 2020-01-01 | Stop reason: HOSPADM

## 2020-01-01 RX ORDER — COLCHICINE 0.6 MG/1
0.6 CAPSULE ORAL 2 TIMES DAILY
Qty: 10 CAP | Refills: 0 | Status: SHIPPED | OUTPATIENT
Start: 2020-01-01 | End: 2020-01-01

## 2020-01-01 RX ORDER — PROMETHAZINE HYDROCHLORIDE 25 MG/1
12.5 TABLET ORAL
Status: DISCONTINUED | OUTPATIENT
Start: 2020-01-01 | End: 2020-01-01 | Stop reason: HOSPADM

## 2020-01-01 RX ORDER — ENOXAPARIN SODIUM 100 MG/ML
40 INJECTION SUBCUTANEOUS EVERY 24 HOURS
Status: DISCONTINUED | OUTPATIENT
Start: 2020-01-01 | End: 2020-01-01 | Stop reason: HOSPADM

## 2020-01-01 RX ORDER — ONDANSETRON 4 MG/1
4 TABLET, ORALLY DISINTEGRATING ORAL
Qty: 20 TAB | Refills: 0 | Status: SHIPPED | OUTPATIENT
Start: 2020-01-01 | End: 2020-01-01 | Stop reason: ALTCHOICE

## 2020-01-01 RX ORDER — AMOXICILLIN AND CLAVULANATE POTASSIUM 875; 125 MG/1; MG/1
1 TABLET, FILM COATED ORAL 2 TIMES DAILY
Qty: 10 TAB | Refills: 0 | Status: SHIPPED | OUTPATIENT
Start: 2020-01-01 | End: 2020-01-01 | Stop reason: ALTCHOICE

## 2020-01-01 RX ORDER — OXYCODONE AND ACETAMINOPHEN 5; 325 MG/1; MG/1
1 TABLET ORAL
Status: COMPLETED | OUTPATIENT
Start: 2020-01-01 | End: 2020-01-01

## 2020-01-01 RX ORDER — CARVEDILOL 6.25 MG/1
6.25 TABLET ORAL 2 TIMES DAILY WITH MEALS
Status: DISCONTINUED | OUTPATIENT
Start: 2020-01-01 | End: 2020-01-01 | Stop reason: HOSPADM

## 2020-01-01 RX ORDER — SODIUM CHLORIDE 0.9 % (FLUSH) 0.9 %
5-40 SYRINGE (ML) INJECTION EVERY 8 HOURS
Status: DISCONTINUED | OUTPATIENT
Start: 2020-01-01 | End: 2020-01-01 | Stop reason: HOSPADM

## 2020-01-01 RX ORDER — OXYCODONE HYDROCHLORIDE 5 MG/1
5 TABLET ORAL
Qty: 12 TAB | Refills: 0 | Status: SHIPPED | OUTPATIENT
Start: 2020-01-01 | End: 2020-01-01

## 2020-01-01 RX ORDER — DICLOFENAC SODIUM 10 MG/G
4 GEL TOPICAL 4 TIMES DAILY
Qty: 100 G | Refills: 0 | Status: SHIPPED | OUTPATIENT
Start: 2020-01-01 | End: 2020-01-01

## 2020-01-01 RX ORDER — PROMETHAZINE HYDROCHLORIDE 25 MG/ML
12.5 INJECTION, SOLUTION INTRAMUSCULAR; INTRAVENOUS
Status: COMPLETED | OUTPATIENT
Start: 2020-01-01 | End: 2020-01-01

## 2020-01-01 RX ORDER — METRONIDAZOLE 500 MG/1
500 TABLET ORAL EVERY 8 HOURS
Status: DISCONTINUED | OUTPATIENT
Start: 2020-01-01 | End: 2020-01-01

## 2020-01-01 RX ORDER — DIPHENHYDRAMINE HCL 25 MG
25 CAPSULE ORAL ONCE
Status: COMPLETED | OUTPATIENT
Start: 2020-01-01 | End: 2020-01-01

## 2020-01-01 RX ORDER — FUROSEMIDE 10 MG/ML
20 INJECTION INTRAMUSCULAR; INTRAVENOUS ONCE
Status: DISCONTINUED | OUTPATIENT
Start: 2020-01-01 | End: 2020-01-01

## 2020-01-01 RX ORDER — ONDANSETRON 2 MG/ML
4 INJECTION INTRAMUSCULAR; INTRAVENOUS
Status: DISCONTINUED | OUTPATIENT
Start: 2020-01-01 | End: 2020-01-01 | Stop reason: HOSPADM

## 2020-01-01 RX ORDER — IBUPROFEN 800 MG/1
800 TABLET ORAL
Status: COMPLETED | OUTPATIENT
Start: 2020-01-01 | End: 2020-01-01

## 2020-01-01 RX ORDER — MIDODRINE HYDROCHLORIDE 5 MG/1
10 TABLET ORAL
Status: DISCONTINUED | OUTPATIENT
Start: 2020-01-01 | End: 2020-01-01 | Stop reason: HOSPADM

## 2020-01-01 RX ORDER — POLYETHYLENE GLYCOL 3350 17 G/17G
17 POWDER, FOR SOLUTION ORAL DAILY PRN
Status: DISCONTINUED | OUTPATIENT
Start: 2020-01-01 | End: 2020-01-01 | Stop reason: HOSPADM

## 2020-01-01 RX ORDER — OXYCODONE AND ACETAMINOPHEN 5; 325 MG/1; MG/1
1 TABLET ORAL
Qty: 12 TAB | Refills: 0 | Status: SHIPPED | OUTPATIENT
Start: 2020-01-01 | End: 2020-01-01

## 2020-01-01 RX ORDER — METRONIDAZOLE 500 MG/1
500 TABLET ORAL EVERY 12 HOURS
Qty: 44 TAB | Refills: 0 | Status: SHIPPED | OUTPATIENT
Start: 2020-01-01 | End: 2020-01-01

## 2020-01-01 RX ORDER — ACETAMINOPHEN 325 MG/1
650 TABLET ORAL
Status: DISCONTINUED | OUTPATIENT
Start: 2020-01-01 | End: 2020-01-01 | Stop reason: HOSPADM

## 2020-01-01 RX ORDER — ACETAMINOPHEN 650 MG/1
650 SUPPOSITORY RECTAL
Status: DISCONTINUED | OUTPATIENT
Start: 2020-01-01 | End: 2020-01-01 | Stop reason: HOSPADM

## 2020-01-01 RX ORDER — COLCHICINE 0.6 MG/1
0.6 TABLET ORAL DAILY
Status: DISCONTINUED | OUTPATIENT
Start: 2020-01-01 | End: 2020-01-01 | Stop reason: HOSPADM

## 2020-01-01 RX ORDER — SODIUM CHLORIDE 0.9 % (FLUSH) 0.9 %
5-40 SYRINGE (ML) INJECTION AS NEEDED
Status: DISCONTINUED | OUTPATIENT
Start: 2020-01-01 | End: 2020-01-01 | Stop reason: HOSPADM

## 2020-01-01 RX ORDER — ACETAMINOPHEN 325 MG/1
650 TABLET ORAL ONCE
Status: COMPLETED | OUTPATIENT
Start: 2020-01-01 | End: 2020-01-01

## 2020-01-01 RX ORDER — SERTRALINE HYDROCHLORIDE 50 MG/1
50 TABLET, FILM COATED ORAL DAILY
COMMUNITY

## 2020-01-01 RX ORDER — FUROSEMIDE 20 MG/1
20 TABLET ORAL 2 TIMES DAILY
Status: DISCONTINUED | OUTPATIENT
Start: 2020-01-01 | End: 2020-01-01 | Stop reason: HOSPADM

## 2020-01-01 RX ORDER — SODIUM CHLORIDE 9 MG/ML
250 INJECTION, SOLUTION INTRAVENOUS AS NEEDED
Status: DISCONTINUED | OUTPATIENT
Start: 2020-01-01 | End: 2020-01-01 | Stop reason: HOSPADM

## 2020-01-01 RX ORDER — SODIUM CHLORIDE 9 MG/ML
500 INJECTION, SOLUTION INTRAVENOUS ONCE
Status: COMPLETED | OUTPATIENT
Start: 2020-01-01 | End: 2020-01-01

## 2020-01-01 RX ORDER — FUROSEMIDE 40 MG/1
40 TABLET ORAL 2 TIMES DAILY
Status: DISCONTINUED | OUTPATIENT
Start: 2020-01-01 | End: 2020-01-01

## 2020-01-01 RX ORDER — FUROSEMIDE 20 MG/1
20 TABLET ORAL ONCE
Status: COMPLETED | OUTPATIENT
Start: 2020-01-01 | End: 2020-01-01

## 2020-01-01 RX ORDER — DIPHENOXYLATE HYDROCHLORIDE AND ATROPINE SULFATE 2.5; .025 MG/1; MG/1
1 TABLET ORAL
Qty: 20 TAB | Refills: 0 | Status: SHIPPED | OUTPATIENT
Start: 2020-01-01 | End: 2020-01-01 | Stop reason: ALTCHOICE

## 2020-01-01 RX ORDER — HYDROCODONE BITARTRATE AND ACETAMINOPHEN 5; 325 MG/1; MG/1
1 TABLET ORAL
Qty: 7 TAB | Refills: 0 | Status: SHIPPED | OUTPATIENT
Start: 2020-01-01 | End: 2020-01-01

## 2020-01-01 RX ADMIN — Medication 10 ML: at 14:12

## 2020-01-01 RX ADMIN — CEFTRIAXONE SODIUM 2 G: 2 INJECTION, POWDER, FOR SOLUTION INTRAMUSCULAR; INTRAVENOUS at 11:53

## 2020-01-01 RX ADMIN — OXYCODONE HYDROCHLORIDE AND ACETAMINOPHEN 1 TABLET: 5; 325 TABLET ORAL at 11:24

## 2020-01-01 RX ADMIN — ACETAMINOPHEN 650 MG: 325 TABLET, FILM COATED ORAL at 16:29

## 2020-01-01 RX ADMIN — MIDODRINE HYDROCHLORIDE 10 MG: 5 TABLET ORAL at 11:33

## 2020-01-01 RX ADMIN — ACETAMINOPHEN 1000 MG: 500 TABLET, FILM COATED ORAL at 19:52

## 2020-01-01 RX ADMIN — METRONIDAZOLE 500 MG: 500 TABLET, FILM COATED ORAL at 21:35

## 2020-01-01 RX ADMIN — HYDROMORPHONE HYDROCHLORIDE 0.5 MG: 1 INJECTION, SOLUTION INTRAMUSCULAR; INTRAVENOUS; SUBCUTANEOUS at 18:19

## 2020-01-01 RX ADMIN — ACETAMINOPHEN 650 MG: 325 TABLET, FILM COATED ORAL at 11:54

## 2020-01-01 RX ADMIN — Medication 10 ML: at 21:05

## 2020-01-01 RX ADMIN — SERTRALINE HYDROCHLORIDE 50 MG: 50 TABLET ORAL at 08:36

## 2020-01-01 RX ADMIN — Medication 10 ML: at 05:06

## 2020-01-01 RX ADMIN — METRONIDAZOLE 500 MG: 500 TABLET, FILM COATED ORAL at 16:15

## 2020-01-01 RX ADMIN — CEFTRIAXONE SODIUM 2 G: 2 INJECTION, POWDER, FOR SOLUTION INTRAMUSCULAR; INTRAVENOUS at 16:29

## 2020-01-01 RX ADMIN — METRONIDAZOLE 500 MG: 500 TABLET, FILM COATED ORAL at 20:09

## 2020-01-01 RX ADMIN — Medication 10 ML: at 05:02

## 2020-01-01 RX ADMIN — ENOXAPARIN SODIUM 40 MG: 40 INJECTION SUBCUTANEOUS at 21:35

## 2020-01-01 RX ADMIN — Medication 10 ML: at 16:29

## 2020-01-01 RX ADMIN — MEROPENEM 500 MG: 500 INJECTION, POWDER, FOR SOLUTION INTRAVENOUS at 20:15

## 2020-01-01 RX ADMIN — Medication 10 ML: at 20:24

## 2020-01-01 RX ADMIN — ONDANSETRON 4 MG: 2 INJECTION INTRAMUSCULAR; INTRAVENOUS at 16:57

## 2020-01-01 RX ADMIN — METRONIDAZOLE 500 MG: 500 TABLET, FILM COATED ORAL at 11:19

## 2020-01-01 RX ADMIN — CEFTRIAXONE SODIUM 2 G: 2 INJECTION, POWDER, FOR SOLUTION INTRAMUSCULAR; INTRAVENOUS at 16:15

## 2020-01-01 RX ADMIN — SODIUM CHLORIDE 250 ML: 900 INJECTION, SOLUTION INTRAVENOUS at 10:43

## 2020-01-01 RX ADMIN — ACETAMINOPHEN 650 MG: 325 TABLET, FILM COATED ORAL at 08:33

## 2020-01-01 RX ADMIN — FUROSEMIDE 20 MG: 20 TABLET ORAL at 11:29

## 2020-01-01 RX ADMIN — Medication 1 EACH: at 17:32

## 2020-01-01 RX ADMIN — LEVOTHYROXINE SODIUM 112 MCG: 0.11 TABLET ORAL at 08:33

## 2020-01-01 RX ADMIN — METRONIDAZOLE 500 MG: 500 TABLET, FILM COATED ORAL at 03:47

## 2020-01-01 RX ADMIN — METRONIDAZOLE 500 MG: 500 TABLET, FILM COATED ORAL at 03:05

## 2020-01-01 RX ADMIN — COLCHICINE 0.6 MG: 0.6 TABLET, FILM COATED ORAL at 10:54

## 2020-01-01 RX ADMIN — DIPHENHYDRAMINE HYDROCHLORIDE 25 MG: 25 CAPSULE ORAL at 09:01

## 2020-01-01 RX ADMIN — Medication 1 AMPULE: at 20:06

## 2020-01-01 RX ADMIN — LEVOTHYROXINE SODIUM 112 MCG: 0.11 TABLET ORAL at 06:30

## 2020-01-01 RX ADMIN — ENOXAPARIN SODIUM 40 MG: 40 INJECTION SUBCUTANEOUS at 20:12

## 2020-01-01 RX ADMIN — Medication 1 AMPULE: at 07:44

## 2020-01-01 RX ADMIN — MIDODRINE HYDROCHLORIDE 10 MG: 5 TABLET ORAL at 07:44

## 2020-01-01 RX ADMIN — DIATRIZOATE MEGLUMINE AND DIATRIZOATE SODIUM 15 ML: 660; 100 LIQUID ORAL; RECTAL at 18:02

## 2020-01-01 RX ADMIN — FUROSEMIDE 20 MG: 20 TABLET ORAL at 17:31

## 2020-01-01 RX ADMIN — Medication 10 ML: at 16:59

## 2020-01-01 RX ADMIN — MEROPENEM 500 MG: 500 INJECTION, POWDER, FOR SOLUTION INTRAVENOUS at 08:36

## 2020-01-01 RX ADMIN — Medication 10 ML: at 21:36

## 2020-01-01 RX ADMIN — Medication 10 ML: at 05:10

## 2020-01-01 RX ADMIN — Medication 10 ML: at 05:11

## 2020-01-01 RX ADMIN — METRONIDAZOLE 500 MG: 500 TABLET, FILM COATED ORAL at 16:29

## 2020-01-01 RX ADMIN — MIDODRINE HYDROCHLORIDE 10 MG: 5 TABLET ORAL at 16:16

## 2020-01-01 RX ADMIN — ENOXAPARIN SODIUM 40 MG: 40 INJECTION SUBCUTANEOUS at 20:07

## 2020-01-01 RX ADMIN — Medication 1 AMPULE: at 11:53

## 2020-01-01 RX ADMIN — ACETAMINOPHEN 650 MG: 325 TABLET, FILM COATED ORAL at 01:28

## 2020-01-01 RX ADMIN — FUROSEMIDE 20 MG: 20 TABLET ORAL at 11:54

## 2020-01-01 RX ADMIN — Medication 1 AMPULE: at 10:54

## 2020-01-01 RX ADMIN — CEFTRIAXONE SODIUM 2 G: 2 INJECTION, POWDER, FOR SOLUTION INTRAMUSCULAR; INTRAVENOUS at 12:12

## 2020-01-01 RX ADMIN — METRONIDAZOLE 500 MG: 500 TABLET, FILM COATED ORAL at 11:55

## 2020-01-01 RX ADMIN — FUROSEMIDE 20 MG: 20 TABLET ORAL at 07:44

## 2020-01-01 RX ADMIN — METRONIDAZOLE 500 MG: 500 TABLET, FILM COATED ORAL at 20:07

## 2020-01-01 RX ADMIN — IBUPROFEN 800 MG: 800 TABLET, FILM COATED ORAL at 10:21

## 2020-01-01 RX ADMIN — Medication 1 AMPULE: at 21:34

## 2020-01-01 RX ADMIN — SODIUM CHLORIDE 500 ML: 900 INJECTION, SOLUTION INTRAVENOUS at 20:05

## 2020-01-01 RX ADMIN — ENOXAPARIN SODIUM 40 MG: 40 INJECTION SUBCUTANEOUS at 20:15

## 2020-01-01 RX ADMIN — FUROSEMIDE 20 MG: 20 TABLET ORAL at 17:00

## 2020-01-01 RX ADMIN — POLYETHYLENE GLYCOL 3350 17 G: 17 POWDER, FOR SOLUTION ORAL at 17:31

## 2020-01-01 RX ADMIN — LEVOTHYROXINE SODIUM 112 MCG: 0.11 TABLET ORAL at 07:44

## 2020-01-01 RX ADMIN — FUROSEMIDE 20 MG: 20 TABLET ORAL at 17:21

## 2020-01-01 RX ADMIN — LEVOTHYROXINE SODIUM 112 MCG: 0.11 TABLET ORAL at 11:25

## 2020-01-01 RX ADMIN — MIDODRINE HYDROCHLORIDE 10 MG: 5 TABLET ORAL at 11:54

## 2020-01-01 RX ADMIN — SERTRALINE HYDROCHLORIDE 50 MG: 50 TABLET ORAL at 11:54

## 2020-01-01 RX ADMIN — ACETAMINOPHEN 650 MG: 325 TABLET, FILM COATED ORAL at 14:48

## 2020-01-01 RX ADMIN — PROMETHAZINE HYDROCHLORIDE 12.5 MG: 25 INJECTION INTRAMUSCULAR; INTRAVENOUS at 18:39

## 2020-01-01 RX ADMIN — TUBERCULIN PURIFIED PROTEIN DERIVATIVE 5 UNITS: 5 INJECTION, SOLUTION INTRADERMAL at 15:49

## 2020-01-01 RX ADMIN — Medication 40 ML: at 06:00

## 2020-01-01 RX ADMIN — METRONIDAZOLE 500 MG: 500 TABLET, FILM COATED ORAL at 04:00

## 2020-01-01 RX ADMIN — Medication 1 AMPULE: at 20:10

## 2020-01-01 RX ADMIN — SODIUM CHLORIDE 500 ML: 900 INJECTION, SOLUTION INTRAVENOUS at 17:32

## 2020-01-01 RX ADMIN — Medication 10 ML: at 22:00

## 2020-01-01 RX ADMIN — MIDODRINE HYDROCHLORIDE 10 MG: 5 TABLET ORAL at 16:59

## 2020-01-01 RX ADMIN — ACETAMINOPHEN 650 MG: 325 TABLET, FILM COATED ORAL at 09:01

## 2020-01-01 RX ADMIN — ACETAMINOPHEN 650 MG: 325 TABLET, FILM COATED ORAL at 21:39

## 2020-01-01 RX ADMIN — MIDODRINE HYDROCHLORIDE 10 MG: 5 TABLET ORAL at 18:53

## 2020-01-01 RX ADMIN — Medication 1 AMPULE: at 11:25

## 2020-01-01 RX ADMIN — HYDROMORPHONE HYDROCHLORIDE 1 MG: 1 INJECTION, SOLUTION INTRAMUSCULAR; INTRAVENOUS; SUBCUTANEOUS at 18:38

## 2020-01-01 RX ADMIN — ENOXAPARIN SODIUM 40 MG: 40 INJECTION SUBCUTANEOUS at 20:06

## 2020-01-01 RX ADMIN — HYDROMORPHONE HYDROCHLORIDE 0.5 MG: 1 INJECTION, SOLUTION INTRAMUSCULAR; INTRAVENOUS; SUBCUTANEOUS at 16:57

## 2020-01-01 RX ADMIN — SODIUM CHLORIDE 250 ML: 900 INJECTION, SOLUTION INTRAVENOUS at 09:00

## 2020-01-01 RX ADMIN — PREDNISONE 60 MG: 10 TABLET ORAL at 21:23

## 2020-01-01 RX ADMIN — METRONIDAZOLE 500 MG: 500 TABLET, FILM COATED ORAL at 04:47

## 2020-01-01 RX ADMIN — COLCHICINE 0.6 MG: 0.6 TABLET, FILM COATED ORAL at 11:25

## 2020-01-01 RX ADMIN — METRONIDAZOLE 500 MG: 500 TABLET, FILM COATED ORAL at 20:12

## 2020-01-01 RX ADMIN — COLCHICINE 0.6 MG: 0.6 TABLET, FILM COATED ORAL at 07:45

## 2020-01-01 RX ADMIN — CEFTRIAXONE SODIUM 2 G: 2 INJECTION, POWDER, FOR SOLUTION INTRAMUSCULAR; INTRAVENOUS at 11:33

## 2020-01-01 RX ADMIN — LEVOTHYROXINE SODIUM 112 MCG: 0.11 TABLET ORAL at 06:33

## 2020-01-01 RX ADMIN — SODIUM CHLORIDE 500 ML: 900 INJECTION, SOLUTION INTRAVENOUS at 16:30

## 2020-01-01 RX ADMIN — COLCHICINE 0.6 MG: 0.6 TABLET, FILM COATED ORAL at 11:54

## 2020-01-01 RX ADMIN — SERTRALINE HYDROCHLORIDE 50 MG: 50 TABLET ORAL at 10:54

## 2020-01-01 RX ADMIN — SERTRALINE HYDROCHLORIDE 50 MG: 50 TABLET ORAL at 07:44

## 2020-01-01 RX ADMIN — CEFTRIAXONE 1 G: 1 INJECTION, POWDER, FOR SOLUTION INTRAMUSCULAR; INTRAVENOUS at 20:05

## 2020-01-01 RX ADMIN — Medication 10 ML: at 15:43

## 2020-01-01 RX ADMIN — FUROSEMIDE 40 MG: 40 TABLET ORAL at 20:15

## 2020-01-01 RX ADMIN — Medication 10 ML: at 16:17

## 2020-01-01 RX ADMIN — OXYCODONE 5 MG: 5 TABLET ORAL at 21:24

## 2020-01-01 RX ADMIN — SODIUM CHLORIDE 500 ML: 900 INJECTION, SOLUTION INTRAVENOUS at 20:14

## 2020-01-01 RX ADMIN — MEROPENEM 500 MG: 500 INJECTION, POWDER, FOR SOLUTION INTRAVENOUS at 02:45

## 2020-01-01 RX ADMIN — SERTRALINE HYDROCHLORIDE 50 MG: 50 TABLET ORAL at 11:25

## 2020-01-01 RX ADMIN — Medication 10 ML: at 20:15

## 2020-01-02 ENCOUNTER — HOSPITAL ENCOUNTER (OUTPATIENT)
Dept: PHYSICAL THERAPY | Age: 80
Discharge: HOME | End: 2020-01-02
Payer: COMMERCIAL

## 2020-01-02 PROCEDURE — 97113 AQUATIC THERAPY/EXERCISES: CPT

## 2020-01-02 NOTE — PROGRESS NOTES
Arlet Howard  : 1940  Payor: Tanya Pulido / Plan: [employer] / Product Type: Federal Funded Programs /  [address]  at [facility]EN[facility] WATSON  [address] [facility], [address], [place] [address].  [phone]   [phone]       OUTPATIENT PHYSICAL THERAPY: Daily Treatment and Aquatic Note 2019  Visit Count: 10     ICD-10: Treatment Diagnosis: Paraplegia, incomplete (G82.22), Difficulty in walking, not elsewhere classified (R26.2), Muscle weakness (generalized) (M62.81)  Precautions/Allergies:   Patient has no known allergies. TREATMENT PLAN:  Effective Dates: 2019 TO 2020 (90 days). Frequency/Duration: 2 times a week for 90 Day(s) MEDICAL/REFERRING DIAGNOSIS:  Paraplegia, unspecified [G82.20]   DATE OF ONSET:   REFERRING PHYSICIAN: Unknown, Provider, MD [place] Orders: aquatherapy eval and treat  Return MD Appointment: uncertain       Pre-treatment Symptoms/Complaints:  Pt feels that the new stander routine is working better for him. Pain: Initial:   no pain Post Session:  Stomach pain with some exercises    Medications Last Reviewed:  2020    Updated Objective Findings:   None Today     TREATMENT:     Aquatic Therapy (45 minutes): Aquatic treatment performed per flow grid for Decreased muscle strength, Decreased endurance, Decreased range of motion, Decreased activity endurance, Decompression, Ease of movement and Low impact and reduced weight bearing activity. Cues provided for posture and exercises. Assistance by therapist provided for balance and exercises.        Worked on increased endurance in deep water today:  - 7' with 10# wts and assist: bicycle, scissors, cross country, DKTC x 4 min each   - quad stretch in 7' x 5 with 10 sec hold each  - walking motion in 4.5' forward, backward, and sideways x 4 laps each  - standing holding onto rail x 5 reps with mod A   Pt entered and exited the pool with chair lift.  Sliding board transfer to and from wheelchair to chair lift with assist for board placement. HEP: continue with stander. [employer] Portal    Treatment/Session Summary:    · Response to Treatment:   Pt did well with endurance exercises today. He was fatigued at end of session. · Communication/Consultation:  None today  · Equipment provided today:  None today  · Recommendations/Intent for next treatment session: Next visit will focus on aquatics and land therapy to address strength, mobility and flexibility to improve independence.     Total Treatment Billable Duration:  45minutes    PT Patient Time In/Time Out  Time In: 1300  Time Out: 1345    Erica Hardin PTA    Future Appointments   Date Time Provider Jamel Davis   1/7/2020  1:45 PM JOSE ALEJANDRO [facility]s [facility]   1/8/2020 11:45 AM Kong Cuadra MD [facility]   1/9/2020  1:00 PM KERRY Zayas [facility]   1/14/2020  1:00 PM KERRY ZayasTYAEL [facility]   1/16/2020  1:45 PM Nancy Erickson PT [facility]   1/21/2020  1:45 PM Nancy Erickson PT [facility]

## 2020-01-07 ENCOUNTER — HOSPITAL ENCOUNTER (OUTPATIENT)
Dept: PHYSICAL THERAPY | Age: 80
Discharge: HOME | End: 2020-01-07

## 2020-01-09 ENCOUNTER — HOSPITAL ENCOUNTER (OUTPATIENT)
Dept: PHYSICAL THERAPY | Age: 80
Discharge: HOME | End: 2020-01-09
Payer: COMMERCIAL

## 2020-01-09 PROCEDURE — 97113 AQUATIC THERAPY/EXERCISES: CPT

## 2020-01-09 NOTE — PROGRESS NOTES
Montse Nelson  : 1940  Payor: Del Canseco / Plan: Jose Larios / Product Type: Federal Funded Programs /  [address]  at [facility] HENRY WATSON  [address], [address], [place] [address].  [phone]   [phone]       OUTPATIENT PHYSICAL THERAPY: Daily Treatment and Aquatic Note 2019  Visit Count: 12     ICD-10: Treatment Diagnosis: Paraplegia, incomplete (G82.22), Difficulty in walking, not elsewhere classified (R26.2), Muscle weakness (generalized) (M62.81)  Precautions/Allergies:   Patient has no known allergies. TREATMENT PLAN:  Effective Dates: 2019 TO 2020 (90 days). Frequency/Duration: 2 times a week for 90 Day(s) MEDICAL/REFERRING DIAGNOSIS:  Paraplegia, unspecified [G82.20]   DATE OF ONSET:   REFERRING PHYSICIAN: Unknown, Provider, MD LA Orders: aquatherapy eval and treat  Return MD Appointment: uncertain       Pre-treatment Symptoms/Complaints:  Pt states he was very sore from using the standing. Pain: Initial:   no pain Post Session:  Stomach pain with some exercises    Medications Last Reviewed:  2020    Updated Objective Findings:   None Today     TREATMENT:     Aquatic Therapy (45 minutes): Aquatic treatment performed per flow grid for Decreased muscle strength, Decreased endurance, Decreased range of motion, Decreased activity endurance, Decompression, Ease of movement and Low impact and reduced weight bearing activity. Cues provided for posture and exercises. Assistance by therapist provided for balance and exercises.        Worked on increased endurance in deep water today:  - 7' with 10# wts and assist: bicycle, scissors, cross country, DKTC x 4 min each   - quad stretch in 7' x 5 with 10 sec hold each  - walking motion in 4.5' forward, backward, and sideways x 4 laps each  - piriformis and quad stretch x 5 with 10 sec hold each  Pt entered and exited the pool with chair lift.  Sliding board transfer to and from wheelchair to chair lift with assist for board placement. Assisted pt with changing pants in the bathroom with help from his wife. He was stronger with him chair push ups. HEP: continue with stander. [employer]    Treatment/Session Summary:    · Response to Treatment:   Pt did well with endurance exercises today. He was fatigued at end of session. · Communication/Consultation:  None today  · Equipment provided today:  None today  · Recommendations/Intent for next treatment session: Next visit will focus on aquatics and land therapy to address strength, mobility and flexibility to improve independence.     Total Treatment Billable Duration:  45minutes    PT Patient Time In/Time Out  Time In: 1300  Time Out: 1400    Abigail Canchola PTA    Future Appointments   Date Time Provider Jamel Maresi   1/14/2020  1:00 PM [facility]   1/16/2020  1:45 PM Dinorah Patches, PT [facility]   1/21/2020  1:45 PM Dinorah Patches, PT [facility]   7/13/2020 11:30 AM Shiva Cuadra MD [facility]

## 2020-01-13 ENCOUNTER — HOSPITAL ENCOUNTER (EMERGENCY)
Age: 80
Discharge: HOME | End: 2020-01-13
Attending: EMERGENCY MEDICINE
Payer: OTHER GOVERNMENT

## 2020-01-13 ENCOUNTER — HOSPITAL ENCOUNTER (OUTPATIENT)
Age: 80
Setting detail: OBSERVATION
LOS: 1 days | Discharge: HOME | End: 2020-01-17
Attending: UROLOGY | Admitting: UROLOGY
Payer: MEDICARE

## 2020-01-13 VITALS
BODY MASS INDEX: 33.86 KG/M2 | TEMPERATURE: 98 F | SYSTOLIC BLOOD PRESSURE: 114 MMHG | WEIGHT: 250 LBS | OXYGEN SATURATION: 98 % | HEIGHT: 72 IN | HEART RATE: 65 BPM | RESPIRATION RATE: 16 BRPM | DIASTOLIC BLOOD PRESSURE: 68 MMHG

## 2020-01-13 DIAGNOSIS — R31.0 GROSS HEMATURIA: Primary | ICD-10-CM

## 2020-01-13 LAB
ABO + RH BLD: NORMAL
ANION GAP SERPL CALC-SCNC: 7 MMOL/L (ref 7–16)
BACTERIA URNS QL MICRO: 0 /HPF
BASOPHILS # BLD: 0 K/UL (ref 0–0.2)
BASOPHILS NFR BLD: 0 % (ref 0–2)
BLOOD GROUP ANTIBODIES SERPL: NORMAL
BUN SERPL-MCNC: 42 MG/DL (ref 8–23)
CALCIUM SERPL-MCNC: 9 MG/DL (ref 8.3–10.4)
CASTS URNS QL MICRO: 0 /LPF
CHLORIDE SERPL-SCNC: 107 MMOL/L (ref 98–107)
CO2 SERPL-SCNC: 24 MMOL/L (ref 21–32)
CREAT SERPL-MCNC: 1.31 MG/DL (ref 0.8–1.5)
CRYSTALS URNS QL MICRO: 0 /LPF
DIFFERENTIAL METHOD BLD: ABNORMAL
EOSINOPHIL # BLD: 0.4 K/UL (ref 0–0.8)
EOSINOPHIL NFR BLD: 3 % (ref 0.5–7.8)
EPI CELLS #/AREA URNS HPF: 0 /HPF
ERYTHROCYTE [DISTWIDTH] IN BLOOD BY AUTOMATED COUNT: 18.6 % (ref 11.9–14.6)
GLUCOSE SERPL-MCNC: 87 MG/DL (ref 65–100)
HCT VFR BLD AUTO: 33.1 % (ref 41.1–50.3)
HGB BLD-MCNC: 10.4 G/DL (ref 13.6–17.2)
IMM GRANULOCYTES # BLD AUTO: 0.1 K/UL (ref 0–0.5)
IMM GRANULOCYTES NFR BLD AUTO: 1 % (ref 0–5)
LYMPHOCYTES # BLD: 1 K/UL (ref 0.5–4.6)
LYMPHOCYTES NFR BLD: 7 % (ref 13–44)
MCH RBC QN AUTO: 30.6 PG (ref 26.1–32.9)
MCHC RBC AUTO-ENTMCNC: 31.4 G/DL (ref 31.4–35)
MCV RBC AUTO: 97.4 FL (ref 79.6–97.8)
MONOCYTES # BLD: 1.9 K/UL (ref 0.1–1.3)
MONOCYTES NFR BLD: 13 % (ref 4–12)
MUCOUS THREADS URNS QL MICRO: 0 /LPF
NEUTS SEG # BLD: 10.5 K/UL (ref 1.7–8.2)
NEUTS SEG NFR BLD: 76 % (ref 43–78)
NRBC # BLD: 0 K/UL (ref 0–0.2)
OTHER OBSERVATIONS,UCOM: NORMAL
PLATELET # BLD AUTO: 129 K/UL (ref 150–450)
PMV BLD AUTO: 9.9 FL (ref 9.4–12.3)
POTASSIUM SERPL-SCNC: 4.2 MMOL/L (ref 3.5–5.1)
RBC # BLD AUTO: 3.4 M/UL (ref 4.23–5.6)
RBC #/AREA URNS HPF: >100 /HPF
SODIUM SERPL-SCNC: 138 MMOL/L (ref 136–145)
SPECIMEN EXP DATE BLD: NORMAL
WBC # BLD AUTO: 13.9 K/UL (ref 4.3–11.1)
WBC URNS QL MICRO: NORMAL /HPF

## 2020-01-13 PROCEDURE — 51700 IRRIGATION OF BLADDER: CPT | Performed by: EMERGENCY MEDICINE

## 2020-01-13 PROCEDURE — 77030018836 HC SOL IRR NACL ICUM -A

## 2020-01-13 PROCEDURE — 77030020254 HC SOL INJ D5LR LACTATED RINGER

## 2020-01-13 PROCEDURE — 51702 INSERT TEMP BLADDER CATH: CPT | Performed by: EMERGENCY MEDICINE

## 2020-01-13 PROCEDURE — 80048 BASIC METABOLIC PNL TOTAL CA: CPT

## 2020-01-13 PROCEDURE — 81015 MICROSCOPIC EXAM OF URINE: CPT

## 2020-01-13 PROCEDURE — 74011250637 HC RX REV CODE- 250/637: Performed by: UROLOGY

## 2020-01-13 PROCEDURE — 77030005546 HC CATH URETH FOL 3W BARD -A

## 2020-01-13 PROCEDURE — 74011250636 HC RX REV CODE- 250/636: Performed by: UROLOGY

## 2020-01-13 PROCEDURE — 99284 EMERGENCY DEPT VISIT MOD MDM: CPT | Performed by: EMERGENCY MEDICINE

## 2020-01-13 PROCEDURE — 77030010545

## 2020-01-13 PROCEDURE — 85025 COMPLETE CBC W/AUTO DIFF WBC: CPT

## 2020-01-13 PROCEDURE — 77030019927 HC TBNG IRR CYSTO BAXT -A

## 2020-01-13 PROCEDURE — 86900 BLOOD TYPING SEROLOGIC ABO: CPT

## 2020-01-13 PROCEDURE — 99218 HC RM OBSERVATION: CPT

## 2020-01-13 RX ORDER — SERTRALINE HYDROCHLORIDE 50 MG/1
50 TABLET, FILM COATED ORAL DAILY
Status: DISCONTINUED | OUTPATIENT
Start: 2020-01-14 | End: 2020-01-17 | Stop reason: HOSPADM

## 2020-01-13 RX ORDER — OXYBUTYNIN CHLORIDE 5 MG/1
5 TABLET ORAL
Status: DISCONTINUED | OUTPATIENT
Start: 2020-01-13 | End: 2020-01-17 | Stop reason: HOSPADM

## 2020-01-13 RX ORDER — OXYCODONE AND ACETAMINOPHEN 5; 325 MG/1; MG/1
1 TABLET ORAL
Status: DISCONTINUED | OUTPATIENT
Start: 2020-01-13 | End: 2020-01-17 | Stop reason: HOSPADM

## 2020-01-13 RX ORDER — DIPHENHYDRAMINE HCL 25 MG
25 CAPSULE ORAL
Status: DISCONTINUED | OUTPATIENT
Start: 2020-01-13 | End: 2020-01-17 | Stop reason: HOSPADM

## 2020-01-13 RX ORDER — ACETAMINOPHEN 325 MG/1
650 TABLET ORAL
Status: DISCONTINUED | OUTPATIENT
Start: 2020-01-13 | End: 2020-01-17 | Stop reason: HOSPADM

## 2020-01-13 RX ORDER — CARVEDILOL 6.25 MG/1
6.25 TABLET ORAL 2 TIMES DAILY WITH MEALS
Status: DISCONTINUED | OUTPATIENT
Start: 2020-01-14 | End: 2020-01-17 | Stop reason: HOSPADM

## 2020-01-13 RX ORDER — DOCUSATE SODIUM 100 MG/1
100 CAPSULE, LIQUID FILLED ORAL 2 TIMES DAILY
Status: DISCONTINUED | OUTPATIENT
Start: 2020-01-13 | End: 2020-01-17 | Stop reason: HOSPADM

## 2020-01-13 RX ORDER — SODIUM CHLORIDE 0.9 % (FLUSH) 0.9 %
5-40 SYRINGE (ML) INJECTION EVERY 8 HOURS
Status: DISCONTINUED | OUTPATIENT
Start: 2020-01-13 | End: 2020-01-17 | Stop reason: HOSPADM

## 2020-01-13 RX ORDER — ONDANSETRON 2 MG/ML
4 INJECTION INTRAMUSCULAR; INTRAVENOUS
Status: DISCONTINUED | OUTPATIENT
Start: 2020-01-13 | End: 2020-01-17 | Stop reason: HOSPADM

## 2020-01-13 RX ORDER — DEXTROSE, SODIUM CHLORIDE, SODIUM LACTATE, POTASSIUM CHLORIDE, AND CALCIUM CHLORIDE 5; .6; .31; .03; .02 G/100ML; G/100ML; G/100ML; G/100ML; G/100ML
50 INJECTION, SOLUTION INTRAVENOUS CONTINUOUS
Status: DISCONTINUED | OUTPATIENT
Start: 2020-01-13 | End: 2020-01-14

## 2020-01-13 RX ORDER — CIPROFLOXACIN 500 MG/1
500 TABLET ORAL EVERY 12 HOURS
Status: DISCONTINUED | OUTPATIENT
Start: 2020-01-13 | End: 2020-01-17 | Stop reason: HOSPADM

## 2020-01-13 RX ORDER — LEVOTHYROXINE SODIUM 100 UG/1
100 TABLET ORAL
Status: DISCONTINUED | OUTPATIENT
Start: 2020-01-14 | End: 2020-01-17 | Stop reason: HOSPADM

## 2020-01-13 RX ORDER — MORPHINE SULFATE 10 MG/ML
5 INJECTION, SOLUTION INTRAMUSCULAR; INTRAVENOUS
Status: DISCONTINUED | OUTPATIENT
Start: 2020-01-13 | End: 2020-01-17 | Stop reason: HOSPADM

## 2020-01-13 RX ORDER — FUROSEMIDE 40 MG/1
40 TABLET ORAL 2 TIMES DAILY
Status: DISCONTINUED | OUTPATIENT
Start: 2020-01-13 | End: 2020-01-17 | Stop reason: HOSPADM

## 2020-01-13 RX ORDER — SODIUM CHLORIDE 0.9 % (FLUSH) 0.9 %
5-40 SYRINGE (ML) INJECTION AS NEEDED
Status: DISCONTINUED | OUTPATIENT
Start: 2020-01-13 | End: 2020-01-17 | Stop reason: HOSPADM

## 2020-01-13 RX ORDER — NALOXONE HYDROCHLORIDE 0.4 MG/ML
0.4 INJECTION, SOLUTION INTRAMUSCULAR; INTRAVENOUS; SUBCUTANEOUS AS NEEDED
Status: DISCONTINUED | OUTPATIENT
Start: 2020-01-13 | End: 2020-01-17 | Stop reason: HOSPADM

## 2020-01-13 RX ORDER — POTASSIUM CHLORIDE 750 MG/1
20 TABLET, EXTENDED RELEASE ORAL DAILY
Status: DISCONTINUED | OUTPATIENT
Start: 2020-01-14 | End: 2020-01-17 | Stop reason: HOSPADM

## 2020-01-13 RX ADMIN — SODIUM CHLORIDE, SODIUM LACTATE, POTASSIUM CHLORIDE, CALCIUM CHLORIDE, AND DEXTROSE MONOHYDRATE 50 ML/HR: 600; 310; 30; 20; 5 INJECTION, SOLUTION INTRAVENOUS at 20:59

## 2020-01-13 RX ADMIN — PSYLLIUM HUSK 1 PACKET: 3.4 POWDER ORAL at 21:00

## 2020-01-13 RX ADMIN — OXYBUTYNIN CHLORIDE 5 MG: 5 TABLET ORAL at 20:59

## 2020-01-13 RX ADMIN — CIPROFLOXACIN HYDROCHLORIDE 500 MG: 500 TABLET, FILM COATED ORAL at 20:59

## 2020-01-13 RX ADMIN — FUROSEMIDE 40 MG: 40 TABLET ORAL at 20:59

## 2020-01-13 RX ADMIN — Medication 10 ML: at 22:00

## 2020-01-13 NOTE — ED PROVIDER NOTES
76-year-old gentleman presents with concerns about blood when he self catheterizes. He has a spinal cord injury and says he normally caths himself 3 or 4 times a day. He said lately he has been having to do it 2 or 3 catheters each time because they are getting clotted with blood. He says he does have a little bit of pressure and libido burning pain when he does it. He said in the past he has had the occasional small amount of blood but nothing as significant as this. The symptoms started Saturday. No nausea, vomiting, or diarrhea. Elements of this note were created using speech recognition software. As such, errors of speech recognition may be present.            Past Medical History:   Diagnosis Date    Aneurysm [facility])     surger in [place] 5/24/16    Atrial flutter ([facility] [address]     ROSENDO guided cardioversion    Congestive heart failure ([facility] [address]     Thyroid disease        Past Surgical History:   Procedure Laterality Date    CARDIAC SURG PROCEDURE UNLIST  2016    repair of aneurysm in acending and transverse arteries    CARDIAC SURG PROCEDURE UNLIST  2016    valve repair    HX APPENDECTOMY      HX COLONOSCOPY  5/08    diverticulosis    HX ORTHOPAEDIC      repair of broken jaw    HX TONSILLECTOMY           Family History:   Problem Relation Age of Onset    Stroke Mother        Social History     Socioeconomic History    Marital status:      Spouse name: Not on file    Number of children: Not on file    Years of education: Not on file    Highest education level: Not on file   Occupational History    Not on file   Social Needs    Financial resource strain: Not on file    Food insecurity:     Worry: Not on file     Inability: Not on file    Transportation needs:     Medical: Not on file     Non-medical: Not on file   Tobacco Use    Smoking status: Former Smoker     Packs/day: 3.00     Years: 25.00     Pack years: 75.00     Types: Cigarettes     Last attempt to quit: 1/1/1986     Years since quittin.0    Smokeless tobacco: Former User     Types: Snuff, Chew     Quit date: 2014   Substance and Sexual Activity    Alcohol use: Not Currently     Comment: advises quitting    Drug use: No    Sexual activity: Yes     Partners: Female   Lifestyle    Physical activity:     Days per week: Not on file     Minutes per session: Not on file    Stress: Not on file   Relationships    Social connections:     Talks on phone: Not on file     Gets together: Not on file     Attends Orthodox service: Not on file     Active member of club or organization: Not on file     Attends meetings of clubs or organizations: Not on file     Relationship status: Not on file    Intimate partner violence:     Fear of current or ex partner: Not on file     Emotionally abused: Not on file     Physically abused: Not on file     Forced sexual activity: Not on file   Other Topics Concern    Not on file   Social History Narrative    Lives with wife    Currently uses walker for ambulation post-op - [facility]        Previously employed as  / , [address] [employer] works,         PCP: Dr. Joanie Godinez: Patient has no known allergies. Review of Systems   Constitutional: Negative for chills and fever. Gastrointestinal: Negative for diarrhea, nausea and vomiting. Genitourinary: Positive for hematuria and penile pain. Vitals:    20 1319   BP: 98/61   Pulse: 69   Resp: 16   Temp: 97.8 °F (36.6 °C)   SpO2: 95%   Weight: 113.4 kg (250 lb)   Height: 6' (1.829 m)            Physical Exam  Vitals signs and nursing note reviewed. Constitutional:       Appearance: Normal appearance. Abdominal:      General: Bowel sounds are normal.      Palpations: Abdomen is soft. Tenderness: There is no tenderness. There is no rebound. Neurological:      Mental Status: He is alert. MDM  Number of Diagnoses or Management Options  Diagnosis management comments:  We will place a three-way to allow for irrigation. Risk of Complications, Morbidity, and/or Mortality  General comments: Case discussed with Dr. Judith Rod who is on-call for urology. Given the persistent hematuria and clotting of the irrigation catheter, patient will be admitted and transferred downtown.     Patient Progress  Patient progress: stable         Procedures

## 2020-01-13 NOTE — ED TRIAGE NOTES
Patient advises that he performs in and out caths at home and has been having blood in urine since morning of 1/11. States that he is now with clots in urine. Patient advises he has emptied it 6 times this morning. Patient advises pressure in bladder when he gets full.

## 2020-01-13 NOTE — ED NOTES
TRANSFER - OUT REPORT:    Verbal report given to Artemio on Myron Bingham  being transferred to [address] [place] [address] 144 for routine progression of care       Report consisted of patients Situation, Background, Assessment and   Recommendations(SBAR). Information from the following report(s) SBAR, ED Summary and MAR was reviewed with the receiving nurse. Lines:       Opportunity for questions and clarification was provided.       Patient transported with:   Jackie Knott          \

## 2020-01-13 NOTE — ED NOTES
Dr. Guardado Or talked to dr. Nayeli Rashid about admission and request for bed downtown has been completed. Fluid is finished but blood is still draining from catheter. Pt is aware of care at this time.

## 2020-01-14 ENCOUNTER — APPOINTMENT (OUTPATIENT)
Dept: PHYSICAL THERAPY | Age: 80
End: 2020-01-14

## 2020-01-14 LAB — HCT VFR BLD AUTO: 32.7 % (ref 41.1–50.3)

## 2020-01-14 PROCEDURE — 85014 HEMATOCRIT: CPT

## 2020-01-14 PROCEDURE — 36415 COLL VENOUS BLD VENIPUNCTURE: CPT

## 2020-01-14 PROCEDURE — 77030018836 HC SOL IRR NACL ICUM -A

## 2020-01-14 PROCEDURE — 99218 HC RM OBSERVATION: CPT

## 2020-01-14 PROCEDURE — 74011250637 HC RX REV CODE- 250/637: Performed by: UROLOGY

## 2020-01-14 RX ADMIN — SERTRALINE HYDROCHLORIDE 50 MG: 50 TABLET ORAL at 09:21

## 2020-01-14 RX ADMIN — POTASSIUM CHLORIDE 20 MEQ: 10 TABLET, EXTENDED RELEASE ORAL at 09:21

## 2020-01-14 RX ADMIN — DOCUSATE SODIUM 100 MG: 100 CAPSULE, LIQUID FILLED ORAL at 09:21

## 2020-01-14 RX ADMIN — ACETAMINOPHEN 650 MG: 325 TABLET, FILM COATED ORAL at 05:35

## 2020-01-14 RX ADMIN — CIPROFLOXACIN HYDROCHLORIDE 500 MG: 500 TABLET, FILM COATED ORAL at 09:21

## 2020-01-14 RX ADMIN — PSYLLIUM HUSK 1 PACKET: 3.4 POWDER ORAL at 17:27

## 2020-01-14 RX ADMIN — CIPROFLOXACIN HYDROCHLORIDE 500 MG: 500 TABLET, FILM COATED ORAL at 22:33

## 2020-01-14 RX ADMIN — FUROSEMIDE 40 MG: 40 TABLET ORAL at 17:26

## 2020-01-14 RX ADMIN — PSYLLIUM HUSK 1 PACKET: 3.4 POWDER ORAL at 09:22

## 2020-01-14 RX ADMIN — Medication 10 ML: at 22:00

## 2020-01-14 RX ADMIN — FUROSEMIDE 40 MG: 40 TABLET ORAL at 09:21

## 2020-01-14 RX ADMIN — LEVOTHYROXINE SODIUM 100 MCG: 100 TABLET ORAL at 05:28

## 2020-01-14 RX ADMIN — DOCUSATE SODIUM 100 MG: 100 CAPSULE, LIQUID FILLED ORAL at 17:26

## 2020-01-14 RX ADMIN — Medication 10 ML: at 14:57

## 2020-01-14 NOTE — H&P
History and Physical    Subjective:      Arlet Howard is a [de-identified] y.o. male evaluated with a neurogenic bladder and on self cath   He developed gross hematuria and went to ER a 3 way bosch was placed and he had a lot of clots. He has a spinal cord injury and says he normally caths himself 3 or 4 times a day. It started over the weekend. 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 Prior anticoagulation therapy stopped for bleeding issues    Past Medical History:   Diagnosis Date    Aneurysm [facility])     surger in [place] 16    Atrial flutter ([facility] [address]     ROSENDO guided cardioversion    Congestive heart failure ([facility] [address]     Thyroid disease      Past Surgical History:   Procedure Laterality Date    CARDIAC SURG PROCEDURE UNLIST  2016    repair of aneurysm in acending and transverse arteries    CARDIAC SURG PROCEDURE UNLIST  2016    valve repair    HX APPENDECTOMY      HX COLONOSCOPY      diverticulosis    HX ORTHOPAEDIC      repair of broken jaw    HX TONSILLECTOMY        Family History   Problem Relation Age of Onset    Stroke Mother      Social History     Tobacco Use    Smoking status: Former Smoker     Packs/day: 3.00     Years: 25.00     Pack years: 75.00     Types: Cigarettes     Last attempt to quit: 1986     Years since quittin.0    Smokeless tobacco: Former User     Types: Snuff, Chew     Quit date: 2014   Substance Use Topics    Alcohol use: Not Currently     Comment: advises quitting     No Known Allergies  Prior to Admission medications    Medication Sig Start Date End Date Taking? Authorizing Provider   aspirin delayed-release 81 mg tablet Take  by mouth daily. Provider, Historical   b complex vitamins (B COMPLEX 1) tablet Take 1 Tab by mouth daily. Provider, Historical   carvedilol (COREG) 6.25 mg tablet Take 1 Tab by mouth two (2) times daily (with meals).  19 Don Salas MD   levothyroxine (SYNTHROID) 112 mcg tablet Take  by mouth Daily (before breakfast). Provider, Historical   furosemide (LASIX) 40 mg tablet Take 40 mg by mouth two (2) times a day. Provider, Historical   potassium chloride SR (K-TAB) 20 mEq tablet Take 20 mEq by mouth daily. Provider, Historical   psyllium husk (METAMUCIL) 0.4 gram cap Take  by mouth three (3) times daily. Provider, Historical   sertraline (ZOLOFT) 50 mg tablet TAKE 1 TABLET EVERY DAY 10/19/17   MD Enma Gonzalez, Air mattress Dx: prevention of decubiti 17   MD Enma Gonzalez, Ultra light wheelchair with drop arms  Dx: hemiplegia 17   MD Enma Gonzalez, Transfer board Dx: limitation in mobility 17   MD Enma Gonzalez, Bariatric potty chair w/drop arms  Dx: limitation in mobility 17   MD Enma Gonzalez, Trapeze bar Dx: limitation in mobility 17   Vickey Lin MD   OTHER,NON-FORMULARY, 14 Fr. In and out urinary catheter. Dx: hemiplegia 17   Vickey Lin MD            Objective:      No data found. Temp (24hrs), Av.9 °F (36.6 °C), Min:97.8 °F (36.6 °C), Max:98 °F (36.7 °C)      Physical Exam:  GENERAL: alert, fatigued, cooperative, morbidly obese, LUNG: clear to auscultation bilaterally, HEART: regular rate and rhythm, S1, S2 normal, no murmur, click, rub or gallop, ABDOMEN: soft, non-tender. Bowel sounds normal. No masses,  no organomegaly, diastasis or ventral hernia and bladder is not distended, EXTREMITIES:  edema significant bilat and weakness in legs. Assessment:     Gross hematuria in pt with NGB from aneurism repair spine complications. On self cath with significant trauma and bleeding and with bladder irrigation now. Plan:   Supportive care and bladder irrigation   May need cystoscopy. Will place on antibiotics.

## 2020-01-14 NOTE — PROGRESS NOTES
01/13/20 1930   Dual Skin Pressure Injury Assessment   Dual Skin Pressure Injury Assessment X   Second Care Provider (Based on [address] [place] [address]) Elham Roth RN   Hip/Trochanter Bilateral  (redness)   Skin Integumentary   Skin Integumentary (WDL) X   Skin Color Appropriate for ethnicity   Skin Condition/Temp Dry;Flaky; Warm   Skin Integrity Scars (comment)  (mid abdomen to spine, midsternal)   Turgor Non-tenting   Hair Growth Present   Varicosities Absent     Primary Nurse Kenroy Rader RN and Elham Roth RN performed a dual skin assessment on this patient Impairment noted- see wound doc flow sheet. Patient has scar on mid left abdomen to mid spine from previous AAA repair. Patient has large hernia like mass on left abdomen and states, \"Its been there for two years. \" Patient oriented to room and call light. All needs met at this time. Will continue to monitor. Kareem score is 15.

## 2020-01-14 NOTE — PROGRESS NOTES
Patient hand irrigated with multiple clots in return, large and small. Patient now clear with CBI at fast gtt. Will continue to monitor.

## 2020-01-14 NOTE — PROGRESS NOTES
Bowels active. Two attempts to have BM but unsuccessful. CBI going at fairly slow drip wih yellow urine. All hourly rounds performed. Call light within reach. No complaints at this time. Will continue with plan of care and give report to oncoming nurse.

## 2020-01-14 NOTE — PROGRESS NOTES
Hourly rounds completed this shift. Patient resting in bed, CBI at slow gtt draining clear/yellow urine. All needs met at this time. Will continue to monitor and give report to oncoming RN.

## 2020-01-14 NOTE — PROGRESS NOTES
Admit Date: 1/13/2020 Subjective:  
 
Mike Solders is resting. Urine is clear with CBI at moderate drip. Objective:  
 
Patient Vitals for the past 8 hrs: 
 BP Temp Pulse Resp SpO2  
01/14/20 1129 119/68 98.5 °F (36.9 °C) 71 18 98 % 01/14/20 0706 101/52 98 °F (36.7 °C) 69 18 97 % 01/14 0701 - 01/14 1900 In: 480 [P.O.:480] Out: -  
01/12 1901 - 01/14 0700 In: 4000 Out: 5000 [Urine:1000] Physical Exam: 
GENERAL: alert, cooperative, no distress LUNG: clear to auscultation bilaterally HEART: regular rate and rhythm, S1, S2  
ABDOMEN: soft, non-tender NEUROLOGIC: A&Ox3 Data Review Recent Results (from the past 24 hour(s)) URINE MICROSCOPIC Collection Time: 01/13/20  2:53 PM  
Result Value Ref Range WBC 10-20 0 /hpf  
 RBC >100 0 /hpf Epithelial cells 0 0 /hpf Bacteria 0 0 /hpf Casts 0 0 /lpf Crystals, urine 0 0 /LPF Mucus 0 0 /lpf Other observations MICRO DONE ON UNSPUN URINE   
CBC WITH AUTOMATED DIFF Collection Time: 01/13/20  4:44 PM  
Result Value Ref Range WBC 13.9 (H) 4.3 - 11.1 K/uL  
 RBC 3.40 (L) 4.23 - 5.6 M/uL  
 HGB 10.4 (L) 13.6 - 17.2 g/dL HCT 33.1 (L) 41.1 - 50.3 % MCV 97.4 79.6 - 97.8 FL  
 MCH 30.6 26.1 - 32.9 PG  
 MCHC 31.4 31.4 - 35.0 g/dL  
 RDW 18.6 (H) 11.9 - 14.6 % PLATELET 884 (L) 681 - 450 K/uL MPV 9.9 9.4 - 12.3 FL ABSOLUTE NRBC 0.00 0.0 - 0.2 K/uL  
 DF AUTOMATED NEUTROPHILS 76 43 - 78 % LYMPHOCYTES 7 (L) 13 - 44 % MONOCYTES 13 (H) 4.0 - 12.0 % EOSINOPHILS 3 0.5 - 7.8 % BASOPHILS 0 0.0 - 2.0 % IMMATURE GRANULOCYTES 1 0.0 - 5.0 %  
 ABS. NEUTROPHILS 10.5 (H) 1.7 - 8.2 K/UL  
 ABS. LYMPHOCYTES 1.0 0.5 - 4.6 K/UL  
 ABS. MONOCYTES 1.9 (H) 0.1 - 1.3 K/UL  
 ABS. EOSINOPHILS 0.4 0.0 - 0.8 K/UL  
 ABS. BASOPHILS 0.0 0.0 - 0.2 K/UL  
 ABS. IMM. GRANS. 0.1 0.0 - 0.5 K/UL METABOLIC PANEL, BASIC Collection Time: 01/13/20  4:44 PM  
Result Value Ref Range  Sodium 138 136 - 145 mmol/L  
 Potassium 4.2 3.5 - 5.1 mmol/L Chloride 107 98 - 107 mmol/L  
 CO2 24 21 - 32 mmol/L Anion gap 7 7 - 16 mmol/L Glucose 87 65 - 100 mg/dL BUN 42 (H) 8 - 23 MG/DL Creatinine 1.31 0.8 - 1.5 MG/DL  
 GFR est AA >60 >60 ml/min/1.73m2 GFR est non-AA 56 (L) >60 ml/min/1.73m2 Calcium 9.0 8.3 - 10.4 MG/DL  
TYPE & SCREEN Collection Time: 01/13/20  4:44 PM  
Result Value Ref Range Crossmatch Expiration 01/16/2020 ABO/Rh(D) A POSITIVE Antibody screen NEG   
HEMATOCRIT Collection Time: 01/14/20  5:30 AM  
Result Value Ref Range HCT 32.7 (L) 41.1 - 50.3 % Assessment:  
 
Active Problems: 
  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 On self cath with significant trauma and bleeding and with bladder irrigation now. Plan:  
 
Continue CBI for today. Remove bosch catheter tomorrow AM if urine remains clear. Will need cystoscopy if hematuria reoccurs. Jaymie Lomax NP [facility] I have reviewed the above note and examined the patient. I agree with the exam, assessment and plan.  
 
Jc Cagle MD

## 2020-01-14 NOTE — PROGRESS NOTES
CM met with pt and spouse at bedside to complete assessment. Pt presented alert and oriented. Pt verified address, emergency contact, insurance, and PCP. Pt lives with spouse, with a ramp to assist with ambulation and entering into the home. Pt states he is able to complete some ADL's, spouse provides assistance when needed and assists with cooking and cleaning. The pt does not drive. Spouse also provides transportation. Pt confirmed DME in the home, such as 2 wheelchairs, bedside commode, hospital bed, standing frame, and transfer in shower chair. Pt voiced no difficulty obtaining medications in the community. Pt states he receives his medications through the [address] Pt also stated, he is current with Outpatient PT services with  and [facility]. Pt voiced no needs at this time. CM continues to follow pt to assist with discharge planning. Care Management Interventions PCP Verified by CM: Yes Mode of Transport at Discharge: Other (see comment) Transition of Care Consult (CM Consult): Discharge Planning Discharge Durable Medical Equipment: No(Pt confirmed DME, such as two wheelchairs, bedside commode, hospital bed, standing frame, and transfer chair in shower. ) Physical Therapy Consult: No 
Occupational Therapy Consult: No 
Speech Therapy Consult: No 
Current Support Network: Lives with Spouse Confirm Follow Up Transport: Other (see comment)(Spouse provides transportation. ) The Plan for Transition of Care is Related to the Following Treatment Goals : Pt to obtain care to become medically stable for discharge. The Patient and/or Patient Representative was Provided with a Choice of Provider and Agrees with the Discharge Plan?: Yes Name of the Patient Representative Who was Provided with a Choice of Provider and Agrees with the Discharge Plan: Patient provied with a Choice of Provider and Agrees with the Discharge Plan. Freedom of Choice List was Provided with Basic Dialogue that Supports the Patient's Individualized Plan of Care/Goals, Treatment Preferences and Shares the Quality Data Associated with the Providers?: Yes [employer] Information Provided?: No 
Discharge Location Discharge Placement: Unable to determine at this time

## 2020-01-15 ENCOUNTER — ANESTHESIA EVENT (OUTPATIENT)
Dept: SURGERY | Age: 80
End: 2020-01-15
Payer: MEDICARE

## 2020-01-15 PROCEDURE — 77030034696 HC CATH URETH FOL 2W BARD -A

## 2020-01-15 PROCEDURE — 99218 HC RM OBSERVATION: CPT

## 2020-01-15 PROCEDURE — 74011250637 HC RX REV CODE- 250/637: Performed by: UROLOGY

## 2020-01-15 RX ADMIN — FUROSEMIDE 40 MG: 40 TABLET ORAL at 17:36

## 2020-01-15 RX ADMIN — SERTRALINE HYDROCHLORIDE 50 MG: 50 TABLET ORAL at 09:19

## 2020-01-15 RX ADMIN — Medication 10 ML: at 21:10

## 2020-01-15 RX ADMIN — POTASSIUM CHLORIDE 20 MEQ: 10 TABLET, EXTENDED RELEASE ORAL at 09:18

## 2020-01-15 RX ADMIN — Medication 10 ML: at 06:00

## 2020-01-15 RX ADMIN — FUROSEMIDE 40 MG: 40 TABLET ORAL at 09:19

## 2020-01-15 RX ADMIN — CIPROFLOXACIN HYDROCHLORIDE 500 MG: 500 TABLET, FILM COATED ORAL at 21:08

## 2020-01-15 RX ADMIN — PSYLLIUM HUSK 1 PACKET: 3.4 POWDER ORAL at 09:00

## 2020-01-15 RX ADMIN — CARVEDILOL 6.25 MG: 6.25 TABLET, FILM COATED ORAL at 09:27

## 2020-01-15 RX ADMIN — Medication 10 ML: at 15:39

## 2020-01-15 RX ADMIN — CIPROFLOXACIN HYDROCHLORIDE 500 MG: 500 TABLET, FILM COATED ORAL at 09:19

## 2020-01-15 RX ADMIN — CARVEDILOL 6.25 MG: 6.25 TABLET, FILM COATED ORAL at 17:36

## 2020-01-15 RX ADMIN — LEVOTHYROXINE SODIUM 100 MCG: 100 TABLET ORAL at 05:55

## 2020-01-15 NOTE — PROGRESS NOTES
Hourly rounds completed this shift. Patient resting in bed. CBI at slow gtt, draining clear yellow urine. All needs met at this time. Will continue to monitor and give report to oncoming RN.

## 2020-01-15 NOTE — PROGRESS NOTES
Urine clear will remove bosch and he will self cath and see if bleeding returns. D//C this PM if does ok.  
 
Hilario LA

## 2020-01-15 NOTE — PROGRESS NOTES
Patient straight catheterized for second time and output of 320 mls. Red and clear to color. Blood clot present at end of catheter. Patient stating he will not stay another night and would like to speak with doctor. Will notify Dr Darien Eubanks.

## 2020-01-15 NOTE — ANESTHESIA PREPROCEDURE EVALUATION
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 Cardiovascular Hypertension CHF (EF 45-50%) Dysrhythmias : atrial fibrillation and atrial flutter PAD (TAA s/p repair 2016.) Exercise tolerance: <4 METS: Uses WC due to paralysis. GI/Hepatic/Renal 
  
 
 
Renal disease: CRI Comments: GI bleed Endo/Other Hypothyroidism Obesity Other Findings Physical Exam 
 
Airway Mallampati: III 
TM Distance: 4 - 6 cm Neck ROM: normal range of motion Mouth opening: Normal 
 
 Cardiovascular Rhythm: irregular Rate: normal 
 
 
 
 Dental 
No notable dental hx Pulmonary Breath sounds clear to auscultation Abdominal 
 
 
 
 Other Findings Anesthetic Plan ASA: 3 Anesthesia type: general 
 
 
 
 
Induction: Intravenous Anesthetic plan and risks discussed with: Patient and Spouse

## 2020-01-15 NOTE — PROGRESS NOTES
Patient self cath performed and no output flowing via catheter. Cath was removed and a clot present on end of catheter. Paged to let Dr. Earl Krishnan know.

## 2020-01-16 ENCOUNTER — ANESTHESIA (OUTPATIENT)
Dept: SURGERY | Age: 80
End: 2020-01-16
Payer: MEDICARE

## 2020-01-16 ENCOUNTER — APPOINTMENT (OUTPATIENT)
Dept: PHYSICAL THERAPY | Age: 80
End: 2020-01-16

## 2020-01-16 PROCEDURE — 99218 HC RM OBSERVATION: CPT

## 2020-01-16 PROCEDURE — 74011250636 HC RX REV CODE- 250/636: Performed by: NURSE ANESTHETIST, CERTIFIED REGISTERED

## 2020-01-16 PROCEDURE — 74011250637 HC RX REV CODE- 250/637: Performed by: UROLOGY

## 2020-01-16 PROCEDURE — 77030018846 HC SOL IRR STRL H20 ICUM -A

## 2020-01-16 PROCEDURE — 74011250636 HC RX REV CODE- 250/636: Performed by: ANESTHESIOLOGY

## 2020-01-16 PROCEDURE — 76010000138 HC OR TIME 0.5 TO 1 HR: Performed by: UROLOGY

## 2020-01-16 PROCEDURE — 77030019927 HC TBNG IRR CYSTO BAXT -A: Performed by: UROLOGY

## 2020-01-16 PROCEDURE — 77030034696 HC CATH URETH FOL 2W BARD -A: Performed by: UROLOGY

## 2020-01-16 PROCEDURE — 77030010545: Performed by: UROLOGY

## 2020-01-16 PROCEDURE — 77030040361 HC SLV COMPR DVT MDII -B: Performed by: UROLOGY

## 2020-01-16 PROCEDURE — 77030010509 HC AIRWY LMA MSK TELE -A: Performed by: ANESTHESIOLOGY

## 2020-01-16 PROCEDURE — 76060000032 HC ANESTHESIA 0.5 TO 1 HR: Performed by: UROLOGY

## 2020-01-16 PROCEDURE — 74011000250 HC RX REV CODE- 250: Performed by: NURSE ANESTHETIST, CERTIFIED REGISTERED

## 2020-01-16 PROCEDURE — 77030018832 HC SOL IRR H20 ICUM -A: Performed by: UROLOGY

## 2020-01-16 PROCEDURE — 76210000006 HC OR PH I REC 0.5 TO 1 HR: Performed by: UROLOGY

## 2020-01-16 RX ORDER — SODIUM CHLORIDE, SODIUM LACTATE, POTASSIUM CHLORIDE, CALCIUM CHLORIDE 600; 310; 30; 20 MG/100ML; MG/100ML; MG/100ML; MG/100ML
100 INJECTION, SOLUTION INTRAVENOUS CONTINUOUS
Status: DISCONTINUED | OUTPATIENT
Start: 2020-01-16 | End: 2020-01-16 | Stop reason: HOSPADM

## 2020-01-16 RX ORDER — MIDAZOLAM HYDROCHLORIDE 1 MG/ML
2 INJECTION, SOLUTION INTRAMUSCULAR; INTRAVENOUS
Status: DISCONTINUED | OUTPATIENT
Start: 2020-01-16 | End: 2020-01-16 | Stop reason: HOSPADM

## 2020-01-16 RX ORDER — HYDROMORPHONE HYDROCHLORIDE 2 MG/ML
0.2 INJECTION, SOLUTION INTRAMUSCULAR; INTRAVENOUS; SUBCUTANEOUS
Status: DISCONTINUED | OUTPATIENT
Start: 2020-01-16 | End: 2020-01-16 | Stop reason: HOSPADM

## 2020-01-16 RX ORDER — MIDAZOLAM HYDROCHLORIDE 1 MG/ML
2 INJECTION, SOLUTION INTRAMUSCULAR; INTRAVENOUS ONCE
Status: DISCONTINUED | OUTPATIENT
Start: 2020-01-16 | End: 2020-01-16 | Stop reason: HOSPADM

## 2020-01-16 RX ORDER — OXYCODONE HYDROCHLORIDE 5 MG/1
5 TABLET ORAL
Status: DISCONTINUED | OUTPATIENT
Start: 2020-01-16 | End: 2020-01-16 | Stop reason: HOSPADM

## 2020-01-16 RX ORDER — LIDOCAINE HYDROCHLORIDE 20 MG/ML
INJECTION, SOLUTION EPIDURAL; INFILTRATION; INTRACAUDAL; PERINEURAL AS NEEDED
Status: DISCONTINUED | OUTPATIENT
Start: 2020-01-16 | End: 2020-01-16 | Stop reason: HOSPADM

## 2020-01-16 RX ORDER — DEXAMETHASONE SODIUM PHOSPHATE 4 MG/ML
INJECTION, SOLUTION INTRA-ARTICULAR; INTRALESIONAL; INTRAMUSCULAR; INTRAVENOUS; SOFT TISSUE AS NEEDED
Status: DISCONTINUED | OUTPATIENT
Start: 2020-01-16 | End: 2020-01-16 | Stop reason: HOSPADM

## 2020-01-16 RX ORDER — PROPOFOL 10 MG/ML
INJECTION, EMULSION INTRAVENOUS AS NEEDED
Status: DISCONTINUED | OUTPATIENT
Start: 2020-01-16 | End: 2020-01-16 | Stop reason: HOSPADM

## 2020-01-16 RX ORDER — FENTANYL CITRATE 50 UG/ML
INJECTION, SOLUTION INTRAMUSCULAR; INTRAVENOUS AS NEEDED
Status: DISCONTINUED | OUTPATIENT
Start: 2020-01-16 | End: 2020-01-16 | Stop reason: HOSPADM

## 2020-01-16 RX ORDER — LIDOCAINE HYDROCHLORIDE 10 MG/ML
0.1 INJECTION INFILTRATION; PERINEURAL AS NEEDED
Status: DISCONTINUED | OUTPATIENT
Start: 2020-01-16 | End: 2020-01-16 | Stop reason: HOSPADM

## 2020-01-16 RX ORDER — ONDANSETRON 2 MG/ML
INJECTION INTRAMUSCULAR; INTRAVENOUS AS NEEDED
Status: DISCONTINUED | OUTPATIENT
Start: 2020-01-16 | End: 2020-01-16 | Stop reason: HOSPADM

## 2020-01-16 RX ORDER — FENTANYL CITRATE 50 UG/ML
100 INJECTION, SOLUTION INTRAMUSCULAR; INTRAVENOUS ONCE
Status: DISCONTINUED | OUTPATIENT
Start: 2020-01-16 | End: 2020-01-16 | Stop reason: HOSPADM

## 2020-01-16 RX ORDER — NALOXONE HYDROCHLORIDE 0.4 MG/ML
0.04 INJECTION, SOLUTION INTRAMUSCULAR; INTRAVENOUS; SUBCUTANEOUS
Status: DISCONTINUED | OUTPATIENT
Start: 2020-01-16 | End: 2020-01-16 | Stop reason: HOSPADM

## 2020-01-16 RX ADMIN — PROPOFOL 150 MG: 10 INJECTION, EMULSION INTRAVENOUS at 10:05

## 2020-01-16 RX ADMIN — CIPROFLOXACIN HYDROCHLORIDE 500 MG: 500 TABLET, FILM COATED ORAL at 08:56

## 2020-01-16 RX ADMIN — CARVEDILOL 6.25 MG: 6.25 TABLET, FILM COATED ORAL at 16:55

## 2020-01-16 RX ADMIN — SERTRALINE HYDROCHLORIDE 50 MG: 50 TABLET ORAL at 08:56

## 2020-01-16 RX ADMIN — PHENYLEPHRINE HYDROCHLORIDE 200 MCG: 10 INJECTION INTRAVENOUS at 10:10

## 2020-01-16 RX ADMIN — Medication 10 ML: at 05:13

## 2020-01-16 RX ADMIN — OXYCODONE HYDROCHLORIDE AND ACETAMINOPHEN 1 TABLET: 5; 325 TABLET ORAL at 23:58

## 2020-01-16 RX ADMIN — Medication 10 ML: at 21:18

## 2020-01-16 RX ADMIN — PHENYLEPHRINE HYDROCHLORIDE 100 MCG: 10 INJECTION INTRAVENOUS at 10:05

## 2020-01-16 RX ADMIN — PSYLLIUM HUSK 1 PACKET: 3.4 POWDER ORAL at 21:18

## 2020-01-16 RX ADMIN — PHENYLEPHRINE HYDROCHLORIDE 200 MCG: 10 INJECTION INTRAVENOUS at 10:29

## 2020-01-16 RX ADMIN — PSYLLIUM HUSK 1 PACKET: 3.4 POWDER ORAL at 16:58

## 2020-01-16 RX ADMIN — CARVEDILOL 6.25 MG: 6.25 TABLET, FILM COATED ORAL at 08:56

## 2020-01-16 RX ADMIN — Medication 10 ML: at 13:20

## 2020-01-16 RX ADMIN — CIPROFLOXACIN HYDROCHLORIDE 500 MG: 500 TABLET, FILM COATED ORAL at 20:30

## 2020-01-16 RX ADMIN — FENTANYL CITRATE 25 MCG: 50 INJECTION INTRAMUSCULAR; INTRAVENOUS at 10:01

## 2020-01-16 RX ADMIN — PHENYLEPHRINE HYDROCHLORIDE 200 MCG: 10 INJECTION INTRAVENOUS at 10:25

## 2020-01-16 RX ADMIN — LEVOTHYROXINE SODIUM 100 MCG: 100 TABLET ORAL at 05:21

## 2020-01-16 RX ADMIN — LIDOCAINE HYDROCHLORIDE 60 MG: 20 INJECTION, SOLUTION EPIDURAL; INFILTRATION; INTRACAUDAL; PERINEURAL at 10:05

## 2020-01-16 RX ADMIN — DEXAMETHASONE SODIUM PHOSPHATE 4 MG: 4 INJECTION, SOLUTION INTRAMUSCULAR; INTRAVENOUS at 10:29

## 2020-01-16 RX ADMIN — FENTANYL CITRATE 25 MCG: 50 INJECTION INTRAMUSCULAR; INTRAVENOUS at 10:03

## 2020-01-16 RX ADMIN — ONDANSETRON 4 MG: 2 INJECTION INTRAMUSCULAR; INTRAVENOUS at 10:29

## 2020-01-16 RX ADMIN — SODIUM CHLORIDE, SODIUM LACTATE, POTASSIUM CHLORIDE, AND CALCIUM CHLORIDE 100 ML/HR: 600; 310; 30; 20 INJECTION, SOLUTION INTRAVENOUS at 09:27

## 2020-01-16 RX ADMIN — FUROSEMIDE 40 MG: 40 TABLET ORAL at 17:25

## 2020-01-16 RX ADMIN — PHENYLEPHRINE HYDROCHLORIDE 200 MCG: 10 INJECTION INTRAVENOUS at 10:27

## 2020-01-16 RX ADMIN — FENTANYL CITRATE 25 MCG: 50 INJECTION INTRAMUSCULAR; INTRAVENOUS at 10:22

## 2020-01-16 RX ADMIN — PHENYLEPHRINE HYDROCHLORIDE 100 MCG: 10 INJECTION INTRAVENOUS at 10:07

## 2020-01-16 NOTE — PERIOP NOTES
TRANSFER - IN REPORT: 
 
Verbal report received from [facility] on Michael Manuel  being received from [phone] for routine progression of care Report consisted of patients Situation, Background, Assessment and  
Recommendations(SBAR). Information from the following report(s) SBAR was reviewed with the receiving nurse. Opportunity for questions and clarification was provided. Assessment to be completed upon patients arrival to unit and care to be assumed.

## 2020-01-16 NOTE — ANESTHESIA POSTPROCEDURE EVALUATION
Procedure(s): 
CYSTOSCOPY/ EVACUATION OF CLOTS/ CATHETER PLACEMENT ROOM 633. general 
 
Anesthesia Post Evaluation Patient location during evaluation: PACU Patient participation: complete - patient participated Level of consciousness: awake Pain management: satisfactory to patient Airway patency: patent Anesthetic complications: no 
Cardiovascular status: hemodynamically stable Respiratory status: spontaneous ventilation Hydration status: euvolemic Post anesthesia nausea and vomiting:  none Late entry. Vitals Value Taken Time /59 1/16/2020 11:46 AM  
Temp 37.1 °C (98.8 °F) 1/16/2020 10:47 AM  
Pulse 76 1/16/2020 11:48 AM  
Resp 18 1/16/2020 11:46 AM  
SpO2 98 % 1/16/2020 11:48 AM  
Vitals shown include unvalidated device data.

## 2020-01-16 NOTE — BRIEF OP NOTE
BRIEF OPERATIVE NOTE Date of Procedure: 1/16/2020 Preoperative Diagnosis: Gross hematuria [R31.0] Postoperative Diagnosis: * No post-op diagnosis entered * Procedure(s): 
CYSTOSCOPY/ ROOM 633 Surgeon(s) and Role: * Soraya Pardo MD - Primary Surgical Assistant: none Surgical Staff: 
Circ-1: Teri Walker RN Scrub Tech-1: Doak Meckel Event Time In Time Out Incision Start 01/16/2020 1021 Incision Close 01/16/2020 1032 Anesthesia: MAC Estimated Blood Loss: 100 ml clot Specimens: * No specimens in log * Findings: see op note Complications: none Implants: * No implants in log *

## 2020-01-16 NOTE — PROGRESS NOTES
Hourly rounds completed this shift. Pt has been NPO since midnight. All needs met at this time. Pt self-cathed himself 2x this shift with a total of 400 ml of amanuel colored urine with red flecks. Bed low/locked. Call light within reach. Will continue to monitor and give bedside report to oncoming nurse.

## 2020-01-16 NOTE — PROGRESS NOTES
Admit Date: 1/13/2020 Subjective:  
 
Dc Montejo is currently resting. Continuing to self cath with amanuel colored urine OP per nursing notes. Objective:  
 
Patient Vitals for the past 8 hrs: 
 BP Temp Pulse Resp SpO2  
01/16/20 0657 109/60 98.6 °F (37 °C) 87 16 95 % 01/16/20 0407 102/60 97.9 °F (36.6 °C) 76 16 95 % 01/16/20 0029 108/57 98.1 °F (36.7 °C) 83 16 97 % No intake/output data recorded. 01/14 1901 - 01/16 0700 In: 9643 [P.O.:480] Out: 3900 [Urine:400] Physical Exam: 
 
GENERAL: sleeping ABDOMEN: soft, non-tender CV: S1 S2, RRR Lungs: CTAB Data Review No results found for this or any previous visit (from the past 24 hour(s)). Assessment:  
 
Active Problems: 
  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  On self cath with significant trauma and bleeding and with bladder irrigation that has been stopped and bosch removed. Now self cathing. 
  
 
Afebrile, VSS Plan: To OR today for cystoscopy with Dr. Jaylen Hines. Verify consent has been obtained. Continue NPO. Uma Medina NP [facility]

## 2020-01-16 NOTE — PROGRESS NOTES
Problem: Pressure Injury - Risk of 
Goal: *Prevention of pressure injury Description Document Kareem Scale and appropriate interventions in the flowsheet. Outcome: Progressing Towards Goal 
Note: Pressure Injury Interventions: 
Sensory Interventions: Assess changes in LOC, Assess need for specialty bed, Avoid rigorous massage over bony prominences, Check visual cues for pain, Keep linens dry and wrinkle-free, Maintain/enhance activity level, Minimize linen layers, Monitor skin under medical devices, Pressure redistribution bed/mattress (bed type) Moisture Interventions: Absorbent underpads, Apply protective barrier, creams and emollients, Assess need for specialty bed, Maintain skin hydration (lotion/cream), Minimize layers, Moisture barrier, Offer toileting Q_hr Activity Interventions: Assess need for specialty bed, Increase time out of bed, Pressure redistribution bed/mattress(bed type), PT/OT evaluation Mobility Interventions: Assess need for specialty bed, HOB 30 degrees or less, Pressure redistribution bed/mattress (bed type), PT/OT evaluation Nutrition Interventions: Document food/fluid/supplement intake Friction and Shear Interventions: Apply protective barrier, creams and emollients, HOB 30 degrees or less, Lift sheet, Lift team/patient mobility team, Minimize layers, Transferring/repositioning devices Problem: Patient Education: Go to Patient Education Activity Goal: Patient/Family Education Outcome: Progressing Towards Goal 
  
Problem: Falls - Risk of 
Goal: *Absence of Falls Description Document Jose Cheo Fall Risk and appropriate interventions in the flowsheet. Outcome: Progressing Towards Goal 
Note: Fall Risk Interventions: 
  
 
  
 
Medication Interventions: Evaluate medications/consider consulting pharmacy, Patient to call before getting OOB, Teach patient to arise slowly Elimination Interventions: Call light in reach, Patient to call for help with toileting needs, Stay With Me (per policy), Toilet paper/wipes in reach, Toileting schedule/hourly rounds, Urinal in reach Problem: Patient Education: Go to Patient Education Activity Goal: Patient/Family Education Outcome: Progressing Towards Goal

## 2020-01-16 NOTE — PROGRESS NOTES
TRANSFER - IN REPORT: 
 
Verbal report received from Kathy Salazar RN(name) on Sosa Harding  being received from [facility]) for routine progression of care Report consisted of patients Situation, Background, Assessment and  
Recommendations(SBAR). Information from the following report(s) SBAR was reviewed with the receiving nurse. Opportunity for questions and clarification was provided. Assessment completed upon patients arrival to unit and care assumed.

## 2020-01-16 NOTE — PERIOP NOTES
TRANSFER - OUT REPORT: 
 
Verbal report given to [address] [place] [address] Road on Citlali Martel  being transferred to [phone] for routine post - op Report consisted of patients Situation, Background, Assessment and  
Recommendations(SBAR). Information from the following report(s) OR Summary and MAR was reviewed with the receiving nurse. Lines:  
Peripheral IV Right Antecubital (Active) Site Assessment Clean, dry, & intact 1/16/2020 11:15 AM  
Phlebitis Assessment 0 1/16/2020 11:15 AM  
Infiltration Assessment 0 1/16/2020 11:15 AM  
Dressing Status Clean, dry, & intact; Occlusive 1/16/2020 11:15 AM  
Dressing Type Transparent;Tape 1/16/2020 11:15 AM  
Hub Color/Line Status Pink;Patent 1/16/2020 11:15 AM  
Alcohol Cap Used No 1/16/2020 11:15 AM  
  
 
Opportunity for questions and clarification was provided. Patient transported with: 
 O2 @ 2 liters Registered Nurse VTE prophylaxis orders have been written for [place] [place]. Patient and family given floor number and nurses name. Family updated re: pt status after security code verified.

## 2020-01-16 NOTE — PROGRESS NOTES
Jeffery catheter draining red. SCD on. Patient transferred to bedside commode. No BM. Hourly rounds performed this shift. Bed lowered and locked, side rails x2, and call light in reach. All patient needs are met at this time. Bedside shift report will be given to oncoming nurse.

## 2020-01-17 VITALS
RESPIRATION RATE: 18 BRPM | HEART RATE: 96 BPM | DIASTOLIC BLOOD PRESSURE: 58 MMHG | TEMPERATURE: 97.6 F | SYSTOLIC BLOOD PRESSURE: 101 MMHG | OXYGEN SATURATION: 96 %

## 2020-01-17 PROCEDURE — 99218 HC RM OBSERVATION: CPT

## 2020-01-17 PROCEDURE — 74011250637 HC RX REV CODE- 250/637: Performed by: UROLOGY

## 2020-01-17 RX ORDER — CIPROFLOXACIN 500 MG/1
500 TABLET ORAL EVERY 12 HOURS
Qty: 10 TAB | Refills: 0 | Status: SHIPPED | OUTPATIENT
Start: 2020-01-17 | End: 2020-01-24 | Stop reason: CLARIF

## 2020-01-17 RX ADMIN — FUROSEMIDE 40 MG: 40 TABLET ORAL at 08:27

## 2020-01-17 RX ADMIN — PSYLLIUM HUSK 1 PACKET: 3.4 POWDER ORAL at 08:26

## 2020-01-17 RX ADMIN — Medication 10 ML: at 05:45

## 2020-01-17 RX ADMIN — POTASSIUM CHLORIDE 20 MEQ: 10 TABLET, EXTENDED RELEASE ORAL at 08:27

## 2020-01-17 RX ADMIN — CIPROFLOXACIN HYDROCHLORIDE 500 MG: 500 TABLET, FILM COATED ORAL at 08:27

## 2020-01-17 RX ADMIN — DOCUSATE SODIUM 100 MG: 100 CAPSULE, LIQUID FILLED ORAL at 08:26

## 2020-01-17 RX ADMIN — SERTRALINE HYDROCHLORIDE 50 MG: 50 TABLET ORAL at 08:27

## 2020-01-17 RX ADMIN — LEVOTHYROXINE SODIUM 100 MCG: 100 TABLET ORAL at 05:44

## 2020-01-17 NOTE — PROGRESS NOTES
Pt to discharge home this day with no needs voiced at this time. CM remains available if any needs shall arise. Care Management Interventions PCP Verified by CM: Yes Mode of Transport at Discharge: Other (see comment) Transition of Care Consult (CM Consult): Discharge Planning Discharge Durable Medical Equipment: No(Pt confirmed DME, such as two wheelchairs, bedside commode, hospital bed, standing frame, and transfer chair in shower. ) Physical Therapy Consult: No 
Occupational Therapy Consult: No 
Speech Therapy Consult: No 
Current Support Network: Lives with Spouse Confirm Follow Up Transport: Other (see comment)(Spouse provides transportation. ) The Plan for Transition of Care is Related to the Following Treatment Goals : Pt to obtain care to become medically stable for discharge and to return home. The Patient and/or Patient Representative was Provided with a Choice of Provider and Agrees with the Discharge Plan?: Yes Name of the Patient Representative Who was Provided with a Choice of Provider and Agrees with the Discharge Plan: Patient provied with a Choice of Provider and Agrees with the Discharge Plan. Freedom of Choice List was Provided with Basic Dialogue that Supports the Patient's Individualized Plan of Care/Goals, Treatment Preferences and Shares the Quality Data Associated with the Providers?: Yes [employer] Information Provided?: No 
Discharge Location Discharge Placement: Home

## 2020-01-17 NOTE — PROGRESS NOTES
Admit Date: 1/13/2020 Subjective:  
 
Rashawn Early is doing well. Urine clearing. Bosch catheter in place. Objective:  
 
Patient Vitals for the past 8 hrs: 
 BP Temp Pulse Resp SpO2  
01/17/20 0736 101/58 97.6 °F (36.4 °C) 96 18 96 % 01/17/20 0437 110/69 97.9 °F (36.6 °C) 81 18 95 % No intake/output data recorded. 01/15 1901 - 01/17 0700 In: 740 [P.O.:240; I.V.:500] Out: 1435 [[id number]] Physical Exam: 
GENERAL: alert, cooperative, no distress LUNG: clear to auscultation bilaterally HEART: regular rate and rhythm, S1, S2  
ABDOMEN: soft, non-tender NEUROLOGIC: AOx3 Data Review No results found for this or any previous visit (from the past 24 hour(s)). Assessment:  
 
Active Problems: 
  Gross hematuria (1/13/2020) POD 1: 
 
POSTOPERATIVE DIAGNOSIS:  Hematuria with the addition of benign prostatic hyperplasia. 
  
PROCEDURE PERFORMED:  Cystoscopy with evacuation of clots from the bladder. Afebrile, VSS Plan: D/c home with bosch. He will have TURP scheduled next Tuesday, January 21. Office will call with information. Kush Barnett NP [facility]

## 2020-01-17 NOTE — OP NOTES
[facility] 
OPERATIVE REPORT Name:  Kate Lawson 
MR#:  [id number] :  1940 ACCOUNT #:  [de-identified] DATE OF SERVICE:  2020 PREOPERATIVE DIAGNOSIS:  Hematuria. POSTOPERATIVE DIAGNOSIS:  Hematuria with the addition of benign prostatic hyperplasia. PROCEDURE PERFORMED:  Cystoscopy with evacuation of clots from the bladder. SURGEON:  Irene Wilson MD 
 
ASSISTANT:  None. ANESTHESIA:  General. 
 
COMPLICATIONS:  None. SPECIMENS REMOVED:  None. IMPLANTS:  None. ESTIMATED BLOOD LOSS:  None. FINDINGS:  Very large prostate with high bladder neck and obstructing lateral lobes, moderate amount of clot within the bladder. DESCRIPTION OF THE PROCEDURE:  The patient was given general anesthetic, placed in the dorsal lithotomy position. Prepped and draped in the sterile fashion. I passed a 22-Lithuanian cystoscope into the urethra using the video camera and 30-degree lens. The anterior urethra appeared normal.  Prostatic fossa showed a very high bladder neck with a large median lobe. It also showed a very large lateral lobes with obstruction. It was difficult to pass the scope into the bladder and the scope had to be directed in an extreme anterior direction to get around the posterior portion of the prostate. Within the bladder, there was a clot floating freely. Both ureteral orifices were identified. I did not see any stones or tumors. A Mari syringe was used to irrigate the clot out of the bladder. At the end of the procedure, I was able to visualize the posterior wall, the dome, and anterior wall. I did not see any abnormalities. At this point, the scope was removed and an 18-Lithuanian coude catheter was passed successfully and left indwelling with 10 mL of water in the balloon. Digital rectal exam showed a 3+ enlarged prostate without nodules. PLAN:  The patient is to be transferred back to the floor.  
 
 
Kristina Owen MD 
 
 
 [id number] 
D:  01/16/2020 10:47 
T:  01/16/2020 20:47 JOB #:  [id number]

## 2020-01-17 NOTE — PROGRESS NOTES
Hourly rounds completed this shift. All needs met at this time. Bed low/locked. Jeffery drainind red urine. Call light within reach. Will continue to monitor and give bedside report to oncoming nurse.

## 2020-01-17 NOTE — DISCHARGE SUMMARY
Discharge Summary Patient: Nelly Mcfarland MRN: [id number]  SSN: [id number] YOB: 1940  Age: [de-identified] y.o. Sex: male Allergies: Patient has no known allergies. Admit Date: 1/13/2020 Discharge Date: 1/17/2020 * Admission Diagnoses:  Gross hematuria [R31.0] * Discharge Diagnoses:  
Hospital Problems as of 1/17/2020 Date Reviewed: 1/8/2020 Codes Class Noted - Resolved POA Gross hematuria ICD-10-CM: R31.0 ICD-9-CM: 599.71  1/13/2020 - Present Unknown * Procedures for this admission:  
Procedure(s): 
CYSTOSCOPY/ EVACUATION OF CLOTS/ CATHETER PLACEMENT ROOM 633 * Disposition: Home * Discharged Condition: improved [facility] Course: Mr. Neto Velazquez is a 16-year-old male with hx of neurogenic bladder with spinal cord injury and on self cath. He developed gross hematuria and went to ER, 3 way bosch catheter was placed for clot retention. Prior anticoagulation therapy stopped for bleeding issues. Off 81 mg ASA currently. Urine cleared. Bosch removed. Pt began self cathing again with gross hematuria. He had cystoscopy on 1/16 for further evaluation with findings of large blood clot in bladder that was evacuated and enlarged prostate. Dr. Enedina Wilkinson recommended TURP. He will d/c home today with bosch (urine clear) and will return for TURP next week, office will call pt to schedule. He will continue to hold aspirin. Patient Active Problem List  
Diagnosis Code  
 HTN (hypertension) I10  
 Hypothyroidism E03.9  Thoracic aortic aneurysm (HCC) I71.2  Limited mobility Z74.09  
 Pleural effusion J90  Chronic HFrEF (heart failure with reduced ejection fraction) ([facility]) I50.22  
 Atrial fibrillation (HCC) I48.91  
 CAD (coronary artery disease) I25.10  Acute renal failure (ARF) ([facility]) N17.9  Neurogenic bladder N31.9  
 GI bleed K92.2  Systolic CHF, chronic ([facility]) I50.22  
 Gross hematuria R31.0 Discharge Medications:  
Current Discharge Medication List  
  
START taking these medications Details  
ciprofloxacin HCl (CIPRO) 500 mg tablet Take 1 Tab by mouth every twelve (12) hours. Qty: 10 Tab, Refills: 0 CONTINUE these medications which have NOT CHANGED Details  
b complex vitamins (B COMPLEX 1) tablet Take 1 Tab by mouth daily. carvedilol (COREG) 6.25 mg tablet Take 1 Tab by mouth two (2) times daily (with meals). Qty: 180 Tab, Refills: 3  
  
levothyroxine (SYNTHROID) 112 mcg tablet Take  by mouth Daily (before breakfast). furosemide (LASIX) 40 mg tablet Take 40 mg by mouth two (2) times a day. potassium chloride SR (K-TAB) 20 mEq tablet Take 20 mEq by mouth daily. psyllium husk (METAMUCIL) 0.4 gram cap Take  by mouth three (3) times daily. sertraline (ZOLOFT) 50 mg tablet TAKE 1 TABLET EVERY DAY Qty: 90 Tab, Refills: 1  
  
!! OTHER,NON-FORMULARY, Air mattress Dx: prevention of decubiti 
Qty: 1 Each, Refills: 0  
  
!! OTHER,NON-FORMULARY, Ultra light wheelchair with drop arms Dx: hemiplegia Qty: 1 Each, Refills: 0  
  
!! OTHER,NON-FORMULARY, Transfer board Dx: limitation in mobility Qty: 1 Each, Refills: 0  
  
!! OTHER,NON-FORMULARY, Bariatric potty chair w/drop arms  Dx: limitation in mobility Qty: 1 Each, Refills: 0  
  
!! OTHER,NON-FORMULARY, Trapeze bar Dx: limitation in mobility Qty: 1 Each, Refills: 0  
  
!! OTHER,NON-FORMULARY, 14 Fr. In and out urinary catheter. Dx: hemiplegia Qty: 150 Each, Refills: 11  
  
 !! - Potential duplicate medications found. Please discuss with provider. STOP taking these medications  
  
 aspirin delayed-release 81 mg tablet Comments:  
Reason for Stopping:   
   
  
  
 
* Follow-up Care/Patient Instructions: Activity: Activity as tolerated Diet: Regular Diet Wound Care: None needed Follow-up Information Follow up With Specialties Details Why Contact Info Loida Gomez MD [facility]   [address] 
[phone] Joseph Andrea MD Urology   [place] [address] 
[address] [place] [id number] 
[phone] Retention of urine   BPH on self cath,  Clot retention. Dr. Judith Diaz will schedule TURP in the future.  
 
Hilario LA

## 2020-01-20 ENCOUNTER — HOSPITAL ENCOUNTER (EMERGENCY)
Age: 80
Discharge: HOME | End: 2020-01-20
Attending: EMERGENCY MEDICINE

## 2020-01-20 VITALS
HEIGHT: 72 IN | DIASTOLIC BLOOD PRESSURE: 59 MMHG | BODY MASS INDEX: 33.86 KG/M2 | HEART RATE: 73 BPM | WEIGHT: 250 LBS | TEMPERATURE: 98.3 F | OXYGEN SATURATION: 95 % | SYSTOLIC BLOOD PRESSURE: 121 MMHG | RESPIRATION RATE: 16 BRPM

## 2020-01-20 DIAGNOSIS — R31.9 HEMATURIA, UNSPECIFIED TYPE: ICD-10-CM

## 2020-01-20 DIAGNOSIS — R33.9 URINARY RETENTION: Primary | ICD-10-CM

## 2020-01-20 NOTE — ED TRIAGE NOTES
Patient arrives via EMS from home. Patient called out with chief complaint of possible blocked catheter x 5 hours ago. Patient started having pressure and pain. The pain went away and though the catheter was working again but no urine output noted. Patient had catheter placed while he was recently in the hospital and discharged Friday. Patient is to have prostate surgery on Tuesday. Blood noted in urine before catheter stopped up. Vitals stable.

## 2020-01-20 NOTE — ED NOTES
3 way catheter placed. Irrigation done. Patient tolerated well. Input of 1150 ml and output 1325 ml. Faustina Vogel MD made aware. Patient to go home with catheter in place per MD order.

## 2020-01-20 NOTE — DISCHARGE INSTRUCTIONS
Patient Education        Urinary Retention: Care Instructions  Your Care Instructions    Urinary retention means that you aren't able to urinate. In men, it is often caused by a blockage of the urinary tract from an enlarged prostate gland. In men and women, it can also be caused by an infection or nerve damage. Or it may be a side effect of a medicine. The doctor may have drained the urine from your bladder. If you still have problems passing urine, you may need to use a catheter at home. This is used to empty your bladder until the problem can be fixed. Your doctor may put a catheter in your bladder before you go home. If so, he or she will tell you when to come back to have the catheter removed. The doctor has checked you closely. But problems can develop later. If you notice any problems or new symptoms, get medical treatment right away. Follow-up care is a key part of your treatment and safety. Be sure to make and go to all appointments, and call your doctor if you are having problems. It's also a good idea to know your test results and keep a list of the medicines you take. How can you care for yourself at home? · Take your medicines exactly as prescribed. Call your doctor if you think you are having a problem with your medicine. You will get more details on the specific medicines your doctor prescribes. · Check with your doctor before you use any over-the-counter medicines. Many cold and allergy medicines, for example, can make this problem worse. Make sure your doctor knows all of the medicines, vitamins, supplements, and herbal remedies you take. · Spread out through the day the amount of fluid you drink. Do not drink a lot at bedtime. · Avoid alcohol and caffeine. · If you have been given a catheter, or if one is already in place, follow the instructions you were given. Always wash your hands before and after you handle the catheter. When should you call for help?   Call your doctor now or seek immediate medical care if:    · You cannot urinate at all, or it is getting harder to urinate.     · You have symptoms of a urinary tract infection. These may include:  ? Pain or burning when you urinate. ? A frequent need to urinate without being able to pass much urine. ? Pain in the flank, which is just below the rib cage and above the waist on either side of the back. ? Blood in your urine. ? A fever.    Watch closely for changes in your health, and be sure to contact your doctor if:    · You have any problems with your catheter.     · You do not get better as expected. Where can you learn more? Go to http://bam-della.info/. Enter M244 in the search box to learn more about \"Urinary Retention: Care Instructions. \"  Current as of: December 19, 2018  Content Version: 12.2  © 7282-0006 [employer]. Care instructions adapted under license by [employer] (which disclaims liability or warranty for this information). If you have questions about a medical condition or this instruction, always ask your healthcare professional. [place] [address] any warranty or liability for your use of this information. Patient Education        Blood in the Urine: Care Instructions  Your Care Instructions    Blood in the urine, or hematuria, may make the urine look red, brown, or pink. There may be blood every time you urinate or just from time to time. You cannot always see blood in the urine, but it will show up in a urine test.  Blood in the urine may be serious. It should always be checked by a doctor. Your doctor may recommend more tests, including an X-ray, a CT scan, or a cystoscopy (which lets a doctor look inside the urethra and bladder). Blood in the urine can be a sign of another problem. Common causes are bladder infections and kidney stones. An injury to your groin or your genital area can also cause bleeding in the urinary tract.  Very hard exercise--such as running a marathon--can cause blood in the urine. Blood in the urine can also be a sign of kidney disease or cancer in the bladder or kidney. Many cases of blood in the urine are caused by a harmless condition that runs in families. This is called benign familial hematuria. It does not need any treatment. Sometimes your urine may look red or brown even though it does not contain blood. For example, not getting enough fluids (dehydration), taking certain medicines, or having a liver problem can change the color of your urine. Eating foods such as beets, rhubarb, or blackberries or foods with red food coloring can make your urine look red or pink. Follow-up care is a key part of your treatment and safety. Be sure to make and go to all appointments, and call your doctor if you are having problems. It's also a good idea to know your test results and keep a list of the medicines you take. When should you call for help? Call your doctor now or seek immediate medical care if:    · You have symptoms of a urinary infection. For example:  ? You have pus in your urine. ? You have pain in your back just below your rib cage. This is called flank pain. ? You have a fever, chills, or body aches. ? It hurts to urinate. ? You have groin or belly pain.     · You have more blood in your urine.    Watch closely for changes in your health, and be sure to contact your doctor if:    · You have new urination problems.     · You do not get better as expected. Where can you learn more? Go to http://bam-della.info/. Enter V201 in the search box to learn more about \"Blood in the Urine: Care Instructions. \"  Current as of: December 19, 2018  Content Version: 12.2  © 1933-5503 [employer]. Care instructions adapted under license by [employer] (which disclaims liability or warranty for this information).  If you have questions about a medical condition or this instruction, always ask your healthcare professional. [place] [address] any warranty or liability for your use of this information.

## 2020-01-20 NOTE — ED PROVIDER NOTES
Patient arrives via EMS from home. Patient called out with chief complaint of possible blocked catheter x 5 hours ago. Patient started having pressure and pain. The pain went away and though the catheter was working again but no urine output noted. Patient had catheter placed while he was recently in the hospital and discharged Friday. Patient is to have prostate surgery on Tuesday. Blood noted in urine before catheter stopped up. Vitals stable. The history is provided by the patient and medical records. Urinary Catheter Problem This is a new problem. Episode onset: today. The problem has not changed since onset. The pain is at a severity of 3/10. The pain is mild. There has been no fever. He is not sexually active. Associated symptoms comments: Abdominal pain Otto Spies He has tried nothing for the symptoms. The treatment provided no relief. His past medical history is significant for urinary catheter problem. Past medical history comments: hematuria; BPH. Past Medical History:  
Diagnosis Date  Aneurysm ([address] surger in [facility] 5/24/16  Atrial flutter ([address] ROSENDO guided cardioversion  Congestive heart failure ([address]  Thyroid disease Past Surgical History:  
Procedure Laterality Date  CARDIAC SURG PROCEDURE UNLIST  2016  
 repair of aneurysm in acending and transverse arteries  CARDIAC SURG PROCEDURE UNLIST  2016  
 valve repair  HX APPENDECTOMY  HX COLONOSCOPY  5/08  
 diverticulosis  HX ORTHOPAEDIC    
 repair of broken jaw  HX TONSILLECTOMY Family History:  
Problem Relation Age of Onset  Stroke Mother Social History Socioeconomic History  Marital status:  Spouse name: Not on file  Number of children: Not on file  Years of education: Not on file  Highest education level: Not on file Occupational History  Not on file Social Needs  Financial resource strain: Not on file  Food insecurity: Worry: Not on file Inability: Not on file  Transportation needs:  
  Medical: Not on file Non-medical: Not on file Tobacco Use  Smoking status: Former Smoker Packs/day: 3.00 Years: 25.00 Pack years: 75.00 Types: Cigarettes Last attempt to quit: 1986 Years since quittin.0  Smokeless tobacco: Former User Types: Snuff, Chew Quit date: 2014 Substance and Sexual Activity  Alcohol use: Not Currently Comment: advises quitting  Drug use: No  
 Sexual activity: Yes  
  Partners: Female Lifestyle  Physical activity:  
  Days per week: Not on file Minutes per session: Not on file  Stress: Not on file Relationships  Social connections:  
  Talks on phone: Not on file Gets together: Not on file Attends Buddhist service: Not on file Active member of club or organization: Not on file Attends meetings of clubs or organizations: Not on file Relationship status: Not on file  Intimate partner violence:  
  Fear of current or ex partner: Not on file Emotionally abused: Not on file Physically abused: Not on file Forced sexual activity: Not on file Other Topics Concern  Not on file Social History Narrative Lives with wife Currently uses walker for ambulation post-op - [facility] Previously employed as  / , [address] works,  PCP: Dr. Ger Carpio ALLERGIES: Patient has no known allergies. Review of Systems All other systems reviewed and are negative. Vitals:  
 20 0341 BP: 122/61 Pulse: (!) 114 Resp: 16 Temp: 98.3 °F (36.8 °C) SpO2: 94% Weight: 113.4 kg (250 lb) Height: 6' (1.829 m) Physical Exam 
Vitals signs and nursing note reviewed. Constitutional:   
   Appearance: Normal appearance. Abdominal:  
   General: There is distension. Tenderness: There is tenderness. Genitourinary: Penis: Normal.   
   Comments: 25 Italian Jeffery catheter is in place with no drainage. Musculoskeletal: Normal range of motion. Skin: 
   General: Skin is warm and dry. Capillary Refill: Capillary refill takes less than 2 seconds. Neurological:  
   General: No focal deficit present. Mental Status: He is alert and oriented to person, place, and time. Mental status is at baseline. Psychiatric:     
   Mood and Affect: Mood normal.     
   Behavior: Behavior normal.  
 
  
 
MDM Number of Diagnoses or Management Options Risk of Complications, Morbidity, and/or Mortality Presenting problems: low Diagnostic procedures: minimal 
Management options: low Patient Progress Patient progress: stable Procedures

## 2020-01-20 NOTE — ED NOTES
I have reviewed discharge instructions with the patient. The patient verbalized understanding. Patient left ED via Discharge Method: wheelchair to Home with spouse Opportunity for questions and clarification provided. Patient given 0 scripts. To continue your aftercare when you leave the hospital, you may receive an automated call from our care team to check in on how you are doing. This is a free service and part of our promise to provide the best care and service to meet your aftercare needs.  If you have questions, or wish to unsubscribe from this service please call [phone]. Thank you for Choosing our [facility] Emergency Department.

## 2020-01-21 ENCOUNTER — APPOINTMENT (OUTPATIENT)
Dept: PHYSICAL THERAPY | Age: 80
End: 2020-01-21

## 2020-01-21 RX ORDER — CEFAZOLIN SODIUM/WATER 2 G/20 ML
2 SYRINGE (ML) INTRAVENOUS
Status: CANCELLED | OUTPATIENT
Start: 2020-01-21 | End: 2020-01-21

## 2020-01-24 ENCOUNTER — HOSPITAL ENCOUNTER (OUTPATIENT)
Dept: SURGERY | Age: 80
Discharge: HOME | End: 2020-01-24
Payer: COMMERCIAL

## 2020-01-24 VITALS
HEART RATE: 76 BPM | BODY MASS INDEX: 36.57 KG/M2 | OXYGEN SATURATION: 96 % | TEMPERATURE: 97.8 F | RESPIRATION RATE: 18 BRPM | HEIGHT: 72 IN | SYSTOLIC BLOOD PRESSURE: 119 MMHG | DIASTOLIC BLOOD PRESSURE: 57 MMHG | WEIGHT: 270 LBS

## 2020-01-24 LAB
ANION GAP SERPL CALC-SCNC: 8 MMOL/L (ref 7–16)
BUN SERPL-MCNC: 36 MG/DL (ref 8–23)
CALCIUM SERPL-MCNC: 8.9 MG/DL (ref 8.3–10.4)
CHLORIDE SERPL-SCNC: 105 MMOL/L (ref 98–107)
CO2 SERPL-SCNC: 25 MMOL/L (ref 21–32)
CREAT SERPL-MCNC: 1.33 MG/DL (ref 0.8–1.5)
ERYTHROCYTE [DISTWIDTH] IN BLOOD BY AUTOMATED COUNT: 17.6 % (ref 11.9–14.6)
GLUCOSE SERPL-MCNC: 94 MG/DL (ref 65–100)
HCT VFR BLD AUTO: 32.2 % (ref 41.1–50.3)
HGB BLD-MCNC: 10.3 G/DL (ref 13.6–17.2)
MCH RBC QN AUTO: 30.7 PG (ref 26.1–32.9)
MCHC RBC AUTO-ENTMCNC: 32 G/DL (ref 31.4–35)
MCV RBC AUTO: 96.1 FL (ref 79.6–97.8)
NRBC # BLD: 0 K/UL (ref 0–0.2)
PLATELET # BLD AUTO: 169 K/UL (ref 150–450)
PMV BLD AUTO: 8.8 FL (ref 9.4–12.3)
POTASSIUM SERPL-SCNC: 4.4 MMOL/L (ref 3.5–5.1)
RBC # BLD AUTO: 3.35 M/UL (ref 4.23–5.6)
SODIUM SERPL-SCNC: 138 MMOL/L (ref 136–145)
WBC # BLD AUTO: 12.3 K/UL (ref 4.3–11.1)

## 2020-01-24 PROCEDURE — 85027 COMPLETE CBC AUTOMATED: CPT

## 2020-01-24 PROCEDURE — 80048 BASIC METABOLIC PNL TOTAL CA: CPT

## 2020-01-24 NOTE — PERIOP NOTES
Lab results reviewed. Elevated WBC 12.1.  Results routed to surgeon    Recent Results (from the past 12 hour(s))   CBC W/O DIFF    Collection Time: 01/24/20 11:26 AM   Result Value Ref Range    WBC 12.3 (H) 4.3 - 11.1 K/uL    RBC 3.35 (L) 4.23 - 5.6 M/uL    HGB 10.3 (L) 13.6 - 17.2 g/dL    HCT 32.2 (L) 41.1 - 50.3 %    MCV 96.1 79.6 - 97.8 FL    MCH 30.7 26.1 - 32.9 PG    MCHC 32.0 31.4 - 35.0 g/dL    RDW 17.6 (H) 11.9 - 14.6 %    PLATELET 076 035 - 033 K/uL    MPV 8.8 (L) 9.4 - 12.3 FL    ABSOLUTE NRBC 0.00 0.0 - 0.2 K/uL   METABOLIC PANEL, BASIC    Collection Time: 01/24/20 11:26 AM   Result Value Ref Range    Sodium 138 136 - 145 mmol/L    Potassium 4.4 3.5 - 5.1 mmol/L    Chloride 105 98 - 107 mmol/L    CO2 25 21 - 32 mmol/L    Anion gap 8 7 - 16 mmol/L    Glucose 94 65 - 100 mg/dL    BUN 36 (H) 8 - 23 MG/DL    Creatinine 1.33 0.8 - 1.5 MG/DL    GFR est AA >60 >60 ml/min/1.73m2    GFR est non-AA 55 (L) >60 ml/min/1.73m2    Calcium 8.9 8.3 - 10.4 MG/DL

## 2020-01-24 NOTE — PERIOP NOTES
PLEASE CONTINUE TAKING ALL PRESCRIPTION MEDICATIONS UP TO THE DAY OF SURGERY UNLESS OTHERWISE DIRECTED BELOW. DISCONTINUE all vitamins and supplements 4 days prior to surgery. DISCONTINUE Non-Steriodal Anti-Inflammatory (NSAIDS) such as Advil and Aleve 5 days prior to surgery. Home Medications to take  the day of surgery    Carvedilol, Levothyroxine, Sertraline           Home Medications   to Hold   None        Comments   May have Tylenol if needed             Please do not bring home medications with you on the day of surgery unless otherwise directed by your nurse. If you are instructed to bring home medications, please give them to your nurse as they will be administered by the nursing staff.     If you have any questions, please call [facility] [phone] or [facility] [phone].      4

## 2020-01-24 NOTE — PERIOP NOTES
Patient verified name and     Order for consent  found in EHR and matches case posting; patient verified. Type ll surgery,  assessment complete. Labs per surgeon: CBC, BMP;   Labs per anesthesia protocol: T&S on DOS; results   EK2019 A-FIB, Last Echo done 11/15/2019 EF 45-50% with Mod Mitral regurg,     Hospital approved surgical skin cleanser and instructions given per hospital policy. Patient provided with and instructed on educational handouts including Guide to Surgery, Pain Management, Hand Hygiene, Blood Transfusion Education, and [facility] Brochure. Patient answered medical/surgical history questions at their best of ability. All prior to admission medications documented in [facility]. Original medication prescription bottle were NOT visualized during patient appointment. Patient instructed to hold all vitamins 7 days prior to surgery and NSAIDS 5 days prior to surgery, patient verbalized understanding. Patient teach back successful and patient demonstrates knowledge of instructions.

## 2020-01-27 ENCOUNTER — ANESTHESIA EVENT (OUTPATIENT)
Dept: SURGERY | Age: 80
End: 2020-01-27
Payer: COMMERCIAL

## 2020-01-28 ENCOUNTER — ANESTHESIA (OUTPATIENT)
Dept: SURGERY | Age: 80
End: 2020-01-28
Payer: COMMERCIAL

## 2020-01-28 ENCOUNTER — HOSPITAL ENCOUNTER (OUTPATIENT)
Age: 80
Discharge: HOME | End: 2020-01-30
Attending: UROLOGY | Admitting: UROLOGY
Payer: COMMERCIAL

## 2020-01-28 PROBLEM — N40.0 BPH (BENIGN PROSTATIC HYPERPLASIA): Status: ACTIVE | Noted: 2020-01-28

## 2020-01-28 LAB — POTASSIUM BLD-SCNC: 4.3 MMOL/L (ref 3.5–5.1)

## 2020-01-28 PROCEDURE — 77030010509 HC AIRWY LMA MSK TELE -A: Performed by: NURSE ANESTHETIST, CERTIFIED REGISTERED

## 2020-01-28 PROCEDURE — 74011250637 HC RX REV CODE- 250/637: Performed by: ANESTHESIOLOGY

## 2020-01-28 PROCEDURE — 74011250636 HC RX REV CODE- 250/636: Performed by: UROLOGY

## 2020-01-28 PROCEDURE — 99218 HC RM OBSERVATION: CPT

## 2020-01-28 PROCEDURE — 77030019927 HC TBNG IRR CYSTO BAXT -A: Performed by: UROLOGY

## 2020-01-28 PROCEDURE — 84132 ASSAY OF SERUM POTASSIUM: CPT

## 2020-01-28 PROCEDURE — 76060000034 HC ANESTHESIA 1.5 TO 2 HR: Performed by: UROLOGY

## 2020-01-28 PROCEDURE — 77030018836 HC SOL IRR NACL ICUM -A

## 2020-01-28 PROCEDURE — 76010000153 HC OR TIME 1.5 TO 2 HR: Performed by: UROLOGY

## 2020-01-28 PROCEDURE — 74011000250 HC RX REV CODE- 250: Performed by: NURSE ANESTHETIST, CERTIFIED REGISTERED

## 2020-01-28 PROCEDURE — 77030010545: Performed by: UROLOGY

## 2020-01-28 PROCEDURE — 74011000258 HC RX REV CODE- 258: Performed by: UROLOGY

## 2020-01-28 PROCEDURE — 76210000063 HC OR PH I REC FIRST 0.5 HR: Performed by: UROLOGY

## 2020-01-28 PROCEDURE — 77030018846 HC SOL IRR STRL H20 ICUM -A: Performed by: UROLOGY

## 2020-01-28 PROCEDURE — 74011250636 HC RX REV CODE- 250/636: Performed by: NURSE ANESTHETIST, CERTIFIED REGISTERED

## 2020-01-28 PROCEDURE — 74011250637 HC RX REV CODE- 250/637: Performed by: UROLOGY

## 2020-01-28 PROCEDURE — 74011250636 HC RX REV CODE- 250/636: Performed by: ANESTHESIOLOGY

## 2020-01-28 PROCEDURE — 88305 TISSUE EXAM BY PATHOLOGIST: CPT

## 2020-01-28 PROCEDURE — 77030040361 HC SLV COMPR DVT MDII -B: Performed by: UROLOGY

## 2020-01-28 PROCEDURE — 77030005546 HC CATH URETH FOL 3W BARD -A: Performed by: UROLOGY

## 2020-01-28 PROCEDURE — 77030018830 HC SOL IRR GLYC ICUM-A: Performed by: UROLOGY

## 2020-01-28 RX ORDER — MIDAZOLAM HYDROCHLORIDE 1 MG/ML
2 INJECTION, SOLUTION INTRAMUSCULAR; INTRAVENOUS
Status: DISCONTINUED | OUTPATIENT
Start: 2020-01-28 | End: 2020-01-28 | Stop reason: HOSPADM

## 2020-01-28 RX ORDER — SODIUM CHLORIDE, SODIUM LACTATE, POTASSIUM CHLORIDE, CALCIUM CHLORIDE 600; 310; 30; 20 MG/100ML; MG/100ML; MG/100ML; MG/100ML
100 INJECTION, SOLUTION INTRAVENOUS CONTINUOUS
Status: DISCONTINUED | OUTPATIENT
Start: 2020-01-28 | End: 2020-01-28 | Stop reason: HOSPADM

## 2020-01-28 RX ORDER — HYDROMORPHONE HYDROCHLORIDE 2 MG/ML
0.5 INJECTION, SOLUTION INTRAMUSCULAR; INTRAVENOUS; SUBCUTANEOUS
Status: DISCONTINUED | OUTPATIENT
Start: 2020-01-28 | End: 2020-01-28 | Stop reason: HOSPADM

## 2020-01-28 RX ORDER — DEXTROSE MONOHYDRATE AND SODIUM CHLORIDE 5; .45 G/100ML; G/100ML
75 INJECTION, SOLUTION INTRAVENOUS CONTINUOUS
Status: DISCONTINUED | OUTPATIENT
Start: 2020-01-28 | End: 2020-01-29

## 2020-01-28 RX ORDER — SERTRALINE HYDROCHLORIDE 50 MG/1
50 TABLET, FILM COATED ORAL DAILY
Status: DISCONTINUED | OUTPATIENT
Start: 2020-01-29 | End: 2020-01-30 | Stop reason: HOSPADM

## 2020-01-28 RX ORDER — ACETAMINOPHEN 325 MG/1
650 TABLET ORAL
Status: DISCONTINUED | OUTPATIENT
Start: 2020-01-28 | End: 2020-01-30 | Stop reason: HOSPADM

## 2020-01-28 RX ORDER — ONDANSETRON 2 MG/ML
INJECTION INTRAMUSCULAR; INTRAVENOUS AS NEEDED
Status: DISCONTINUED | OUTPATIENT
Start: 2020-01-28 | End: 2020-01-28 | Stop reason: HOSPADM

## 2020-01-28 RX ORDER — NALOXONE HYDROCHLORIDE 0.4 MG/ML
0.4 INJECTION, SOLUTION INTRAMUSCULAR; INTRAVENOUS; SUBCUTANEOUS AS NEEDED
Status: DISCONTINUED | OUTPATIENT
Start: 2020-01-28 | End: 2020-01-30 | Stop reason: HOSPADM

## 2020-01-28 RX ORDER — LIDOCAINE HYDROCHLORIDE 20 MG/ML
INJECTION, SOLUTION EPIDURAL; INFILTRATION; INTRACAUDAL; PERINEURAL AS NEEDED
Status: DISCONTINUED | OUTPATIENT
Start: 2020-01-28 | End: 2020-01-28 | Stop reason: HOSPADM

## 2020-01-28 RX ORDER — FUROSEMIDE 40 MG/1
40 TABLET ORAL 2 TIMES DAILY
Status: DISCONTINUED | OUTPATIENT
Start: 2020-01-28 | End: 2020-01-30 | Stop reason: HOSPADM

## 2020-01-28 RX ORDER — SODIUM CHLORIDE, SODIUM LACTATE, POTASSIUM CHLORIDE, CALCIUM CHLORIDE 600; 310; 30; 20 MG/100ML; MG/100ML; MG/100ML; MG/100ML
100 INJECTION, SOLUTION INTRAVENOUS CONTINUOUS
Status: ACTIVE | OUTPATIENT
Start: 2020-01-28 | End: 2020-01-28

## 2020-01-28 RX ORDER — HYDROCODONE BITARTRATE AND ACETAMINOPHEN 5; 325 MG/1; MG/1
1 TABLET ORAL
Status: DISCONTINUED | OUTPATIENT
Start: 2020-01-28 | End: 2020-01-30 | Stop reason: HOSPADM

## 2020-01-28 RX ORDER — SODIUM CHLORIDE 0.9 % (FLUSH) 0.9 %
5-40 SYRINGE (ML) INJECTION AS NEEDED
Status: DISCONTINUED | OUTPATIENT
Start: 2020-01-28 | End: 2020-01-30 | Stop reason: HOSPADM

## 2020-01-28 RX ORDER — POTASSIUM CHLORIDE 20 MEQ/1
20 TABLET, EXTENDED RELEASE ORAL DAILY
Status: DISCONTINUED | OUTPATIENT
Start: 2020-01-29 | End: 2020-01-30 | Stop reason: HOSPADM

## 2020-01-28 RX ORDER — LIDOCAINE HYDROCHLORIDE 10 MG/ML
0.3 INJECTION INFILTRATION; PERINEURAL ONCE
Status: DISCONTINUED | OUTPATIENT
Start: 2020-01-28 | End: 2020-01-28 | Stop reason: HOSPADM

## 2020-01-28 RX ORDER — ONDANSETRON 2 MG/ML
4 INJECTION INTRAMUSCULAR; INTRAVENOUS
Status: DISCONTINUED | OUTPATIENT
Start: 2020-01-28 | End: 2020-01-30 | Stop reason: HOSPADM

## 2020-01-28 RX ORDER — PROPOFOL 10 MG/ML
INJECTION, EMULSION INTRAVENOUS AS NEEDED
Status: DISCONTINUED | OUTPATIENT
Start: 2020-01-28 | End: 2020-01-28 | Stop reason: HOSPADM

## 2020-01-28 RX ORDER — EPHEDRINE SULFATE/0.9% NACL/PF 50 MG/5 ML
SYRINGE (ML) INTRAVENOUS AS NEEDED
Status: DISCONTINUED | OUTPATIENT
Start: 2020-01-28 | End: 2020-01-28 | Stop reason: HOSPADM

## 2020-01-28 RX ORDER — FUROSEMIDE 10 MG/ML
INJECTION INTRAMUSCULAR; INTRAVENOUS AS NEEDED
Status: DISCONTINUED | OUTPATIENT
Start: 2020-01-28 | End: 2020-01-28 | Stop reason: HOSPADM

## 2020-01-28 RX ORDER — LEVOTHYROXINE SODIUM 112 UG/1
112 TABLET ORAL
Status: DISCONTINUED | OUTPATIENT
Start: 2020-01-29 | End: 2020-01-30 | Stop reason: HOSPADM

## 2020-01-28 RX ORDER — OXYCODONE HYDROCHLORIDE 5 MG/1
10 TABLET ORAL
Status: DISCONTINUED | OUTPATIENT
Start: 2020-01-28 | End: 2020-01-28 | Stop reason: HOSPADM

## 2020-01-28 RX ORDER — CARVEDILOL 6.25 MG/1
6.25 TABLET ORAL 2 TIMES DAILY WITH MEALS
Status: DISCONTINUED | OUTPATIENT
Start: 2020-01-28 | End: 2020-01-30 | Stop reason: HOSPADM

## 2020-01-28 RX ORDER — ACETAMINOPHEN 500 MG
1000 TABLET ORAL ONCE
Status: COMPLETED | OUTPATIENT
Start: 2020-01-28 | End: 2020-01-28

## 2020-01-28 RX ORDER — HYDROMORPHONE HYDROCHLORIDE 1 MG/ML
1 INJECTION, SOLUTION INTRAMUSCULAR; INTRAVENOUS; SUBCUTANEOUS
Status: DISCONTINUED | OUTPATIENT
Start: 2020-01-28 | End: 2020-01-30 | Stop reason: HOSPADM

## 2020-01-28 RX ORDER — SODIUM CHLORIDE 0.9 % (FLUSH) 0.9 %
5-40 SYRINGE (ML) INJECTION EVERY 8 HOURS
Status: DISCONTINUED | OUTPATIENT
Start: 2020-01-28 | End: 2020-01-30 | Stop reason: HOSPADM

## 2020-01-28 RX ADMIN — FUROSEMIDE 40 MG: 40 TABLET ORAL at 18:08

## 2020-01-28 RX ADMIN — SODIUM CHLORIDE 1000 MG: 900 INJECTION, SOLUTION INTRAVENOUS at 20:51

## 2020-01-28 RX ADMIN — Medication 10 MG: at 12:24

## 2020-01-28 RX ADMIN — Medication 10 MG: at 12:43

## 2020-01-28 RX ADMIN — PHENYLEPHRINE HYDROCHLORIDE 100 MCG: 10 INJECTION INTRAVENOUS at 12:33

## 2020-01-28 RX ADMIN — DEXTROSE MONOHYDRATE AND SODIUM CHLORIDE 75 ML/HR: 5; .45 INJECTION, SOLUTION INTRAVENOUS at 17:30

## 2020-01-28 RX ADMIN — Medication 10 MG: at 12:15

## 2020-01-28 RX ADMIN — LIDOCAINE HYDROCHLORIDE 100 MG: 20 INJECTION, SOLUTION EPIDURAL; INFILTRATION; INTRACAUDAL; PERINEURAL at 12:14

## 2020-01-28 RX ADMIN — Medication 10 ML: at 20:51

## 2020-01-28 RX ADMIN — CARVEDILOL 6.25 MG: 6.25 TABLET, FILM COATED ORAL at 18:08

## 2020-01-28 RX ADMIN — CEFAZOLIN 3 G: 1 INJECTION, POWDER, FOR SOLUTION INTRAVENOUS at 12:23

## 2020-01-28 RX ADMIN — Medication 5 ML: at 17:57

## 2020-01-28 RX ADMIN — FUROSEMIDE 20 MG: 10 INJECTION, SOLUTION INTRAMUSCULAR; INTRAVENOUS at 13:11

## 2020-01-28 RX ADMIN — ACETAMINOPHEN 1000 MG: 500 TABLET, FILM COATED ORAL at 09:57

## 2020-01-28 RX ADMIN — ONDANSETRON 4 MG: 2 INJECTION INTRAMUSCULAR; INTRAVENOUS at 12:18

## 2020-01-28 RX ADMIN — Medication 10 MG: at 12:36

## 2020-01-28 RX ADMIN — PROPOFOL 180 MG: 10 INJECTION, EMULSION INTRAVENOUS at 12:14

## 2020-01-28 RX ADMIN — PHENYLEPHRINE HYDROCHLORIDE 100 MCG: 10 INJECTION INTRAVENOUS at 12:29

## 2020-01-28 RX ADMIN — SODIUM CHLORIDE, SODIUM LACTATE, POTASSIUM CHLORIDE, AND CALCIUM CHLORIDE 100 ML/HR: 600; 310; 30; 20 INJECTION, SOLUTION INTRAVENOUS at 09:57

## 2020-01-28 NOTE — PERIOP NOTES
TRANSFER - OUT REPORT:    Verbal report given to Mannie on Citlali Martel  being transferred to room 630 for post-op observation. Report consisted of patients Situation, Background, Assessment and   Recommendations(SBAR). Information from the following report(s) SBAR, Kardex, OR Summary, Procedure Summary, Intake/Output, MAR, Recent Results, Med Rec Status and Cardiac Rhythm NSR was reviewed with the receiving nurse. Lines:   Peripheral IV 01/28/20 Anterior; Left Wrist (Active)   Site Assessment Clean, dry, & intact 1/28/2020  1:45 PM   Phlebitis Assessment 0 1/28/2020  1:45 PM   Infiltration Assessment 0 1/28/2020  1:45 PM   Dressing Status Clean, dry, & intact 1/28/2020  1:45 PM   Dressing Type Transparent 1/28/2020  1:45 PM   Hub Color/Line Status Patent; Infusing 1/28/2020  1:45 PM        Opportunity for questions and clarification was provided. Patient transported with:  Wheel chair, belongings, CBI, IV, SCD, Chart. VTE prophylaxis orders have been written for Citlali Martel. Patient and family given floor number and nurses name. Family updated re: pt status after security code verified.

## 2020-01-28 NOTE — BRIEF OP NOTE
BRIEF OPERATIVE NOTE    Date of Procedure: 1/28/2020   Preoperative Diagnosis: Gross hematuria [R31.0]  Postoperative Diagnosis: Gross hematuria [R31.0]    Procedure(s):  CYSTOSCOPY TRANSURETHRAL RESECTION OF PROSTATE  Surgeon(s) and Role:     * Kaitlin Martins MD - Primary         Surgical Assistant: none    Surgical Staff:  Circ-1: Clarissa Coronado  Circ-2: Marvin Sandoval RN  Event Time In Time Out   Incision Start 1240    Incision Close 1323      Anesthesia: General   Estimated Blood Loss: 50 ml  Specimens:   ID Type Source Tests Collected by Time Destination   1 : [address] [place] [address]  Kaitlin Martins MD 1/28/2020 1320 Pathology      Findings: see op note   Complications: none  Implants: * No implants in log *

## 2020-01-28 NOTE — ANESTHESIA POSTPROCEDURE EVALUATION
Procedure(s): 
CYSTOSCOPY TRANSURETHRAL RESECTION OF PROSTATE. 
 
general 
 
Anesthesia Post Evaluation Multimodal analgesia: multimodal analgesia used between 6 hours prior to anesthesia start to PACU discharge Patient location during evaluation: PACU Patient participation: complete - patient participated Level of consciousness: awake and alert Pain management: adequate Airway patency: patent Anesthetic complications: no 
Cardiovascular status: acceptable Respiratory status: acceptable Hydration status: acceptable Post anesthesia nausea and vomiting:  none Vitals Value Taken Time /55 1/28/2020  2:44 PM  
Temp 36.7 °C (98 °F) 1/28/2020  2:04 PM  
Pulse 67 1/28/2020  2:57 PM  
Resp 16 1/28/2020  2:14 PM  
SpO2 96 % 1/28/2020  2:57 PM  
Vitals shown include unvalidated device data.

## 2020-01-28 NOTE — ANESTHESIA PREPROCEDURE EVALUATION
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 Cardiovascular Hypertension CHF (EF 45-50%) Dysrhythmias : atrial fibrillation and atrial flutter PAD (TAA s/p repair 2016.) Exercise tolerance: <4 METS: Uses WC due to paralysis. GI/Hepatic/Renal 
  
 
 
Renal disease: CRI Comments: GI bleed Endo/Other Hypothyroidism Obesity Other Findings Physical Exam 
 
Airway Mallampati: II 
TM Distance: 4 - 6 cm Neck ROM: normal range of motion Mouth opening: Normal 
 
 Cardiovascular Rhythm: irregular Rate: normal 
 
 
 
 Dental 
No notable dental hx Pulmonary Breath sounds clear to auscultation Abdominal 
 
 
 
 Other Findings Anesthetic Plan ASA: 3 Anesthesia type: general 
 
 
 
 
Induction: Intravenous Anesthetic plan and risks discussed with: Patient and Spouse

## 2020-01-28 NOTE — PROGRESS NOTES
Mr. Shona Holliday is a 50-year-old male with hx of neurogenic bladder with spinal cord injury and on self cath. He developed gross hematuria and went to ER, 3 way bosch catheter was placed for clot retention.  Prior anticoagulation therapy stopped for bleeding issues. Off 81 mg ASA currently. Urine cleared. Bosch removed. Pt began self cathing again with gross hematuria. He had cystoscopy on 1/16 for further evaluation with findings of large blood clot in bladder that was evacuated and enlarged prostate. Dr. Rob Lauren recommended TURP. He will d/c home today with bosch (urine clear) and will return for TURP next week, office will call pt to schedule. He will continue to hold aspirin.          Patient Active Problem List   Diagnosis Code    HTN (hypertension) I10    Hypothyroidism E03.9    Thoracic aortic aneurysm (HCC) I71.2    Limited mobility Z74.09    Pleural effusion J90    Chronic HFrEF (heart failure with reduced ejection fraction) (HCC) I50.22    Atrial fibrillation (HCC) I48.91    CAD (coronary artery disease) I25.10    Acute renal failure (ARF) (HCC) N17.9    Neurogenic bladder N31.9    GI bleed M24.9    Systolic CHF, chronic ([address] I50.22    Gross hematuria R31.0         Physical Exam  General   Mental Status - Patient is alert and oriented X3. Build & Nutrition - Well nourished. Chest and Lung Exam   Chest and lung exam reveals  - normal excursion with symmetric chest walls, quiet, even and easy respiratory effort with no use of accessory muscles and on auscultation, normal breath sounds, no adventitious sounds and normal vocal resonance. Cardiovascular   Cardiovascular examination reveals  - normal heart sounds, regular rate and rhythm with no murmurs. Abdomen   Palpation/Percussion: Palpation and Percussion of the abdomen reveal - Non Tender, No Rebound tenderness, No Rigidity (guarding), No hepatosplenomegaly, No Palpable abdominal masses and Soft.  Hernia - Bilateral - No Hernia(s) present. A: BPH with hematuria  P: TURP  Risks of bleeding, infection, stricture, incontinence, retrograde ejaculation, impotence discussed.

## 2020-01-28 NOTE — PROGRESS NOTES
01/28/20 1815 Dual Skin Pressure Injury Assessment Dual Skin Pressure Injury Assessment X Second Care Provider (Based on [address] [place] [address]) Allan Gonzalez RN Gluteal Cleft  Moisture Buttocks/Ishium  Bilateral  
 
Pt is A&Ox4. Redness prsent on sacrum and around rectum that is non blanchable redness. Small opening above gluteal cleft. allevyn in place. Pt c/o of no pain. Call light placed within reach. Bed low and locked. Pt on bariatric air mattress.

## 2020-01-29 PROCEDURE — 74011250636 HC RX REV CODE- 250/636: Performed by: UROLOGY

## 2020-01-29 PROCEDURE — 74011000258 HC RX REV CODE- 258: Performed by: UROLOGY

## 2020-01-29 PROCEDURE — 74011250637 HC RX REV CODE- 250/637: Performed by: UROLOGY

## 2020-01-29 PROCEDURE — 77030020245 HC SOL INJ 5% D/0.9%NACL

## 2020-01-29 PROCEDURE — 77030018836 HC SOL IRR NACL ICUM -A

## 2020-01-29 RX ADMIN — Medication 10 ML: at 05:06

## 2020-01-29 RX ADMIN — Medication 10 ML: at 21:15

## 2020-01-29 RX ADMIN — LEVOTHYROXINE SODIUM 112 MCG: 112 TABLET ORAL at 05:06

## 2020-01-29 RX ADMIN — PSYLLIUM HUSK 1 PACKET: 3.4 POWDER ORAL at 10:10

## 2020-01-29 RX ADMIN — FUROSEMIDE 40 MG: 40 TABLET ORAL at 10:09

## 2020-01-29 RX ADMIN — ACETAMINOPHEN 650 MG: 325 TABLET, FILM COATED ORAL at 00:53

## 2020-01-29 RX ADMIN — SERTRALINE HYDROCHLORIDE 50 MG: 50 TABLET ORAL at 10:09

## 2020-01-29 RX ADMIN — HYDROCODONE BITARTRATE AND ACETAMINOPHEN 1 TABLET: 5; 325 TABLET ORAL at 15:59

## 2020-01-29 RX ADMIN — FUROSEMIDE 40 MG: 40 TABLET ORAL at 17:27

## 2020-01-29 RX ADMIN — POTASSIUM CHLORIDE 20 MEQ: 20 TABLET, EXTENDED RELEASE ORAL at 10:09

## 2020-01-29 RX ADMIN — ACETAMINOPHEN 650 MG: 325 TABLET, FILM COATED ORAL at 22:34

## 2020-01-29 RX ADMIN — DEXTROSE MONOHYDRATE AND SODIUM CHLORIDE 75 ML/HR: 5; .45 INJECTION, SOLUTION INTRAVENOUS at 05:10

## 2020-01-29 RX ADMIN — CARVEDILOL 6.25 MG: 6.25 TABLET, FILM COATED ORAL at 17:27

## 2020-01-29 RX ADMIN — HYDROCODONE BITARTRATE AND ACETAMINOPHEN 1 TABLET: 5; 325 TABLET ORAL at 10:10

## 2020-01-29 RX ADMIN — Medication 10 ML: at 13:39

## 2020-01-29 RX ADMIN — SODIUM CHLORIDE 1000 MG: 900 INJECTION, SOLUTION INTRAVENOUS at 05:05

## 2020-01-29 NOTE — PROGRESS NOTES
Subjective:   Daily Progress Note: 2020 9:13 AM  No Complaints  Objective:     Visit Vitals  BP 90/60   Pulse 85   Temp 97.6 °F (36.4 °C)   Resp 16   Wt 270 lb (122.5 kg)   SpO2 91%   BMI 36.62 kg/m²      O2 Device: Room air    Temp (24hrs), Av.8 °F (36.6 °C), Min:97.2 °F (36.2 °C), Max:98.4 °F (36.9 °C)      1901 -  0700  In: 99625.4 [I.V.:1758.4]  Out: 14847 [Urine:39103]  No intake/output data recorded. [unfilled]  [unfilled]  [unfilled]    Exam: urine clear with CBI      Data Review    Recent Results (from the past 24 hour(s))   POC SODIUM-POTASSIUM    Collection Time: 20 10:20 AM   Result Value Ref Range    Potassium, POC 4.3 3.5 - 5.1 MMOL/L       Assessment   Active Problems:    Neurogenic bladder (2017)      BPH (benign prostatic hyperplasia) (2020)      Overview: Date of Procedure: 2020       Preoperative Diagnosis: Gross hematuria [R31.0]      Postoperative Diagnosis: Gross hematuria [R31.0]        Procedure(s):      CYSTOSCOPY TRANSURETHRAL RESECTION OF PROSTATE        Plan:  Continue CBI. Voiding trial in AM. May need coude catheter to do self cath.

## 2020-01-29 NOTE — PROGRESS NOTES
Hourly rounds done this shift. Pt has reported pain to his coccyx, medicated per [facility], and verbalized effectiveness. CBI, draining with pink colored urine, no blood clots noted. Needs met at this time,will give report to incoming RN.

## 2020-01-29 NOTE — PROGRESS NOTES
Both and Pt and Family member where dosing in the room when  stopped by. Did not wake. Please consult Spiritual Care as needed. Carlos Diaz, [address] [place] [address] Road.

## 2020-01-29 NOTE — PROGRESS NOTES
Hourly rounds completed this shift. Patient rested quietly throughout the night. Patient complained of pain & medicated per MAR. All needs met at this time. Will continue to monitor & give report to oncoming RN.

## 2020-01-29 NOTE — OP NOTES
[facility] 
OPERATIVE REPORT Name:  Chloe Hodge 
MR#:  [id number] :  1940 ACCOUNT #:  [de-identified] DATE OF SERVICE:  2020 PREOPERATIVE DIAGNOSES: 
1. Benign prostatic hypertrophy. 2.  Urinary retention. 3.  Neurogenic bladder. 4.  Hematuria. POSTOPERATIVE DIAGNOSES: 
1. Benign prostatic hypertrophy. 2.  Urinary retention. 3.  Neurogenic bladder. 4.  Hematuria. PROCEDURE PERFORMED:  Transurethral resection of the prostate. SURGEON:  Archie Hong MD 
 
ASSISTANT:  None. ANESTHESIA:  General. 
 
COMPLICATIONS:  None. SPECIMENS REMOVED:  TURP chips. IMPLANTS:  None. ESTIMATED BLOOD LOSS:  Approximately 50 mL. INDICATION:  The patient has lower extremity paralysis and neurogenic bladder since a spinal cord infarct secondary to aortic surgery in the past.  He has been doing self catheterization and was admitted earlier this month with gross hematuria. Cystoscopy showed clot in the bladder with BPH. He was brought in at this time for TURP. FINDINGS:  Extremely high bladder neck with obstructing lateral lobes. False passage in the prostatic urethra at the bladder neck at the 7 o'clock position. This was likely the source of the previous bleeding. DESCRIPTION OF PROCEDURE:  The patient was given general anesthetic, placed in the dorsal lithotomy position. He was prepped and draped in sterile fashion. The Jeffery catheter had been removed. The distal urethra easily accepted a 30-Lao [employer] sound. I passed a 28-Lao continuous flow resectoscope sheath over an obturator into the bladder. The resectoscope with 30-degree lens and video camera was used. Bladder was inspected. There were no stones or tumors noted. Both ureteral orifices appeared normal.  There is no trabeculation. The prostatic fossa shows a very high bladder neck posteriorly.   There does not appear to be a significant posterior BPH lobe, but there is moderately enlarged lateral lobes which were producing obstruction, mostly distally in the apical area. Resection was begun with the cutting loop. I resected the right lateral lobe and then the left lateral lobe out to the level of the surgical capsule. There were no significant anterior tissue. The verumontanum was easily visualized and was in the distal extent of the resection. The chips were pushed back into the bladder and were irrigated out with Mari syringe. As noted, the bladder neck is very high. At the end of the procedure, there was good open channel. All the chips have been removed. A 24-[place] three-way Jeffery was inserted with a catheter guide. Balloon inflated with 30 mL of water, was connected to continuous bladder irrigation. The patient taken to recovery room in stable condition. PLAN:  He will be admitted for observation. I anticipate removing the catheter in the hospital for voiding trial.  He would also benefit from using coude catheters in the future. MD GALLO [id number] 
D:  01/28/2020 13:51 T:  01/29/2020 0:56 JOB #:  [id number]

## 2020-01-29 NOTE — PROGRESS NOTES
CM met with pt at bedside to complete assessment. Pt presented alert and oriented. Pt lives with spouse, own home, with no steps to enter into the home. Pt states he is independent with completing ADL's such as bathing, dressing, cooking, feeding, cleaning. Pt states his spouse provides transportation. Pt confirmed DME in the home, such as a hospital bed with a air mattress, bariatric bedside commode, shower sliding system, wheelchair, and standing frame. Pt voiced no difficulty obtaining medications within the community. Pt confirmed he has a history of receiving [facility] services, but does not have [facility] services at this time. Pt voiced no needs at this time. CM continues to follow pt to assist with discharge planning. Care Management Interventions  PCP Verified by CM: Yes  Mode of Transport at Discharge: Other (see comment)  Transition of Care Consult (CM Consult): Discharge Planning  Discharge Durable Medical Equipment: No(Pt confirmed DME in the home such as a hospital bed, bariatric bedside commode, shower sliding system, wheelchair, and standing frame. )  Physical Therapy Consult: No  Occupational Therapy Consult: No  Speech Therapy Consult: No  Current Support Network: Lives with Spouse, Own Home  Confirm Follow Up Transport: Family(Pt's spouse provides transportation. )  The Plan for Transition of Care is Related to the Following Treatment Goals : Pt to obtain care to become medically stable for discharge and to return home.    The Patient and/or Patient Representative was Provided with a Choice of Provider and Agrees with the Discharge Plan?: Yes  Name of the Patient Representative Who was Provided with a Choice of Provider and Agrees with the Discharge Plan: Melissa Wright  [place] of Choice List was Provided with Basic Dialogue that Supports the Patient's Individualized Plan of Care/Goals, Treatment Preferences and Shares the Quality Data Associated with the Providers?: Yes  The [employer] Information Provided?: No  Discharge Location  Discharge Placement: Home

## 2020-01-30 VITALS
DIASTOLIC BLOOD PRESSURE: 46 MMHG | RESPIRATION RATE: 18 BRPM | HEART RATE: 86 BPM | BODY MASS INDEX: 36.62 KG/M2 | OXYGEN SATURATION: 94 % | TEMPERATURE: 98 F | WEIGHT: 270 LBS | SYSTOLIC BLOOD PRESSURE: 96 MMHG

## 2020-01-30 PROCEDURE — 74011250637 HC RX REV CODE- 250/637: Performed by: UROLOGY

## 2020-01-30 PROCEDURE — 77030010545

## 2020-01-30 PROCEDURE — 77030012865 HC BG URIN LEG MDII -A

## 2020-01-30 RX ORDER — SULFAMETHOXAZOLE AND TRIMETHOPRIM 800; 160 MG/1; MG/1
1 TABLET ORAL 2 TIMES DAILY
Qty: 14 TAB | Refills: 0 | Status: SHIPPED | OUTPATIENT
Start: 2020-01-30 | End: 2020-02-06

## 2020-01-30 RX ADMIN — HYDROCODONE BITARTRATE AND ACETAMINOPHEN 1 TABLET: 5; 325 TABLET ORAL at 02:35

## 2020-01-30 RX ADMIN — SERTRALINE HYDROCHLORIDE 50 MG: 50 TABLET ORAL at 09:30

## 2020-01-30 RX ADMIN — PSYLLIUM HUSK 1 PACKET: 3.4 POWDER ORAL at 09:30

## 2020-01-30 RX ADMIN — FUROSEMIDE 40 MG: 40 TABLET ORAL at 09:30

## 2020-01-30 RX ADMIN — LEVOTHYROXINE SODIUM 112 MCG: 112 TABLET ORAL at 05:08

## 2020-01-30 RX ADMIN — CARVEDILOL 6.25 MG: 6.25 TABLET, FILM COATED ORAL at 09:30

## 2020-01-30 RX ADMIN — Medication 10 ML: at 05:07

## 2020-01-30 RX ADMIN — POTASSIUM CHLORIDE 20 MEQ: 20 TABLET, EXTENDED RELEASE ORAL at 09:30

## 2020-01-30 NOTE — PROGRESS NOTES
I have reviewed discharge instructions and medications with the patient. The patient verbalized understanding. All questions answered. IVs removed. 1 antibiotic script sent to pts pharmacy. Pt educated on follow up appt. Discharge instructions signed by patient and placed in chart. Instructed to call  when ready to leave.

## 2020-01-30 NOTE — PROGRESS NOTES
Subjective:   Daily Progress Note: 2020 10:47 AM  No Complaints  Objective:     Visit Vitals  BP 96/46 (BP 1 Location: Right arm, BP Patient Position: At rest)   Pulse 86   Temp 98 °F (36.7 °C)   Resp 18   Wt 270 lb (122.5 kg)   SpO2 94%   BMI 36.62 kg/m²      O2 Device: Room air    Temp (24hrs), Av.1 °F (36.7 °C), Min:97.6 °F (36.4 °C), Max:99 °F (37.2 °C)      1901 -  0700  In: 12896.4 [P.O.:720; I.V.:833.4]  Out: 83379 [Urine:45486]  No intake/output data recorded. [unfilled]  [unfilled]  [unfilled]    Exam: urine is clear. Data Review    No results found for this or any previous visit (from the past 24 hour(s)). Assessment   Active Problems:    Neurogenic bladder (2017)      BPH (benign prostatic hyperplasia) (2020)      Overview: Date of Procedure: 2020       Preoperative Diagnosis: Gross hematuria [R31.0]      Postoperative Diagnosis: Gross hematuria [R31.0]        Procedure(s):      CYSTOSCOPY TRANSURETHRAL RESECTION OF PROSTATE        Plan: Will discharge home with bosch. appt in 2 weeks. Will likely need to use coude catheter to cath himself if needed in future.

## 2020-01-30 NOTE — DISCHARGE INSTRUCTIONS
Patient Education        Learning About Transurethral Resection of the Prostate (TURP)  What is transurethral resection of the prostate (TURP)? Transurethral resection of the prostate (TURP) is surgery to remove some prostate tissue. It is done when an overgrown prostate gland is pressing on the urethra and making it hard for a man to urinate. The prostate gland is a small organ just below a man's bladder. It makes most of the fluid in semen. The urethra is the tube that carries urine from the bladder out of the body through the penis. It passes through the prostate. When the prostate gets too large, it can press on the urethra. TURP is done to take pressure off of the urethra. It can help you have better control over starting and stopping your urine stream. You may feel like you get more relief when you urinate. How is the surgery done? Your doctor will give you medicine to make you sleep or feel relaxed. You will be kept comfortable. If you are awake during the surgery, you will get medicine to numb you from the chest down. The doctor will put a thin, lighted tube, which is called a scope, into your urethra through the opening in your penis. Then the doctor will put small surgical tools or a tiny laser through the scope. He or she will then cut or burn away the section of the prostate that is blocking urine flow. When the surgery is finished, the doctor will take out the scope. What can you expect after the surgery? You may stay in the hospital for 1 to 2 days after the surgery. You may be able to go back to work and do many of your usual activities in 1 to 3 weeks. But it is important to avoid heavy lifting or strenuous activities for about 6 weeks. If your surgery was done with a laser, you may feel better faster. Most men go home on the day of laser surgery, then see their doctor soon after. You may be able to go back to work and your usual activities after a few days.  And you may be able to return to strenuous activity and heavy lifting after about 2 weeks. But talk to your doctor first.  Jackie Bennett may need a urinary catheter for a short time. This is a flexible plastic tube used to drain urine from your bladder when you can't urinate on your own. If it is still in place when you go home, your doctor will give you instructions for how to care for your catheter. You may still feel like you need to urinate often in the weeks after your surgery. It often takes up to 6 weeks for this to get better. After they recover from surgery, most men still can have erections (if they were able to have them before surgery). But they may not ejaculate when they have an orgasm. Semen may go into the bladder instead of out through the penis. This is called retrograde ejaculation. It does not hurt and is not harmful to your health. But it may mean that you will not be able to father a child. If this is a concern, talk to your doctor. You may be able to save your sperm before the surgery. Follow-up care is a key part of your treatment and safety. Be sure to make and go to all appointments, and call your doctor if you are having problems. It's also a good idea to know your test results and keep a list of the medicines you take. Where can you learn more? Go to http://bam-della.info/. Enter E735 in the search box to learn more about \"Learning About Transurethral Resection of the Prostate (TURP). \"  Current as of: May 28, 2019  Content Version: 12.2  © 2749-4297 Healthwise, [employer]. Care instructions adapted under license by [employer] (which disclaims liability or warranty for this information). If you have questions about a medical condition or this instruction, always ask your healthcare professional. [place] [address] any warranty or liability for your use of this information.          Patient Education        Neurogenic Bladder: Care Instructions  Your Care Instructions    Neurogenic bladder is nerve damage that keeps the bladder from working properly. The damage can be caused by an injury or disease. You may find it hard to go to the bathroom when you need to. Or you may leak urine between bathroom visits. Nerve damage in the brain, spinal cord, or elsewhere in the body can cause neurogenic bladder. Diseases that can lead to neurogenic bladder include Parkinson's disease, diabetes, and multiple sclerosis. The treatment for neurogenic bladder depends on the cause. Sometimes you can solve the problem by changing your diet, doing exercises to keep urine from leaking, or learning how to control your bladder. You might be able to empty your bladder using a thin flexible tube called a catheter that you insert into the bladder. However, you may need medicine, surgery, or both. Follow-up care is a key part of your treatment and safety. Be sure to make and go to all appointments, and call your doctor if you are having problems. It's also a good idea to know your test results and keep a list of the medicines you take. How can you care for yourself at home? · Take medicine as prescribed. If your doctor prescribed antibiotics, take them as directed. Do not stop taking them just because you feel better. You need to take the full course of antibiotics. · If a certain food seems to affect your bladder, stop eating it to see if the problem goes away. · Do not smoke. It can irritate the bladder and cause bladder cancer. If you need help quitting, talk to your doctor about stop-smoking programs and medicines. These can increase your chances of quitting for good. · Try bladder training. Set certain times to go to the bathroom, and slowly increase the time between bathroom visits. This may help lengthen the time your bladder can hold urine. · Do pelvic floor (Kegel) exercises, which tighten and strengthen pelvic muscles. To do Kegel exercises:  ?  Squeeze the same muscles you would use to stop your urine. Your belly and thighs should not move. ? Hold the squeeze for 3 seconds, and then relax for 3 seconds. ? Start with 3 seconds. Then add 1 second each week until you are able to squeeze for 10 seconds. ? Repeat the exercise 10 to 15 times a session. Do three or more sessions a day. · Wash your pubic area with a mild soap. Avoid deodorant soaps or soaps with heavy perfumes. · Wear loose-fitting clothing that does not put pressure on your bladder. · Wear pads in your underwear to absorb urine leakage during treatment. · Consider joining a support group. Sharing your experiences with other people who have the same problem may help you learn more and cope better. When should you call for help? Call your doctor now or seek immediate medical care if:    · You have a fever not caused by the flu or other illness.     · You have severe pain in your lower back.     · You have blood or pus in your urine.     · Your urine is cloudy or smells bad.     · You have pain or bleeding when you insert the catheter.     · You have swelling in your belly.     · You cannot urinate.    Watch closely for changes in your health, and be sure to contact your doctor if:    · You do not get better as expected. Where can you learn more? Go to http://bam-della.info/. Enter M161 in the search box to learn more about \"Neurogenic Bladder: Care Instructions. \"  Current as of: December 19, 2018  Content Version: 12.2  © 8225-4568 [employer]. Care instructions adapted under license by [employer] (which disclaims liability or warranty for this information). If you have questions about a medical condition or this instruction, always ask your healthcare professional. [place] [address] any warranty or liability for your use of this information.

## 2020-01-30 NOTE — PROGRESS NOTES
Pt to discharge home this day with no needs voiced at this time. Per chart review, pt to discharge home with ro bosch in two weeks. Please consult CM if any needs shall arise. Care Management Interventions  PCP Verified by CM: Yes  Mode of Transport at Discharge: Other (see comment)  Transition of Care Consult (CM Consult): Discharge Planning  Discharge Durable Medical Equipment: No(Pt confirmed DME in the home such as a hospital bed, bariatric bedside commode, shower sliding system, wheelchair, and standing frame. )  Physical Therapy Consult: No  Occupational Therapy Consult: No  Speech Therapy Consult: No  Current Support Network: Lives with Spouse, Own Home  Confirm Follow Up Transport: Family(Pt's spouse provides transportation. )  The Plan for Transition of Care is Related to the Following Treatment Goals : Pt to obtain care to become medically stable for discharge and to return home.    The Patient and/or Patient Representative was Provided with a Choice of Provider and Agrees with the Discharge Plan?: Yes  Name of the Patient Representative Who was Provided with a Choice of Provider and Agrees with the Discharge Plan: Makeda Layton  Freedom of Choice List was Provided with Basic Dialogue that Supports the Patient's Individualized Plan of Care/Goals, Treatment Preferences and Shares the Quality Data Associated with the Providers?: Yes   Resource Information Provided?: No  Discharge Location  Discharge Placement: Home

## 2020-01-30 NOTE — PROGRESS NOTES
Date 01/29/20 0700 - 01/30/20 0659 01/30/20 0700 - 01/31/20 0659   Shift [id number] 1900-0659 24 Hour Total 9480-8177 8197-2752 24 Hour Total   INTAKE   P.O. 720  720        P. O. 720  720      Other 3000 3000 6000        Irrigation Volume Input (mL) (Urinary Catheter 01/28/20 3- way) 3000 3000 6000      Shift Total(mL/kg) 3720(30.4) 3000(24.5) 5594(08.9)      OUTPUT   Urine(mL/kg/hr) 2830(9) 4000 6900        Urine Output (mL) (Urinary Catheter 01/28/20 3- way) 2900 4000 6900      Shift Total(mL/kg) 2900(23.7) 4000(32.7) 2719(78.6)       -1000 -180      Weight (kg) 122.5 122.5 122.5 122.5 122.5 122.5     Hourly rounds done. Pt c/o pain, medicated per MAR. Denies nausea, vomiting. Jeffery output pink/clear. CBI running at slow rate. No BM, but passing flatus. Pt eager to DC this am. All needs met at this time.

## 2020-02-05 ENCOUNTER — HOSPITAL ENCOUNTER (OUTPATIENT)
Age: 80
Setting detail: OBSERVATION
Discharge: HOME | End: 2020-02-08
Attending: UROLOGY | Admitting: UROLOGY
Payer: MEDICARE

## 2020-02-05 DIAGNOSIS — R31.0 GROSS HEMATURIA: Primary | ICD-10-CM

## 2020-02-05 PROBLEM — R31.9 HEMATURIA: Status: ACTIVE | Noted: 2020-02-05

## 2020-02-05 PROBLEM — E66.01 SEVERE OBESITY (HCC): Status: ACTIVE | Noted: 2020-02-05

## 2020-02-05 LAB
ALBUMIN SERPL-MCNC: 3.5 G/DL (ref 3.2–4.6)
ALBUMIN/GLOB SERPL: 0.9 {RATIO} (ref 1.2–3.5)
ALP SERPL-CCNC: 134 U/L (ref 50–136)
ALT SERPL-CCNC: 16 U/L (ref 12–65)
ANION GAP SERPL CALC-SCNC: 7 MMOL/L (ref 7–16)
AST SERPL-CCNC: 19 U/L (ref 15–37)
BASOPHILS # BLD: 0 K/UL (ref 0–0.2)
BASOPHILS NFR BLD: 0 % (ref 0–2)
BILIRUB SERPL-MCNC: 0.6 MG/DL (ref 0.2–1.1)
BUN SERPL-MCNC: 37 MG/DL (ref 8–23)
CALCIUM SERPL-MCNC: 9 MG/DL (ref 8.3–10.4)
CHLORIDE SERPL-SCNC: 105 MMOL/L (ref 98–107)
CO2 SERPL-SCNC: 25 MMOL/L (ref 21–32)
CREAT SERPL-MCNC: 1.95 MG/DL (ref 0.8–1.5)
DIFFERENTIAL METHOD BLD: ABNORMAL
EOSINOPHIL # BLD: 0.2 K/UL (ref 0–0.8)
EOSINOPHIL NFR BLD: 2 % (ref 0.5–7.8)
ERYTHROCYTE [DISTWIDTH] IN BLOOD BY AUTOMATED COUNT: 17.6 % (ref 11.9–14.6)
GLOBULIN SER CALC-MCNC: 3.8 G/DL (ref 2.3–3.5)
GLUCOSE SERPL-MCNC: 94 MG/DL (ref 65–100)
HCT VFR BLD AUTO: 29.1 % (ref 41.1–50.3)
HGB BLD-MCNC: 9.2 G/DL (ref 13.6–17.2)
IMM GRANULOCYTES # BLD AUTO: 0.1 K/UL (ref 0–0.5)
IMM GRANULOCYTES NFR BLD AUTO: 1 % (ref 0–5)
LYMPHOCYTES # BLD: 0.8 K/UL (ref 0.5–4.6)
LYMPHOCYTES NFR BLD: 7 % (ref 13–44)
MCH RBC QN AUTO: 29.3 PG (ref 26.1–32.9)
MCHC RBC AUTO-ENTMCNC: 31.6 G/DL (ref 31.4–35)
MCV RBC AUTO: 92.7 FL (ref 79.6–97.8)
MONOCYTES # BLD: 1.3 K/UL (ref 0.1–1.3)
MONOCYTES NFR BLD: 11 % (ref 4–12)
NEUTS SEG # BLD: 8.9 K/UL (ref 1.7–8.2)
NEUTS SEG NFR BLD: 79 % (ref 43–78)
NRBC # BLD: 0 K/UL (ref 0–0.2)
PLATELET # BLD AUTO: 187 K/UL (ref 150–450)
PMV BLD AUTO: 9.4 FL (ref 9.4–12.3)
POTASSIUM SERPL-SCNC: 4.3 MMOL/L (ref 3.5–5.1)
PROT SERPL-MCNC: 7.3 G/DL (ref 6.3–8.2)
RBC # BLD AUTO: 3.14 M/UL (ref 4.23–5.6)
SODIUM SERPL-SCNC: 137 MMOL/L (ref 136–145)
WBC # BLD AUTO: 11.3 K/UL (ref 4.3–11.1)

## 2020-02-05 PROCEDURE — 99283 EMERGENCY DEPT VISIT LOW MDM: CPT

## 2020-02-05 PROCEDURE — 99218 HC RM OBSERVATION: CPT

## 2020-02-05 PROCEDURE — 80053 COMPREHEN METABOLIC PANEL: CPT

## 2020-02-05 PROCEDURE — 74011250637 HC RX REV CODE- 250/637: Performed by: NURSE PRACTITIONER

## 2020-02-05 PROCEDURE — 85025 COMPLETE CBC W/AUTO DIFF WBC: CPT

## 2020-02-05 PROCEDURE — 77030018836 HC SOL IRR NACL ICUM -A

## 2020-02-05 RX ORDER — NALOXONE HYDROCHLORIDE 0.4 MG/ML
0.4 INJECTION, SOLUTION INTRAMUSCULAR; INTRAVENOUS; SUBCUTANEOUS AS NEEDED
Status: DISCONTINUED | OUTPATIENT
Start: 2020-02-05 | End: 2020-02-08 | Stop reason: HOSPADM

## 2020-02-05 RX ORDER — SODIUM CHLORIDE 0.9 % (FLUSH) 0.9 %
5-40 SYRINGE (ML) INJECTION AS NEEDED
Status: DISCONTINUED | OUTPATIENT
Start: 2020-02-05 | End: 2020-02-08 | Stop reason: HOSPADM

## 2020-02-05 RX ORDER — ONDANSETRON 2 MG/ML
4 INJECTION INTRAMUSCULAR; INTRAVENOUS
Status: DISCONTINUED | OUTPATIENT
Start: 2020-02-05 | End: 2020-02-08 | Stop reason: HOSPADM

## 2020-02-05 RX ORDER — ATROPA BELLADONNA AND OPIUM 16.2; 6 MG/1; MG/1
1 SUPPOSITORY RECTAL
Status: DISCONTINUED | OUTPATIENT
Start: 2020-02-05 | End: 2020-02-08 | Stop reason: HOSPADM

## 2020-02-05 RX ORDER — HYDROMORPHONE HYDROCHLORIDE 1 MG/ML
1 INJECTION, SOLUTION INTRAMUSCULAR; INTRAVENOUS; SUBCUTANEOUS
Status: DISCONTINUED | OUTPATIENT
Start: 2020-02-05 | End: 2020-02-08 | Stop reason: HOSPADM

## 2020-02-05 RX ORDER — SULFAMETHOXAZOLE AND TRIMETHOPRIM 800; 160 MG/1; MG/1
1 TABLET ORAL EVERY 12 HOURS
Status: DISCONTINUED | OUTPATIENT
Start: 2020-02-05 | End: 2020-02-08 | Stop reason: HOSPADM

## 2020-02-05 RX ORDER — HYDROCODONE BITARTRATE AND ACETAMINOPHEN 5; 325 MG/1; MG/1
1 TABLET ORAL
Status: DISCONTINUED | OUTPATIENT
Start: 2020-02-05 | End: 2020-02-08 | Stop reason: HOSPADM

## 2020-02-05 RX ORDER — ACETAMINOPHEN 325 MG/1
650 TABLET ORAL
Status: DISCONTINUED | OUTPATIENT
Start: 2020-02-05 | End: 2020-02-08 | Stop reason: HOSPADM

## 2020-02-05 RX ORDER — SODIUM CHLORIDE 0.9 % (FLUSH) 0.9 %
5-40 SYRINGE (ML) INJECTION EVERY 8 HOURS
Status: DISCONTINUED | OUTPATIENT
Start: 2020-02-05 | End: 2020-02-08 | Stop reason: HOSPADM

## 2020-02-05 RX ORDER — OXYBUTYNIN CHLORIDE 5 MG/1
5 TABLET ORAL
Status: DISCONTINUED | OUTPATIENT
Start: 2020-02-05 | End: 2020-02-08 | Stop reason: HOSPADM

## 2020-02-05 RX ADMIN — Medication 10 ML: at 17:20

## 2020-02-05 RX ADMIN — SULFAMETHOXAZOLE AND TRIMETHOPRIM 1 TABLET: 800; 160 TABLET ORAL at 21:23

## 2020-02-05 RX ADMIN — SULFAMETHOXAZOLE AND TRIMETHOPRIM 1 TABLET: 800; 160 TABLET ORAL at 17:19

## 2020-02-05 RX ADMIN — Medication 10 ML: at 21:24

## 2020-02-05 RX ADMIN — HYDROCODONE BITARTRATE AND ACETAMINOPHEN 1 TABLET: 5; 325 TABLET ORAL at 18:40

## 2020-02-05 NOTE — ED TRIAGE NOTES
Patient sent by Dr. Enedina Wilkinson for bladder irrigation. Last week reports prostate surgery. Jeffery catheter present, blood evident in urine. Sent for admission. Dr. Madalyn Polo made aware, admission orders placed at this time. Patient reports open wounds to buttock as well

## 2020-02-05 NOTE — ED TRIAGE NOTES
TRANSFER - OUT REPORT: 
 
Verbal report given to Patrick Wyman RN (name) on John Rose  being transferred to [phone] (unit) for routine progression of care Report consisted of patients Situation, Background, Assessment and  
Recommendations(SBAR). Information from the following report(s) SBAR was reviewed with the receiving nurse. Lines:  
Peripheral IV 02/05/20 Left Antecubital (Active) Opportunity for questions and clarification was provided. Patient transported with: 
 [employer]

## 2020-02-05 NOTE — H&P
[facility] [address] [place], [address] 
[phone] 
  
Nazanin Renner : 1940 
  
    
Chief Complaint Patient presents with  Blood in Urine  
    clot retention  
  
  
HPI  
  
Nazanin Renner is a [de-identified] y.o. male with hx of neurogenic bladder with spinal cord injury and on self cath.  He developed gross hematuria and went to ER, 3 way bosch catheter was placed for clot retention.  Prior anticoagulation therapy stopped for bleeding issues.  Off 81 mg ASA currently.  Urine cleared. Bosch removed.  Pt began self cathing again with gross hematuria. [facility] had cystoscopy on  for further evaluation with findings of large blood clot in bladder that was evacuated and enlarged prostate.  Dr. Linda Verdin recommended TURP. Had TURP 20, findings of high bladder neck. Path:  
 DIAGNOSIS  
PROSTATE TISSUE: PROSTATE TISSUE WITH STROMAL AND GLANDULAR HYPERPLASIA, ACUTE AND CHRONIC INFLAMMATION. 4 gm resected. Sent home with 3 way bosch. States that he started to have gross hematuria a day after discharge . 
  
    
Past Medical History:  
Diagnosis Date  Adverse effect of anesthesia    
  nighmares  Aneurysm ([address]    
  Thorasic Aortic aneurysm, surger in [facility]  Atrial flutter ([address]    
  ROSENDO guided cardioversion, No thinners  CAD (coronary artery disease)    
  patient denies  Congestive heart failure ([address]    
  EF 45-50% on 2019  Hypertension    
 Ill-defined condition    
  spinal cord injury  Ill-defined condition    
  Neurogenic bladder, self caths  Morbid obesity ([address]    
 Thyroid disease    
  
     
Past Surgical History:  
Procedure Laterality Date  CARDIAC SURG PROCEDURE UNLIST   2016  
  repair of aneurysm in acending and transverse arteries  CARDIAC SURG PROCEDURE UNLIST   2017  
  Aortic valve repair Descending  CARDIAC SURG PROCEDURE UNLIST   2016  
  cardioversion, ROSENDO x2  
  CHEST SURGERY PROCEDURE UNLISTED   ,   
  Aortic aneursym  HX APPENDECTOMY      
 HX COLONOSCOPY     
  diverticulosis  HX ORTHOPAEDIC      
  repair of broken jaw  HX TONSILLECTOMY      
  
      
Current Outpatient Medications Medication Sig Dispense Refill  trimethoprim-sulfamethoxazole (BACTRIM DS) 160-800 mg per tablet Take 1 Tab by mouth two (2) times a day for 7 days. 14 Tab 0  carvedilol (COREG) 6.25 mg tablet Take 1 Tab by mouth two (2) times daily (with meals). 180 Tab 3  
 levothyroxine (SYNTHROID) 112 mcg tablet Take  by mouth Daily (before breakfast).      
 furosemide (LASIX) 40 mg tablet Take 40 mg by mouth two (2) times a day.      
 potassium chloride SR (K-TAB) 20 mEq tablet Take 20 mEq by mouth daily.      
 psyllium husk (METAMUCIL) 0.4 gram cap Take  by mouth daily.      
 sertraline (ZOLOFT) 50 mg tablet TAKE 1 TABLET EVERY DAY 90 Tab 1  
  
No Known Allergies Social History  
  
     
Socioeconomic History  Marital status:   
    Spouse name: Not on file  Number of children: Not on file  Years of education: Not on file  Highest education level: Not on file Occupational History  Not on file Social Needs  Financial resource strain: Not on file  Food insecurity:  
    Worry: Not on file  
    Inability: Not on file  Transportation needs:  
    Medical: Not on file  
    Non-medical: Not on file Tobacco Use  Smoking status: Former Smoker  
    Packs/day: 3.00  
    Years: 25.00  
    Pack years: 75.00  
    Types: Cigarettes  
    Last attempt to quit: 1986  
    Years since quittin.1  Smokeless tobacco: Former User  
    Types: Snuff, Chew  
    Quit date: 2014 Substance and Sexual Activity  Alcohol use: Not Currently  
    Comment: advises quitting  Drug use: No  
 Sexual activity: Yes  
    Partners: Female Lifestyle  Physical activity:  
    Days per week: Not on file     Minutes per session: Not on file  Stress: Not on file Relationships  Social connections:  
    Talks on phone: Not on file  
    Gets together: Not on file  
    Attends Congregational service: Not on file  
    Active member of club or organization: Not on file  
    Attends meetings of clubs or organizations: Not on file  
    Relationship status: Not on file  Intimate partner violence:  
    Fear of current or ex partner: Not on file  
    Emotionally abused: Not on file  
    Physically abused: Not on file  
    Forced sexual activity: Not on file Other Topics Concern  Not on file Social History Narrative  
  Lives with wife  
  Currently uses walker for ambulation post-op - [facility]  
     
  Previously employed as  / , [address] works,   
     
  PCP: Dr. Nicol Galvez History Problem Relation Age of Onset  Stroke Mother    
  
  
Review of Systems Constitutional:   Negative for fever and headaches. ENT:  Negative for high frequency hearing loss. Respiratory:  Negative for shortness of breath. Cardiovascular:  Negative for chest pain. GI:  Negative for abdominal pain. Genitourinary:  Negative for urinary burning and hematuria. Musculoskeletal:  Negative for back pain. Neurological:  Negative for numbness. Psychological:  Negative for depression. Endocrine:  Negative for fatigue. Hem/Lymphatic:  Negative for easy bruising. 
  
  
Visit Vitals /51 Pulse 75 Temp 98.3 °F (36.8 °C) (Oral) Wt 270 lb (122.5 kg) BMI 36.62 kg/m²  
  
Physical Exam 
General  
Mental Status - Patient is alert and oriented X3. Obese and in wheelchair Chest and Lung Exam  
Chest and lung exam reveals  - normal excursion with symmetric chest walls, quiet, even and easy respiratory effort with no use of accessory muscles and on auscultation, normal breath sounds, no adventitious sounds and normal vocal resonance. 
  
  
Cardiovascular  
 Cardiovascular examination reveals  - normal heart sounds, regular rate and rhythm with no murmurs. 
  
  
Abdomen  
Palpation/Percussion: Palpation and Percussion of the abdomen reveal - Non Tender, No Rebound tenderness, No Rigidity (guarding), No hepatosplenomegaly, No Palpable abdominal masses and Soft. Hernia - Bilateral - No Hernia(s) present. BLE- +2 pitting edema 
  
3 way bosch, hand-irrigated many clots. Still light red.  
  
  
Physical Exam 
  
Assessment and Plan 
    ICD-10-CM ICD-9-CM    
1. Clot retention of urine R33.8 788.29    
2. Severe obesity (HCC) E66.01 278.01    
3. Longstanding persistent atrial fibrillation I48.11 427.31    
4. Neurogenic bladder N31.9 596.54    
  
  
No orders of the defined types were placed in this encounter. 
  
 
Plan: Will admit for CBI, labs, possible cysto and evacuation of clots if urine doesn't clear. Continue CBI. Titrate drip to keep clear. Manually irrigate catheter as needed to keep clear. NPO at MN in event he needs cysto/evac of clots tomorrow. Follow-up and Dispositions  · Return for Admit to hospital. 
   
  
   
  
Revision History

## 2020-02-05 NOTE — PROGRESS NOTES
02/05/20 1540 Dual Skin Pressure Injury Assessment Dual Skin Pressure Injury Assessment WDL Second Care Provider (Based on [address] [place] [address]) Tamar Bailey RN Skin Integumentary Skin Integumentary (WDL) X Skin Color Appropriate for ethnicity Skin Condition/Temp Warm;Dry Skin Integrity Blister Turgor Epidermis thin w/ loss of subcut tissue Hair Growth Present Varicosities Absent Wound Prevention and Protection Methods Orientation of Wound Prevention Posterior Location of Wound Prevention Sacrum/Coccyx Dressing Present  Yes (applied Allevyn ) Dressing Status Other (comment) (Applied Allyvyn) Wound Offloading (Prevention Methods) Bed, pressure reduction mattress;Repositioning;Turning To room 616 from Ed. Oriented to room and floor. Instructed on NPO after midnight and would need to call for pain medication. Verbalizes understanding. Pt with bruise noted to rt side of buttock with non blanchable redness noted, Allevyn applied. RT heel with open blister noted applied Allevyn. LT heel boggy with no break down applied Mepilex. No further skin break down noted. Dual skin assessment completed with Tamar Bailey RN. Voices no c/o pain.

## 2020-02-05 NOTE — PROGRESS NOTES
END OF SHIFT NOTE:Jeffery with CBI remains at fast rate but clearing some. Resting in bed Hourly rounds completed with all needs met this shift. Will continue to monitor until night shift nurse takes over. INTAKE/OUTPUT No intake/output data recorded. Voiding: NO 
Catheter: YES Color: bloody Drain: DIET 
regular Flatus: Patient does have flatus present. Stool:  1 occurrences. Characteristics: 
  
 
Ambulating No 
 
Emesis: 0 occurrences. Characteristics: VITAL SIGNS Patient Vitals for the past 12 hrs: 
 Temp Pulse Resp BP SpO2  
02/05/20 1531 98.8 °F (37.1 °C) 91 18 113/49 96 % 02/05/20 1448  95  119/53 95 % 02/05/20 1143 98.1 °F (36.7 °C) 67 16 91/60 95 % Pain Assessment Pain Intensity 1: 5 (02/05/20 1840) Pain Location 1: Back Pain Intervention(s) 1: Medication (see MAR) Patient Stated Pain Goal: 0 Emmanuel Ibarra RN

## 2020-02-05 NOTE — ED NOTES
Pt refusing to get into bed. States that he wants to sit in his w/c. Notified that he likely needs CBI, per urology note, states that he will remain in his chair during CBI. Also refused to have VS rechecked. States \"my BP is always low\".

## 2020-02-06 ENCOUNTER — ANESTHESIA (OUTPATIENT)
Dept: SURGERY | Age: 80
End: 2020-02-06
Payer: MEDICARE

## 2020-02-06 ENCOUNTER — ANESTHESIA EVENT (OUTPATIENT)
Dept: SURGERY | Age: 80
End: 2020-02-06
Payer: MEDICARE

## 2020-02-06 LAB
INR PPP: 1.3
PROTHROMBIN TIME: 16.1 SEC (ref 12–14.7)

## 2020-02-06 PROCEDURE — 77030019605

## 2020-02-06 PROCEDURE — 76010000138 HC OR TIME 0.5 TO 1 HR: Performed by: UROLOGY

## 2020-02-06 PROCEDURE — 77030019927 HC TBNG IRR CYSTO BAXT -A: Performed by: UROLOGY

## 2020-02-06 PROCEDURE — 74011250637 HC RX REV CODE- 250/637: Performed by: NURSE PRACTITIONER

## 2020-02-06 PROCEDURE — 99218 HC RM OBSERVATION: CPT

## 2020-02-06 PROCEDURE — 74011250636 HC RX REV CODE- 250/636: Performed by: NURSE PRACTITIONER

## 2020-02-06 PROCEDURE — 36415 COLL VENOUS BLD VENIPUNCTURE: CPT

## 2020-02-06 PROCEDURE — 74011250636 HC RX REV CODE- 250/636: Performed by: NURSE ANESTHETIST, CERTIFIED REGISTERED

## 2020-02-06 PROCEDURE — 77030010509 HC AIRWY LMA MSK TELE -A: Performed by: ANESTHESIOLOGY

## 2020-02-06 PROCEDURE — 77030022427 HC ELECTRD RESCTCS CT STOR -C: Performed by: UROLOGY

## 2020-02-06 PROCEDURE — 77030040361 HC SLV COMPR DVT MDII -B: Performed by: UROLOGY

## 2020-02-06 PROCEDURE — 74011000250 HC RX REV CODE- 250: Performed by: NURSE ANESTHETIST, CERTIFIED REGISTERED

## 2020-02-06 PROCEDURE — 77030018832 HC SOL IRR H20 ICUM -A: Performed by: UROLOGY

## 2020-02-06 PROCEDURE — 77030018836 HC SOL IRR NACL ICUM -A

## 2020-02-06 PROCEDURE — 76210000063 HC OR PH I REC FIRST 0.5 HR: Performed by: UROLOGY

## 2020-02-06 PROCEDURE — 85610 PROTHROMBIN TIME: CPT

## 2020-02-06 PROCEDURE — 88305 TISSUE EXAM BY PATHOLOGIST: CPT

## 2020-02-06 PROCEDURE — 76060000033 HC ANESTHESIA 1 TO 1.5 HR: Performed by: UROLOGY

## 2020-02-06 RX ORDER — CEFAZOLIN SODIUM/WATER 2 G/20 ML
2 SYRINGE (ML) INTRAVENOUS
Status: COMPLETED | OUTPATIENT
Start: 2020-02-06 | End: 2020-02-06

## 2020-02-06 RX ORDER — ONDANSETRON 2 MG/ML
INJECTION INTRAMUSCULAR; INTRAVENOUS AS NEEDED
Status: DISCONTINUED | OUTPATIENT
Start: 2020-02-06 | End: 2020-02-06 | Stop reason: HOSPADM

## 2020-02-06 RX ORDER — PROPOFOL 10 MG/ML
INJECTION, EMULSION INTRAVENOUS AS NEEDED
Status: DISCONTINUED | OUTPATIENT
Start: 2020-02-06 | End: 2020-02-06 | Stop reason: HOSPADM

## 2020-02-06 RX ORDER — FENTANYL CITRATE 50 UG/ML
INJECTION, SOLUTION INTRAMUSCULAR; INTRAVENOUS AS NEEDED
Status: DISCONTINUED | OUTPATIENT
Start: 2020-02-06 | End: 2020-02-06 | Stop reason: HOSPADM

## 2020-02-06 RX ORDER — LIDOCAINE HYDROCHLORIDE 20 MG/ML
INJECTION, SOLUTION EPIDURAL; INFILTRATION; INTRACAUDAL; PERINEURAL AS NEEDED
Status: DISCONTINUED | OUTPATIENT
Start: 2020-02-06 | End: 2020-02-06 | Stop reason: HOSPADM

## 2020-02-06 RX ORDER — SODIUM CHLORIDE, SODIUM LACTATE, POTASSIUM CHLORIDE, CALCIUM CHLORIDE 600; 310; 30; 20 MG/100ML; MG/100ML; MG/100ML; MG/100ML
INJECTION, SOLUTION INTRAVENOUS
Status: DISCONTINUED | OUTPATIENT
Start: 2020-02-06 | End: 2020-02-06 | Stop reason: HOSPADM

## 2020-02-06 RX ADMIN — Medication 10 ML: at 13:55

## 2020-02-06 RX ADMIN — ONDANSETRON 4 MG: 2 INJECTION INTRAMUSCULAR; INTRAVENOUS at 17:51

## 2020-02-06 RX ADMIN — PHENYLEPHRINE HYDROCHLORIDE 100 MCG: 10 INJECTION INTRAVENOUS at 17:35

## 2020-02-06 RX ADMIN — PROPOFOL 130 MG: 10 INJECTION, EMULSION INTRAVENOUS at 17:24

## 2020-02-06 RX ADMIN — Medication 2 G: at 17:20

## 2020-02-06 RX ADMIN — Medication 10 ML: at 22:11

## 2020-02-06 RX ADMIN — SULFAMETHOXAZOLE AND TRIMETHOPRIM 1 TABLET: 800; 160 TABLET ORAL at 08:35

## 2020-02-06 RX ADMIN — FENTANYL CITRATE 25 MCG: 50 INJECTION INTRAMUSCULAR; INTRAVENOUS at 17:24

## 2020-02-06 RX ADMIN — HYDROCODONE BITARTRATE AND ACETAMINOPHEN 1 TABLET: 5; 325 TABLET ORAL at 03:37

## 2020-02-06 RX ADMIN — PHENYLEPHRINE HYDROCHLORIDE 100 MCG: 10 INJECTION INTRAVENOUS at 17:55

## 2020-02-06 RX ADMIN — PHENYLEPHRINE HYDROCHLORIDE 100 MCG: 10 INJECTION INTRAVENOUS at 17:50

## 2020-02-06 RX ADMIN — LIDOCAINE HYDROCHLORIDE 60 MG: 20 INJECTION, SOLUTION EPIDURAL; INFILTRATION; INTRACAUDAL; PERINEURAL at 17:24

## 2020-02-06 RX ADMIN — SODIUM CHLORIDE, SODIUM LACTATE, POTASSIUM CHLORIDE, AND CALCIUM CHLORIDE: 600; 310; 30; 20 INJECTION, SOLUTION INTRAVENOUS at 17:12

## 2020-02-06 RX ADMIN — SULFAMETHOXAZOLE AND TRIMETHOPRIM 1 TABLET: 800; 160 TABLET ORAL at 22:11

## 2020-02-06 NOTE — PROGRESS NOTES
Care Management Interventions PCP Verified by CM: Yes(Dr. Aundrea Melo) Mode of Transport at Discharge: Self Transition of Care Consult (CM Consult): Discharge Planning Discharge Durable Medical Equipment: Yes(Wheelchair) Physical Therapy Consult: No 
Occupational Therapy Consult: No 
Speech Therapy Consult: No 
Current Support Network: Own Home, Lives with Spouse Confirm Follow Up Transport: Family Discharge Location Discharge Placement: Home Cm met with patient in room wife at bedside. Patient alert and orient. Patient verified demographic information to be correct. Patient stated he and his wife live in a one story home that has a ramp to enter the residence. DME: Wheelchair O2: na 
CPAP: na 
HD: na 
ADL patient stated he is able to feed, bathe, and dress himself but needs assistance putting on socks and getting his pants all the way up. Patient stated his wife assists him daily. Patient stated he no longer drives but his wife drives him to his appointments and on necessary errands. Patient stated he is able to afford his needed medications. Patient stated he has had [facility] and [facility] in the past and has been to [facility] for STR. Patient stated he has also been to the [facility] for rehab and a to a rehab facility in [place]. Patient stated he would like to return home at discharge. CM will continue to follow patient during hospitalization for discharge planning and needs. Please consult CM for new needs.

## 2020-02-06 NOTE — PROGRESS NOTES
Hourly rounds completed this shift. CBI going at fast gtt with pink urine. Pt has been NPO since midnight. All needs met at this time. Bed low/locked. Call light within reach. Will continue to monitor and give bedside report to oncoming nurse.

## 2020-02-06 NOTE — PROGRESS NOTES
Utilization Review Physician has recommended this patient is most appropriate as Outpatient Status receiving Observation Services. The Attending provider agreed and an outpatient order was placed on the chart as the Attending Provider requested. The patient will be provided the documentation for a Condition Code 44, conversion from Inpatient to Outpatient Status, and questions answered. The MOON letter explaining the outpatient/observation services was given to patient with verbal explanation and verbal understanding was received about information given. Letter signed by patient with date and time. Signed copy placed in the patient's chart for scanning by HIS and copy left at bedside for patient information. UR HOLLAND Augustine and CM notified.

## 2020-02-06 NOTE — PERIOP NOTES
TRANSFER - IN REPORT: 
 
Verbal report received from Reymundo Calvo on Nelly Mcfarland  being received from [phone] for ordered procedure Report consisted of patients Situation, Background, Assessment and  
Recommendations(SBAR). Information from the following report(s) SBAR and MAR was reviewed with the receiving nurse. Opportunity for questions and clarification was provided. Assessment to be completed upon patients arrival to unit and care assumed.

## 2020-02-06 NOTE — PROGRESS NOTES
TRANSFER - IN REPORT: 
 
Verbal report received from [address] [place] [address] on [facility]  being received from ED for routine progression of care Report consisted of patients Situation, Background, Assessment and  
Recommendations(SBAR). Information from the following report(s) SBAR, Kardex, ED Summary, MAR, Recent Results and Med Rec Status was reviewed with the receiving nurse. Opportunity for questions and clarification was provided. Assessment completed upon patients arrival to unit and care assumed. Waiting on transport

## 2020-02-06 NOTE — PROGRESS NOTES
Had to manually irrigate twice, once at 0930 and once at 1000. Moderate amount of dark clot from bladder. Urine is now very light pink with CBI at a fast drip. CBI bag is being changed about once per hour since 0900.

## 2020-02-06 NOTE — ANESTHESIA PREPROCEDURE EVALUATION
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 Cardiovascular Hypertension CHF (EF 45-50%) Dysrhythmias : atrial fibrillation and atrial flutter PAD (TAA s/p repair 2016.) Exercise tolerance: <4 METS: Uses WC due to paralysis. GI/Hepatic/Renal 
  
 
 
Renal disease: CRI Comments: GI bleed Endo/Other Hypothyroidism Obesity Other Findings Physical Exam 
 
Airway Mallampati: II 
TM Distance: 4 - 6 cm Neck ROM: normal range of motion Mouth opening: Normal 
 
 Cardiovascular Rhythm: irregular Rate: normal 
 
 
 
 Dental 
No notable dental hx Pulmonary Breath sounds clear to auscultation Abdominal 
 
 
 
 Other Findings Anesthetic Plan ASA: 3 Anesthesia type: general 
 
 
 
 
Induction: Intravenous Anesthetic plan and risks discussed with: Patient and Spouse

## 2020-02-06 NOTE — PROGRESS NOTES
Turned patient bruise noted to rt side of buttock with non blanchable redness noted, Allevyn applied. RT heel with open blister noted applied Allevyn. Allevyn was also applied to the left heel for prevention.

## 2020-02-06 NOTE — BRIEF OP NOTE
BRIEF OPERATIVE NOTE Date of Procedure: 2/6/2020 Preoperative Diagnosis: Clot retention of urine [R33.8] Postoperative Diagnosis: * No post-op diagnosis entered * Procedure(s): 
CYSTOSCOPY/TURP Surgeon(s) and Role: * Princess Diony MD - Primary Surgical Assistant: none Surgical Staff: 
Circ-1: Jabari Tong RN 
Circ-Relief: Sean Orozco RN Scrub Tech-1: Leandra Speaker Event Time In Time Out Incision Start 02/06/2020 1738 Incision Close 02/06/2020 1758 Anesthesia: General  
Estimated Blood Loss: 300 ml clot Specimens: * No specimens in log * Findings: see op note Complications: none Implants: * No implants in log *

## 2020-02-06 NOTE — PROGRESS NOTES
Subjective:  
Daily Progress Note: 2020 8:14 AM 
No Complaints. Had bosch irrigated of clots yesterday. Objective:  
 
Visit Vitals /60 (BP 1 Location: Right arm, BP Patient Position: Head of bed elevated (Comment degrees)) Pulse 80 Temp 97.9 °F (36.6 °C) Resp 18 Ht 6' (1.829 m) Wt 270 lb (122.5 kg) SpO2 96% BMI 36.62 kg/m² O2 Device: Room air Temp (24hrs), Av.3 °F (36.8 °C), Min:97.9 °F (36.6 °C), Max:98.8 °F (37.1 °C) 
 
 
1901 -  07 In: 03057 Out: 23014 [[id number]] 701 - 1900 In: 3000 Out: 3750 [ZUEQR:5811] [unfilled] [unfilled] [facility] Exam: Physical Exam 
General  
Mental Status - Patient is alert and oriented X3. Build & Nutrition - Well nourished. Chest and Lung Exam  
Chest and lung exam reveals  - normal excursion with symmetric chest walls, quiet, even and easy respiratory effort with no use of accessory muscles and on auscultation, normal breath sounds, no adventitious sounds and normal vocal resonance. Cardiovascular  
Cardiovascular examination reveals  - normal heart sounds, regular rate and rhythm with no murmurs. Abdomen  
Palpation/Percussion: Palpation and Percussion of the abdomen reveal - Non Tender, No Rebound tenderness, No Rigidity (guarding), No hepatosplenomegaly, No Palpable abdominal masses and Soft. Hernia - Bilateral - No Hernia(s) present. bosch with red urine, CBI has run out. Data Review Recent Results (from the past 24 hour(s)) CBC WITH AUTOMATED DIFF Collection Time: 20 11:47 AM  
Result Value Ref Range WBC 11.3 (H) 4.3 - 11.1 K/uL  
 RBC 3.14 (L) 4.23 - 5.6 M/uL HGB 9.2 (L) 13.6 - 17.2 g/dL HCT 29.1 (L) 41.1 - 50.3 % MCV 92.7 79.6 - 97.8 FL  
 MCH 29.3 26.1 - 32.9 PG  
 MCHC 31.6 31.4 - 35.0 g/dL  
 RDW 17.6 (H) 11.9 - 14.6 % PLATELET 338 676 - 153 K/uL MPV 9.4 9.4 - 12.3 FL ABSOLUTE NRBC 0.00 0.0 - 0.2 K/uL  
 DF AUTOMATED NEUTROPHILS 79 (H) 43 - 78 % LYMPHOCYTES 7 (L) 13 - 44 % MONOCYTES 11 4.0 - 12.0 % EOSINOPHILS 2 0.5 - 7.8 % BASOPHILS 0 0.0 - 2.0 % IMMATURE GRANULOCYTES 1 0.0 - 5.0 %  
 ABS. NEUTROPHILS 8.9 (H) 1.7 - 8.2 K/UL  
 ABS. LYMPHOCYTES 0.8 0.5 - 4.6 K/UL  
 ABS. MONOCYTES 1.3 0.1 - 1.3 K/UL  
 ABS. EOSINOPHILS 0.2 0.0 - 0.8 K/UL  
 ABS. BASOPHILS 0.0 0.0 - 0.2 K/UL  
 ABS. IMM. GRANS. 0.1 0.0 - 0.5 K/UL METABOLIC PANEL, COMPREHENSIVE Collection Time: 02/05/20 11:47 AM  
Result Value Ref Range Sodium 137 136 - 145 mmol/L Potassium 4.3 3.5 - 5.1 mmol/L Chloride 105 98 - 107 mmol/L  
 CO2 25 21 - 32 mmol/L Anion gap 7 7 - 16 mmol/L Glucose 94 65 - 100 mg/dL BUN 37 (H) 8 - 23 MG/DL Creatinine 1.95 (H) 0.8 - 1.5 MG/DL  
 GFR est AA 43 (L) >60 ml/min/1.73m2 GFR est non-AA 35 (L) >60 ml/min/1.73m2 Calcium 9.0 8.3 - 10.4 MG/DL Bilirubin, total 0.6 0.2 - 1.1 MG/DL  
 ALT (SGPT) 16 12 - 65 U/L  
 AST (SGOT) 19 15 - 37 U/L Alk. phosphatase 134 50 - 136 U/L Protein, total 7.3 6.3 - 8.2 g/dL Albumin 3.5 3.2 - 4.6 g/dL Globulin 3.8 (H) 2.3 - 3.5 g/dL A-G Ratio 0.9 (L) 1.2 - 3.5 Assessment Active Problems: 
  Gross hematuria (1/13/2020) Hematuria (2/5/2020) Plan: Will go to OR for cysto, evacuation of clots, fulguration of bleeding.

## 2020-02-06 NOTE — PROGRESS NOTES
Problem: Falls - Risk of 
Goal: *Absence of Falls Description Document Sebastian Wilcox Fall Risk and appropriate interventions in the flowsheet. Outcome: Progressing Towards Goal 
Note: Fall Risk Interventions: 
  
 
  
 
Medication Interventions: Evaluate medications/consider consulting pharmacy, Patient to call before getting OOB, Teach patient to arise slowly Elimination Interventions: Call light in reach, Stay With Me (per policy), Toileting schedule/hourly rounds Problem: Patient Education: Go to Patient Education Activity Goal: Patient/Family Education Outcome: Progressing Towards Goal 
  
Problem: Pressure Injury - Risk of 
Goal: *Prevention of pressure injury Description Document Kareem Scale and appropriate interventions in the flowsheet. Outcome: Progressing Towards Goal 
Note: Pressure Injury Interventions: 
Sensory Interventions: Assess changes in LOC, Assess need for specialty bed, Avoid rigorous massage over bony prominences, Check visual cues for pain, Keep linens dry and wrinkle-free, Maintain/enhance activity level, Minimize linen layers Moisture Interventions: Absorbent underpads, Assess need for specialty bed, Internal/External urinary devices, Maintain skin hydration (lotion/cream), Minimize layers, Moisture barrier Activity Interventions: Assess need for specialty bed, Increase time out of bed, Pressure redistribution bed/mattress(bed type) Mobility Interventions: Assess need for specialty bed, HOB 30 degrees or less, Pressure redistribution bed/mattress (bed type) Nutrition Interventions: Document food/fluid/supplement intake Friction and Shear Interventions: Apply protective barrier, creams and emollients, HOB 30 degrees or less, Lift sheet, Lift team/patient mobility team, Minimize layers Problem: Patient Education: Go to Patient Education Activity Goal: Patient/Family Education Outcome: Progressing Towards Goal

## 2020-02-07 PROCEDURE — 74011250637 HC RX REV CODE- 250/637: Performed by: NURSE PRACTITIONER

## 2020-02-07 PROCEDURE — 77030018836 HC SOL IRR NACL ICUM -A

## 2020-02-07 PROCEDURE — 77030040361 HC SLV COMPR DVT MDII -B

## 2020-02-07 PROCEDURE — 99218 HC RM OBSERVATION: CPT

## 2020-02-07 RX ADMIN — HYDROCODONE BITARTRATE AND ACETAMINOPHEN 1 TABLET: 5; 325 TABLET ORAL at 20:18

## 2020-02-07 RX ADMIN — SULFAMETHOXAZOLE AND TRIMETHOPRIM 1 TABLET: 800; 160 TABLET ORAL at 09:04

## 2020-02-07 RX ADMIN — SULFAMETHOXAZOLE AND TRIMETHOPRIM 1 TABLET: 800; 160 TABLET ORAL at 20:19

## 2020-02-07 NOTE — PROGRESS NOTES
Problem: Falls - Risk of 
Goal: *Absence of Falls Description Document Ebenezer Oconnor Fall Risk and appropriate interventions in the flowsheet. Outcome: Progressing Towards Goal 
Note: Fall Risk Interventions: 
  
 
  
 
Medication Interventions: Evaluate medications/consider consulting pharmacy, Patient to call before getting OOB, Teach patient to arise slowly Elimination Interventions: Call light in reach, Patient to call for help with toileting needs, Stay With Me (per policy), Toilet paper/wipes in reach, Toileting schedule/hourly rounds Problem: Patient Education: Go to Patient Education Activity Goal: Patient/Family Education Outcome: Progressing Towards Goal 
  
Problem: Pressure Injury - Risk of 
Goal: *Prevention of pressure injury Description Document Kareem Scale and appropriate interventions in the flowsheet. Outcome: Progressing Towards Goal 
Note: Pressure Injury Interventions: 
Sensory Interventions: Assess changes in LOC, Assess need for specialty bed, Avoid rigorous massage over bony prominences, Check visual cues for pain, Float heels, Keep linens dry and wrinkle-free, Maintain/enhance activity level, Minimize linen layers, Monitor skin under medical devices, Pressure redistribution bed/mattress (bed type) Moisture Interventions: Absorbent underpads, Apply protective barrier, creams and emollients, Assess need for specialty bed, Internal/External urinary devices, Maintain skin hydration (lotion/cream), Minimize layers, Moisture barrier Activity Interventions: Assess need for specialty bed, Increase time out of bed, Pressure redistribution bed/mattress(bed type) Mobility Interventions: Assess need for specialty bed, Float heels, HOB 30 degrees or less, Pressure redistribution bed/mattress (bed type) Nutrition Interventions: Document food/fluid/supplement intake Friction and Shear Interventions: Apply protective barrier, creams and emollients, Foam dressings/transparent film/skin sealants, HOB 30 degrees or less, Lift sheet, Lift team/patient mobility team, Minimize layers, Transferring/repositioning devices Problem: Patient Education: Go to Patient Education Activity Goal: Patient/Family Education Outcome: Progressing Towards Goal

## 2020-02-07 NOTE — PROGRESS NOTES
Hourly rounds completed this shift. All needs met at this time. Bed low/locked. Call light within reach. CBI flowing at fast gtt with pink urine output. Will continue to monitor and give bedside report to oncoming nurse.

## 2020-02-07 NOTE — OP NOTES
[address] [facility] 
OPERATIVE REPORT Name:  Chloe Hodge 
MR#:  [id number] :  1940 ACCOUNT #:  [de-identified] DATE OF SERVICE:  2020 PREOPERATIVE DIAGNOSES: 
1. Hematuria. 2.  Benign prostatic hyperplasia. POSTOPERATIVE DIAGNOSES: 
1. Hematuria. 2.  Benign prostatic hyperplasia. PROCEDURES PERFORMED:  Cystoscopy, evacuation of clots, transurethral resection of prostate. SURGEON:  Archie Villanueva MD 
 
ASSISTANT:  None. ANESTHESIA:  General. 
 
COMPLICATIONS:  None. SPECIMENS REMOVED:  Prostate tissue. IMPLANTS:  None. ESTIMATED BLOOD LOSS:  About 300 mL of blood clot. FINDINGS:  Recent TURP with blood clot mostly within the prostatic fossa, very high bladder neck with small bladder capacity. INDICATION:  The patient had a TURP done during his last hospitalization. He was sent home with an indwelling Jeffery. He came in the office with clot retention and his Jeffery clotted off. He was admitted and the urine is still bloody despite continuous bladder irrigation. He is brought in the OR at this time for definitive treatment. DESCRIPTION OF PROCEDURE:  The patient was given a general anesthetic, placed in the dorsal lithotomy position. He was prepped and draped in the sterile fashion. His Jeffery catheter was removed prior to prepping. I inserted a 26-[place] Island[place] continuous flow cystoscope sheath over an obturator into the bladder. The resectoscope with 30-degree lens and video camera was used to investigate the prostatic fossa and bladder. We used a yellow loop. There was evidence of previous TURP but still had some fairly pronounced apical prostatic tissue which was distal to the verumontanum. There was blood clot within the left side of the bladder and in the prostatic fossa. Mari syringe was used to irrigate the visible blood clot out. We then used a cutting loop. There was no discrete bleeder noted.   I did do some cauterization around the bladder neck. I also did some more resecting of the adenomatous-appearing tissue in the lateral aspect of the prostatic fossa. At the end of the procedure, these chips were irrigated out and sent in formalin along with some of the blood clot. My impression is that the bleeding probably occurred because the Jeffery catheter balloon may have gotten pulled down into the prostatic fossa and not drained well. The scope was removed and a 24-Peruvian three-way Jeffery was inserted over a catheter guide. I inflated the balloon with 40 mL of water. It was irrigated and then connected to continuous bladder irrigation and ran almost clear. He was taken to recovery room in stable condition. PLAN:  He will be transferred back to the floor. MD GALLO Calderon/[id number] 
D:  02/06/2020 18:22 T:  02/07/2020 5:09 
JOB #:  [id number]

## 2020-02-07 NOTE — PROGRESS NOTES
Admit Date: 2/5/2020 Subjective:  
 
Gonzalo Garcia is doing well. Urine pink with CBI at moderate drip. Objective:  
 
Patient Vitals for the past 8 hrs: 
 BP Temp Pulse Resp SpO2  
02/07/20 0822 109/75 98.2 °F (36.8 °C) 79 20 95 % 02/07/20 0310 97/64 99 °F (37.2 °C) 87 17 91 % 02/07 0701 - 02/07 1900 In: 3000 Out: 3250 [[facility]:[id number]] 02/05 1901 - 02/07 0700 In: 96175 [I.V.:500] Out: 81157 [[facility]:[id number]] Physical Exam: 
GENERAL: alert, cooperative, no distress LUNG: clear to auscultation bilaterally HEART: regular rate and rhythm, S1, S2  
ABDOMEN: soft, non-tender NEUROLOGIC: AOx3 Data Review Recent Results (from the past 24 hour(s)) PROTHROMBIN TIME + INR Collection Time: 02/06/20  9:41 AM  
Result Value Ref Range Prothrombin time 16.1 (H) 12.0 - 14.7 sec INR 1.3 Assessment:  
 
Active Problems: 
  Gross hematuria (1/13/2020) Hematuria (2/5/2020) POD 1: 
 
POSTOPERATIVE DIAGNOSES: 
1. Hematuria. 2.  Benign prostatic hyperplasia. 
  
PROCEDURES PERFORMED:  Cystoscopy, evacuation of clots, transurethral resection of prostate. Afebrile, VSS Plan:  
 
Continue CBI. Titrate drip to keep clear and wean as tolerated. Likely home tomorrow with bosch catheter. He will f/u with Dr. Tim Weiss as scheduled. Home with PO bactrim. Angie Clay NP [facility]

## 2020-02-07 NOTE — PROGRESS NOTES
Hourly rounds completed, pt denies needs at this time. Pt doing well. CBI clear and running slow. No complaints

## 2020-02-07 NOTE — ANESTHESIA POSTPROCEDURE EVALUATION
Procedure(s): 
CYSTOSCOPY/TURP. general 
 
Anesthesia Post Evaluation Multimodal analgesia: multimodal analgesia used between 6 hours prior to anesthesia start to PACU discharge Patient location during evaluation: PACU Patient participation: complete - patient participated Level of consciousness: awake and alert Pain management: adequate Airway patency: patent Anesthetic complications: no 
Cardiovascular status: acceptable and hemodynamically stable Respiratory status: acceptable Hydration status: acceptable Vitals Value Taken Time /58 2/6/2020  6:50 PM  
Temp 36.8 °C (98.3 °F) 2/6/2020  6:45 PM  
Pulse 88 2/6/2020  6:52 PM  
Resp 14 2/6/2020  6:52 PM  
SpO2 93 % 2/6/2020  6:53 PM  
Vitals shown include unvalidated device data.

## 2020-02-07 NOTE — PERIOP NOTES
TRANSFER - OUT REPORT: 
 
Verbal report given to Dangelo Vogel on Sandi Metz  being transferred to [phone] for routine post - op Report consisted of patients Situation, Background, Assessment and  
Recommendations(SBAR). Information from the following report(s) SBAR, OR Summary, Procedure Summary, Intake/Output and MAR was reviewed with the receiving nurse. Lines:  
Peripheral IV 02/05/20 Left Antecubital (Active) Site Assessment Clean, dry, & intact 2/6/2020  6:45 PM  
Phlebitis Assessment 0 2/6/2020  6:45 PM  
Infiltration Assessment 0 2/6/2020  6:45 PM  
Dressing Status Clean, dry, & intact 2/6/2020  6:45 PM  
Dressing Type Tape;Transparent 2/6/2020  6:45 PM  
Hub Color/Line Status Pink; Infusing 2/6/2020  6:45 PM  
Action Taken Dressing changed 2/6/2020  4:33 PM  
Alcohol Cap Used No 2/6/2020  2:57 AM  
  
 
Opportunity for questions and clarification was provided. Patient transported with: 
 O2 @ 2 liters VTE prophylaxis orders have been written for Sandi Metz. Patient and family given floor number and nurses name. Family updated re: pt status after security code verified.

## 2020-02-08 VITALS
HEIGHT: 72 IN | TEMPERATURE: 98.5 F | WEIGHT: 270.1 LBS | DIASTOLIC BLOOD PRESSURE: 53 MMHG | OXYGEN SATURATION: 98 % | SYSTOLIC BLOOD PRESSURE: 99 MMHG | BODY MASS INDEX: 36.59 KG/M2 | RESPIRATION RATE: 20 BRPM | HEART RATE: 92 BPM

## 2020-02-08 PROCEDURE — 77030018836 HC SOL IRR NACL ICUM -A

## 2020-02-08 PROCEDURE — 99218 HC RM OBSERVATION: CPT

## 2020-02-08 PROCEDURE — 74011250637 HC RX REV CODE- 250/637: Performed by: NURSE PRACTITIONER

## 2020-02-08 RX ADMIN — SULFAMETHOXAZOLE AND TRIMETHOPRIM 1 TABLET: 800; 160 TABLET ORAL at 09:33

## 2020-02-08 RX ADMIN — HYDROCODONE BITARTRATE AND ACETAMINOPHEN 1 TABLET: 5; 325 TABLET ORAL at 09:34

## 2020-02-08 RX ADMIN — HYDROCODONE BITARTRATE AND ACETAMINOPHEN 1 TABLET: 5; 325 TABLET ORAL at 00:42

## 2020-02-08 RX ADMIN — Medication 10 ML: at 05:08

## 2020-02-08 NOTE — PROGRESS NOTES
Pt is for discharge home today with no needs/supportive care orders recieved for CM at this time. Care Management Interventions PCP Verified by CM: Yes(Dr. Deangelo Elizondo) Mode of Transport at Discharge: Self Transition of Care Consult (CM Consult): Discharge Planning Discharge Durable Medical Equipment: Yes(Wheelchair) Physical Therapy Consult: No 
Occupational Therapy Consult: No 
Speech Therapy Consult: No 
Current Support Network: Own Home, Lives with Spouse Confirm Follow Up Transport: Family Discharge Location Discharge Placement: Home

## 2020-02-08 NOTE — PROGRESS NOTES
Admit Date: 2/5/2020 Subjective:  
 
Patient has no new complaint. Objective:  
 
Patient Vitals for the past 8 hrs: 
 BP Temp Pulse Resp SpO2  
02/08/20 0436 95/51 98.4 °F (36.9 °C) 90 18 94 % 02/07/20 2353 91/52  [address] No intake/output data recorded. 02/06 1901 - 02/08 0700 In: 87229 [P.O.:480] Out: 60021 [Urine:57367] Physical Exam: abd soft, urine clear yellow on SLOW CBI Data Review No results found for this or any previous visit (from the past 24 hour(s)). Assessment:  
 
Active Problems: 
  Gross hematuria (1/13/2020) Hematuria (2/5/2020) 
 
s/p evacuation of clots and fulguration of bleeders Plan:  
 
 
Stop CBI. Home today with bosch IN. Follow up with Francois Francisco as scheduled.   
 
Coby Del Cid MD

## 2020-02-08 NOTE — PROGRESS NOTES
Problem: Falls - Risk of 
Goal: *Absence of Falls Description Document Nacho Diaz Fall Risk and appropriate interventions in the flowsheet. Outcome: Progressing Towards Goal 
Note: Fall Risk Interventions: 
  
 
  
 
Medication Interventions: Evaluate medications/consider consulting pharmacy, Patient to call before getting OOB, Teach patient to arise slowly Elimination Interventions: Call light in reach, Patient to call for help with toileting needs, Stay With Me (per policy), Toilet paper/wipes in reach, Toileting schedule/hourly rounds Problem: Patient Education: Go to Patient Education Activity Goal: Patient/Family Education Outcome: Progressing Towards Goal 
  
Problem: Pressure Injury - Risk of 
Goal: *Prevention of pressure injury Description Document Kareem Scale and appropriate interventions in the flowsheet. Outcome: Progressing Towards Goal 
Note: Pressure Injury Interventions: 
Sensory Interventions: Assess changes in LOC, Assess need for specialty bed, Avoid rigorous massage over bony prominences, Check visual cues for pain, Discuss PT/OT consult with provider, Float heels, Keep linens dry and wrinkle-free, Maintain/enhance activity level, Minimize linen layers, Pressure redistribution bed/mattress (bed type) Moisture Interventions: Absorbent underpads, Assess need for specialty bed, Internal/External urinary devices, Maintain skin hydration (lotion/cream), Minimize layers, Moisture barrier Activity Interventions: Assess need for specialty bed, Increase time out of bed, Pressure redistribution bed/mattress(bed type), PT/OT evaluation Mobility Interventions: Assess need for specialty bed, HOB 30 degrees or less, Pressure redistribution bed/mattress (bed type) Nutrition Interventions: Document food/fluid/supplement intake Friction and Shear Interventions: HOB 30 degrees or less Problem: Patient Education: Go to Patient Education Activity Goal: Patient/Family Education Outcome: Progressing Towards Goal

## 2020-02-08 NOTE — PROGRESS NOTES
Discharge teaching done with pt and wife. Instructed on all care, medications, and appointments. Written instructions given. Demonstrated how to empty bosch bag. Verbalizes understanding. Denies questions. No valuables to return. Will let nurse know when ready to leave.

## 2020-02-08 NOTE — PROGRESS NOTES
Hourly rounds completed this shift. All needs met at this time. CBI at slow rate with yellow/clear urine output. Bed low/locked. Call light within reach. Will continue to monitor and give bedside report to oncoming nurse.

## 2020-02-08 NOTE — DISCHARGE INSTRUCTIONS
Patient Education        Blood in the Urine: Care Instructions  Your Care Instructions    Blood in the urine, or hematuria, may make the urine look red, brown, or pink. There may be blood every time you urinate or just from time to time. You cannot always see blood in the urine, but it will show up in a urine test.  Blood in the urine may be serious. It should always be checked by a doctor. Your doctor may recommend more tests, including an X-ray, a CT scan, or a cystoscopy (which lets a doctor look inside the urethra and bladder). Blood in the urine can be a sign of another problem. Common causes are bladder infections and kidney stones. An injury to your groin or your genital area can also cause bleeding in the urinary tract. Very hard exercise--such as running a marathon--can cause blood in the urine. Blood in the urine can also be a sign of kidney disease or cancer in the bladder or kidney. Many cases of blood in the urine are caused by a harmless condition that runs in families. This is called benign familial hematuria. It does not need any treatment. Sometimes your urine may look red or brown even though it does not contain blood. For example, not getting enough fluids (dehydration), taking certain medicines, or having a liver problem can change the color of your urine. Eating foods such as beets, rhubarb, or blackberries or foods with red food coloring can make your urine look red or pink. Follow-up care is a key part of your treatment and safety. Be sure to make and go to all appointments, and call your doctor if you are having problems. It's also a good idea to know your test results and keep a list of the medicines you take. When should you call for help? Call your doctor now or seek immediate medical care if:    · You have symptoms of a urinary infection. For example:  ? You have pus in your urine. ? You have pain in your back just below your rib cage. This is called flank pain. ?  You have a fever, chills, or body aches. ? It hurts to urinate. ? You have groin or belly pain.     · You have more blood in your urine.    Watch closely for changes in your health, and be sure to contact your doctor if:    · You have new urination problems.     · You do not get better as expected. Where can you learn more? Go to http://bam-della.info/. Enter K082 in the search box to learn more about \"Blood in the Urine: Care Instructions. \"  Current as of: December 19, 2018  Content Version: 12.2  © 1761-3512 [employer]. Care instructions adapted under license by [employer] (which disclaims liability or warranty for this information). If you have questions about a medical condition or this instruction, always ask your healthcare professional. [address] any warranty or liability for your use of this information. DISCHARGE SUMMARY from Nurse    PATIENT INSTRUCTIONS:    After general anesthesia or intravenous sedation, for 24 hours or while taking prescription Narcotics:  · Limit your activities  · Do not drive and operate hazardous machinery  · Do not make important personal or business decisions  · Do  not drink alcoholic beverages  · If you have not urinated within 8 hours after discharge, please contact your surgeon on call.     Report the following to your surgeon:  · Excessive pain, swelling, redness or odor of or around the surgical area  · Temperature over 100.5  · Nausea and vomiting lasting longer than 4 hours or if unable to take medications  · Any signs of decreased circulation or nerve impairment to extremity: change in color, persistent  numbness, tingling, coldness or increase pain  · Any questions    What to do at Home:  Recommended activity: Activity as tolerated, as tolerated  If you experience any of the following symptoms excessive bloody urine, excessive clots, pain, unable to urinate, please follow up with Dr Francois Francisco as scheduled. *  Please give a list of your current medications to your Primary Care Provider. *  Please update this list whenever your medications are discontinued, doses are      changed, or new medications (including over-the-counter products) are added. *  Please carry medication information at all times in case of emergency situations. These are general instructions for a healthy lifestyle:    No smoking/ No tobacco products/ Avoid exposure to second hand smoke  Surgeon General's Warning:  Quitting smoking now greatly reduces serious risk to your health. Obesity, smoking, and sedentary lifestyle greatly increases your risk for illness    A healthy diet, regular physical exercise & weight monitoring are important for maintaining a healthy lifestyle    You may be retaining fluid if you have a history of heart failure or if you experience any of the following symptoms:  Weight gain of 3 pounds or more overnight or 5 pounds in a week, increased swelling in our hands or feet or shortness of breath while lying flat in bed. Please call your doctor as soon as you notice any of these symptoms; do not wait until your next office visit. The discharge information has been reviewed with the patient. The patient verbalized understanding. Discharge medications reviewed with the patient and appropriate educational materials and side effects teaching were provided.   ___________________________________________________________________________________________________________________________________

## 2020-02-21 ENCOUNTER — HOSPITAL ENCOUNTER (EMERGENCY)
Age: 80
Discharge: HOME | End: 2020-02-21
Attending: EMERGENCY MEDICINE
Payer: MEDICARE

## 2020-02-21 VITALS
TEMPERATURE: 98.4 F | RESPIRATION RATE: 16 BRPM | HEART RATE: 93 BPM | DIASTOLIC BLOOD PRESSURE: 58 MMHG | SYSTOLIC BLOOD PRESSURE: 123 MMHG | OXYGEN SATURATION: 98 %

## 2020-02-21 DIAGNOSIS — R31.0 GROSS HEMATURIA: Primary | ICD-10-CM

## 2020-02-21 LAB
APPEARANCE UR: ABNORMAL
BACTERIA URNS QL MICRO: ABNORMAL /HPF
BILIRUB UR QL: ABNORMAL
CASTS URNS QL MICRO: ABNORMAL /LPF
COLOR UR: ABNORMAL
EPI CELLS #/AREA URNS HPF: 0 /HPF
GLUCOSE UR STRIP.AUTO-MCNC: NEGATIVE MG/DL
HGB UR QL STRIP: ABNORMAL
KETONES UR QL STRIP.AUTO: ABNORMAL MG/DL
LEUKOCYTE ESTERASE UR QL STRIP.AUTO: ABNORMAL
NITRITE UR QL STRIP.AUTO: POSITIVE
PH UR STRIP: 5.5 [PH] (ref 5–9)
PROT UR STRIP-MCNC: 300 MG/DL
RBC #/AREA URNS HPF: >100 /HPF
SP GR UR REFRACTOMETRY: 1.01 (ref 1–1.02)
UROBILINOGEN UR QL STRIP.AUTO: 1 EU/DL (ref 0.2–1)
WBC URNS QL MICRO: ABNORMAL /HPF

## 2020-02-21 PROCEDURE — 99284 EMERGENCY DEPT VISIT MOD MDM: CPT

## 2020-02-21 PROCEDURE — 51703 INSERT BLADDER CATH COMPLEX: CPT

## 2020-02-21 PROCEDURE — 81001 URINALYSIS AUTO W/SCOPE: CPT

## 2020-02-21 RX ORDER — CEPHALEXIN 500 MG/1
CAPSULE ORAL
COMMUNITY
Start: 2020-02-21 | End: 2020-03-10 | Stop reason: SDUPTHER

## 2020-02-22 ENCOUNTER — HOSPITAL ENCOUNTER (EMERGENCY)
Age: 80
Discharge: HOME | End: 2020-02-22
Attending: EMERGENCY MEDICINE
Payer: MEDICARE

## 2020-02-22 VITALS
BODY MASS INDEX: 36.57 KG/M2 | WEIGHT: 270 LBS | TEMPERATURE: 98.7 F | HEART RATE: 90 BPM | RESPIRATION RATE: 16 BRPM | HEIGHT: 72 IN | OXYGEN SATURATION: 95 % | DIASTOLIC BLOOD PRESSURE: 68 MMHG | SYSTOLIC BLOOD PRESSURE: 130 MMHG

## 2020-02-22 DIAGNOSIS — T83.9XXA COMPLICATION OF FOLEY CATHETER, INITIAL ENCOUNTER (HCC): Primary | ICD-10-CM

## 2020-02-22 PROCEDURE — 51700 IRRIGATION OF BLADDER: CPT

## 2020-02-22 PROCEDURE — 99284 EMERGENCY DEPT VISIT MOD MDM: CPT

## 2020-02-22 NOTE — DISCHARGE INSTRUCTIONS
Leave the indwelling catheter in place. Contact your urologist on Monday for follow-up and recheck. Return to the ER for any other acute concerns.

## 2020-02-22 NOTE — ED TRIAGE NOTES
Pt states he just had a permanent catheter taken out a couple days ago after prostate surgery and now is urinating blood. States he is passing nothing but clots.

## 2020-02-22 NOTE — ED PROVIDER NOTES
Patient is an 79-year of prior spinal injury and neurogenic bladder. He comes to the ER today complaining of hematuria and clots. He has had this happen multiple times in recent months and had was evaluated by his urologist.  They eventually did a prostate surgery. He was doing well and Jeffery catheter was removed in the urology clinic 2 days ago. States he initially did well but urine gradually became bloody and now he is doing multiple recurrent self caths and keeps getting clot. The history is provided by the patient and the spouse. Blood in Urine This is a new problem. The current episode started 2 days ago. The problem occurs every urination. The problem has been gradually worsening. The patient is experiencing no pain. There has been no fever. He is not sexually active. There is no history of pyelonephritis. Associated symptoms include hematuria. Pertinent negatives include no chills, no nausea, no frequency, no urgency, no abdominal pain and no back pain. His past medical history is significant for urological procedure and catheterization. Past Medical History:  
Diagnosis Date  Adverse effect of anesthesia   
 nighmares  Aneurysm ([address] Thorasic Aortic aneurysm, surger in [facility] 5/24/16  Atrial flutter ([address] ROSENDO guided cardioversion, No thinners  CAD (coronary artery disease)   
 patient denies  Congestive heart failure ([address] EF 45-50% on 11/2019  Hypertension  Ill-defined condition   
 spinal cord injury  Ill-defined condition Neurogenic bladder, self caths  Morbid obesity ([address]  Thyroid disease Past Surgical History:  
Procedure Laterality Date  CARDIAC SURG PROCEDURE UNLIST  2016  
 repair of aneurysm in acending and transverse arteries  CARDIAC SURG PROCEDURE UNLIST  2017 Aortic valve repair Descending  CARDIAC SURG PROCEDURE UNLIST  06/2016  
 cardioversion, ROSENDO x2  
 CHEST SURGERY PROCEDURE UNLISTED  2016, 2017 Aortic aneursym  HX APPENDECTOMY  HX COLONOSCOPY    
 diverticulosis  HX ORTHOPAEDIC    
 repair of broken jaw  HX TONSILLECTOMY Family History:  
Problem Relation Age of Onset  Stroke Mother Social History Socioeconomic History  Marital status:  Spouse name: Not on file  Number of children: Not on file  Years of education: Not on file  Highest education level: Not on file Occupational History  Not on file Social Needs  Financial resource strain: Not on file  Food insecurity:  
  Worry: Not on file Inability: Not on file  Transportation needs:  
  Medical: Not on file Non-medical: Not on file Tobacco Use  Smoking status: Former Smoker Packs/day: 3.00 Years: 25.00 Pack years: 75.00 Types: Cigarettes Last attempt to quit: 1986 Years since quittin.1  Smokeless tobacco: Former User Types: Snuff, Chew Quit date: 2014 Substance and Sexual Activity  Alcohol use: Not Currently Comment: advises quitting  Drug use: No  
 Sexual activity: Yes  
  Partners: Female Lifestyle  Physical activity:  
  Days per week: Not on file Minutes per session: Not on file  Stress: Not on file Relationships  Social connections:  
  Talks on phone: Not on file Gets together: Not on file Attends Faith service: Not on file Active member of club or organization: Not on file Attends meetings of clubs or organizations: Not on file Relationship status: Not on file  Intimate partner violence:  
  Fear of current or ex partner: Not on file Emotionally abused: Not on file Physically abused: Not on file Forced sexual activity: Not on file Other Topics Concern  Not on file Social History Narrative Lives with wife  Currently uses walker for ambulation post-op - [facility]   
   
 Previously employed as  / , [address] works,  PCP: Dr. Cindi Reed ALLERGIES: Patient has no known allergies. Review of Systems Constitutional: Negative for chills, fatigue and fever. HENT: Negative for congestion, rhinorrhea and sore throat. Eyes: Negative for pain, discharge and visual disturbance. Respiratory: Negative for cough and shortness of breath. Cardiovascular: Negative for chest pain and palpitations. Gastrointestinal: Negative for abdominal pain, diarrhea and nausea. Endocrine: Negative for polydipsia and polyuria. Genitourinary: Positive for hematuria. Negative for dysuria, frequency and urgency. Musculoskeletal: Negative for back pain and neck pain. Skin: Negative for rash. Neurological: Negative for seizures, syncope and weakness. Hematological: Negative. Vitals:  
 02/21/20 2154 02/21/20 2159 02/21/20 2200 BP: 105/55 111/66 Pulse: 83 Resp: 18 Temp: 98.3 °F (36.8 °C) SpO2: 97%  98% Physical Exam 
Vitals signs and nursing note reviewed. Constitutional:   
   Appearance: Normal appearance. He is well-developed. He is obese. HENT:  
   Head: Normocephalic and atraumatic. Nose: Nose normal.  
Eyes:  
   Extraocular Movements: Extraocular movements intact. Conjunctiva/sclera: Conjunctivae normal.  
   Pupils: Pupils are equal, round, and reactive to light. Neck: Musculoskeletal: Normal range of motion and neck supple. Cardiovascular:  
   Rate and Rhythm: Normal rate and regular rhythm. Heart sounds: Normal heart sounds. Pulmonary:  
   Effort: Pulmonary effort is normal.  
   Breath sounds: Normal breath sounds. Abdominal:  
   Palpations: Abdomen is soft. Tenderness: There is no abdominal tenderness. There is no guarding or rebound. Genitourinary: 
   Penis: Normal.   
   Scrotum/Testes: Normal.  
   Comments: Some red blood at the meatus Musculoskeletal: Normal range of motion. General: No tenderness. Right lower leg: Edema present. Left lower leg: Edema present. Lymphadenopathy:  
   Cervical: No cervical adenopathy. Skin: 
   General: Skin is warm and dry. Findings: No rash. Neurological:  
   General: No focal deficit present. Mental Status: He is alert and oriented to person, place, and time. GCS: GCS eye subscore is 4. GCS verbal subscore is 5. GCS motor subscore is 6. Motor: Motor function is intact. MDM Number of Diagnoses or Management Options Diagnosis management comments: 11:23 PM 
Nursing staff was unable to place a three-way Jeffery catheter however they were able to place a coudé cath. That is the type of catheter which the patient uses at home for his self caths. It is draining bloody urine was irrigated by the nurses and now draining appropriately patient feels much better. We will leave the catheter in place and he will follow-up with his urologist on Monday. Voice dictation software was used during the making of this note. This software is not perfect and grammatical and other typographical errors may be present. This note has been proofread, but may still contain errors. Leno Mckay MD; 2/21/2020 @11:24 PM  
=================================================================== Amount and/or Complexity of Data Reviewed Tests in the radiology section of CPT®: ordered and reviewed Review and summarize past medical records: yes Independent visualization of images, tracings, or specimens: yes Risk of Complications, Morbidity, and/or Mortality Presenting problems: low Diagnostic procedures: low Management options: low Patient Progress Patient progress: improved Procedures

## 2020-02-22 NOTE — ED PROVIDER NOTES
Patient is an 79-year-old male with a history of neurogenic bladder who performs self caths secondary to a prior spinal injury. Over the past 2 months he has had multiple visits to the hospital for gross hematuria and had a Jeffery catheter placed. Urology is also done several cystoscopies followed by a TURP. Indwelling Jeffery catheter was removed in the urology office 3 days ago. He was back in the ER yesterday with gross hematuria. We were unable to replace the three-way Jeffeyr catheter but we were able to place a coudé catheter. He states it drained all night his Jeffery bag was full this morning but has had no drainage or output since this morning. Now leaking bloody urine around the catheter. The history is provided by the patient and the spouse. Urinary Catheter Problem This is a new problem. The current episode started 6 to 12 hours ago. There has been no fever. He is not sexually active. Associated symptoms include hematuria. Pertinent negatives include no chills, no nausea, no frequency, no urgency, no abdominal pain and no back pain. He has tried nothing for the symptoms. His past medical history is significant for urological procedure, catheterization and urinary catheter problem. Past Medical History:  
Diagnosis Date  Adverse effect of anesthesia   
 nighmares  Aneurysm ([address] Thorasic Aortic aneurysm, surger in [place] 5/24/16  Atrial flutter ([address] ROSENDO guided cardioversion, No thinners  CAD (coronary artery disease)   
 patient denies  Congestive heart failure ([address] EF 45-50% on 11/2019  Hypertension  Ill-defined condition   
 spinal cord injury  Ill-defined condition Neurogenic bladder, self caths  Morbid obesity ([address]  Thyroid disease Past Surgical History:  
Procedure Laterality Date  CARDIAC SURG PROCEDURE UNLIST  2016  
 repair of aneurysm in acending and transverse arteries  CARDIAC SURG PROCEDURE UNLIST  2017 Aortic valve repair Descending  CARDIAC SURG PROCEDURE UNLIST  2016  
 cardioversion, ROSENDO x2  
 CHEST SURGERY PROCEDURE UNLISTED  ,  Aortic aneursym  HX APPENDECTOMY  HX COLONOSCOPY    
 diverticulosis  HX ORTHOPAEDIC    
 repair of broken jaw  HX TONSILLECTOMY Family History:  
Problem Relation Age of Onset  Stroke Mother Social History Socioeconomic History  Marital status:  Spouse name: Not on file  Number of children: Not on file  Years of education: Not on file  Highest education level: Not on file Occupational History  Not on file Social Needs  Financial resource strain: Not on file  Food insecurity:  
  Worry: Not on file Inability: Not on file  Transportation needs:  
  Medical: Not on file Non-medical: Not on file Tobacco Use  Smoking status: Former Smoker Packs/day: 3.00 Years: 25.00 Pack years: 75.00 Types: Cigarettes Last attempt to quit: 1986 Years since quittin.1  Smokeless tobacco: Former User Types: Snuff, Chew Quit date: 2014 Substance and Sexual Activity  Alcohol use: Not Currently Comment: advises quitting  Drug use: No  
 Sexual activity: Yes  
  Partners: Female Lifestyle  Physical activity:  
  Days per week: Not on file Minutes per session: Not on file  Stress: Not on file Relationships  Social connections:  
  Talks on phone: Not on file Gets together: Not on file Attends Hoahaoism service: Not on file Active member of club or organization: Not on file Attends meetings of clubs or organizations: Not on file Relationship status: Not on file  Intimate partner violence:  
  Fear of current or ex partner: Not on file Emotionally abused: Not on file Physically abused: Not on file Forced sexual activity: Not on file Other Topics Concern  Not on file Social History Narrative Lives with wife Currently uses walker for ambulation post-op - [facility] Previously employed as  / , [employer] works,  PCP: Dr. Lakeisha Andrew ALLERGIES: Patient has no known allergies. Review of Systems Constitutional: Negative for chills, fatigue and fever. HENT: Negative for congestion, rhinorrhea and sore throat. Eyes: Negative for pain, discharge and visual disturbance. Respiratory: Negative for cough and shortness of breath. Cardiovascular: Negative for chest pain and palpitations. Gastrointestinal: Negative for abdominal pain, diarrhea and nausea. Endocrine: Negative for polydipsia and polyuria. Genitourinary: Positive for difficulty urinating and hematuria. Negative for dysuria, frequency and urgency. Musculoskeletal: Negative for back pain and neck pain. Skin: Negative for rash. Neurological: Negative for seizures, syncope and weakness. Hematological: Negative. Vitals:  
 02/22/20 1849 BP: 130/68 Pulse: 90 Resp: 16 Temp: 98.3 °F (36.8 °C) SpO2: 95% Weight: 122.5 kg (270 lb) Height: 6' (1.829 m) Physical Exam 
Vitals signs and nursing note reviewed. Constitutional:   
   Appearance: Normal appearance. He is well-developed. HENT:  
   Head: Normocephalic and atraumatic. Nose: Nose normal.  
Eyes:  
   Extraocular Movements: Extraocular movements intact. Conjunctiva/sclera: Conjunctivae normal.  
   Pupils: Pupils are equal, round, and reactive to light. Neck: Musculoskeletal: Normal range of motion and neck supple. Cardiovascular:  
   Rate and Rhythm: Normal rate and regular rhythm. Heart sounds: Normal heart sounds. Pulmonary:  
   Effort: Pulmonary effort is normal.  
   Breath sounds: Normal breath sounds. Abdominal:  
   Palpations: Abdomen is soft. Tenderness: There is no abdominal tenderness.  There is no guarding or rebound. Genitourinary: 
   Penis: Normal.   
   Comments: Jeffery catheter in place Musculoskeletal: Normal range of motion. General: No tenderness. Right lower leg: Edema present. Left lower leg: Edema present. Lymphadenopathy:  
   Cervical: No cervical adenopathy. Skin: 
   General: Skin is warm and dry. Findings: No rash. Neurological:  
   Mental Status: He is alert. GCS: GCS eye subscore is 4. GCS verbal subscore is 5. GCS motor subscore is 6. Cranial Nerves: No cranial nerve deficit. Sensory: No sensory deficit. Motor: Motor function is intact. MDM Number of Diagnoses or Management Options Diagnosis management comments: 7:24 PM 
Nursing staff was able to irrigate the Jeffery catheter remove a large amount of clot and and is now draining urine appropriately. Patient feels much better. 7:35 PM 
I discussed the case with Dr. Andressa Gray the patient's urologist he is very familiar with his case. He states he will follow-up with the patient in the clinic on Monday. If the patient has any more trouble or difficulty this weekend he is to call him. Voice dictation software was used during the making of this note. This software is not perfect and grammatical and other typographical errors may be present. This note has been proofread, but may still contain errors. Joel San MD; 2/22/2020 @7:35 PM  
=================================================================== Amount and/or Complexity of Data Reviewed Review and summarize past medical records: yes Discuss the patient with other providers: yes Risk of Complications, Morbidity, and/or Mortality Presenting problems: low Diagnostic procedures: minimal 
Management options: low Patient Progress Patient progress: improved Procedures

## 2020-02-22 NOTE — ED TRIAGE NOTES
Pt states urinary cath stopped draining this morning. Pt states urine leaking around cath insertion site.

## 2020-02-22 NOTE — ED NOTES
I have reviewed discharge instructions with the patient. The patient verbalized understanding. Patient left ED via Discharge Method: wheelchair to Home with wife. Opportunity for questions and clarification provided. Patient given 0 scripts. The pt was in no acute distress at NH. To continue your aftercare when you leave the hospital, you may receive an automated call from our care team to check in on how you are doing. This is a free service and part of our promise to provide the best care and service to meet your aftercare needs.  If you have questions, or wish to unsubscribe from this service please call [phone]. Thank you for Choosing our [facility] Emergency Department.

## 2020-02-23 NOTE — DISCHARGE INSTRUCTIONS
Follow-up with your urologist in the office on Monday. Call him for any other problems this weekend.

## 2020-02-23 NOTE — ED NOTES
I have reviewed discharge instructions with the patient. The patient verbalized understanding. Patient left ED via Discharge Method: wheelchair to Home with wife. Opportunity for questions and clarification provided. Patient given 0 scripts. The pt was in no acute distress at [place]. To continue your aftercare when you leave the hospital, you may receive an automated call from our care team to check in on how you are doing. This is a free service and part of our promise to provide the best care and service to meet your aftercare needs.  If you have questions, or wish to unsubscribe from this service please call [phone]. Thank you for Choosing our [facility] Emergency Department.

## 2020-03-08 ENCOUNTER — HOSPITAL ENCOUNTER (EMERGENCY)
Age: 80
Discharge: HOME | End: 2020-03-08
Attending: EMERGENCY MEDICINE
Payer: MEDICARE

## 2020-03-08 VITALS
WEIGHT: 270 LBS | SYSTOLIC BLOOD PRESSURE: 115 MMHG | OXYGEN SATURATION: 97 % | HEIGHT: 72 IN | DIASTOLIC BLOOD PRESSURE: 74 MMHG | HEART RATE: 74 BPM | TEMPERATURE: 98.5 F | BODY MASS INDEX: 36.57 KG/M2 | RESPIRATION RATE: 18 BRPM

## 2020-03-08 DIAGNOSIS — R31.9 HEMATURIA, UNSPECIFIED TYPE: Primary | ICD-10-CM

## 2020-03-08 LAB
ANION GAP SERPL CALC-SCNC: 8 MMOL/L (ref 7–16)
BASOPHILS # BLD: 0 K/UL (ref 0–0.2)
BASOPHILS NFR BLD: 0 % (ref 0–2)
BUN SERPL-MCNC: 36 MG/DL (ref 8–23)
CALCIUM SERPL-MCNC: 8.7 MG/DL (ref 8.3–10.4)
CHLORIDE SERPL-SCNC: 107 MMOL/L (ref 98–107)
CO2 SERPL-SCNC: 25 MMOL/L (ref 21–32)
CREAT SERPL-MCNC: 1.53 MG/DL (ref 0.8–1.5)
DIFFERENTIAL METHOD BLD: ABNORMAL
EOSINOPHIL # BLD: 0.2 K/UL (ref 0–0.8)
EOSINOPHIL NFR BLD: 2 % (ref 0.5–7.8)
ERYTHROCYTE [DISTWIDTH] IN BLOOD BY AUTOMATED COUNT: 19.9 % (ref 11.9–14.6)
GLUCOSE SERPL-MCNC: 106 MG/DL (ref 65–100)
HCT VFR BLD AUTO: 31 % (ref 41.1–50.3)
HGB BLD-MCNC: 9.4 G/DL (ref 13.6–17.2)
IMM GRANULOCYTES # BLD AUTO: 0.1 K/UL (ref 0–0.5)
IMM GRANULOCYTES NFR BLD AUTO: 1 % (ref 0–5)
LYMPHOCYTES # BLD: 0.9 K/UL (ref 0.5–4.6)
LYMPHOCYTES NFR BLD: 9 % (ref 13–44)
MCH RBC QN AUTO: 29.3 PG (ref 26.1–32.9)
MCHC RBC AUTO-ENTMCNC: 30.3 G/DL (ref 31.4–35)
MCV RBC AUTO: 96.6 FL (ref 79.6–97.8)
MONOCYTES # BLD: 1.1 K/UL (ref 0.1–1.3)
MONOCYTES NFR BLD: 10 % (ref 4–12)
NEUTS SEG # BLD: 8 K/UL (ref 1.7–8.2)
NEUTS SEG NFR BLD: 78 % (ref 43–78)
NRBC # BLD: 0 K/UL (ref 0–0.2)
PLATELET # BLD AUTO: 144 K/UL (ref 150–450)
PMV BLD AUTO: 9.1 FL (ref 9.4–12.3)
POTASSIUM SERPL-SCNC: 4.9 MMOL/L (ref 3.5–5.1)
RBC # BLD AUTO: 3.21 M/UL (ref 4.23–5.6)
SODIUM SERPL-SCNC: 140 MMOL/L (ref 136–145)
WBC # BLD AUTO: 10.3 K/UL (ref 4.3–11.1)

## 2020-03-08 PROCEDURE — 51702 INSERT TEMP BLADDER CATH: CPT

## 2020-03-08 PROCEDURE — 51700 IRRIGATION OF BLADDER: CPT

## 2020-03-08 PROCEDURE — 74011250637 HC RX REV CODE- 250/637: Performed by: EMERGENCY MEDICINE

## 2020-03-08 PROCEDURE — 85025 COMPLETE CBC W/AUTO DIFF WBC: CPT

## 2020-03-08 PROCEDURE — 99284 EMERGENCY DEPT VISIT MOD MDM: CPT

## 2020-03-08 PROCEDURE — 74011000250 HC RX REV CODE- 250: Performed by: EMERGENCY MEDICINE

## 2020-03-08 PROCEDURE — 80048 BASIC METABOLIC PNL TOTAL CA: CPT

## 2020-03-08 RX ORDER — LIDOCAINE HYDROCHLORIDE 20 MG/ML
JELLY TOPICAL ONCE
Status: COMPLETED | OUTPATIENT
Start: 2020-03-08 | End: 2020-03-08

## 2020-03-08 RX ORDER — TRANEXAMIC ACID 650 1/1
1300 TABLET ORAL
Status: COMPLETED | OUTPATIENT
Start: 2020-03-08 | End: 2020-03-08

## 2020-03-08 RX ADMIN — TRANEXAMIC ACID 1300 MG: 650 TABLET ORAL at 22:18

## 2020-03-08 RX ADMIN — LIDOCAINE HYDROCHLORIDE: 20 JELLY TOPICAL at 19:10

## 2020-03-08 NOTE — ED TRIAGE NOTES
Ems called to home for clotting and bleeding from urinary catheter. Pt has had this bosch in place for the last 8 days. Pt has spot of coagulated blood on penis. bp 124/84, hr 76, 100% RA.

## 2020-03-09 NOTE — ED NOTES
This RN Tali LINO and Camelia henderson able to move patient with a lot of assistance to wheelchair from the bed, patient is not able to use legs or stand at all. Meagan LINO assisted with attempting to get patient into car and this was unsuccessful. [facility] ambulance service has been contacted for transport.

## 2020-03-09 NOTE — DISCHARGE INSTRUCTIONS
Patient Education        Blood in the Urine: Care Instructions  Your Care Instructions    Blood in the urine, or hematuria, may make the urine look red, brown, or pink. There may be blood every time you urinate or just from time to time. You cannot always see blood in the urine, but it will show up in a urine test.  Blood in the urine may be serious. It should always be checked by a doctor. Your doctor may recommend more tests, including an X-ray, a CT scan, or a cystoscopy (which lets a doctor look inside the urethra and bladder). Blood in the urine can be a sign of another problem. Common causes are bladder infections and kidney stones. An injury to your groin or your genital area can also cause bleeding in the urinary tract. Very hard exercise--such as running a marathon--can cause blood in the urine. Blood in the urine can also be a sign of kidney disease or cancer in the bladder or kidney. Many cases of blood in the urine are caused by a harmless condition that runs in families. This is called benign familial hematuria. It does not need any treatment. Sometimes your urine may look red or brown even though it does not contain blood. For example, not getting enough fluids (dehydration), taking certain medicines, or having a liver problem can change the color of your urine. Eating foods such as beets, rhubarb, or blackberries or foods with red food coloring can make your urine look red or pink. Follow-up care is a key part of your treatment and safety. Be sure to make and go to all appointments, and call your doctor if you are having problems. It's also a good idea to know your test results and keep a list of the medicines you take. When should you call for help? Call your doctor now or seek immediate medical care if:    · You have symptoms of a urinary infection. For example:  ? You have pus in your urine. ? You have pain in your back just below your rib cage. This is called flank pain. ?  You have a fever, chills, or body aches. ? It hurts to urinate. ? You have groin or belly pain.     · You have more blood in your urine.    Watch closely for changes in your health, and be sure to contact your doctor if:    · You have new urination problems.     · You do not get better as expected. Where can you learn more? Go to http://bam-della.info/. Enter C449 in the search box to learn more about \"Blood in the Urine: Care Instructions. \"  Current as of: December 19, 2018  Content Version: 12.2  © 6191-9911 [employer]. Care instructions adapted under license by [employer] (which disclaims liability or warranty for this information). If you have questions about a medical condition or this instruction, always ask your healthcare professional. [address] any warranty or liability for your use of this information.

## 2020-03-09 NOTE — ED NOTES
Urinary cath is still patent and draining at this time, slow infusion of fluids, urine tube draining clear to very light yellow.

## 2020-03-09 NOTE — ED NOTES
I have reviewed discharge instructions with the patient. The patient verbalized understanding. Patient left ED via Discharge Method: stretcher to Home with [facility] Opportunity for questions and clarification provided. Patient given 0 scripts. To continue your aftercare when you leave the hospital, you may receive an automated call from our care team to check in on how you are doing. This is a free service and part of our promise to provide the best care and service to meet your aftercare needs.  If you have questions, or wish to unsubscribe from this service please call [phone]. Thank you for Choosing our [facility] Emergency Department.

## 2020-03-09 NOTE — ED PROVIDER NOTES
History of coronary artery disease here with Jeffery that is not draining. He has had bright red blood coming from the tip of his penis and also some clots there. Denies any trauma and states he had his Jeffery taped to his left upper thigh with duct tape. The history is provided by the patient and the spouse. Blood in Urine This is a new problem. The current episode started 3 to 5 hours ago. The problem has not changed since onset. There has been no fever. Associated symptoms include hematuria. Pertinent negatives include no chills. Past Medical History:  
Diagnosis Date  Adverse effect of anesthesia   
 nighmares  Aneurysm ([address] Thorasic Aortic aneurysm, surger in [place] 5/24/16  Atrial flutter ([address] ROSENDO guided cardioversion, No thinners  CAD (coronary artery disease)   
 patient denies  Congestive heart failure ([address] EF 45-50% on 11/2019  Hypertension  Ill-defined condition   
 spinal cord injury  Ill-defined condition Neurogenic bladder, self caths  Morbid obesity ([address]  Thyroid disease Past Surgical History:  
Procedure Laterality Date  CARDIAC SURG PROCEDURE UNLIST  2016  
 repair of aneurysm in acending and transverse arteries  CARDIAC SURG PROCEDURE UNLIST  2017 Aortic valve repair Descending  CARDIAC SURG PROCEDURE UNLIST  06/2016  
 cardioversion, ROSENDO x2  
 CHEST SURGERY PROCEDURE UNLISTED  2016, 2017 Aortic aneursym  HX APPENDECTOMY  HX COLONOSCOPY  5/08  
 diverticulosis  HX ORTHOPAEDIC    
 repair of broken jaw  HX TONSILLECTOMY Family History:  
Problem Relation Age of Onset  Stroke Mother Social History Socioeconomic History  Marital status:  Spouse name: Not on file  Number of children: Not on file  Years of education: Not on file  Highest education level: Not on file Occupational History  Not on file Social Needs  Financial resource strain: Not on file  Food insecurity:  
  Worry: Not on file Inability: Not on file  Transportation needs:  
  Medical: Not on file Non-medical: Not on file Tobacco Use  Smoking status: Former Smoker Packs/day: 3.00 Years: 25.00 Pack years: 75.00 Types: Cigarettes Last attempt to quit: 1986 Years since quittin.2  Smokeless tobacco: Former User Types: Snuff, Chew Quit date: 2014 Substance and Sexual Activity  Alcohol use: Not Currently Comment: advises quitting  Drug use: No  
 Sexual activity: Yes  
  Partners: Female Lifestyle  Physical activity:  
  Days per week: Not on file Minutes per session: Not on file  Stress: Not on file Relationships  Social connections:  
  Talks on phone: Not on file Gets together: Not on file Attends [employer] service: Not on file Active member of club or organization: Not on file Attends meetings of clubs or organizations: Not on file Relationship status: Not on file  Intimate partner violence:  
  Fear of current or ex partner: Not on file Emotionally abused: Not on file Physically abused: Not on file Forced sexual activity: Not on file Other Topics Concern  Not on file Social History Narrative Lives with wife Currently uses walker for ambulation post-op - [facility] Previously employed as  / , [address] works,  PCP: Dr. Cindi Reed ALLERGIES: Patient has no known allergies. Review of Systems Constitutional: Negative for chills and fever. Respiratory: Negative. Cardiovascular: Negative. Genitourinary: Positive for difficulty urinating and hematuria. Musculoskeletal: Negative. Neurological: Negative. Psychiatric/Behavioral: Negative. All other systems reviewed and are negative. Vitals:  
 20 1755 BP: 109/58 Pulse: 75  
 Resp: 18 Temp: 98.7 °F (37.1 °C) SpO2: 100% Weight: 122.5 kg (270 lb) Height: 6' (1.829 m) Physical Exam 
Vitals signs and nursing note reviewed. Constitutional:   
   General: He is not in acute distress. Appearance: He is well-developed. HENT:  
   Head: Atraumatic. Eyes:  
   General: No scleral icterus. Neck: Musculoskeletal: Neck supple. Cardiovascular:  
   Rate and Rhythm: Normal rate. Pulmonary:  
   Effort: Pulmonary effort is normal. No respiratory distress. Abdominal:  
   General: Abdomen is flat. There is no distension. Palpations: Abdomen is soft. Tenderness: There is no abdominal tenderness. There is no right CVA tenderness or left CVA tenderness. Genitourinary: 
   Scrotum/Testes: Normal.  
   Comments: Fresh blood and clot to tip of penis Skin: 
   General: Skin is warm and dry. Psychiatric:     
   Thought Content: Thought content normal.  
 
  
 
MDM Number of Diagnoses or Management Options Hematuria, unspecified type:  
Diagnosis management comments: Patient blood at the urethral meatus region. Seems to be more bleeding in this region than actually coming from within the bladder though initial several minutes severe again were bloody in color no re-turn of any bleeding was identified. And completion of continuous bladder irrigation throughout all other than the first few minutes was totally clear. Patient had Jeffery duct tape to his thigh consideration that as he was getting on the toilet he may have torqued/pulled this to the point where there was a tear within the urethra superficially. Patient's labs are stable Amount and/or Complexity of Data Reviewed Clinical lab tests: ordered and reviewed Risk of Complications, Morbidity, and/or Mortality Presenting problems: moderate Diagnostic procedures: low Management options: moderate Patient Progress Patient progress: improved Bladder Catheterization Date/Time: 3/8/2020 8:01 PM 
Performed by: Kalen Richardson MD 
Authorized by: Kalen Richardson MD  
 
Consent:  
  Consent obtained:  Verbal 
  Consent given by:  Patient Risks discussed:  False passage and pain Alternatives discussed:  No treatment Pre-procedure details:  
  Procedure purpose:  Therapeutic Preparation: Patient was prepped and draped in usual sterile fashion Anesthesia (see MAR for exact dosages): Anesthesia method:  Topical application Procedure details:  
  Catheter insertion:  Indwelling Catheter type:  Triple lumen Catheter size:  22 Fr Bladder irrigation: yes Number of attempts:  1 Urine characteristics:  Blood-tinged Post-procedure details:  
  Patient tolerance of procedure: Tolerated well, no immediate complications

## 2020-04-15 NOTE — ED PROVIDER NOTES
Patient complains of left wrist pain. Onset slightly yesterday and severe this morning. No trauma. No fever. No prior history of gout or other arthritides. Took Tylenol without relief. Left hand dominant. No other joint pain. Takes Lasix. Does not currently drink any alcohol. He is not on blood thinners. Recent prostate surgery. Has a urinary bladder catheter. Past Medical History:  
Diagnosis Date  Adverse effect of anesthesia   
 nighmares  Aneurysm ([facility] [address] Thorasic Aortic aneurysm, surger in [facility] 5/24/16  Atrial flutter ([address] ROSENDO guided cardioversion, No thinners  CAD (coronary artery disease)   
 patient denies  Congestive heart failure ([address] EF 45-50% on 11/2019  Hypertension  Ill-defined condition   
 spinal cord injury  Ill-defined condition Neurogenic bladder, self caths  Morbid obesity ([address]  Thyroid disease Past Surgical History:  
Procedure Laterality Date  CARDIAC SURG PROCEDURE UNLIST  2016  
 repair of aneurysm in acending and transverse arteries  CARDIAC SURG PROCEDURE UNLIST  2017 Aortic valve repair Descending  CARDIAC SURG PROCEDURE UNLIST  06/2016  
 cardioversion, ROSENDO x2  
 CHEST SURGERY PROCEDURE UNLISTED  2016, 2017 Aortic aneursym  HX APPENDECTOMY  HX COLONOSCOPY  5/08  
 diverticulosis  HX ORTHOPAEDIC    
 repair of broken jaw  HX TONSILLECTOMY Family History:  
Problem Relation Age of Onset  Stroke Mother Social History Socioeconomic History  Marital status:  Spouse name: Not on file  Number of children: Not on file  Years of education: Not on file  Highest education level: Not on file Occupational History  Not on file Social Needs  Financial resource strain: Not on file  Food insecurity Worry: Not on file Inability: Not on file  Transportation needs Medical: Not on file Non-medical: Not on file Tobacco Use  
  Smoking status: Former Smoker Packs/day: 3.00 Years: 25.00 Pack years: 75.00 Types: Cigarettes Last attempt to quit: 1986 Years since quittin.3  Smokeless tobacco: Former User Types: Snuff, Chew Quit date: 2014 Substance and Sexual Activity  Alcohol use: Not Currently Comment: advises quitting  Drug use: No  
 Sexual activity: Yes  
  Partners: Female Lifestyle  Physical activity Days per week: Not on file Minutes per session: Not on file  Stress: Not on file Relationships  Social connections Talks on phone: Not on file Gets together: Not on file Attends Congregation service: Not on file Active member of club or organization: Not on file Attends meetings of clubs or organizations: Not on file Relationship status: Not on file  Intimate partner violence Fear of current or ex partner: Not on file Emotionally abused: Not on file Physically abused: Not on file Forced sexual activity: Not on file Other Topics Concern  Not on file Social History Narrative Lives with wife Currently uses walker for ambulation post-op - [facility] Previously employed as  / , [address] works,  PCP: Dr. Luisa Bernal ALLERGIES: Patient has no known allergies. Review of Systems Constitutional: Negative for chills and fever. HENT: Negative. Respiratory: Negative for cough and shortness of breath. Cardiovascular: Negative for chest pain. Genitourinary:  
     Catheter in bladder Musculoskeletal: Positive for arthralgias and joint swelling. Skin: Negative. Neurological: Positive for weakness (crhonic legs). Hematological: Negative. Vitals:  
 04/15/20 0950 04/15/20 0951 04/15/20 1000 04/15/20 1022 BP:  105/52 97/56 Pulse:  71 Resp:  18 Temp:  98.1 °F (36.7 °C) SpO2: 97% 98% 98% 98% Weight:  120.2 kg (265 lb) Height:  6' (1.829 m) Physical Exam 
Vitals signs and nursing note reviewed. Constitutional:   
   Appearance: Normal appearance. HENT:  
   Head: Normocephalic and atraumatic. Cardiovascular:  
   Rate and Rhythm: Normal rate and regular rhythm. Musculoskeletal:  
   Comments: Left wrist with mild swelling and exquisite tenderness even to light touch. Tender at joint line. No bony point tenderness. No deformity. Not hot or red. Elbow and shoulder are not tender. Normal ROM but resists secondary to pain. Fingers are normal.   
Skin: 
   General: Skin is warm and dry. Capillary Refill: Capillary refill takes less than 2 seconds. Findings: No erythema. Neurological:  
   Mental Status: He is alert. MDM Number of Diagnoses or Management Options Acute pain of left wrist:  
Diagnosis management comments: Acute left wrist pain Labs - reviewed. Uric acid 16. Xray - mild degenerative changes Motrin 800 mg po Percocet 5 po Wrist splint applied to help him with necessary use of the left arm for transfers as he is wheelchair bound Rx colchicine 0.6 mg bid x 5 days, Prednisone 20 mg bid x 5 days, Percocet 5 q 6 hours prn unrelieved pain Follow up with PCP Return with new or worse symptoms. Amount and/or Complexity of Data Reviewed Clinical lab tests: ordered and reviewed Tests in the radiology section of CPT®: ordered and reviewed Review and summarize past medical records: yes Procedures

## 2020-04-15 NOTE — ED NOTES
I have reviewed discharge instructions with the patient. The patient verbalized understanding. Patient left ED via Discharge Method: wheelchair to Home with wife. Opportunity for questions and clarification provided. Patient given 3 scripts. To continue your aftercare when you leave the hospital, you may receive an automated call from our care team to check in on how you are doing. This is a free service and part of our promise to provide the best care and service to meet your aftercare needs.  If you have questions, or wish to unsubscribe from this service please call [phone]. Thank you for Choosing our [facility] Emergency Department.

## 2020-04-15 NOTE — ED TRIAGE NOTES
Pt in with mask in place via [facility] c/o left wrist pain x 2 days. States pain worse today with swelling. No known injury. No fever.

## 2020-04-15 NOTE — DISCHARGE INSTRUCTIONS
Only wear the splint until your wrist feels better  Elevate the arm to reduce swelling  Take medications as prescribed  Take Percocet if pain is unrelieved by the Prednisone and Colchicine  You may also take Tylenol as needed

## 2020-04-17 NOTE — Clinical Note
Good morning! This patient would like to sign up for Get Well Loop. Email: [email or web address], phone [phone].   Thank you! :)

## 2020-04-17 NOTE — PROGRESS NOTES
Patient contacted regarding recent visit for viral symptoms. This Liane Combs contacted the patient by telephone to perform post discharge call. Verified name and  with patient as identifiers. Provided introduction to self, and reason for call due to viral symptoms of infection and/or exposure to COVID-19. Patient presented to emergency department/flu clinic with complaints of viral symptoms/exposure to COVID. Patient reports symptoms are improving. Due to no new or worsening symptoms the RN CTN/ACM was not notified for escalation. Discussed exposure protocols and quarantine with CDC Guidelines What To Do If You Are Sick Patient was given an opportunity for questions and concerns. Stay home except to get medical care Separate yourself from other people and animals in your home Call ahead before visiting your doctor Wear a facemask Cover your coughs and sneezes Clean your hands often Avoid sharing personal household items Clean all high-touch surfaces everyday Monitor your symptoms Seek prompt medical attention if your illness is worsening (e.g., difficulty breathing). Before seeking care, call your healthcare provider and tell them that you have, or are being evaluated for, COVID-19. Put on a facemask before you enter the facility. These steps will help the healthcare provider's office to keep other people in the office or waiting room from getting infected or exposed. Ask your healthcare provider to call the local or Critical access hospital health department. Persons who are placed under If you have a medical emergency and need to call 911, notify the dispatch personnel that you have, or are being evaluated for COVID-19. If possible, put on a facemask before emergency medical services arrive. The patient agrees to contact the [employer] exposure line [phone], local health department [facility] [address]: ([phone]) and PCP office for questions related to their healthcare. Author provided contact information for future reference. Patient/family/caregiver given information for [employer] and agrees to enroll yes Patient preferred e-mail: [email or web address] Patient preferred phone contact: [phone] Based on Loop alert triggers, patient will be contacted by nurse care manager for worsening symptoms.

## 2020-05-01 NOTE — ED TRIAGE NOTES
Presents complaining of abdominal pain. Denies nausea/vomiting/diarrhea/constipation. Jeffery catheter in place. States he has had it [place] long time. \"

## 2020-05-01 NOTE — ED PROVIDER NOTES
69-year-old  male complains of a several week history of lower abdominal discomfort, much worse than last 4 to 5 days. He describes the pain is worse with movement and palpation but not associated with eating or drinking, denies any change in bowel habits, melena hematochezia. Denies any urinary symptoms, does have a chronic indwelling Jeffery catheter but denies any change in urinary output. Patient states he has been taking some oxycodone which he had for his gout over the last 2 to 3 weeks with some relief, but he is down to his last 1 or 2 pills. The history is provided by the patient. Abdominal Pain This is a new problem. The current episode started more than 1 week ago. The problem occurs daily. The problem has been gradually worsening. The pain is associated with an unknown factor. The pain is located in the suprapubic region and LLQ. The quality of the pain is aching and sharp. The pain is at a severity of 10/10. Pertinent negatives include no anorexia, no fever, no belching, no diarrhea, no flatus, no hematochezia, no melena, no nausea, no vomiting, no constipation, no dysuria, no frequency, no hematuria, no headaches, no arthralgias, no myalgias, no trauma, no chest pain, no testicular pain and no back pain. The pain is worsened by activity and palpation. The pain is relieved by nothing. His past medical history is significant for diverticulitis. His past medical history does not include PUD, gallstones, GERD, ulcerative colitis, Crohn's disease, irritable bowel syndrome, cancer, UTI, pancreatitis, atrial fibrillation, DM, kidney stones or small bowel obstruction. The patient's surgical history includes appendectomy. Thoracic aortic aneurysm repair Past Medical History:  
Diagnosis Date  Adverse effect of anesthesia   
 nighmares  Aneurysm ([address] Thorasic Aortic aneurysm, surger in [facility] 5/24/16  Atrial flutter ([address] ROSENDO guided cardioversion, No thinners  CAD (coronary artery disease)   
 patient denies  Congestive heart failure ([facility] EF 45-50% on 2019  Hypertension  Ill-defined condition   
 spinal cord injury  Ill-defined condition Neurogenic bladder, self caths  Morbid obesity ([facility]  Thyroid disease Past Surgical History:  
Procedure Laterality Date  CARDIAC SURG PROCEDURE UNLIST    
 repair of aneurysm in acending and transverse arteries  CARDIAC SURG PROCEDURE UNLIST   Aortic valve repair Descending  CARDIAC SURG PROCEDURE UNLIST  2016  
 cardioversion, ROSENDO x2  
 CHEST SURGERY PROCEDURE UNLISTED  ,  Aortic aneursym  HX APPENDECTOMY  HX COLONOSCOPY    
 diverticulosis  HX ORTHOPAEDIC    
 repair of broken jaw  HX TONSILLECTOMY Family History:  
Problem Relation Age of Onset  Stroke Mother Social History Socioeconomic History  Marital status:  Spouse name: Not on file  Number of children: Not on file  Years of education: Not on file  Highest education level: Not on file Occupational History  Not on file Social Needs  Financial resource strain: Not on file  Food insecurity Worry: Not on file Inability: Not on file  Transportation needs Medical: Not on file Non-medical: Not on file Tobacco Use  Smoking status: Former Smoker Packs/day: 3.00 Years: 25.00 Pack years: 75.00 Types: Cigarettes Last attempt to quit: 1986 Years since quittin.3  Smokeless tobacco: Former User Types: Snuff, Chew Quit date: 2014 Substance and Sexual Activity  Alcohol use: Not Currently Comment: advises quitting  Drug use: No  
 Sexual activity: Yes  
  Partners: Female Lifestyle  Physical activity Days per week: Not on file Minutes per session: Not on file  Stress: Not on file Relationships  Social connections Talks on phone: Not on file Gets together: Not on file Attends Protestant service: Not on file Active member of club or organization: Not on file Attends meetings of clubs or organizations: Not on file Relationship status: Not on file  Intimate partner violence Fear of current or ex partner: Not on file Emotionally abused: Not on file Physically abused: Not on file Forced sexual activity: Not on file Other Topics Concern  Not on file Social History Narrative Lives with wife Currently uses walker for ambulation post-op - [facility] Previously employed as  / , [address] works,  PCP: Dr. Rex Mack ALLERGIES: Patient has no known allergies. Review of Systems Constitutional: Negative for chills and fever. Cardiovascular: Negative for chest pain. Gastrointestinal: Positive for abdominal pain. Negative for anorexia, blood in stool, constipation, diarrhea, flatus, hematochezia, melena, nausea and vomiting. Genitourinary: Negative for dysuria, frequency, hematuria and testicular pain. Musculoskeletal: Negative for arthralgias, back pain and myalgias. Neurological: Negative for headaches. All other systems reviewed and are negative. Vitals:  
 05/01/20 1600 BP: (!) 135/95 Pulse: 72 Resp: 18 Temp: 98.4 °F (36.9 °C) SpO2: 96% Weight: 122.5 kg (270 lb) Height: 6' (1.829 m) Physical Exam 
Vitals signs and nursing note reviewed. Constitutional:   
   General: He is in acute distress. Appearance: He is well-developed. HENT:  
   Head: Normocephalic and atraumatic. Right Ear: External ear normal.  
   Left Ear: External ear normal.  
Eyes:  
   Conjunctiva/sclera: Conjunctivae normal.  
   Pupils: Pupils are equal, round, and reactive to light. Neck: Musculoskeletal: Normal range of motion and neck supple. Cardiovascular: Rate and Rhythm: Normal rate and regular rhythm. Heart sounds: Normal heart sounds. Pulmonary:  
   Effort: Pulmonary effort is normal.  
   Breath sounds: Normal breath sounds. Abdominal:  
   General: Abdomen is protuberant. Bowel sounds are normal. There is no distension or abdominal bruit. There are no signs of injury. Palpations: Abdomen is soft. There is no shifting dullness, hepatomegaly, splenomegaly or mass. Tenderness: There is abdominal tenderness in the left lower quadrant. There is no right CVA tenderness, left CVA tenderness, guarding or rebound. Negative signs include Oconnor's sign and McBurney's sign. Hernia: No hernia (Patient does have a protrusion of the left side of his abdomen which he states is been there since his aortic repair.) is present. Musculoskeletal: Normal range of motion. Skin: 
   General: Skin is warm and dry. Capillary Refill: Capillary refill takes less than 2 seconds. Neurological:  
   General: No focal deficit present. Mental Status: He is alert and oriented to person, place, and time. Psychiatric:     
   Mood and Affect: Mood normal.     
   Behavior: Behavior normal.  
 
  
 
MDM Number of Diagnoses or Management Options Acute diverticulitis: new and requires workup Amount and/or Complexity of Data Reviewed Clinical lab tests: ordered and reviewed Tests in the radiology section of CPT®: ordered and reviewed Review and summarize past medical records: yes Independent visualization of images, tracings, or specimens: yes Risk of Complications, Morbidity, and/or Mortality Presenting problems: high Diagnostic procedures: moderate Management options: moderate Patient Progress Patient progress: stable Procedures The patient was observed in the ED. patient was hydrated slightly as well as given some Rocephin IV here in the emergency department.   Patient refused admission and stated he would rather try and take the Augmentin at home. Patient was instructed return for worsening pain, fever or bleeding. Results Reviewed: 
 
 
Recent Results (from the past 24 hour(s)) CBC WITH AUTOMATED DIFF Collection Time: 05/01/20  4:49 PM  
Result Value Ref Range WBC 11.6 (H) 4.3 - 11.1 K/uL  
 RBC 3.19 (L) 4.23 - 5.6 M/uL HGB 8.9 (L) 13.6 - 17.2 g/dL HCT 29.3 (L) 41.1 - 50.3 % MCV 91.8 79.6 - 97.8 FL  
 MCH 27.9 26.1 - 32.9 PG  
 MCHC 30.4 (L) 31.4 - 35.0 g/dL  
 RDW 19.5 (H) 11.9 - 14.6 % PLATELET 860 (L) 619 - 450 K/uL MPV 8.6 (L) 9.4 - 12.3 FL ABSOLUTE NRBC 0.00 0.0 - 0.2 K/uL  
 DF AUTOMATED NEUTROPHILS 80 (H) 43 - 78 % LYMPHOCYTES 5 (L) 13 - 44 % MONOCYTES 13 (H) 4.0 - 12.0 % EOSINOPHILS 1 0.5 - 7.8 % BASOPHILS 0 0.0 - 2.0 % IMMATURE GRANULOCYTES 1 0.0 - 5.0 %  
 ABS. NEUTROPHILS 9.3 (H) 1.7 - 8.2 K/UL  
 ABS. LYMPHOCYTES 0.6 0.5 - 4.6 K/UL  
 ABS. MONOCYTES 1.5 (H) 0.1 - 1.3 K/UL  
 ABS. EOSINOPHILS 0.1 0.0 - 0.8 K/UL  
 ABS. BASOPHILS 0.0 0.0 - 0.2 K/UL  
 ABS. IMM. GRANS. 0.1 0.0 - 0.5 K/UL METABOLIC PANEL, COMPREHENSIVE Collection Time: 05/01/20  4:49 PM  
Result Value Ref Range Sodium 134 (L) 136 - 145 mmol/L Potassium 4.2 3.5 - 5.1 mmol/L Chloride 101 98 - 107 mmol/L  
 CO2 24 21 - 32 mmol/L Anion gap 9 7 - 16 mmol/L Glucose 122 (H) 65 - 100 mg/dL BUN 57 (H) 8 - 23 MG/DL Creatinine 1.77 (H) 0.8 - 1.5 MG/DL  
 GFR est AA 48 (L) >60 ml/min/1.73m2 GFR est non-AA 39 (L) >60 ml/min/1.73m2 Calcium 8.8 8.3 - 10.4 MG/DL Bilirubin, total 0.8 0.2 - 1.1 MG/DL  
 ALT (SGPT) 20 12 - 65 U/L  
 AST (SGOT) 13 (L) 15 - 37 U/L Alk. phosphatase 101 50 - 136 U/L Protein, total 7.8 6.3 - 8.2 g/dL Albumin 3.4 3.2 - 4.6 g/dL Globulin 4.4 (H) 2.3 - 3.5 g/dL A-G Ratio 0.8 (L) 1.2 - 3.5 LACTIC ACID Collection Time: 05/01/20  4:50 PM  
Result Value Ref Range Lactic acid 0.9 0.4 - 2.0 MMOL/L  
 
CT ABD PELV WO CONT Final Result IMPRESSION:   
  
1. Findings most likely represent an acute uncomplicated sigmoid  
diverticulitis. 2.  New cholelithiasis. 3.  Other chronic findings as above. I discussed the results of all labs, procedures, radiographs, and treatments with the patient and available family. Treatment plan is agreed upon and the patient is ready for discharge. All voiced understanding of the discharge plan and medication instructions or changes as appropriate. Questions about treatment in the ED were answered. All were encouraged to return should symptoms worsen or new problems develop.

## 2020-05-01 NOTE — DISCHARGE INSTRUCTIONS
Patient Education        Diverticulitis: Care Instructions  Your Care Instructions    Diverticulitis occurs when pouches form in the wall of the colon and become inflamed or infected. It can be very painful. Doctors aren't sure what causes diverticulitis. There is no proof that foods such as nuts, seeds, or berries cause it or make it worse. A low-fiber diet may cause the colon to work harder to push stool forward. Pouches may form because of this extra work. It may be hard to think about healthy eating while you're in pain. But as you recover, you might think about how you can use healthy eating for overall better health. Healthy eating may help you avoid future attacks. Follow-up care is a key part of your treatment and safety. Be sure to make and go to all appointments, and call your doctor if you are having problems. It's also a good idea to know your test results and keep a list of the medicines you take. How can you care for yourself at home? · Drink plenty of fluids, enough so that your urine is light yellow or clear like water. If you have kidney, heart, or liver disease and have to limit fluids, talk with your doctor before you increase the amount of fluids you drink. · Stick to liquids or a bland diet (plain rice, bananas, dry toast or crackers, applesauce) until you are feeling better. Then you can return to regular foods and gradually increase the amount of fiber in your diet. · Use a heating pad set on low on your belly to relieve mild cramps and pain. · Get extra rest until you are feeling better. · Be safe with medicines. Read and follow all instructions on the label. ? If the doctor gave you a prescription medicine for pain, take it as prescribed. ? If you are not taking a prescription pain medicine, ask your doctor if you can take an over-the-counter medicine. · If your doctor prescribed antibiotics, take them as directed. Do not stop taking them just because you feel better.  You need to take the full course of antibiotics. To prevent future attacks of diverticulitis  · Avoid constipation:  ? Include fruits, vegetables, beans, and whole grains in your diet each day. These foods are high in fiber. ? Drink plenty of fluids, enough so that your urine is light yellow or clear like water. If you have kidney, heart, or liver disease and have to limit fluids, talk with your doctor before you increase the amount of fluids you drink. ? Get some exercise every day. Build up slowly to 30 to 60 minutes a day on 5 or more days of the week. ? Take a fiber supplement, such as Citrucel or Metamucil, every day if needed. Read and follow all instructions on the label. ? Schedule time each day for a bowel movement. Having a daily routine may help. Take your time and do not strain when having a bowel movement. When should you call for help? Call your doctor now or seek immediate medical care if:    · You have a fever.     · You are vomiting.     · You have new or worse belly pain.     · You cannot pass stools or gas.    Watch closely for changes in your health, and be sure to contact your doctor if you have any problems. Where can you learn more? Go to http://bam-della.info/  Enter H901 in the search box to learn more about \"Diverticulitis: Care Instructions. \"  Current as of: August 11, 2019Content Version: 12.4  © 7658-2397 [employer]. Care instructions adapted under license by [employer] (which disclaims liability or warranty for this information). If you have questions about a medical condition or this instruction, always ask your healthcare professional. [place] [address] any warranty or liability for your use of this information.

## 2020-05-02 NOTE — ED NOTES
I have reviewed discharge instructions with the patient. The patient verbalized understanding. Patient left ED via Discharge Method: wheelchair to Home with family. Opportunity for questions and clarification provided. Patient given 2 scripts.

## 2020-05-04 NOTE — PROGRESS NOTES
1st Attempt to contact patient for Covid-19 At Risk Education call, no answer, left message on voice mail to please return my call. Will attempt to contact patient again within 24 hours

## 2020-05-06 NOTE — PROGRESS NOTES
Patient contacted regarding recent visit for viral symptoms. This Kendell Theodore contacted the patient by telephone to perform post discharge call. Verified name and  with patient as identifiers. Provided introduction to self, and reason for call due to viral symptoms of infection and/or exposure to COVID-19. Patient presented to emergency department/flu clinic with complaints of viral symptoms/exposure to COVID. Patient reports symptoms are improving. Due to no new or worsening symptoms the RN CTN/ACM was not notified for escalation. Discussed exposure protocols and quarantine with CDC Guidelines What To Do If You Are Sick Patient was given an opportunity for questions and concerns. Stay home except to get medical care Separate yourself from other people and animals in your home Call ahead before visiting your doctor Wear a facemask Cover your coughs and sneezes Clean your hands often Avoid sharing personal household items Clean all high-touch surfaces everyday Monitor your symptoms Seek prompt medical attention if your illness is worsening (e.g., difficulty breathing). Before seeking care, call your healthcare provider and tell them that you have, or are being evaluated for, COVID-19. Put on a facemask before you enter the facility. These steps will help the healthcare provider's office to keep other people in the office or waiting room from getting infected or exposed. Ask your healthcare provider to call the local or [facility] health department. Persons who are placed under If you have a medical emergency and need to call 911, notify the dispatch personnel that you have, or are being evaluated for COVID-19. If possible, put on a facemask before emergency medical services arrive. The patient agrees to contact the [facility] exposure line [phone], local health department [facility] [address]: ([phone]) and PCP office for questions related to their healthcare. Author provided contact information for future reference. Patient/family/caregiver given information for [employer] and agrees to enroll Patient stated he is already enrolled in [facility] Patient preferred e-mail:  
Patient preferred phone contact:  
Based on Loop alert triggers, patient will be contacted by nurse care manager for worsening symptoms.

## 2020-10-01 NOTE — ED TRIAGE NOTES
Brought in by EMS for right wrist pain. No trauma noted. It began this am. Pt with PICC line in right arm. Pt currently on antibiotics for pressure wound in right foot. Pt masked in triage.

## 2020-10-01 NOTE — ED PROVIDER NOTES
[facility] Emergency Department Arrival Date/Time: 10/1/2020 @ 1900 Julio C Huston  MRN: [id number]   
  79 y.o. male YOB: 1940   Telephone Information:  
Mobile [phone] (home) [facility] EMERGENCY DEPT HD/D  Seen on 10/1/2020 @ 7:04 PM   
 
Today's Chief Complaint:  
Chief Complaint Patient presents with  Wrist Pain HPI: 79-year-old male presents emergency department with acute onset of right wrist pain. Denies trauma. Painful swollen tender worse with movement slightly better with rest 
 
No associated fever or chills He is undergoing several weeks of IV antibiotics via PICC line in his right upper arm secondary to osteomyelitis of the foot He did have gout in the left wrist several months ago. HPI Review of Systems: Review of Systems Constitutional: Negative for activity change, appetite change, chills and fever. Respiratory: Negative for shortness of breath. Cardiovascular: Negative for chest pain. Musculoskeletal: Positive for arthralgias. Skin: Negative. Psychiatric/Behavioral: Negative. Past Medical History: Primary Care Doctor: Brandon, MD Elvin 
Meds, Medical Hx,Surgical Hx, Family Hx, Social Hx are reviewed and at end of this note Allergies: No Known Allergies Key Anti-Platelet Anticoagulant Meds The patient is on no antiplatelet meds or anticoagulants. Physical Exam:  Nursing documentation reviewed. Patient Vitals for the past 24 hrs: 
 Temp Pulse Resp BP SpO2  
10/01/20 2156  74 18 (!) 102/58 100 % 10/01/20 1905 98 °F (36.7 °C) 74 16 (!) 115/46 100 % Vital signs were reviewed. Physical Exam 
Vitals signs and nursing note reviewed. Constitutional:   
   General: He is not in acute distress. Appearance: Normal appearance. He is not ill-appearing. Cardiovascular:  
   Rate and Rhythm: Normal rate. Heart sounds: Normal heart sounds. Pulmonary: Breath sounds: Normal breath sounds. Musculoskeletal:  
   Right wrist: He exhibits decreased range of motion, tenderness, bony tenderness and swelling. He exhibits no crepitus and no deformity. Neurological:  
   Mental Status: He is alert. MEDICAL DECISION MAKING: (Including Differential Dx, and Plan) 79-year-old male with right wrist pain and swelling. Onset earlier today. No trauma Differential Diagnosis: Gouty arthritis, pseudogout, traumatic injury, DVT Plan: Labs x-rays MDM This is a new problem that does not need additional workup Labs/Radiographs/ECG were ordered: yes CT/US/XRay/MRI were visualized by me: yes Obtained and Reviewed Old Records or History from someone other than the patient:   
 
The patient's problem is:  moderate Diagnostic Options are:  minimal risk Management Options are:  moderate risk Data/Management:  (rhea) Lab findings during this visit:  
Recent Results (from the past 48 hour(s)) CBC W/O DIFF Collection Time: 10/01/20  7:47 PM  
Result Value Ref Range WBC 13.5 (H) 4.3 - 11.1 K/uL  
 RBC 3.27 (L) 4.23 - 5.6 M/uL HGB 9.4 (L) 13.6 - 17.2 g/dL HCT 29.8 (L) 41.1 - 50.3 % MCV 91.1 79.6 - 97.8 FL  
 MCH 28.7 26.1 - 32.9 PG  
 MCHC 31.5 31.4 - 35.0 g/dL RDW 20.0 (H) 11.9 - 14.6 % PLATELET 940 700 - 915 K/uL MPV 8.2 (L) 9.4 - 12.3 FL ABSOLUTE NRBC 0.00 0.0 - 0.2 K/uL METABOLIC PANEL, BASIC Collection Time: 10/01/20  7:47 PM  
Result Value Ref Range Sodium 132 (L) 136 - 145 mmol/L Potassium 4.2 3.5 - 5.1 mmol/L Chloride 100 98 - 107 mmol/L  
 CO2 23 21 - 32 mmol/L Anion gap 9 7 - 16 mmol/L Glucose 127 (H) 65 - 100 mg/dL BUN 53 (H) 8 - 23 MG/DL Creatinine 2.00 (H) 0.8 - 1.5 MG/DL  
 GFR est AA 41 (L) >60 ml/min/1.73m2 GFR est non-AA 34 (L) >60 ml/min/1.73m2 Calcium 8.6 8.3 - 10.4 MG/DL  
C REACTIVE PROTEIN, QT Collection Time: 10/01/20  7:47 PM  
Result Value Ref Range C-Reactive protein 8.0 (H) 0.0 - 0.9 mg/dL SED RATE, AUTOMATED Collection Time: 10/01/20  7:47 PM  
Result Value Ref Range Sed rate, automated 95 (H) 0 - 20 mm/hr Radiology studies during this visit: Xr Hand Rt Min 3 V Result Date: 10/1/2020 IMPRESSION: No acute abnormality. Medications given in the ED:  
Medications  
acetaminophen (TYLENOL) tablet 1,000 mg (1,000 mg Oral Given 10/1/20 1952) predniSONE (DELTASONE) tablet 60 mg (60 mg Oral Given 10/1/20 2123) oxyCODONE IR (ROXICODONE) tablet 5 mg (5 mg Oral Given 10/1/20 2124) Procedure Documentation:   
(.addlac  .addabscess   . addreduction   . addintubation    . addprocdoc) Procedures Recheck and Additional Documentation: 
(use .addrecheck  . addsepsis   . addstroke   . addhip  . addhandoff  . addcctime . emergcnt ) Labs and x-rays are reviewed. Old records were reviewed by me. Patient had episode of gout with a markedly elevated uric acid several months ago Given some pain medication. Wrap the wrist.  Start him on prednisone Have him follow-up with his primary care doctor. Other ED Course Notes:  
 
ED Course as of Oct 01 2311 Thu Oct 01, 2020  
1940 X-rays visualized by me. Report reviewed. No fracture XR HAND RT MIN 3 V [GH] ED Course User Index [GH] Vida Irby MD  
 
 
I wore appropriate PPE throughout this patient's ED encounter. Assessment and Plan:   
 
Disposition:   Discharged Condition @ Disposition   stable Time of Disposition   2110 Impression: ICD-10-CM ICD-9-CM 1. Right wrist pain  M25.531 719.43 2. Pain and swelling of right wrist  M25.531 719.43  
 M25.431 719.03 Follow-up:  
Follow-up Information Follow up With Specialties Details Why Contact Info [facility] EMERGENCY DEPT Emergency Medicine  Come back to the ED if you get worse or develop any new problems [address] [place] [phone] Discharge Medications:  
Discharge Medication List as of 10/1/2020  9:10 PM  
  
START taking these medications Details  
predniSONE (DELTASONE) 20 mg tablet Take 40 mg by mouth daily for 4 days. , Normal, Disp-8 Tab,R-0  
  
diclofenac (Voltaren) 1 % gel Apply 4 g to affected area four (4) times daily. , Normal, Disp-100 g,R-0 HYDROcodone-acetaminophen (NORCO) 5-325 mg per tablet Take 1 Tab by mouth every six (6) hours as needed for Pain for up to 5 days. Max Daily Amount: 4 Tabs., Normal, Disp-7 Tab,R-0 CONTINUE these medications which have NOT CHANGED Details  
meloxicam (MOBIC) 15 mg tablet Take 15 mg by mouth daily. , Historical Med  
  
ferrous gluconate (FERGON) 240 mg (27 mg iron) tablet Take 240 mg by mouth three (3) times daily (with meals). , Historical Med  
  
collagenase (SANTYL) 250 unit/gram ointment Apply  to affected area daily. , Historical Med  
  
mupirocin (BACTROBAN) 2 % ointment APPLY TO LEFT LEG DAILY, Historical Med  
  
sertraline (ZOLOFT) 50 mg tablet Take 50 mg by mouth daily. , Historical Med  
  
carvedilol (COREG) 6.25 mg tablet Take 1 Tab by mouth two (2) times daily (with meals). , Print, Disp-180 Tab, R-3  
  
levothyroxine (SYNTHROID) 112 mcg tablet Take  by mouth Daily (before breakfast). , Historical Med  
  
furosemide (LASIX) 40 mg tablet Take 40 mg by mouth two (2) times a day., Historical Med  
  
potassium chloride SR (K-TAB) 20 mEq tablet Take 20 mEq by mouth daily. , Historical Med  
  
psyllium husk (METAMUCIL) 0.4 gram cap Take  by mouth daily. , Historical Med  
  
  
  
 
Past Medical History:   
 
Past Medical History:  
Diagnosis Date  Adverse effect of anesthesia   
 nighmares  Aneurysm ([facility] [address] Thorasic Aortic aneurysm, surger in [facility] 5/24/16  Atrial flutter ([facility] [address] ROSENDO guided cardioversion, No thinners  CAD (coronary artery disease)   
 patient denies  Congestive heart failure ([facility] [address] EF 45-50% on 11/2019  Hypertension  Ill-defined condition   
 spinal cord injury  Ill-defined condition Neurogenic bladder, self caths  Morbid obesity ([facility] [address]  Thyroid disease Past Surgical History:  
Procedure Laterality Date  CARDIAC SURG PROCEDURE UNLIST    
 repair of aneurysm in acending and transverse arteries  CARDIAC SURG PROCEDURE UNLIST   Aortic valve repair Descending  CARDIAC SURG PROCEDURE UNLIST  2016  
 cardioversion, ROSENDO x2  
 CHEST SURGERY PROCEDURE UNLISTED  ,  Aortic aneursym  HX APPENDECTOMY  HX COLONOSCOPY    
 diverticulosis  HX ORTHOPAEDIC    
 repair of broken jaw  HX TONSILLECTOMY Family History Problem Relation Age of Onset  Stroke Mother Social History Tobacco Use  Smoking status: Former Smoker Packs/day: 3.00 Years: 25.00 Pack years: 75.00 Types: Cigarettes Last attempt to quit: 1986 Years since quittin.7  Smokeless tobacco: Former User Types: Snuff, Chew Quit date: 2014 Substance Use Topics  Alcohol use: Not Currently Comment: advises quitting  Drug use: No  
 
Prior to Admission Medications Prescriptions Last Dose Informant Patient Reported? Taking?  
carvedilol (COREG) 6.25 mg tablet   No No  
Sig: Take 1 Tab by mouth two (2) times daily (with meals). collagenase (SANTYL) 250 unit/gram ointment   Yes No  
Sig: Apply  to affected area daily. ferrous gluconate (FERGON) 240 mg (27 mg iron) tablet   Yes No  
Sig: Take 240 mg by mouth three (3) times daily (with meals). furosemide (LASIX) 40 mg tablet   Yes No  
Sig: Take 40 mg by mouth two (2) times a day. levothyroxine (SYNTHROID) 112 mcg tablet   Yes No  
Sig: Take  by mouth Daily (before breakfast). meloxicam (MOBIC) 15 mg tablet   Yes No  
Sig: Take 15 mg by mouth daily. mupirocin (BACTROBAN) 2 % ointment   Yes No  
Sig: APPLY TO LEFT LEG DAILY potassium chloride SR (K-TAB) 20 mEq tablet   Yes No  
 Sig: Take 20 mEq by mouth daily. psyllium husk (METAMUCIL) 0.4 gram cap   Yes No  
Sig: Take  by mouth daily. sertraline (ZOLOFT) 50 mg tablet   Yes No  
Sig: Take 50 mg by mouth daily. Facility-Administered Medications: None

## 2020-10-01 NOTE — DISCHARGE INSTRUCTIONS
Ice your wrist, 20 to 30 minutes 2-3 times a day    Take medications as directed    It may take several days for the pain and swelling to settle down    Follow-up with your primary care doctor if this gets worse or you have any new problems.

## 2020-10-02 NOTE — ED NOTES
I have reviewed discharge instructions with the patient. The patient verbalized understanding. Patient left ED via Discharge Method: stretcher to Home with basilia transport. Opportunity for questions and clarification provided. Patient given 3 scripts. To continue your aftercare when you leave the hospital, you may receive an automated call from our care team to check in on how you are doing. This is a free service and part of our promise to provide the best care and service to meet your aftercare needs.  If you have questions, or wish to unsubscribe from this service please call [phone]. Thank you for Choosing our [employer] [facility] Emergency Department.

## 2020-10-14 PROBLEM — R41.82 ALTERED MENTAL STATUS: Status: ACTIVE | Noted: 2020-01-01

## 2020-10-14 PROBLEM — D72.829 LEUKOCYTOSIS: Status: ACTIVE | Noted: 2020-01-01

## 2020-10-14 PROBLEM — N17.9 AKI (ACUTE KIDNEY INJURY) (HCC): Status: ACTIVE | Noted: 2020-01-01

## 2020-10-14 NOTE — H&P
Hospitalist Note Admit Date:  10/14/2020  3:37 PM  
Name:  Frank Lyle Age:  79 y.o. 
:  1940 MRN:  [id number] PCP:  Elvin Taylor MD 
Treatment Team: Attending Provider: Matthias Williamson MD; Primary Nurse: Ben Jones RN 
 
HPI/Subjective:  
79-year-old male with medical history significant for hypertension, CHF, CAD, aortic valve repair, hypothyroidism, A. 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 Wife is present at bedside and states he has had trouble with confusion and slurred speech. She reports he is hallucinating. Also reports he is restless at night and not able to sleep over the past couple of days. Denies any fevers, shortness of breath, chest pain, palpitation, nausea, vomiting, abdominal pain. Patient had episode of gout about 2 weeks ago and has completed prednisone and hydrocodone course about 2 days ago. Patient is also following infectious disease and wound care for right heel ulcer and osteomyelitis of the posterior lateral calcaneus and is currently on ertapenem. As per wife he has completed 2 weeks out of 6 weeks antibiotic course in total. Patient has history of indwelling Jeffery's. Last change was 2 weeks ago. Patient was vitally stable in the ED, saturating 98% on room air. Alert and oriented x3. Labs significant for leukocytosis up to 23.8 and REECE. Hospitalist consulted for admission. 10 systems reviewed and negative except as noted in HPI. Past Medical History:  
Diagnosis Date  Adverse effect of anesthesia   
 nighmares  Aneurysm ([address] Thorasic Aortic aneurysm, surger in [facility] 16  Atrial flutter ([address] ROSENDO guided cardioversion, No thinners  CAD (coronary artery disease)   
 patient denies  Congestive heart failure ([address] EF 45-50% on 2019  Hypertension  Ill-defined condition   
 spinal cord injury  Ill-defined condition Neurogenic bladder, self caths  Morbid obesity ([facility] [address]  Thyroid disease Past Surgical History:  
Procedure Laterality Date  CARDIAC SURG PROCEDURE UNLIST    
 repair of aneurysm in acending and transverse arteries  CARDIAC SURG PROCEDURE UNLIST   Aortic valve repair Descending  CARDIAC SURG PROCEDURE UNLIST  2016  
 cardioversion, ROSENDO x2  
 CHEST SURGERY PROCEDURE UNLISTED  ,  Aortic aneursym  HX APPENDECTOMY  HX COLONOSCOPY    
 diverticulosis  HX ORTHOPAEDIC    
 repair of broken jaw  HX TONSILLECTOMY No Known Allergies Social History Tobacco Use  Smoking status: Former Smoker Packs/day: 3.00 Years: 25.00 Pack years: 75.00 Types: Cigarettes Last attempt to quit: 1986 Years since quittin.8  Smokeless tobacco: Former User Types: Snuff, Chew Quit date: 2014 Substance Use Topics  Alcohol use: Not Currently Comment: advises quitting Family History Problem Relation Age of Onset  Stroke Mother Family history reviewed and noncontributory. Immunization History Administered Date(s) Administered  Tdap 2012 PTA Medications: 
Prior to Admission Medications Prescriptions Last Dose Informant Patient Reported? Taking?  
carvedilol (COREG) 6.25 mg tablet   No No  
Sig: Take 1 Tab by mouth two (2) times daily (with meals). collagenase (SANTYL) 250 unit/gram ointment   Yes No  
Sig: Apply  to affected area daily. diclofenac (Voltaren) 1 % gel   No No  
Sig: Apply 4 g to affected area four (4) times daily. ferrous gluconate (FERGON) 240 mg (27 mg iron) tablet   Yes No  
Sig: Take 240 mg by mouth three (3) times daily (with meals). furosemide (LASIX) 40 mg tablet   Yes No  
Sig: Take 40 mg by mouth two (2) times a day.   
levothyroxine (SYNTHROID) 112 mcg tablet   Yes No  
 Sig: Take  by mouth Daily (before breakfast). meloxicam (MOBIC) 15 mg tablet   Yes No  
Sig: Take 15 mg by mouth daily. mupirocin (BACTROBAN) 2 % ointment   Yes No  
Sig: APPLY TO LEFT LEG DAILY potassium chloride SR (K-TAB) 20 mEq tablet   Yes No  
Sig: Take 20 mEq by mouth daily. psyllium husk (METAMUCIL) 0.4 gram cap   Yes No  
Sig: Take  by mouth daily. sertraline (ZOLOFT) 50 mg tablet   Yes No  
Sig: Take 50 mg by mouth daily. Facility-Administered Medications: None Objective:  
 
Patient Vitals for the past 24 hrs: 
 Temp Pulse Resp BP SpO2  
10/14/20 1650 98 °F (36.7 °C)      
10/14/20 1612     98 % 10/14/20 1538 97.7 °F (36.5 °C) 72 18 96/62 98 % Oxygen Therapy O2 Sat (%): 98 % (10/14/20 1612) O2 Device: Room air (10/14/20 1538) Estimated body mass index is 37.66 kg/m² as calculated from the following: 
  Height as of this encounter: 5' 11\" (1.803 m). Weight as of this encounter: 122.5 kg (270 lb). No intake or output data in the 24 hours ending 10/14/20 1831 *Note that automatically entered I/Os may not be accurate; dependent on patient compliance with collection and accurate  by assistants. Physical Exam: 
General:          No acute distress, well-developed Eyes:   Normal sclerae. Extraocular movements intact. HENT:  Normocephalic, atraumatic. Moist mucous membranes CV:   RRR. No m/r/g. Lungs:  CTAB. No wheezing, rhonchi, or rales. Abdomen: Soft, nontender, nondistended. Has indwelling catheter Extremities: Warm and dry. 2+ pitting edema bilaterally. Right foot is in a boot. As per ED there is skin breakdown on the heel with minimal surrounding cellulitis. PICC line in place without any obvious erythema at the site Neurologic: CN II-XII grossly intact. Sensation intact. Patient is paraplegic, alert and oriented x3 Skin:     No rashes or jaundice. Normal coloration Psych:  Normal mood and affect. I reviewed the labs, imaging, EKGs, telemetry, and other studies done this admission. Data Reviewed:  
Recent Results (from the past 24 hour(s)) CBC WITH AUTOMATED DIFF Collection Time: 10/14/20  4:02 PM  
Result Value Ref Range WBC 23.8 (H) 4.3 - 11.1 K/uL  
 RBC 3.20 (L) 4.23 - 5.6 M/uL HGB 9.2 (L) 13.6 - 17.2 g/dL HCT 28.7 (L) 41.1 - 50.3 % MCV 89.7 79.6 - 97.8 FL  
 MCH 28.8 26.1 - 32.9 PG  
 MCHC 32.1 31.4 - 35.0 g/dL RDW 20.3 (H) 11.9 - 14.6 % PLATELET 445 (L) 010 - 450 K/uL MPV 9.4 9.4 - 12.3 FL ABSOLUTE NRBC 0.02 0.0 - 0.2 K/uL  
 DF AUTOMATED NEUTROPHILS 83 (H) 43 - 78 % LYMPHOCYTES 4 (L) 13 - 44 % MONOCYTES 8 4.0 - 12.0 % EOSINOPHILS 1 0.5 - 7.8 % BASOPHILS 0 0.0 - 2.0 % IMMATURE GRANULOCYTES 4 0.0 - 5.0 %  
 ABS. NEUTROPHILS 19.8 (H) 1.7 - 8.2 K/UL  
 ABS. LYMPHOCYTES 1.0 0.5 - 4.6 K/UL  
 ABS. MONOCYTES 1.8 (H) 0.1 - 1.3 K/UL  
 ABS. EOSINOPHILS 0.2 0.0 - 0.8 K/UL  
 ABS. BASOPHILS 0.1 0.0 - 0.2 K/UL  
 ABS. IMM. GRANS. 0.9 (H) 0.0 - 0.5 K/UL METABOLIC PANEL, COMPREHENSIVE Collection Time: 10/14/20  4:02 PM  
Result Value Ref Range Sodium 138 136 - 145 mmol/L Potassium 4.0 3.5 - 5.1 mmol/L Chloride 105 98 - 107 mmol/L  
 CO2 26 21 - 32 mmol/L Anion gap 7 7 - 16 mmol/L Glucose 87 65 - 100 mg/dL BUN 83 (H) 8 - 23 MG/DL Creatinine 1.49 0.8 - 1.5 MG/DL  
 GFR est AA 58 (L) >60 ml/min/1.73m2 GFR est non-AA 48 (L) >60 ml/min/1.73m2 Calcium 8.5 8.3 - 10.4 MG/DL Bilirubin, total 0.8 0.2 - 1.1 MG/DL  
 ALT (SGPT) 19 12 - 65 U/L  
 AST (SGOT) 14 (L) 15 - 37 U/L Alk. phosphatase 73 50 - 136 U/L Protein, total 7.1 6.3 - 8.2 g/dL Albumin 3.2 3.2 - 4.6 g/dL Globulin 3.9 (H) 2.3 - 3.5 g/dL A-G Ratio 0.8 (L) 1.2 - 3.5 LACTIC ACID Collection Time: 10/14/20  4:02 PM  
Result Value Ref Range Lactic acid 0.4 0.4 - 2.0 MMOL/L All Micro Results Procedure Component Value Units Date/Time CULTURE, BLOOD [[id number]] Collected:  10/14/20 1703 Order Status:  Completed Specimen:  Blood Updated:  10/14/20 1712 CULTURE, BLOOD [[id number]] Collected:  10/14/20 1602 Order Status:  Completed Specimen:  Blood Updated:  10/14/20 1611 Current Facility-Administered Medications Medication Dose Route Frequency  sodium chloride 0.9 % bolus infusion 500 mL  500 mL IntraVENous ONCE Current Outpatient Medications Medication Sig  
 diclofenac (Voltaren) 1 % gel Apply 4 g to affected area four (4) times daily.  meloxicam (MOBIC) 15 mg tablet Take 15 mg by mouth daily.  ferrous gluconate (FERGON) 240 mg (27 mg iron) tablet Take 240 mg by mouth three (3) times daily (with meals).  collagenase (SANTYL) 250 unit/gram ointment Apply  to affected area daily.  mupirocin (BACTROBAN) 2 % ointment APPLY TO LEFT LEG DAILY  sertraline (ZOLOFT) 50 mg tablet Take 50 mg by mouth daily.  carvedilol (COREG) 6.25 mg tablet Take 1 Tab by mouth two (2) times daily (with meals).  levothyroxine (SYNTHROID) 112 mcg tablet Take  by mouth Daily (before breakfast).  furosemide (LASIX) 40 mg tablet Take 40 mg by mouth two (2) times a day.  potassium chloride SR (K-TAB) 20 mEq tablet Take 20 mEq by mouth daily.  psyllium husk (METAMUCIL) 0.4 gram cap Take  by mouth daily. Other Studies: No results found for this visit on 10/14/20. Xr Chest [facility] Result Date: 10/14/2020 Portable AP upright chest dated 10/14/2020 Prior chest x-ray 11/8/2016 CLINICAL INFORMATION: Confusion and hallucinations for one week Metallic sternal wires are present. There is a right PICC line, the tip at the upper superior vena cava. Heart is enlarged. Thoracic aorta is mildly tortuous. Pulmonary vascularity appears normal and lungs clear. No pleural effusion. No pneumothorax. IMPRESSION: No acute abnormality [unfilled] Assessment and Plan: Hospital Problems as of 10/14/2020 Date Reviewed: 7/13/2020 Codes Class Noted - Resolved POA Leukocytosis ICD-10-CM: T69.522 ICD-9-CM: 288.60  10/14/2020 - Present Unknown REECE (acute kidney injury) ([facility] [address] ICD-10-CM: N17.9 ICD-9-CM: 584.9  10/14/2020 - Present Unknown Plan: 
Admit patient to medical floor CT head ordered in the ED, follow-up on the results Patient is alert and oriented x3 on my examination Patient with leukocytosis No obvious source of infection other than Osteomyelitis and is on ertapenem Will continue Ertapenem ID consulted, appreciate recommendations Wound care consulted Will rule out any other source of infection UA, urine culture and blood culture ordered. Chest x-ray no acute pathology We will give small bolus for REECE, likely prerenal given poor oral intake for couple of days Avoid Nephrotoxic agent BMP in a.m. Continue home meds for hypothyroidism CHF, hypertension: Stable Discharge planning: Admit to medical floor DVT ppx ordered Code status:  Full Estimated LOS:  Greater than 2 midnights Risk:  high Signed: 
Lucila Mandujano MD

## 2020-10-14 NOTE — ED TRIAGE NOTES
Pt brought in by wife for episodes of confusion and hallucinations. Pt with PICC in right arm. Wounds on feet being treated wound care and I.D. Pt is wheelchair bound. Pt has Jeffery in place.

## 2020-10-14 NOTE — ED NOTES
TRANSFER - OUT REPORT: 
 
Verbal report given to RN Teodora Fox on Maritza Mirza  being transferred to [facility] for routine progression of care Report consisted of patients Situation, Background, Assessment and  
Recommendations(SBAR). Information from the following report(s) SBAR, ED Summary, [facility] and Recent Results was reviewed with the receiving nurse. Lines:  
Peripheral IV 10/14/20 Left;Upper Arm (Active) Site Assessment Clean, dry, & intact 10/14/20 1706 Phlebitis Assessment 0 10/14/20 1706 Infiltration Assessment 0 10/14/20 1706 Dressing Status Clean, dry, & intact 10/14/20 1706 Dressing Type Transparent 10/14/20 1706 Hub Color/Line Status Pink 10/14/20 1706 Action Taken Blood drawn 10/14/20 1706 Opportunity for questions and clarification was provided. Patient transported with: 
 Registered Nurse

## 2020-10-14 NOTE — ED PROVIDER NOTES
25-year-old gentleman brought in by his wife. She states he has had troubles with hallucinations and confusion and slurred speech and restlessness with decreased sleep over the last 3 days. Hallucinations are visual in nature. His wife both give history. He has had no fever cough or vomiting. Had some loose bowel movements. Patient denies any pain other than some pain \"in his backside\" where he had loose bowel movements. There is been no bleeding. No focal numbness or weakness. There has been 1 or 2 falls. He gets around only in a wheelchair. Past history is significant for congestive heart failure. Aortic valve repair. Also thoracic aortic aneurysm that was repaired also. This is 4 years ago. History of atrial flutter. History of diverticulosis and diverticulitis. Review of recent records reveals he had an episode of gout about 2 weeks ago. He just finished his prednisone and took last hydrocodone 2 days ago. Patient also has a decubitus of the right heel with cellulitis has been seen receiving IV antibiotics through PICC line through home health. Patient has history of indwelling Jeffery. Appears to be records that he is on ertapenem for his right heel osteomyelitis. The history is provided by the patient and the spouse. Altered mental status This is a new problem. The current episode started more than 2 days ago. The problem has been gradually worsening. Associated symptoms include confusion and hallucinations. Pertinent negatives include no tingling and no numbness. Mental status baseline is normal.  His past medical history is significant for heart disease. His past medical history does not include diabetes, CVA, TIA, hypertension or head trauma. Past Medical History:  
Diagnosis Date  Adverse effect of anesthesia   
 nighmares  Aneurysm ([facility] [address] Thorasic Aortic aneurysm, surger in [facility] 5/24/16  Atrial flutter ([address] ROSENDO guided cardioversion, No thinners  CAD (coronary artery disease)   
 patient denies  Congestive heart failure ([facility] [address] EF 45-50% on 2019  Hypertension  Ill-defined condition   
 spinal cord injury  Ill-defined condition Neurogenic bladder, self caths  Morbid obesity ([facility] [address]  Thyroid disease Past Surgical History:  
Procedure Laterality Date  CARDIAC SURG PROCEDURE UNLIST    
 repair of aneurysm in acending and transverse arteries  CARDIAC SURG PROCEDURE UNLIST   Aortic valve repair Descending  CARDIAC SURG PROCEDURE UNLIST  2016  
 cardioversion, ROSENDO x2  
 CHEST SURGERY PROCEDURE UNLISTED  ,  Aortic aneursym  HX APPENDECTOMY  HX COLONOSCOPY    
 diverticulosis  HX ORTHOPAEDIC    
 repair of broken jaw  HX TONSILLECTOMY Family History:  
Problem Relation Age of Onset  Stroke Mother Social History Socioeconomic History  Marital status:  Spouse name: Not on file  Number of children: Not on file  Years of education: Not on file  Highest education level: Not on file Occupational History  Not on file Social Needs  Financial resource strain: Not on file  Food insecurity Worry: Not on file Inability: Not on file  Transportation needs Medical: Not on file Non-medical: Not on file Tobacco Use  Smoking status: Former Smoker Packs/day: 3.00 Years: 25.00 Pack years: 75.00 Types: Cigarettes Last attempt to quit: 1986 Years since quittin.8  Smokeless tobacco: Former User Types: Snuff, Chew Quit date: 2014 Substance and Sexual Activity  Alcohol use: Not Currently Comment: advises quitting  Drug use: No  
 Sexual activity: Yes  
  Partners: Female Lifestyle  Physical activity Days per week: Not on file Minutes per session: Not on file  Stress: Not on file Relationships  Social connections Talks on phone: Not on file Gets together: Not on file Attends Baptism service: Not on file Active member of club or organization: Not on file Attends meetings of clubs or organizations: Not on file Relationship status: Not on file  Intimate partner violence Fear of current or ex partner: Not on file Emotionally abused: Not on file Physically abused: Not on file Forced sexual activity: Not on file Other Topics Concern  Not on file Social History Narrative Lives with wife Currently uses walker for ambulation post-op - [facility] Previously employed as  / , [address] works,  PCP: Dr. Josef Landaverde ALLERGIES: Patient has no known allergies. Review of Systems Constitutional: Negative for chills and fever. Respiratory: Negative for cough and shortness of breath. Cardiovascular: Negative for chest pain and palpitations. Gastrointestinal: Negative for abdominal pain, diarrhea and vomiting. Genitourinary: Positive for difficulty urinating. Negative for dysuria and flank pain. Musculoskeletal: Negative for back pain and neck pain. Skin: Negative for color change and rash. Neurological: Negative for tingling, syncope, numbness and headaches. Psychiatric/Behavioral: Positive for confusion and hallucinations. All other systems reviewed and are negative. Vitals:  
 10/14/20 1538 BP: 96/62 Pulse: 72 Resp: 18 Temp: 97.7 °F (36.5 °C) SpO2: 98% Weight: 122.5 kg (270 lb) Height: 5' 11\" (1.803 m) Physical Exam 
Vitals signs and nursing note reviewed. Constitutional:   
   General: He is not in acute distress. Appearance: He is well-developed. HENT:  
   Head: Normocephalic and atraumatic. Right Ear: External ear normal.  
   Left Ear: External ear normal.  
   Mouth/Throat:  
   Pharynx: No oropharyngeal exudate. Eyes: Conjunctiva/sclera: Conjunctivae normal.  
   Pupils: Pupils are equal, round, and reactive to light. Neck: Musculoskeletal: Normal range of motion and neck supple. Cardiovascular:  
   Rate and Rhythm: Normal rate and regular rhythm. Heart sounds: No murmur. Pulmonary:  
   Effort: No respiratory distress. Breath sounds: Normal breath sounds. Abdominal:  
   General: Bowel sounds are normal.  
   Palpations: Abdomen is soft. There is no mass. Tenderness: There is no abdominal tenderness. There is no guarding or rebound. Hernia: No hernia is present. Genitourinary: 
   Comments: Has indwelling catheter. Musculoskeletal:  
   Comments: 2+ pitting lower extremity edema bilaterally. Right foot is in a boot. There is skin breakdown on the heel with minimal surrounding cellulitis. Extremity PICC line without any obvious erythema swelling about the site. Skin: 
   General: Skin is warm and dry. Neurological:  
   Mental Status: He is alert and oriented to person, place, and time. GCS: GCS eye subscore is 4. GCS verbal subscore is 5. GCS motor subscore is 6. Gait: Gait normal.  
   Comments: Nl speech No drift. Patient definitely oriented x3. Good clear speech. Psychiatric:     
   Speech: Speech normal.  
 
  
 
MDM Number of Diagnoses or Management Options Diagnosis management comments: Altered mental status. Concern for dehydration, infection, sepsis, electrolyte abnormalities. ,  Medication related possibly to prednisone or hydrocodone. With recent fall, head CT. Amount and/or Complexity of Data Reviewed Clinical lab tests: ordered and reviewed Tests in the radiology section of CPT®: ordered and reviewed Tests in the medicine section of CPT®: reviewed and ordered Review and summarize past medical records: yes Independent visualization of images, tracings, or specimens: yes (EKG Seals atrial fibrillation with occasional PVCs. Nonspecific ST changes. No ST elevation.) Risk of Complications, Morbidity, and/or Mortality Presenting problems: moderate Diagnostic procedures: low Management options: moderate Patient Progress Patient progress: stable Procedures Results Include: 
 
Recent Results (from the past 24 hour(s)) CBC WITH AUTOMATED DIFF Collection Time: 10/14/20  4:02 PM  
Result Value Ref Range WBC 23.8 (H) 4.3 - 11.1 K/uL  
 RBC 3.20 (L) 4.23 - 5.6 M/uL HGB 9.2 (L) 13.6 - 17.2 g/dL HCT 28.7 (L) 41.1 - 50.3 % MCV 89.7 79.6 - 97.8 FL  
 MCH 28.8 26.1 - 32.9 PG  
 MCHC 32.1 31.4 - 35.0 g/dL RDW 20.3 (H) 11.9 - 14.6 % PLATELET 959 (L) 153 - 450 K/uL MPV 9.4 9.4 - 12.3 FL ABSOLUTE NRBC 0.02 0.0 - 0.2 K/uL  
 DF AUTOMATED NEUTROPHILS 83 (H) 43 - 78 % LYMPHOCYTES 4 (L) 13 - 44 % MONOCYTES 8 4.0 - 12.0 % EOSINOPHILS 1 0.5 - 7.8 % BASOPHILS 0 0.0 - 2.0 % IMMATURE GRANULOCYTES 4 0.0 - 5.0 %  
 ABS. NEUTROPHILS 19.8 (H) 1.7 - 8.2 K/UL  
 ABS. LYMPHOCYTES 1.0 0.5 - 4.6 K/UL  
 ABS. MONOCYTES 1.8 (H) 0.1 - 1.3 K/UL  
 ABS. EOSINOPHILS 0.2 0.0 - 0.8 K/UL  
 ABS. BASOPHILS 0.1 0.0 - 0.2 K/UL  
 ABS. IMM. GRANS. 0.9 (H) 0.0 - 0.5 K/UL METABOLIC PANEL, COMPREHENSIVE Collection Time: 10/14/20  4:02 PM  
Result Value Ref Range Sodium 138 136 - 145 mmol/L Potassium 4.0 3.5 - 5.1 mmol/L Chloride 105 98 - 107 mmol/L  
 CO2 26 21 - 32 mmol/L Anion gap 7 7 - 16 mmol/L Glucose 87 65 - 100 mg/dL BUN 83 (H) 8 - 23 MG/DL Creatinine 1.49 0.8 - 1.5 MG/DL  
 GFR est AA 58 (L) >60 ml/min/1.73m2 GFR est non-AA 48 (L) >60 ml/min/1.73m2 Calcium 8.5 8.3 - 10.4 MG/DL Bilirubin, total 0.8 0.2 - 1.1 MG/DL  
 ALT (SGPT) 19 12 - 65 U/L  
 AST (SGOT) 14 (L) 15 - 37 U/L Alk. phosphatase 73 50 - 136 U/L Protein, total 7.1 6.3 - 8.2 g/dL Albumin 3.2 3.2 - 4.6 g/dL Globulin 3.9 (H) 2.3 - 3.5 g/dL A-G Ratio 0.8 (L) 1.2 - 3.5 LACTIC ACID Collection Time: 10/14/20  4:02 PM  
Result Value Ref Range Lactic acid 0.4 0.4 - 2.0 MMOL/L Xr Chest [facility] Result Date: 10/14/2020 Portable AP upright chest dated 10/14/2020 Prior chest x-ray 11/8/2016 CLINICAL INFORMATION: Confusion and hallucinations for one week Metallic sternal wires are present. There is a right PICC line, the tip at the upper superior vena cava. Heart is enlarged. Thoracic aorta is mildly tortuous. Pulmonary vascularity appears normal and lungs clear. No pleural effusion. No pneumothorax. IMPRESSION: No acute abnormality No obvious evidence for sepsis. However BUN is elevated and there is a component of dehydration for which he is being hydrated. Also leukocytosis. Unsure of infection versus effect of recent prednisone. Cultures are done. Discussed with hospitalist.  CT scan is pending. Potential observation admission. Unsure if hallucinations are due to dehydration, Heel wound inspected. No drainage. No surrounding erythema. Inspection of buttocks reveals his right medial cleft with some erythema and some tenderness. There is obvious desquamation or abscess. Possibility of some cellulitis.

## 2020-10-15 PROBLEM — M86.9 OSTEOMYELITIS (HCC): Status: ACTIVE | Noted: 2020-01-01

## 2020-10-15 NOTE — PROGRESS NOTES
Problem: Falls - Risk of 
Goal: *Absence of Falls Description: Document Efra Contreras Fall Risk and appropriate interventions in the flowsheet. Outcome: Progressing Towards Goal 
Note: Fall Risk Interventions: 
Mobility Interventions: Bed/chair exit alarm Mentation Interventions: Bed/chair exit alarm Medication Interventions: Patient to call before getting OOB Elimination Interventions: Call light in reach

## 2020-10-15 NOTE — WOUND CARE
Patient seen for right lateral-posterior heel wound. It is full thickness. Light odor. Measured 3.5x2.5x0.2 cm. Pale pink base, light rubbery grayish yellow layer over top. Cleansed with dermal wound cleanser. Covered with xeroform then dry dressing. Recommend we start silver dressing with next dressing change. Discussed with Dr Elvia Rodriguez. Updated patient, he fell asleep multiple times during assessment and discussion. May continue tubi  already in use. Please use heel boot when in the bed. Will continue to follow and adjust treatment as needed.

## 2020-10-15 NOTE — PROGRESS NOTES
Hospitalist Note Admit Date:  10/14/2020  3:37 PM  
Name:  Randy Mcintyre Age:  79 y.o. 
:  1940 MRN:  [id number] PCP:  Elvin Taylor MD 
Treatment Team: Attending Provider: Ham Bello MD; Consulting Provider: Marva Steele MD; Utilization Review: Jillian Chavez RN; : Marguerita Hodgkins, LBSW 
 
HPI/Subjective:  
65-year-old male with medical history significant for hypertension, CHF, CAD, aortic valve repair, hypothyroidism, A. 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 No acute event reported overnight. Patient was alert and oriented times 3 in the morning. C/o Back pain and neck pain. He endorses he wants to sit in the chair to get some relief. Denies chest pain, shortness of breath, hypotension, abdominal pain, nausea and vomiting. He does not like his breakfast and asking if we can start him on regular diet. No other complaints Objective:  
 
Patient Vitals for the past 24 hrs: 
 Temp Pulse Resp BP SpO2  
10/15/20 1133 98 °F (36.7 °C) 90 18 (!) 82/70 95 % 10/15/20 1132    (!) 85/58   
10/15/20 0738 98.1 °F (36.7 °C) 70 18 (!) 99/56 98 % 10/15/20 0330    (!) 102/52   
10/15/20 0306 98.3 °F (36.8 °C) 72 16  97 % 10/14/20 1950 97.6 °F (36.4 °C) 60 17 (!) 105/47 98 % 10/14/20 1650 98 °F (36.7 °C)      
10/14/20 1612     98 % 10/14/20 1538 97.7 °F (36.5 °C) 72 18 96/62 98 % Oxygen Therapy O2 Sat (%): 95 % (10/15/20 1133) O2 Device: Room air (10/14/20 1538) Estimated body mass index is 37.66 kg/m² as calculated from the following: 
  Height as of this encounter: 5' 11\" (1.803 m). Weight as of this encounter: 122.5 kg (270 lb). Intake/Output Summary (Last 24 hours) at 10/15/2020 1201 Last data filed at 10/15/2020 5170 Gross per 24 hour Intake  Output 2600 ml Net -2600 ml *Note that automatically entered I/Os may not be accurate; dependent on patient compliance with collection and accurate  by techs. General:    Well nourished. Alert. CV:   RRR. No murmur, rub, or gallop. Lungs:   CTAB. No wheezing, rhonchi, or rales. Abdomen:   Soft, nontender, nondistended. Extremities: Warm and dry. Bilateral lower extremity edema, clean dressing Skin:     No rashes or jaundice. Neuro:  No gross focal deficits Data Reviewed: 
I have reviewed all labs, meds, and studies from the last 24 hours: 
Recent Results (from the past 24 hour(s)) CBC WITH AUTOMATED DIFF Collection Time: 10/14/20  4:02 PM  
Result Value Ref Range WBC 23.8 (H) 4.3 - 11.1 K/uL  
 RBC 3.20 (L) 4.23 - 5.6 M/uL HGB 9.2 (L) 13.6 - 17.2 g/dL HCT 28.7 (L) 41.1 - 50.3 % MCV 89.7 79.6 - 97.8 FL  
 MCH 28.8 26.1 - 32.9 PG  
 MCHC 32.1 31.4 - 35.0 g/dL RDW 20.3 (H) 11.9 - 14.6 % PLATELET 444 (L) 562 - 450 K/uL MPV 9.4 9.4 - 12.3 FL ABSOLUTE NRBC 0.02 0.0 - 0.2 K/uL  
 DF AUTOMATED NEUTROPHILS 83 (H) 43 - 78 % LYMPHOCYTES 4 (L) 13 - 44 % MONOCYTES 8 4.0 - 12.0 % EOSINOPHILS 1 0.5 - 7.8 % BASOPHILS 0 0.0 - 2.0 % IMMATURE GRANULOCYTES 4 0.0 - 5.0 %  
 ABS. NEUTROPHILS 19.8 (H) 1.7 - 8.2 K/UL  
 ABS. LYMPHOCYTES 1.0 0.5 - 4.6 K/UL  
 ABS. MONOCYTES 1.8 (H) 0.1 - 1.3 K/UL  
 ABS. EOSINOPHILS 0.2 0.0 - 0.8 K/UL  
 ABS. BASOPHILS 0.1 0.0 - 0.2 K/UL  
 ABS. IMM. GRANS. 0.9 (H) 0.0 - 0.5 K/UL METABOLIC PANEL, COMPREHENSIVE Collection Time: 10/14/20  4:02 PM  
Result Value Ref Range Sodium 138 136 - 145 mmol/L Potassium 4.0 3.5 - 5.1 mmol/L Chloride 105 98 - 107 mmol/L  
 CO2 26 21 - 32 mmol/L Anion gap 7 7 - 16 mmol/L Glucose 87 65 - 100 mg/dL BUN 83 (H) 8 - 23 MG/DL Creatinine 1.49 0.8 - 1.5 MG/DL  
 GFR est AA 58 (L) >60 ml/min/1.73m2 GFR est non-AA 48 (L) >60 ml/min/1.73m2  Calcium 8.5 8.3 - 10.4 MG/DL  
 Bilirubin, total 0.8 0.2 - 1.1 MG/DL  
 ALT (SGPT) 19 12 - 65 U/L  
 AST (SGOT) 14 (L) 15 - 37 U/L Alk. phosphatase 73 50 - 136 U/L Protein, total 7.1 6.3 - 8.2 g/dL Albumin 3.2 3.2 - 4.6 g/dL Globulin 3.9 (H) 2.3 - 3.5 g/dL A-G Ratio 0.8 (L) 1.2 - 3.5 LACTIC ACID Collection Time: 10/14/20  4:02 PM  
Result Value Ref Range Lactic acid 0.4 0.4 - 2.0 MMOL/L  
CULTURE, BLOOD Collection Time: 10/14/20  4:02 PM  
 Specimen: Blood Result Value Ref Range Special Requests: BLUE PICC Culture result: NO GROWTH AFTER 16 HOURS    
PROCALCITONIN Collection Time: 10/14/20  4:02 PM  
Result Value Ref Range Procalcitonin 0.06 ng/mL EKG, 12 LEAD, INITIAL Collection Time: 10/14/20  4:09 PM  
Result Value Ref Range Ventricular Rate 68 BPM  
 Atrial Rate 159 BPM  
 QRS Duration 94 ms Q-T Interval 420 ms QTC Calculation (Bezet) 446 ms Calculated R Axis -8 degrees Calculated T Axis 66 degrees Diagnosis Atrial fibrillation with premature ventricular or aberrantly conducted  
complexes Abnormal ECG When compared with ECG of 17-NOV-2014 22:11, 
Atrial fibrillation has replaced Sinus rhythm Confirmed by Angella Jin ([id number]) on 10/15/2020 6:31:27 AM 
  
CULTURE, BLOOD Collection Time: 10/14/20  5:03 PM  
 Specimen: Blood Result Value Ref Range Special Requests: LEFT Antecubital 
    
 Culture result: NO GROWTH AFTER 15 HOURS    
URINALYSIS W/ RFLX MICROSCOPIC Collection Time: 10/15/20  1:03 AM  
Result Value Ref Range Color YELLOW Appearance CLEAR Specific gravity 1.008 1.001 - 1.023    
 pH (UA) 5.5 5.0 - 9.0 Protein Negative NEG mg/dL Glucose Negative mg/dL Ketone Negative NEG mg/dL Bilirubin Negative NEG Blood Negative NEG Urobilinogen 0.2 0.2 - 1.0 EU/dL Nitrites Negative NEG Leukocyte Esterase Negative NEG METABOLIC PANEL, BASIC Collection Time: 10/15/20  2:45 AM  
Result Value Ref Range Sodium 139 136 - 145 mmol/L Potassium 3.8 3.5 - 5.1 mmol/L Chloride 104 98 - 107 mmol/L  
 CO2 26 21 - 32 mmol/L Anion gap 9 7 - 16 mmol/L Glucose 84 65 - 100 mg/dL BUN 69 (H) 8 - 23 MG/DL Creatinine 1.35 0.8 - 1.5 MG/DL  
 GFR est AA >60 >60 ml/min/1.73m2 GFR est non-AA 54 (L) >60 ml/min/1.73m2 Calcium 8.6 8.3 - 10.4 MG/DL  
CBC WITH AUTOMATED DIFF Collection Time: 10/15/20  2:45 AM  
Result Value Ref Range WBC 22.8 (H) 4.3 - 11.1 K/uL  
 RBC 3.09 (L) 4.23 - 5.6 M/uL HGB 8.7 (L) 13.6 - 17.2 g/dL HCT 27.7 (L) 41.1 - 50.3 % MCV 89.6 79.6 - 97.8 FL  
 MCH 28.2 26.1 - 32.9 PG  
 MCHC 31.4 31.4 - 35.0 g/dL RDW 20.4 (H) 11.9 - 14.6 % PLATELET 586 (L) 364 - 450 K/uL MPV 8.9 (L) 9.4 - 12.3 FL ABSOLUTE NRBC 0.02 0.0 - 0.2 K/uL  
 DF AUTOMATED NEUTROPHILS 85 (H) 43 - 78 % LYMPHOCYTES 4 (L) 13 - 44 % MONOCYTES 7 4.0 - 12.0 % EOSINOPHILS 1 0.5 - 7.8 % BASOPHILS 0 0.0 - 2.0 % IMMATURE GRANULOCYTES 3 0.0 - 5.0 %  
 ABS. NEUTROPHILS 19.3 (H) 1.7 - 8.2 K/UL  
 ABS. LYMPHOCYTES 0.9 0.5 - 4.6 K/UL  
 ABS. MONOCYTES 1.6 (H) 0.1 - 1.3 K/UL  
 ABS. EOSINOPHILS 0.2 0.0 - 0.8 K/UL  
 ABS. BASOPHILS 0.1 0.0 - 0.2 K/UL  
 ABS. IMM. GRANS. 0.8 (H) 0.0 - 0.5 K/UL Current Meds: 
Current Facility-Administered Medications Medication Dose Route Frequency  sodium chloride 0.9 % bolus infusion 250 mL  250 mL IntraVENous ONCE  
 metroNIDAZOLE (FLAGYL) tablet 500 mg  500 mg Oral Q8H  
 cefTRIAXone (ROCEPHIN) 2 g in 0.9% sodium chloride (MBP/ADV) 50 mL  2 g IntraVENous Q24H  carvediloL (COREG) tablet 6.25 mg  6.25 mg Oral BID WITH MEALS  furosemide (LASIX) tablet 40 mg  40 mg Oral BID  levothyroxine (SYNTHROID) tablet 112 mcg  112 mcg Oral ACB  sertraline (ZOLOFT) tablet 50 mg  50 mg Oral DAILY  sodium chloride (NS) flush 5-40 mL  5-40 mL IntraVENous Q8H  
 sodium chloride (NS) flush 5-40 mL  5-40 mL IntraVENous PRN  
  acetaminophen (TYLENOL) tablet 650 mg  650 mg Oral Q6H PRN Or  
 acetaminophen (TYLENOL) suppository 650 mg  650 mg Rectal Q6H PRN  polyethylene glycol (MIRALAX) packet 17 g  17 g Oral DAILY PRN  promethazine (PHENERGAN) tablet 12.5 mg  12.5 mg Oral Q6H PRN Or  
 ondansetron (ZOFRAN) injection 4 mg  4 mg IntraVENous Q6H PRN  
 enoxaparin (LOVENOX) injection 40 mg  40 mg SubCUTAneous Q24H Other Studies: No results found for this visit on 10/14/20. Ct Head Wo Cont Result Date: 10/14/2020 NONCONTRAST HEAD CT CLINICAL HISTORY:  Decreased level of consciousness with a history of fall. TECHNIQUE:  Axial images were obtained with spiral technique. Radiation dose reduction was achieved using one or all of the following techniques: automated exposure control, weight-based dosing, iterative reconstruction. COMPARISON:  None. REPORT:   Standard noncontrast head CT demonstrates no definite intracranial mass effect, hemorrhage, or evidence of acute geographic infarction. White matter hypodensities are most consistent with small vessel ischemic disease. The ventricles are normal in size and configuration, accounting for the patient's age. Orbits  and paranasal sinuses are clear where imaged. Bone windows demonstrate no definite fracture or destruction. IMPRESSION:     SMALL VESSEL ISCHEMIC DISEASE WITH NO ACUTE INTRACRANIAL ABNORMALITY OR CALVARIAL FRACTURE IDENTIFIED AT NONCONTRAST CT. Xr Chest [facility] Result Date: 10/14/2020 Portable AP upright chest dated 10/14/2020 Prior chest x-ray 11/8/2016 CLINICAL INFORMATION: Confusion and hallucinations for one week Metallic sternal wires are present. There is a right PICC line, the tip at the upper superior vena cava. Heart is enlarged. Thoracic aorta is mildly tortuous. Pulmonary vascularity appears normal and lungs clear. No pleural effusion. No pneumothorax. IMPRESSION: No acute abnormality Duplex Lower Ext Venous Bilat Result Date: 10/15/2020 Bilateral lower extremity duplex venous doppler ultrasound Indication: Evaluation for DVT Technique: Gray-scale ultrasound with and without compression and color Doppler evaluation were performed of the deep veins of bilateral lower extremities from the level of the common femoral veins to the level of the peroneal and posterior tibial veins. Findings: Bilateral common femoral veins,  femoral veins, posterior tibial and peroneal veins, and popliteal veins are compressible and opacify with color at color Doppler evaluation. Limited evaluation of the infrapopliteal vessels. There is no evidence of deep vein thrombosis. Impression: No evidence of DVT in the bilateral lower extremities. All Micro Results Procedure Component Value Units Date/Time CULTURE, URINE [[id number]] Collected:  10/15/20 0103 Order Status:  Completed Updated:  10/15/20 7100 CULTURE, BLOOD [[id number]] Collected:  10/14/20 1602 Order Status:  Completed Specimen:  Blood Updated:  10/15/20 1488 Special Requests: BLUE PICC Culture result: NO GROWTH AFTER 16 HOURS     
 CULTURE, BLOOD [[id number]] Collected:  10/14/20 1703 Order Status:  Completed Specimen:  Blood Updated:  10/15/20 9137 Special Requests: --     
  LEFT Antecubital 
  
  Culture result: NO GROWTH AFTER 15 HOURS     
 CULTURE, URINE [[id number]] Collected:  10/14/20 1545 Order Status:  Canceled Specimen:  Urine from Clean catch   
  
 
 
[unfilled] Assessment and Plan:  
 
Hospital Problems as of 10/15/2020 Date Reviewed: 7/13/2020 Codes Class Noted - Resolved POA Osteomyelitis ([facility] ICD-10-CM: M86.9 ICD-9-CM: 730.20  10/15/2020 - Present Unknown Leukocytosis ICD-10-CM: I93.112 ICD-9-CM: 288.60  10/14/2020 - Present Unknown REECE (acute kidney injury) ([facility] ICD-10-CM: N17.9 ICD-9-CM: 584.9  10/14/2020 - Present Unknown  * (Principal) Altered mental status ICD-10-CM: R41.82 
 ICD-9-CM: 780.97  10/14/2020 - Present Unknown Systolic CHF, chronic (HCC) (Chronic) ICD-10-CM: B82.00 ICD-9-CM: 428.22, 428.0  1/8/2019 - Present Yes HTN (hypertension) (Chronic) ICD-10-CM: I10 
ICD-9-CM: 401.9  6/12/2016 - Present Yes Thoracic aortic aneurysm (HCC) ICD-10-CM: I71.2 ICD-9-CM: 441.2  5/16/2016 - Present Yes Overview Addendum 5/31/2017  1:53 PM by Marj Cai MD  
  May 2016 - 7.3 cm with moderate AI and anterior pericardial effusion,  found incidentally on resting echo prior to planned stress test.  Admitted urgently for transfer to Dr Anatoly Roy for repair Jan 2017 - staged repair of descending thoracic aneurysm, Dr Blanca Fontaine, complicated by spinal cord dysfunction. Hypothyroidism (Chronic) ICD-10-CM: E03.9 ICD-9-CM: 244.9  2/4/2014 - Present Yes Plan: Altered mental status: Mentation is improved CT head negative for acute pathology Could be a ertapenem or prednisone induced delirium Meropenem has been discontinued by ID and switched to ceftriaxone and metronidazole for osteomyelitis No obvious source of infection other than Osteomyelitis Osteomyelitis: 
ID consulted, appreciate recommendations Wound care consulted Follow-up on blood culture and urine culture REECE : 
renal function improving Avoid Nephrotoxic agent Hypothyroidism: 
continue home meds for hypothyroidism CHF, hypertension:  
Patient with borderline blood pressure, will hold antihypertensive at this time Paraplegia: 
Stable Physical therapy consult DC planning/Dispo:   
Home on discharge Diet:  DIET CARDIAC 
DVT ppx: Lovenox Signed: 
Russ Ramos MD

## 2020-10-15 NOTE — PROGRESS NOTES
END OF SHIFT NOTE: 
Patient resting in bed, medicated with Tylenol 650 mg po once for leg discomfort. Duplex studies completed negative for DVT's . Am Coreg and Lasix po held and Pm Coreg held with decrease of Lasix 20 mg po this pm  per Dr. Vance Aguilar's recommendations. Right heel in boot Intake/Output 10/15 0701 - 10/15 1900 In: 480 [P.O.:480] Out: 700 [Urine:700] Voiding: Yes Catheter: Yes Drain:   
 
 
 
 
 
Stool:  No occurrences. Emesis:    No occurrences. VITAL SIGNS Patient Vitals for the past 12 hrs: 
 Temp Pulse Resp BP SpO2  
10/15/20 1522 97.4 °F (36.3 °C) 71 18 (!) 124/50 93 % 10/15/20 1133 98 °F (36.7 °C) 90 18 (!) 82/70 95 % 10/15/20 1132    (!) 85/58   
10/15/20 0738 98.1 °F (36.7 °C) 70 18 (!) 99/56 98 % Pain Assessment Pain 1 Pain Scale 1: Numeric (0 - 10) (10/15/20 1043) Pain Intensity 1: 0 (10/15/20 1043) Patient Stated Pain Goal: 0 (10/15/20 0215) Pain Location 1: Leg (10/15/20 3882) Pain Orientation 1: Left;Right (10/15/20 3928) Pain Description 1: Aching (10/15/20 5026) Pain Intervention(s) 1: Medication (see MAR) (10/15/20 1528) Ambulating 
bedrest 
 
Additional Information:  See above Shift report given to oncoming nurse Brionna Kaufman. RN  at the bedside.  
 
Petrina Severe, RN

## 2020-10-15 NOTE — CONSULTS
Infectious Disease Consult Today's Date: 10/15/2020 Admit Date: 10/14/2020 Impression: · Delirium · Leukocytosis · Right calcaneal osteomyelitis My concern is that he has ertapenem-induced delirium. Another possibility would be steroid induced delirium or an occult blood stream infection of PICC. Plan:  
·  stop meropenem · Change to ceftriaxone plus metronidazole in anticipation of sending him home on that regimen possibly tomorrow Anti-infectives:  
· meropenem Subjective:  
Date of Consultation:  October 15, 2020 Referring Physician: Nilam Reyes 
 
Patient is a 79 y.o. male who has paraplegia due to a spinal cord injury sustained during surgery to repair an abdominal aneurysm. He developed a pressure injury to the right heel while hospitalized in 2/2020. MRI demonstrated a small focus of acute osteomyelitis in the calcaneous. He for which he has been treated with ertapenem for about 3 weeks. has an indwelling bosch catheter and presented to the ED with delirium and slurred speech. He recently had gout and was treated with prednisone which completed 2 days prior to admission. He was admitted yesterday and has been continued on meropenem. Urinalysis, CT head, and CXR are all unremarkable. The delirium seems to have resolved and he has no complaints at present. Patient Active Problem List  
Diagnosis Code  
 HTN (hypertension) I10  
 Hypothyroidism E03.9  Thoracic aortic aneurysm ([facility]) I71.2  Limited mobility Z74.09  
 Pleural effusion J90  Chronic HFrEF (heart failure with reduced ejection fraction) ([facility]) I50.22  
 Atrial fibrillation ([facility]) I48.91  
 CAD (coronary artery disease) I25.10  Acute renal failure (ARF) ([facility]) N17.9  Neurogenic bladder N31.9  
 GI bleed K92.2  Systolic CHF, chronic ([facility]) I50.22  
 Gross hematuria R31.0  BPH (benign prostatic hyperplasia) N40.0  Severe obesity ([facility]) E66.01  
 Hematuria R31.9  Leukocytosis D72.829  
  REECE (acute kidney injury) ([facility] N17.9  Altered mental status R41.82 Past Medical History:  
Diagnosis Date  Adverse effect of anesthesia   
 nighmares  Aneurysm ([facility] Thorasic Aortic aneurysm, surger in Ong 16  Atrial flutter ([facility] ROSENDO guided cardioversion, No thinners  CAD (coronary artery disease)   
 patient denies  Congestive heart failure ([facility] EF 45-50% on 2019  Hypertension  Ill-defined condition   
 spinal cord injury  Ill-defined condition Neurogenic bladder, self caths  Morbid [facility] ([facility]  Thyroid disease Family History Problem Relation Age of Onset  Stroke Mother Social History Tobacco Use  Smoking status: Former Smoker Packs/day: 3.00 Years: 25.00 Pack years: 75.00 Types: Cigarettes Last attempt to quit: 1986 Years since quittin.8  Smokeless tobacco: Former User Types: Snuff, Chew Quit date: 2014 Substance Use Topics  Alcohol use: Not Currently Comment: advises quitting Past Surgical History:  
Procedure Laterality Date  CARDIAC SURG PROCEDURE UNLIST  2016  
 repair of aneurysm in acending and transverse arteries  CARDIAC SURG PROCEDURE UNLIST  2017 Aortic valve repair Descending  CARDIAC SURG PROCEDURE UNLIST  2016  
 cardioversion, ROSENDO x2  
 CHEST SURGERY PROCEDURE UNLISTED  ,  Aortic aneursym  HX APPENDECTOMY  HX COLONOSCOPY    
 diverticulosis  HX ORTHOPAEDIC    
 repair of broken jaw  HX TONSILLECTOMY Prior to Admission medications Medication Sig Start Date End Date Taking? Authorizing Provider  
diclofenac (Voltaren) 1 % gel Apply 4 g to affected area four (4) times daily. 10/1/20   Patito Rain MD  
meloxicam (MOBIC) 15 mg tablet Take 15 mg by mouth daily.     Provider, Historical  
ferrous gluconate (FERGON) 240 mg (27 mg iron) tablet Take 240 mg by mouth three (3) times daily (with meals). Provider, Historical  
collagenase (SANTYL) 250 unit/gram ointment Apply  to affected area daily. 3/17/20   Other, MD Elvin  
mupirocin (BACTROBAN) 2 % ointment APPLY TO LEFT LEG DAILY 3/3/20   Other, MD Elvin  
sertraline (ZOLOFT) 50 mg tablet Take 50 mg by mouth daily. Other, MD Elvin  
carvedilol (COREG) 6.25 mg tablet Take 1 Tab by mouth two (2) times daily (with meals). 19   Balta Forbes MD  
levothyroxine (SYNTHROID) 112 mcg tablet Take  by mouth Daily (before breakfast). Provider, Historical  
furosemide (LASIX) 40 mg tablet Take 40 mg by mouth two (2) times a day. Provider, Historical  
potassium chloride SR (K-TAB) 20 mEq tablet Take 20 mEq by mouth daily. Provider, Historical  
psyllium husk (METAMUCIL) 0.4 gram cap Take  by mouth daily. Provider, Historical  
 
 
No Known Allergies Review of Systems:  A comprehensive review of systems was negative except for that written in the History of Present Illness. Objective:  
 
Visit Vitals BP (!) 99/56 (BP 1 Location: Left arm, BP Patient Position: At rest) Pulse 70 Temp 98.1 °F (36.7 °C) Resp 18 Ht 5' 11\" (1.803 m) Wt 122.5 kg (270 lb) SpO2 98% BMI 37.66 kg/m² Temp (24hrs), Av.9 °F (36.6 °C), Min:97.6 °F (36.4 °C), Max:98.3 °F (36.8 °C) Lines:  PICC:    
 
Physical Exam:   
General:  Alert, cooperative, well noursished, well developed, appears stated age Eyes:  Sclera anicteric. Pupils equally round and reactive to light. Mouth/Throat: Mucous membranes normal, oral pharynx clear Neck: Supple Lungs:   Clear to auscultation bilaterally, good effort CV:  Regular rate and rhythm,no murmur, click, rub or gallop Abdomen:   Soft, non-tender. bowel sounds normal. non-distended Extremities: No cyanosis; bilateral lower extremity pedal edema; 1/5 lower extremity strength Skin: Right heel with 2-3 cm ulceration which is a stage 2.  Right lower leg hyperpigmentation from chronic edema Lymph nodes: Cervical and supraclavicular normal  
Musculoskeletal: Bilateral lower extremity edema Lines/Devices:  Intact, no erythema, drainage or tenderness Psych: Alert and oriented, normal mood affect given the setting Data Review: CBC: 
Recent Labs 10/15/20 
0245 10/14/20 
1602 WBC 22.8* 23.8* GRANS 85* 83* MONOS 7 8 EOS 1 1 ANEU 19.3* 19.8* ABL 0.9 1.0 HGB 8.7* 9.2* HCT 27.7* 28.7*  
* 136* BMP: 
Recent Labs 10/15/20 
0245 10/14/20 
1602 CREA 1.35 1.49 BUN 69* 83*  138  
K 3.8 4.0  
 105 CO2 26 26 AGAP 9 7 GLU 84 87 LFTS: 
Recent Labs 10/14/20 
1602 TBILI 0.8 ALT 19 AP 73  
TP 7.1 ALB 3.2 Microbiology:  
 
All Micro Results Procedure Component Value Units Date/Time CULTURE, URINE [[id number]] Collected:  10/15/20 0103 Order Status:  Completed Updated:  10/15/20 6587 CULTURE, BLOOD [[id number]] Collected:  10/14/20 1602 Order Status:  Completed Specimen:  Blood Updated:  10/15/20 4288 Special Requests: BLUE PICC Culture result: NO GROWTH AFTER 16 HOURS     
 CULTURE, BLOOD [[id number]] Collected:  10/14/20 1703 Order Status:  Completed Specimen:  Blood Updated:  10/15/20 2333 Special Requests: --     
  LEFT Antecubital 
  
  Culture result: NO GROWTH AFTER 15 HOURS     
 CULTURE, URINE [[id number]] Collected:  10/14/20 1545 Order Status:  Canceled Specimen:  Urine from Clean catch Imaging: CXR and CT reviewed Signed By: Gold Cifuentes MD   
 October 15, 2020

## 2020-10-15 NOTE — PROGRESS NOTES
Care Management Interventions Transition of Care Consult (CM Consult): Home Health, Discharge Planning [address]: No 
Reason Outside [place]: Patient already serviced by other home care/hospice agency Physical Therapy Consult: Yes Occupational Therapy Consult: Yes Current Support Network: Lives with Spouse, Own Home Confirm Follow Up Transport: Family The Patient and/or Patient Representative was Provided with a Choice of Provider and Agrees with the Discharge Plan?: Yes Freedom of Choice List was Provided with Basic Dialogue that Supports the Patient's Individualized Plan of Care/Goals, Treatment Preferences and Shares the Quality Data Associated with the Providers?: Yes Discharge Location Discharge Placement: Skilled nursing facility Chart reviewed. JOSEPH spoke with pt who is alert & oriented. PTA pt living with spouse who was providing pt's IV ABX. Pt states a nurse was coming to care for his foot wound. Pt unable to provide names of agencies providing services. JOSEPH spoke with pt's spouse at bedside who states [facility] ( [phone] ) is providing [facility] ( [phone] ) provides nursing care. Spouse states she is unsure if can continue to manage pt at home as he has fallen 3x in the last week and she needed assist of fire dept to get pt up. SW discussed SNF option if [employer] would approve admission. Spouse states pt would not want to go to SNF. JOSEPH spoke with Ashlee Morillo at [facility] & informed pt currently in-house & d/c date is unknown & pt may go to SNF. JOSEPH left message for Cornel with [employer] with same info. Pt's spouse came to speak with JOSEPH & states spoke with her daughter ( who lives in [place] ) & would like JOSEPH to inquire if [facility] would have bed. Pt was there several yrs ago. JOSEPH sent referral to [facility] via [facility] link and spoke with [facility].   [facility] states he will review clinical & await PT/ OT evaluations. Britney Baez agrees to f/u on Friday.

## 2020-10-15 NOTE — PROGRESS NOTES
10/14/20 1930 Dual Skin Pressure Injury Assessment Dual Skin Pressure Injury Assessment X Second Care Provider (Based on Facility Policy) Corbin Rodrigues RN Skin Integumentary Skin Integumentary (WDL) X Pressure  Injury Documentation Pressure Injury Noted-See Wound LDA to Document Skin Color Ecchymosis (comment) Skin Condition/Temp Dry;Fragile; Warm  
Skin Integrity Wound (add Wound LDA) (right heel stage 3 ) Turgor Epidermis thin w/ loss of subcut tissue Hair Growth Present Nails WDL Varicosities Absent Wound Prevention and Protection Methods Orientation of Wound Prevention Right Location of Wound Prevention Foot 
(heel) Dressing Present  Yes;Changed Dressing Status Intact; Changed Wound Offloading (Prevention Methods) Bed, pressure reduction mattress; Heel boots; Elevate heels;Pillows Stage 3 pressure injury wound on right heel. Dressing removed, new dressing applied clean/dry/intact. No pressure injury on sacrum. BLE compression stockings present.

## 2020-10-15 NOTE — PROGRESS NOTES
End Of Shift: 
 
Pt rested well throughout the night. Dressing on heel ulcer changed, clean/dry/intact. Pt had periodic hallucinations throughout the night. Pt resting with no needs voiced at this time

## 2020-10-15 NOTE — PROGRESS NOTES
Nutrition Note Received pressure ulcer referral  
Pt with full thickness wound of right heel. Wife reports this has been present since February. She was here at lunch and despite his AMS, he consumed almost 100% He typically eats\"too well\" and she has been cutting back foods because he recently had gout and has kidney issues which limits medications he can take for it. She says the information she found online was conflicting. She says he is supposed to be going home tomorrow. Provided wife with written information and brief overviwew of low purine diet. Will defer full assessment and use of nutritional supplement a this time. Electronically signed by Roz Lynn RD on 10/15/2020 at 3:21 PM 
 
Contact: [phone]

## 2020-10-16 NOTE — PROGRESS NOTES
END OF SHIFT NOTE: 
 
Intake/Output 10/15 1901 - 10/16 0700 In: 120 [P.O.:120] Out: 850 [Urine:850] Voiding: NO 
Catheter: YES Drain:   
 
 
 
 
 
Stool:  0 occurrences. Emesis:  0 occurrences. VITAL SIGNS Patient Vitals for the past 12 hrs: 
 Temp Pulse Resp BP SpO2  
10/16/20 0341 97.8 °F (36.6 °C) 74 19 (!) 121/59 99 % 10/16/20 0108 98.1 °F (36.7 °C) 73 17 121/63 99 % 10/15/20 2016 97.6 °F (36.4 °C) 73 18 126/61 95 % Pain Assessment Pain 1 Pain Scale 1: Numeric (0 - 10) (10/16/20 0140) Pain Intensity 1: 0 (10/16/20 0140) Patient Stated Pain Goal: 0 (10/16/20 0140) Pain Location 1: Leg (10/15/20 1640) Pain Orientation 1: Left;Right (10/15/20 7510) Pain Description 1: Aching (10/15/20 0223) Pain Intervention(s) 1: Medication (see MAR) (10/15/20 7270) Ambulating No 
 
Additional Information:  
Took over pt care at 0100. P t w/ moments of confusion. Kept comfortable. Shift report given to oncoming nurse Chinmay Bailey  at the bedside.  
 
Ravin Simms RN

## 2020-10-16 NOTE — PROGRESS NOTES
Infectious Disease Consult Today's Date: 10/16/2020 Admit Date: 10/14/2020 Impression: · Delirium. His wife is at bedside and does report that he has had intermittent delirium · Leukocytosis · Right calcaneal osteomyelitis My concern is that he has ertapenem-induced delirium. Another possibility would be steroid induced delirium or an occult blood stream infection of PICC. Plan:  
· continue ceftriaxone plus metronidazole in anticipation of sending him home on that regimen when symptoms are better. Anti-infectives:  
· meropenem Subjective: His wife is at bedside and reports that he is having intermittent delirium. He complains of pain around his rectum which apparently is not a new complaint. No Known Allergies Review of Systems:  A comprehensive review of systems was negative except for that written in the History of Present Illness. Objective:  
 
Visit Vitals BP (!) 62/47 (BP 1 Location: Left arm, BP Patient Position: At rest) Pulse 73 Temp 98.4 °F (36.9 °C) Resp 18 Ht 5' 11\" (1.803 m) Wt 123 kg (271 lb 2.7 oz) SpO2 95% BMI 37.82 kg/m² Temp (24hrs), Av.6 °F (37 °C), Min:97.6 °F (36.4 °C), Max:100.2 °F (37.9 °C) Lines:  PICC:    
 
Physical Exam:   
General:  Alert, cooperative, well nourished, well developed, appears stated age; he does have some delirium at present Eyes:  Sclera anicteric. Pupils equally round and reactive to light. Mouth/Throat: Mucous membranes normal, oral pharynx clear Neck: Supple Lungs:   Clear to auscultation bilaterally, good effort CV:  Regular rate and rhythm,no murmur, click, rub or gallop Abdomen:   Soft, non-tender. bowel sounds normal. non-distended Extremities: No cyanosis; bilateral lower extremity pedal edema; 1/5 lower extremity strength Skin: Right heel with 2-3 cm ulceration which is a stage 2. Right lower leg hyperpigmentation from chronic edema Lymph nodes: Cervical and supraclavicular normal  
Musculoskeletal: Bilateral lower extremity edema Lines/Devices:  Intact, no erythema, drainage or tenderness Psych: Alert; delirious Data Review: CBC: 
Recent Labs 10/15/20 
0245 10/14/20 
1602 WBC 22.8* 23.8* GRANS 85* 83* MONOS 7 8 EOS 1 1 ANEU 19.3* 19.8* ABL 0.9 1.0 HGB 8.7* 9.2* HCT 27.7* 28.7*  
* 136* BMP: 
Recent Labs 10/15/20 
0245 10/14/20 
1602 CREA 1.35 1.49 BUN 69* 83*  138  
K 3.8 4.0  
 105 CO2 26 26 AGAP 9 7 GLU 84 87 LFTS: 
Recent Labs 10/14/20 
1602 TBILI 0.8 ALT 19 AP 73  
TP 7.1 ALB 3.2 Microbiology:  
 
All Micro Results Procedure Component Value Units Date/Time CULTURE, URINE [[id number]] Collected:  10/15/20 0103 Order Status:  Completed Specimen:  Urine Updated:  10/16/20 1018 Special Requests: NO SPECIAL REQUESTS Culture result: NO GROWTH 1 DAY     
 CULTURE, BLOOD [[id number]] Collected:  10/14/20 1602 Order Status:  Completed Specimen:  Blood Updated:  10/16/20 4924 Special Requests: BLUE PICC Culture result: NO GROWTH 2 DAYS     
 CULTURE, BLOOD [[id number]] Collected:  10/14/20 1703 Order Status:  Completed Specimen:  Blood Updated:  10/16/20 7954 Special Requests: --     
  LEFT Antecubital 
  
  Culture result: NO GROWTH 2 DAYS     
 CULTURE, URINE [[id number]] Collected:  10/14/20 1545 Order Status:  Canceled Specimen:  Urine from Clean catch Imaging: CXR and CT reviewed Signed By: Reginald Fox MD   
 October 16, 2020

## 2020-10-16 NOTE — PROGRESS NOTES
Problem: Self Care Deficits Care Plan (Adult) Goal: *Acute Goals and Plan of Care (Insert Text) Description: 1. Patient will perform grooming with supervision. 2. Patient will perform Upper body dressing with supervision 3. Patient will perform upper body bathing with supervision. 4. Patient will sit EOB for ADL task with CGA. 5. Patient will participate in 30 + minutes of ADL/ therapeutic exercise/therapeutic activity with min rest breaks to increase activity tolerance for self care. Goals to be achieved in 7 days. Outcome: Progressing Towards Goal 
  
OCCUPATIONAL THERAPY: Initial Assessment and PM 10/16/2020 OBSERVATION:   
Payor: Yousif Caraballo / Plan: [address] HMO / Product Type: Managed Care Medicare /  
  
NAME/AGE/GENDER: Elsa López is a 79 y.o. male PRIMARY DIAGNOSIS:  Leukocytosis [D72.829] REECE (acute kidney injury) ([facility] [address] [N17.9] Leukocytosis [D72.829] REECE (acute kidney injury) ([facility] [address] [N17.9] Altered mental status Altered mental status ICD-10: Treatment Diagnosis:  
 · Other abnormalities of gait and mobility (R26.89) · Generalized Muscle Weakness (M62.81) · Other lack of cordination (R27.8) · Difficulty in walking, Not elsewhere classified (R26.2) · Other abnormalities of gait and mobility (R26.89) · Ataxic gait (R26.0) · Repeated Falls (R29.6) · History of falling (Z91.81) Precautions/Allergies: 
   Patient has no known allergies. ASSESSMENT:  
 
Mr. Timmy Horner presents supine in bed. He was admitted with above diagnosis. Wife present and reported he has fallen a few times at home. He presents with a catheter and wraps on B LE. He has a wound on his R heel and his Left foot has moderate edema. He has a strap on each thigh that he uses to assist his B LE with mobility due to his paraplegia. He has a Picc line in R Upper arm. His arom and strength is grossly 4+/5. He is supervision with eating and grooming.  He is min assist for upper body ADLS in supine and total assist for LE ADLS in supine. He was max assist rolling right and left. He was max assist earlier with PT for a sliding board transfer earlier this am.  He plans to go to rehab due to his current level of care. He is motivated and would benefit from skilled OT services in acute and post acute. Will follow. This section established at most recent assessment PROBLEM LIST (Impairments causing functional limitations): 1. Decreased Strength 2. Decreased ADL/Functional Activities 3. Decreased Transfer Abilities 4. Decreased Ambulation Ability/Technique 5. Decreased Balance INTERVENTIONS PLANNED: (Benefits and precautions of occupational therapy have been discussed with the patient.) 1. Activities of daily living training 2. Adaptive equipment training 3. Balance training 4. Clothing management 5. Donning&doffing training 6. Neuromuscular re-eduation 7. Therapeutic activity 8. Therapeutic exercise TREATMENT PLAN: Frequency/Duration: Follow patient 3 times to address above goals. Rehabilitation Potential For Stated Goals: Good REHAB RECOMMENDATIONS (at time of discharge pending progress):   
Placement: It is my opinion, based on this patient's performance to date, that Mr. Guillermo Weaver may benefit from intensive therapy at a [address] [place] [address] after discharge due to the functional deficits listed above that are likely to improve with skilled rehabilitation and concerns that he/she may be unsafe to be unsupervised at home due to level of assistance . Equipment:  
? None at this time OCCUPATIONAL PROFILE AND HISTORY:  
History of Present Injury/Illness (Reason for Referral): 
See  H and P Past Medical History/Comorbidities:  
Mr. Guillermo Weaver  has a past medical history of Adverse effect of anesthesia, Aneurysm ([address] Atrial flutter ([address] CAD (coronary artery disease), Congestive heart failure ([address] Hypertension, Ill-defined condition, Ill-defined condition, Morbid obesity ([facility] [address] and Thyroid disease. Mr. Soto Yoder  has a past surgical history that includes hx tonsillectomy; hx orthopaedic; hx appendectomy; hx colonoscopy (5/08); pr chest surgery procedure unlisted (2016, 2017); pr cardiac surg procedure unlist (2016); pr cardiac surg procedure unlist (2017); and pr cardiac surg procedure unlist (06/2016). Social History/Living Environment:  
Home Environment: Private residence Wheelchair Ramp: Yes One/Two Story Residence: One story Living Alone: No 
Support Systems: Spouse/Significant Other/Partner Patient Expects to be Discharged to[de-identified] Skilled nursing facility Current DME Used/Available at Home: Commode, bedside, Tub transfer bench, Wheelchair, Other (comment) Tub or Shower Type: Shower Prior Level of Function/Work/Activity: 
Assist with ADLs  recently Number of Personal Factors/Comorbidities that affect the Plan of Care: Expanded review of therapy/medical records (1-2):  MODERATE COMPLEXITY ASSESSMENT OF OCCUPATIONAL PERFORMANCE[de-identified]  
Activities of Daily Living:  
Basic ADLs (From Assessment) Complex ADLs (From Assessment) Feeding: Supervision Oral Facial Hygiene/Grooming: Supervision Bathing: Moderate assistance, Maximum assistance Upper Body Dressing: Minimum assistance Lower Body Dressing: Total assistance Toileting: Total assistance(catheter) Grooming/Bathing/Dressing Activities of Daily Living Cognitive Retraining Safety/Judgement: Awareness of environment; Fall prevention Bed/Mat Mobility Rolling: Maximum assistance Supine to Sit: Maximum assistance Sit to Supine: Maximum assistance Scooting: Total assistance Most Recent Physical Functioning:  
Gross Assessment: 
  
         
  
Posture: 
  
Balance: 
Sitting: Impaired; Without support Sitting - Static: (fair-) Sitting - Dynamic: (fair-) Bed Mobility: 
Rolling: Maximum assistance Supine to Sit: Maximum assistance Sit to Supine: Maximum assistance Scooting: Total assistance Wheelchair Mobility: 
  
Transfers: 
Sliding Board : Maximum assistance(bed<>WC) Patient Vitals for the past 6 hrs: 
 BP BP Patient Position SpO2 Pulse 10/16/20 1108 (!) 106/59 At rest 95 % 88 Mental Status Neurologic State: Alert, Appropriate for age Orientation Level: Appropriate for age, Oriented to person, Oriented to place, Oriented to time Cognition: Appropriate for age attention/concentration, Follows commands Perseveration: No perseveration noted Safety/Judgement: Awareness of environment, Fall prevention LLE Assessment LLE Assessment (WDL): Exception to [facility] RLE Assessment RLE Assessment (WDL): Exceptions to [facility] Physical Skills Involved: 
1. Balance 2. Strength 3. Activity Tolerance Cognitive Skills Affected (resulting in the inability to perform in a timely and safe manner): 1. Perception Psychosocial Skills Affected: 1. Habits/Routines 2. Environmental Adaptation 3. Self-Awareness Number of elements that affect the Plan of Care: 5+:  HIGH COMPLEXITY CLINICAL DECISION MAKIN[address] [place] [address] AM-PAC 6 Clicks Daily Activity Inpatient Short Form How much help from another person does the patient currently need. .. Total A Lot A Little None 1. Putting on and taking off regular lower body clothing? [x] 1   [] 2   [] 3   [] 4  
2. Bathing (including washing, rinsing, drying)? [] 1   [x] 2   [] 3   [] 4  
3. Toileting, which includes using toilet, bedpan or urinal?   [x] 1   [] 2   [] 3   [] 4  
4. Putting on and taking off regular upper body clothing? [] 1   [] 2   [x] 3   [] 4  
5. Taking care of personal grooming such as brushing teeth? [] 1   [] 2   [] 3   [x] 4  
6. Eating meals? [] 1   [] 2   [] 3   [x] 4  
© , Trustees of [address] [place] [address], under license to [employer]. All rights reserved Score:  Initial: 15 Most Recent: X (Date: -- ) Interpretation of Tool:  Represents activities that are increasingly more difficult (i.e. Bed mobility, Transfers, Gait). Medical Necessity:    
· Patient is expected to demonstrate progress in · Self care skills and functional mobility · Reason for Services/Other Comments: 
· Patient continues to require skilled intervention due to · Above listed deficits Use of outcome tool(s) and clinical judgement create a POC that gives a: MODERATE COMPLEXITY  
 
 
 
TREATMENT:  
(In addition to Assessment/Re-Assessment sessions the following treatments were rendered) Pre-treatment Symptoms/Complaints:   
Pain: Initial:  
Pain Intensity 1: 0  Post Session:  0/10 Assessment/Reassessment only, no treatment provided today Braces/Orthotics/Lines/Etc:  
· bosch catheter · PIcc line R UE  
· O2 Device: Room air Treatment/Session Assessment:   
· Response to Treatment:  tolerated fair, weak with rolling · Interdisciplinary Collaboration:  
o Physical Therapist 
o Occupational Therapist 
o Registered Nurse 
o  · After treatment position/precautions:  
o Supine in bed 
o Bed/Chair-wheels locked 
o Bed in low position 
o Call light within reach 
o RN notified 
o Family at bedside 
o Side rails x 3  
· Compliance with Program/Exercises: Compliant all of the time, Will assess as treatment progresses. · Recommendations/Intent for next treatment session: \"Next visit will focus on advancements to more challenging activities and reduction in assistance provided\". Total Treatment Duration: OT Patient Time In/Time Out Time In: 1410 Time Out: 1430 Juno Ca OT

## 2020-10-16 NOTE — PROGRESS NOTES
Hospitalist Note Admit Date:  10/14/2020  3:37 PM  
Name:  Rob Llanos Age:  79 y.o. 
:  1940 MRN:  [id number] PCP:  Elvin Taylor MD 
Treatment Team: Attending Provider: Geraldo Baldwin MD; Consulting Provider: Anabelle Dong MD; Utilization Review: Luisa Mercedes RN; : Rupinder Sanchez; Physical Therapist: Jason Wells PT; Occupational Therapist: Stevie Ramirez OT 
 
HPI/Subjective:  
79-year-old male with medical history significant for hypertension, CHF, CAD, aortic valve repair, hypothyroidism, A. 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 Altered mental status could be secondary to ertapenem or steroid induced delirium. ID was consulted and switch to ceftriaxone and metronidazole. No acute event reported overnight. Patient was alert and oriented times 3 in the morning. Patient states he does not like the breakfast. Denies chest pain, shortness of breath, hypotension, abdominal pain, nausea and vomiting. Objective:  
 
Patient Vitals for the past 24 hrs: 
 Temp Pulse Resp BP SpO2  
10/16/20 1108 99.2 °F (37.3 °C) 88 18 (!) 106/59 95 % 10/16/20 0732 100.2 °F (37.9 °C) 79 18 (!) 90/50 94 % 10/16/20 0341 97.8 °F (36.6 °C) 74 19 (!) 121/59 99 % 10/16/20 0108 98.1 °F (36.7 °C) 73 17 121/63 99 % 10/15/20 2016 97.6 °F (36.4 °C) 73 18 126/61 95 % 10/15/20 1522 97.4 °F (36.3 °C) 71 18 (!) 124/50 93 % Oxygen Therapy O2 Sat (%): 95 % (10/16/20 1108) O2 Device: Room air (10/16/20 0341) Estimated body mass index is 37.82 kg/m² as calculated from the following: 
  Height as of this encounter: 5' 11\" (1.803 m). Weight as of this encounter: 123 kg (271 lb 2.7 oz). Intake/Output Summary (Last 24 hours) at 10/16/2020 1344 Last data filed at 10/16/2020 0998 Gross per 24 hour Intake 420 ml  
 Output 1400 ml Net -980 ml *Note that automatically entered I/Os may not be accurate; dependent on patient compliance with collection and accurate  by techs. General:    Well nourished. Alert. CV:   RRR. No murmur, rub, or gallop. Lungs:   CTAB. No wheezing, rhonchi, or rales. Abdomen:   Soft, nontender, nondistended. Extremities: Warm and dry. Bilateral lower extremity edema, clean dressing Skin:     No rashes or jaundice. Neuro:  No gross focal deficits Data Reviewed: 
I have reviewed all labs, meds, and studies from the last 24 hours: 
Recent Results (from the past 24 hour(s)) SARS-COV-2 Collection Time: 10/15/20  4:00 PM  
Result Value Ref Range Specimen source Nasopharyngeal    
 COVID-19 rapid test Not detected NOTD    
 SARS CoV-2 PENDING Current Meds: 
Current Facility-Administered Medications Medication Dose Route Frequency  alcohol 62% (NOZIN) nasal  1 Ampule  1 Ampule Topical Q12H  colchicine tablet 0.6 mg  0.6 mg Oral DAILY  furosemide (LASIX) tablet 20 mg  20 mg Oral BID  metroNIDAZOLE (FLAGYL) tablet 500 mg  500 mg Oral Q8H  
 cefTRIAXone (ROCEPHIN) 2 g in 0.9% sodium chloride (MBP/ADV) 50 mL  2 g IntraVENous Q24H  
 tuberculin injection 5 Units  5 Units IntraDERMal ONCE  
 [Held by provider] carvediloL (COREG) tablet 6.25 mg  6.25 mg Oral BID WITH MEALS  
 levothyroxine (SYNTHROID) tablet 112 mcg  112 mcg Oral ACB  sertraline (ZOLOFT) tablet 50 mg  50 mg Oral DAILY  sodium chloride (NS) flush 5-40 mL  5-40 mL IntraVENous Q8H  
 sodium chloride (NS) flush 5-40 mL  5-40 mL IntraVENous PRN  
 acetaminophen (TYLENOL) tablet 650 mg  650 mg Oral Q6H PRN Or  
 acetaminophen (TYLENOL) suppository 650 mg  650 mg Rectal Q6H PRN  polyethylene glycol (MIRALAX) packet 17 g  17 g Oral DAILY PRN  promethazine (PHENERGAN) tablet 12.5 mg  12.5 mg Oral Q6H PRN  Or  
  ondansetron (ZOFRAN) injection 4 mg  4 mg IntraVENous Q6H PRN  
 enoxaparin (LOVENOX) injection 40 mg  40 mg SubCUTAneous Q24H Other Studies: No results found for this visit on 10/14/20. No results found. All Micro Results Procedure Component Value Units Date/Time CULTURE, URINE [[id number]] Collected:  10/15/20 0103 Order Status:  Completed Specimen:  Urine Updated:  10/16/20 1018 Special Requests: NO SPECIAL REQUESTS Culture result: NO GROWTH 1 DAY     
 CULTURE, BLOOD [[id number]] Collected:  10/14/20 1602 Order Status:  Completed Specimen:  Blood Updated:  10/16/20 4391 Special Requests: BLUE PICC Culture result: NO GROWTH 2 DAYS     
 CULTURE, BLOOD [[id number]] Collected:  10/14/20 1703 Order Status:  Completed Specimen:  Blood Updated:  10/16/20 2455 Special Requests: --     
  LEFT Antecubital 
  
  Culture result: NO GROWTH 2 DAYS     
 CULTURE, URINE [[id number]] Collected:  10/14/20 1545 Order Status:  Canceled Specimen:  Urine from Clean catch   
  
 
 
[unfilled] Assessment and Plan:  
 
Hospital Problems as of 10/16/2020 Date Reviewed: 7/13/2020 Codes Class Noted - Resolved POA Osteomyelitis ([facility] ICD-10-CM: M86.9 ICD-9-CM: 730.20  10/15/2020 - Present Unknown Leukocytosis ICD-10-CM: P96.823 ICD-9-CM: 288.60  10/14/2020 - Present Unknown REECE (acute kidney injury) ([facility] ICD-10-CM: N17.9 ICD-9-CM: 584.9  10/14/2020 - Present Unknown * (Principal) Altered mental status ICD-10-CM: R41.82 
ICD-9-CM: 780.97  10/14/2020 - Present Unknown Systolic CHF, chronic (HCC) (Chronic) ICD-10-CM: A17.21 ICD-9-CM: 428.22, 428.0  1/8/2019 - Present Yes HTN (hypertension) (Chronic) ICD-10-CM: I10 
ICD-9-CM: 401.9  6/12/2016 - Present Yes Thoracic aortic aneurysm (HCC) ICD-10-CM: I71.2 ICD-9-CM: 441.2  5/16/2016 - Present Yes  Overview Addendum 5/31/2017  1:53 PM by Rosana Camilo MD  
 May 2016 - 7.3 cm with moderate AI and anterior pericardial effusion,  found incidentally on resting echo prior to planned stress test.  Admitted urgently for transfer to Dr Norberto Jean-Baptiste for repair Jan 2017 - staged repair of descending thoracic aneurysm, Dr Rishabh Denton, complicated by spinal cord dysfunction. Hypothyroidism (Chronic) ICD-10-CM: E03.9 ICD-9-CM: 244.9  2/4/2014 - Present Yes Plan: Altered mental status: Mentation is improved CT head negative for acute pathology Could be a ertapenem or prednisone induced delirium Meropenem has been discontinued by ID and switched to ceftriaxone and metronidazole for osteomyelitis No obvious source of infection other than Osteomyelitis Osteomyelitis: 
ID consulted, appreciate recommendations Wound care consulted Follow-up on blood culture and urine culture, till date negative Repeat CBC and BMP REECE : 
renal function improving Avoid Nephrotoxic agent Hypothyroidism: 
continue home meds for hypothyroidism CHF, hypertension:  
Patient with borderline blood pressure, will hold antihypertensive at this time Paraplegia: 
Stable Physical therapy consult DC planning/Dispo:   
Home on discharge Diet:  DIET CARDIAC 
DVT ppx: Lovenox Signed: 
Fabian Mercado MD

## 2020-10-16 NOTE — PROGRESS NOTES
Problem: Mobility Impaired (Adult and Pediatric) Goal: *Acute Goals and Plan of Care (Insert Text) Description: DISCHARGE GOALS : 
(1.)Mr. Eunice Nugent will move from supine to sit and sit to supine  with MINIMAL ASSIST & bed modified PRN. 
(2.)Mr. Eunice Nugent will transfer from bed to chair and chair to bed with MINIMAL ASSIST using a slide board, pt helps with board placement. (3.)Mr. Eunice Nugent will perform static sitting on outstretched arms fair for 2-3 min, pt able to shift wt multiple directions while sitting EOB on outstretched arms with CGA. Outcome: Progressing Towards Goal 
  
PHYSICAL THERAPY: Initial Assessment and AM 10/16/2020 OBSERVATION: PT Visit Days : 1 Payor: Doc Medel / Plan: [address] [place] [address] HMO / Product Type: Managed Care Medicare /   
  
NAME/AGE/GENDER: Rosendo Singh is a 79 y.o. male PRIMARY DIAGNOSIS: Leukocytosis [D72.829] REECE (acute kidney injury) ([facility] [N17.9] Leukocytosis [D72.829] REECE (acute kidney injury) ([facility] [address] [N17.9] Altered mental status Altered mental status ICD-10: Treatment Diagnosis:  
 · Generalized Muscle Weakness (M62.81) · Other lack of cordination (R27.8) Precaution/Allergies: 
Patient has no known allergies. ASSESSMENT:  
 
Mr. Eunice Nugent presents with general weakness for UE's & core, LE's no active movement. Pt uses thigh straps to help maneuver legs but max assist needed for bed mobility, impaired sitting balance & max A for slide board transfers bed<>WC. This pt is not manageable for his wife & will need extensive rehab at a SNF to work back to a manageable level. This section established at most recent assessment PROBLEM LIST (Impairments causing functional limitations): 1. Decreased Strength 2. Decreased ADL/Functional Activities 3. Decreased Transfer Abilities 4. Decreased Balance 5. Decreased Activity Tolerance 6. Decreased Flexibility/Joint Mobility INTERVENTIONS PLANNED: (Benefits and precautions of physical therapy have been discussed with the patient.) 1. Balance Exercise 2. Bed Mobility 3. Therapeutic Activites 4. Therapeutic Exercise/Strengthening 5. Transfer Training TREATMENT PLAN: Frequency/Duration: daily for duration of hospital stay Rehabilitation Potential For Stated Goals: Fair REHAB RECOMMENDATIONS (at time of discharge pending progress):   
Placement: It is my opinion, based on this patient's performance to date, that Mr. Elizabeth Soliz may benefit from intensive therapy at a [facility] after discharge due to the functional deficits listed above that are likely to improve with skilled rehabilitation and concerns that he/she may be unsafe to be unsupervised at home due to severe debility . Equipment: ? To be setermined HISTORY:  
History of Present Injury/Illness (Reason for Referral): 
54-year-old male with medical history significant for hypertension, CHF, CAD, aortic valve repair, hypothyroidism, A. 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 Wife is present at bedside and states he has had trouble with confusion and slurred speech. She reports he is hallucinating. Also reports he is restless at night and not able to sleep over the past couple of days. Denies any fevers, shortness of breath, chest pain, palpitation, nausea, vomiting, abdominal pain. Patient had episode of gout about 2 weeks ago and has completed prednisone and hydrocodone course about 2 days ago. Patient is also following infectious disease and wound care for right heel ulcer and osteomyelitis of the posterior lateral calcaneus and is currently on ertapenem.   As per wife he has completed 2 weeks out of 6 weeks antibiotic course in total. Patient has history of indwelling Jeffery's. Last change was 2 weeks ago. Past Medical History/Comorbidities:  
Mr. Valentino Rasmussen  has a past medical history of Adverse effect of anesthesia, Aneurysm ([address] Atrial flutter ([address] CAD (coronary artery disease), Congestive heart failure ([address] Hypertension, Ill-defined condition, Ill-defined condition, Morbid obesity ([address] and Thyroid disease. Mr. Valentino Rasmussen  has a past surgical history that includes hx tonsillectomy; hx orthopaedic; hx appendectomy; hx colonoscopy (5/08); pr chest surgery procedure unlisted (2016, 2017); pr cardiac surg procedure unlist (2016); pr cardiac surg procedure unlist (2017); and pr cardiac surg procedure unlist (06/2016). Social History/Living Environment:  
Home Environment: Private residence Wheelchair Ramp: Yes One/Two Story Residence: One story Living Alone: No 
Support Systems: Spouse/Significant Other/Partner Patient Expects to be Discharged to[de-identified] Skilled nursing facility Current DME Used/Available at Home: Wheelchair(slide board, hospital bed) Prior Level of Function/Work/Activity: Pt was performing slide board transfers with SBA prior tot his admission. Personal Factors: Other factors that influence how disability is experienced by the patient:  current & PMH Number of Personal Factors/Comorbidities that affect the Plan of Care: 3+: HIGH COMPLEXITY EXAMINATION:  
Most Recent Physical Functioning:  
Gross Assessment: 
AROM: Grossly decreased, non-functional(both UE , no use both LE's) Strength: Grossly decreased, non-functional(both UE , no use both LE's) Coordination: Grossly decreased, non-functional(both UE , no use both LE's) Balance: 
Sitting: Impaired; Without support Sitting - Static: (fair-) Sitting - Dynamic: (fair-) Bed Mobility: 
Supine to Sit: Maximum assistance Sit to Supine: Maximum assistance Scooting: Total assistance Transfers: 
Sliding Board : Maximum assistance(bed<>WC) Gait: NA Functional Mobility:  
      Transfers:  max Bed Mobility:  max Body Structures Involved: 1. Metabolic 2. Muscles Body Functions Affected: 1. Movement Related 2. Metobolic/Endocrine Activities and Participation Affected: 1. General Tasks and Demands 2. Mobility Number of elements that affect the Plan of Care: 4+: HIGH COMPLEXITY CLINICAL PRESENTATION:  
Presentation: Evolving clinical presentation with unstable and unpredictable characteristics: HIGH COMPLEXITY CLINICAL DECISION MAKIN[address] [place] [address] AM-PAC 6 Clicks Basic Mobility Inpatient Short Form How much difficulty does the patient currently have. .. Unable A Lot A Little None 1. Turning over in bed (including adjusting bedclothes, sheets and blankets)? [] 1   [x] 2   [] 3   [] 4  
2. Sitting down on and standing up from a chair with arms ( e.g., wheelchair, bedside commode, etc.)   [x] 1   [] 2   [] 3   [] 4  
3. Moving from lying on back to sitting on the side of the bed? [] 1   [x] 2   [] 3   [] 4 How much help from another person does the patient currently need. .. Total A Lot A Little None 4. Moving to and from a bed to a chair (including a wheelchair)? [] 1   [x] 2   [] 3   [] 4  
5. Need to walk in hospital room? [x] 1   [] 2   [] 3   [] 4  
6. Climbing 3-5 steps with a railing? [x] 1   [] 2   [] 3   [] 4  
© , Trustees of [address] [place] [address], under license to [employer]. All rights reserved Score:  Initial: 9 Most Recent: X (Date: -- ) Interpretation of Tool:  Represents activities that are increasingly more difficult (i.e. Bed mobility, Transfers, Gait). Medical Necessity:    
· Patient is expected to demonstrate progress in  
· strength, balance, coordination, and functional technique ·  to  
· decrease assistance required with bed mobility & transfers · . Reason for Services/Other Comments: 
· Patient continues to require skilled intervention due to  
 · Pt not manageable for caregiver · . Use of outcome tool(s) and clinical judgement create a POC that gives a: Difficult prediction of patient's progress: HIGH COMPLEXITY  
  
 
 
 
TREATMENT:  
(In addition to Assessment/Re-Assessment sessions the following treatments were rendered) Pre-treatment Symptoms/Complaints:  weakness Pain: Initial: numeric scale Pain Intensity 1: 0  Post Session:  0/10 Therapeutic Activity: (    25 min (extra time to work through activity noted): Therapeutic activities including sitting static & dynamic balance activity, repeated supine<>sit, repeated transfer with slide board to improve mobility, strength, balance, coordination, and dynamic movement of arm - bilateral to improve functional transfers . R Assessment/ 15 min Braces/Orthotics/Lines/Etc:  
· IV 
· bosch catheter Treatment/Session Assessment:   
· Response to Treatment:  pt has an unrealistic view of his physical abilities · Interdisciplinary Collaboration:  
o Registered Nurse 
o  
o Certified Nursing Assistant/Patient Care Technician · After treatment position/precautions:  
o Supine in bed 
o Bed/Chair-wheels locked 
o Bed in low position 
o Call light within reach 
o RN notified 
o CNA with pt · Compliance with Program/Exercises: Will assess as treatment progresses · Recommendations/Intent for next treatment session: \"Next visit will focus on reduction in assistance provided\". Total Treatment Duration: PT Patient Time In/Time Out Time In: 0923 Time Out: 5342 Delfino Wellington PT

## 2020-10-16 NOTE — PROGRESS NOTES
Patient has bed offer for [facility] and can accept after authorization but will not accept over the weekend. Will start authorization with [facility] for SNF.

## 2020-10-17 NOTE — PROGRESS NOTES
Hospitalist Note Admit Date:  10/14/2020  3:37 PM  
Name:  Steven Brito Age:  79 y.o. 
:  1940 MRN:  [id number] PCP:  Elvin Taylor MD 
Treatment Team: Attending Provider: Rodo Frank MD; Consulting Provider: Tracie Maloney MD; Utilization Review: Sanford Dolan RN; : Danilo Myers; Primary Nurse: Reba James RN; Physical Therapist: Jomar Potts PT; Occupational Therapy Assistant: William Sy 
 
HPI/Subjective:  
68-year-old male with medical history significant for hypertension, CHF, CAD, aortic valve repair, hypothyroidism, A. 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 Altered mental status could be secondary to ertapenem or steroid induced delirium. ID was consulted and antibiotics were switched to ceftriaxone and metronidazole. No acute event reported overnight. Patient is maintaining his blood pressure with maps in 70s. Leukocytosis is improving. He is alert and oriented. Denies any active new complaints. Wife wanted for rehab versus SNF placement. Case management is on board. Objective:  
 
Patient Vitals for the past 24 hrs: 
 Temp Pulse Resp BP SpO2  
10/17/20 0809 97.9 °F (36.6 °C) 88 18 110/79 94 % 10/17/20 0306 97.7 °F (36.5 °C) 89 18 110/81 94 % 10/16/20 2304 98.1 °F (36.7 °C) 71 18 (!) 116/59 97 % 10/16/20 1941 98.2 °F (36.8 °C) 75 18 (!) 116/49 96 % 10/16/20 1626    (!) 110/56   
10/16/20 1505 98.4 °F (36.9 °C) 73 18 (!) 62/47 95 % 10/16/20 1108 99.2 °F (37.3 °C) 88 18 (!) 106/59 95 % Oxygen Therapy O2 Sat (%): 94 % (10/17/20 0809) O2 Device: Room air (10/17/20 0306) Estimated body mass index is 37.38 kg/m² as calculated from the following: 
  Height as of this encounter: 5' 11\" (1.803 m). Weight as of this encounter: 121.6 kg (268 lb). Intake/Output Summary (Last 24 hours) at 10/17/2020 0716 Last data filed at 10/17/2020 0500 Gross per 24 hour Intake 240 ml Output 1250 ml Net -1010 ml  
   
*Note that automatically entered I/Os may not be accurate; dependent on patient compliance with collection and accurate  by techs. General:    Well nourished. Alert. CV:   RRR. No murmur, rub, or gallop. Lungs:   CTAB. No wheezing, rhonchi, or rales. Abdomen:   Soft, nontender, nondistended. Extremities: Warm and dry. Bilateral lower extremity edema, clean dressing Skin:     No rashes or jaundice. Neuro:  No gross focal deficits Data Reviewed: 
I have reviewed all labs, meds, and studies from the last 24 hours: 
Recent Results (from the past 24 hour(s)) PLEASE READ & DOCUMENT PPD TEST IN 24 HRS Collection Time: 10/16/20  3:49 PM  
Result Value Ref Range PPD Negative Negative  
 mm Induration 0 0 - 5 mm CBC WITH AUTOMATED DIFF Collection Time: 10/16/20  7:03 PM  
Result Value Ref Range WBC 14.5 (H) 4.3 - 11.1 K/uL  
 RBC 2.79 (L) 4.23 - 5.6 M/uL HGB 8.2 (L) 13.6 - 17.2 g/dL HCT 25.4 (L) 41.1 - 50.3 % MCV 91.0 79.6 - 97.8 FL  
 MCH 29.4 26.1 - 32.9 PG  
 MCHC 32.3 31.4 - 35.0 g/dL RDW 20.1 (H) 11.9 - 14.6 % PLATELET 018 (L) 632 - 450 K/uL MPV 9.4 9.4 - 12.3 FL ABSOLUTE NRBC 0.00 0.0 - 0.2 K/uL  
 DF AUTOMATED NEUTROPHILS 82 (H) 43 - 78 % LYMPHOCYTES 6 (L) 13 - 44 % MONOCYTES 9 4.0 - 12.0 % EOSINOPHILS 1 0.5 - 7.8 % BASOPHILS 0 0.0 - 2.0 % IMMATURE GRANULOCYTES 3 0.0 - 5.0 %  
 ABS. NEUTROPHILS 11.8 (H) 1.7 - 8.2 K/UL  
 ABS. LYMPHOCYTES 0.8 0.5 - 4.6 K/UL  
 ABS. MONOCYTES 1.3 0.1 - 1.3 K/UL  
 ABS. EOSINOPHILS 0.1 0.0 - 0.8 K/UL  
 ABS. BASOPHILS 0.0 0.0 - 0.2 K/UL  
 ABS. IMM. GRANS. 0.4 0.0 - 0.5 K/UL METABOLIC PANEL, BASIC Collection Time: 10/16/20  7:03 PM  
Result Value Ref Range Sodium 141 136 - 145 mmol/L  Potassium 3.5 3.5 - 5.1 mmol/L  
 Chloride 108 (H) 98 - 107 mmol/L  
 CO2 27 21 - 32 mmol/L Anion gap 6 (L) 7 - 16 mmol/L Glucose 162 (H) 65 - 100 mg/dL BUN 50 (H) 8 - 23 MG/DL Creatinine 1.32 0.8 - 1.5 MG/DL  
 GFR est AA >60 >60 ml/min/1.73m2 GFR est non-AA 55 (L) >60 ml/min/1.73m2 Calcium 7.7 (L) 8.3 - 10.4 MG/DL  
CBC WITH AUTOMATED DIFF Collection Time: 10/17/20  2:15 AM  
Result Value Ref Range WBC 14.4 (H) 4.3 - 11.1 K/uL  
 RBC 2.91 (L) 4.23 - 5.6 M/uL HGB 8.4 (L) 13.6 - 17.2 g/dL HCT 26.7 (L) 41.1 - 50.3 % MCV 91.8 79.6 - 97.8 FL  
 MCH 28.9 26.1 - 32.9 PG  
 MCHC 31.5 31.4 - 35.0 g/dL RDW 20.2 (H) 11.9 - 14.6 % PLATELET 484 (L) 172 - 450 K/uL MPV 9.6 9.4 - 12.3 FL ABSOLUTE NRBC 0.00 0.0 - 0.2 K/uL  
 DF AUTOMATED NEUTROPHILS 79 (H) 43 - 78 % LYMPHOCYTES 7 (L) 13 - 44 % MONOCYTES 10 4.0 - 12.0 % EOSINOPHILS 1 0.5 - 7.8 % BASOPHILS 0 0.0 - 2.0 % IMMATURE GRANULOCYTES 3 0.0 - 5.0 %  
 ABS. NEUTROPHILS 11.4 (H) 1.7 - 8.2 K/UL  
 ABS. LYMPHOCYTES 1.0 0.5 - 4.6 K/UL  
 ABS. MONOCYTES 1.5 (H) 0.1 - 1.3 K/UL  
 ABS. EOSINOPHILS 0.2 0.0 - 0.8 K/UL  
 ABS. BASOPHILS 0.0 0.0 - 0.2 K/UL  
 ABS. IMM. GRANS. 0.4 0.0 - 0.5 K/UL METABOLIC PANEL, BASIC Collection Time: 10/17/20  2:15 AM  
Result Value Ref Range Sodium 141 136 - 145 mmol/L Potassium 3.7 3.5 - 5.1 mmol/L Chloride 108 (H) 98 - 107 mmol/L  
 CO2 26 21 - 32 mmol/L Anion gap 7 7 - 16 mmol/L Glucose 100 65 - 100 mg/dL BUN 48 (H) 8 - 23 MG/DL Creatinine 1.17 0.8 - 1.5 MG/DL  
 GFR est AA >60 >60 ml/min/1.73m2 GFR est non-AA >60 >60 ml/min/1.73m2 Calcium 8.0 (L) 8.3 - 10.4 MG/DL Current Meds: 
Current Facility-Administered Medications Medication Dose Route Frequency  alcohol 62% (NOZIN) nasal  1 Ampule  1 Ampule Topical Q12H  colchicine tablet 0.6 mg  0.6 mg Oral DAILY  [Held by provider] furosemide (LASIX) tablet 20 mg  20 mg Oral BID  
  midodrine (PROAMATINE) tablet 10 mg  10 mg Oral TID WITH MEALS  metroNIDAZOLE (FLAGYL) tablet 500 mg  500 mg Oral Q8H  
 cefTRIAXone (ROCEPHIN) 2 g in 0.9% sodium chloride (MBP/ADV) 50 mL  2 g IntraVENous Q24H  
 [Held by provider] carvediloL (COREG) tablet 6.25 mg  6.25 mg Oral BID WITH MEALS  
 levothyroxine (SYNTHROID) tablet 112 mcg  112 mcg Oral ACB  sertraline (ZOLOFT) tablet 50 mg  50 mg Oral DAILY  sodium chloride (NS) flush 5-40 mL  5-40 mL IntraVENous Q8H  
 sodium chloride (NS) flush 5-40 mL  5-40 mL IntraVENous PRN  
 acetaminophen (TYLENOL) tablet 650 mg  650 mg Oral Q6H PRN Or  
 acetaminophen (TYLENOL) suppository 650 mg  650 mg Rectal Q6H PRN  polyethylene glycol (MIRALAX) packet 17 g  17 g Oral DAILY PRN  promethazine (PHENERGAN) tablet 12.5 mg  12.5 mg Oral Q6H PRN Or  
 ondansetron (ZOFRAN) injection 4 mg  4 mg IntraVENous Q6H PRN  
 enoxaparin (LOVENOX) injection 40 mg  40 mg SubCUTAneous Q24H Other Studies: No results found for this visit on 10/14/20. No results found. All Micro Results Procedure Component Value Units Date/Time CULTURE, URINE [[id number]] Collected:  10/15/20 0103 Order Status:  Completed Specimen:  Urine Updated:  10/17/20 0754 Special Requests: NO SPECIAL REQUESTS Culture result: NO GROWTH 2 DAYS     
 CULTURE, BLOOD [[id number]] Collected:  10/14/20 1602 Order Status:  Completed Specimen:  Blood Updated:  10/16/20 5564 Special Requests: BLUE PICC Culture result: NO GROWTH 2 DAYS     
 CULTURE, BLOOD [[id number]] Collected:  10/14/20 1703 Order Status:  Completed Specimen:  Blood Updated:  10/16/20 1344 Special Requests: --     
  LEFT Antecubital 
  
  Culture result: NO GROWTH 2 DAYS     
 CULTURE, URINE [[id number]] Collected:  10/14/20 154 Order Status:  Canceled Specimen:  Urine from Clean catch   
  
 
 
[unfilled] Assessment and Plan: Hospital Problems as of 10/17/2020 Date Reviewed: 7/13/2020 Codes Class Noted - Resolved POA Osteomyelitis ([facility] ICD-10-CM: M86.9 ICD-9-CM: 730.20  10/15/2020 - Present Unknown Leukocytosis ICD-10-CM: Y15.932 ICD-9-CM: 288.60  10/14/2020 - Present Unknown REECE (acute kidney injury) ([facility] ICD-10-CM: N17.9 ICD-9-CM: 584.9  10/14/2020 - Present Unknown * (Principal) Altered mental status ICD-10-CM: R41.82 
ICD-9-CM: 780.97  10/14/2020 - Present Unknown Systolic CHF, chronic (HCC) (Chronic) ICD-10-CM: S57.96 ICD-9-CM: 428.22, 428.0  1/8/2019 - Present Yes HTN (hypertension) (Chronic) ICD-10-CM: I10 
ICD-9-CM: 401.9  6/12/2016 - Present Yes Thoracic aortic aneurysm (HCC) ICD-10-CM: I71.2 ICD-9-CM: 441.2  5/16/2016 - Present Yes Overview Addendum 5/31/2017  1:53 PM by Stacy Fletcher MD  
  May 2016 - 7.3 cm with moderate AI and anterior pericardial effusion,  found incidentally on resting echo prior to planned stress test.  Admitted urgently for transfer to Dr Bren Duenas for repair Jan 2017 - staged repair of descending thoracic aneurysm, Dr Cinthia Aguilar, complicated by spinal cord dysfunction. Hypothyroidism (Chronic) ICD-10-CM: E03.9 ICD-9-CM: 244.9  2/4/2014 - Present Yes Plan: Altered mental status: Mentation is improved CT head negative for acute pathology Could be a ertapenem or prednisone induced delirium Meropenem has been discontinued by ID and switched to ceftriaxone and metronidazole for osteomyelitis No obvious source of infection other than Osteomyelitis Osteomyelitis: 
Leukocytosis is improving ID consulted, appreciate recommendations Wound care consulted Follow-up on blood culture and urine culture, till date negative Rehab versus SNF placement REECE : 
renal function improving Avoid Nephrotoxic agent Hypothyroidism: 
continue home meds for hypothyroidism CHF, hypertension: Patient with borderline blood pressure, will hold antihypertensive at this time Gout: 
Continue colchicine Paraplegia: 
Stable Physical therapy consult DC planning/Dispo:   
Home on discharge Diet:  DIET CARDIAC 
DVT ppx: Lovenox Signed: 
Jessica Juarez MD

## 2020-10-17 NOTE — PROGRESS NOTES
Spoke with dr Collins Moore regarding blood pressure orders to hold lasix if sbp <110. Hold coreg today lasix dosage decreased All meds held at present. 1505  SBP 62  Dr. Collins Moore made aware and bolus of ns ordered along with midodrine order hold on lasix made as well 1630 afte bolus . Wife at bedside

## 2020-10-17 NOTE — PROGRESS NOTES
Problem: Self Care Deficits Care Plan (Adult) Goal: *Acute Goals and Plan of Care (Insert Text) Description: 1. Patient will perform grooming with supervision. 2. Patient will perform Upper body dressing with supervision 3. Patient will perform upper body bathing with supervision. 4. Patient will sit EOB for ADL task with CGA. 5. Patient will participate in 30 + minutes of ADL/ therapeutic exercise/therapeutic activity with min rest breaks to increase activity tolerance for self care. Goals to be achieved in 7 days. Outcome: Progressing Towards Goal 
  
OCCUPATIONAL THERAPY: Daily Note and PM 10/17/2020 OBSERVATION: OT Visit Days: 2 Payor: Shiva Shoemaker / Plan: [address] [place] [address] HMO / Product Type: Managed Care Medicare /  
  
NAME/AGE/GENDER: Haroon Nichols is a 79 y.o. male PRIMARY DIAGNOSIS:  Leukocytosis [D72.829] REECE (acute kidney injury) ([facility] [address] [N17.9] Leukocytosis [D72.829] REECE (acute kidney injury) ([facility] [address] [N17.9] Altered mental status Altered mental status ICD-10: Treatment Diagnosis:  
 · Other abnormalities of gait and mobility (R26.89) · Generalized Muscle Weakness (M62.81) · Other lack of cordination (R27.8) · Difficulty in walking, Not elsewhere classified (R26.2) · Other abnormalities of gait and mobility (R26.89) · Ataxic gait (R26.0) · Repeated Falls (R29.6) · History of falling (Z91.81) Precautions/Allergies: 
   Patient has no known allergies. ASSESSMENT:  
 
Mr. Gladis Garcia presents supine in bed. He was admitted with above diagnosis. Wife present and reported he has fallen a few times at home. He presents with a catheter and wraps on B LE. He has a wound on his R heel and his Left foot has moderate edema. He has a strap on each thigh that he uses to assist his B LE with mobility due to his paraplegia. He has a Picc line in R Upper arm. His arom and strength is grossly 4+/5.  He is supervision with eating and grooming. He is min assist for upper body ADLS in supine and total assist for LE ADLS in supine. He was max assist rolling right and left. He was max assist earlier with PT for a sliding board transfer earlier this am.  He plans to go to rehab due to his current level of care. He is motivated and would benefit from skilled OT services in acute and post acute. Will follow. 10/17/2020 Pt was supine in bed upon arrival. Pt completed bed mobility with mod A X2. Pt completed sliding board transfer with min-mod Ax2. Pt completed grooming with the assistance listed below. Pt completed wheelchair management with supervision. Pt completed sliding board transfer with min-mod A X2 in order for ADL preparation. Good progress made. Continue POC. At this time, patient is appropriate for Co-treatment with occupational therapy due to patient's decreased overall endurance/tolerance levels, as well as need for high level skilled assistance to complete functional transfers/mobility and functional tasks. Mr Monse Sutton is appropriate for a multidisciplinary co-treatment of PT and OT to address goals of both disciplines This section established at most recent assessment PROBLEM LIST (Impairments causing functional limitations): 1. Decreased Strength 2. Decreased ADL/Functional Activities 3. Decreased Transfer Abilities 4. Decreased Ambulation Ability/Technique 5. Decreased Balance INTERVENTIONS PLANNED: (Benefits and precautions of occupational therapy have been discussed with the patient.) 1. Activities of daily living training 2. Adaptive equipment training 3. Balance training 4. Clothing management 5. Donning&doffing training 6. Neuromuscular re-eduation 7. Therapeutic activity 8. Therapeutic exercise TREATMENT PLAN: Frequency/Duration: Follow patient 3 times to address above goals. Rehabilitation Potential For Stated Goals: Good REHAB RECOMMENDATIONS (at time of discharge pending progress):   
Placement: It is my opinion, based on this patient's performance to date, that Mr. Blaire Mistry may benefit from intensive therapy at a [address] [place] [address] after discharge due to the functional deficits listed above that are likely to improve with skilled rehabilitation and concerns that he/she may be unsafe to be unsupervised at home due to level of assistance . Equipment:  
? None at this time OCCUPATIONAL PROFILE AND HISTORY:  
History of Present Injury/Illness (Reason for Referral): 
See  H and P Past Medical History/Comorbidities:  
Mr. Blaire Mistry  has a past medical history of Adverse effect of anesthesia, Aneurysm ([address] Atrial flutter ([address] CAD (coronary artery disease), Congestive heart failure ([address] Hypertension, Ill-defined condition, Ill-defined condition, Morbid obesity ([address] and Thyroid disease. Mr. Blaire Mistry  has a past surgical history that includes hx tonsillectomy; hx orthopaedic; hx appendectomy; hx colonoscopy (5/08); pr chest surgery procedure unlisted (2016, 2017); pr cardiac surg procedure unlist (2016); pr cardiac surg procedure unlist (2017); and pr cardiac surg procedure unlist (06/2016). Social History/Living Environment:  
Home Environment: Private residence Wheelchair Ramp: Yes One/Two Story Residence: One story Living Alone: No 
Support Systems: Spouse/Significant Other/Partner Patient Expects to be Discharged to[de-identified] Skilled nursing facility Current DME Used/Available at Home: Commode, bedside, Tub transfer bench, Wheelchair, Other (comment) Tub or Shower Type: Shower Prior Level of Function/Work/Activity: 
Assist with ADLs  recently Number of Personal Factors/Comorbidities that affect the Plan of Care: Expanded review of therapy/medical records (1-2):  MODERATE COMPLEXITY ASSESSMENT OF OCCUPATIONAL PERFORMANCE[de-identified]  
Activities of Daily Living: Basic ADLs (From Assessment) Complex ADLs (From Assessment) Feeding: Supervision Oral Facial Hygiene/Grooming: Supervision Bathing: Moderate assistance, Maximum assistance Upper Body Dressing: Minimum assistance Lower Body Dressing: Total assistance Toileting: Total assistance(catheter) Grooming/Bathing/Dressing Activities of Daily Living Grooming Washing Face: Set-up Cognitive Retraining Safety/Judgement: Fall prevention Bed/Mat Mobility Supine to Sit: Assist x2; Moderate assistance Sit to Supine: Moderate assistance;Assist x2 Most Recent Physical Functioning:  
Gross Assessment: 
  
         
  
Posture: 
  
Balance: 
Sitting: High guard; With support Bed Mobility: 
Supine to Sit: Assist x2; Moderate assistance Sit to Supine: Moderate assistance;Assist x2 Wheelchair Mobility: 
  
Transfers: 
   
 
    
 
Patient Vitals for the past 6 hrs: 
 BP BP Patient Position SpO2 Pulse 10/17/20 1041 (!) 132/57 At rest 95 % 74 Mental Status Neurologic State: Alert Orientation Level: Oriented X4 Cognition: Follows commands Perception: Verbal, Tactile Perseveration: Tactile cues provided, Verbal cues provided Safety/Judgement: Fall prevention Physical Skills Involved: 
1. Balance 2. Strength 3. Activity Tolerance Cognitive Skills Affected (resulting in the inability to perform in a timely and safe manner): 1. Perception Psychosocial Skills Affected: 1. Habits/Routines 2. Environmental Adaptation 3. Self-Awareness Number of elements that affect the Plan of Care: 5+:  HIGH COMPLEXITY CLINICAL DECISION MAKIN [facility] [address] [address] AM-PAC 6 Clicks Daily Activity Inpatient Short Form How much help from another person does the patient currently need. .. Total A Lot A Little None 1. Putting on and taking off regular lower body clothing? [x] 1   [] 2   [] 3   [] 4  
2. Bathing (including washing, rinsing, drying)?    [] 1   [x] 2   [] 3 [] 4  
3. Toileting, which includes using toilet, bedpan or urinal?   [x] 1   [] 2   [] 3   [] 4  
4. Putting on and taking off regular upper body clothing? [] 1   [] 2   [x] 3   [] 4  
5. Taking care of personal grooming such as brushing teeth? [] 1   [] 2   [] 3   [x] 4  
6. Eating meals? [] 1   [] 2   [] 3   [x] 4  
© 2007, Trustees of [facility], under license to [employer]. All rights reserved Score:  Initial: 15 Most Recent: X (Date: -- ) Interpretation of Tool:  Represents activities that are increasingly more difficult (i.e. Bed mobility, Transfers, Gait). Medical Necessity:    
· Patient is expected to demonstrate progress in · Self care skills and functional mobility · Reason for Services/Other Comments: 
· Patient continues to require skilled intervention due to · Above listed deficits Use of outcome tool(s) and clinical judgement create a POC that gives a: MODERATE COMPLEXITY  
 
 
 
TREATMENT:  
(In addition to Assessment/Re-Assessment sessions the following treatments were rendered) Pre-treatment Symptoms/Complaints:   
Pain: Initial:  
Pain Intensity 1: 0  Post Session:  0/10 Today's treatment session addressed Decreased Strength, Decreased Transfer Abilities and Decreased Activity Tolerance to progress towards achieving goal(s) 0. During this session,  Physical Therapy addressed  Functional Transfers to progress towards their discipline specific goal(s). Co-treatment was necessary to improve patient's ability to increase activity demands. Assessment/Reassessment only, no treatment provided today Self Care: (38): Procedure(s) (per grid) utilized to improve and/or restore self-care/home management as related to grooming. Required set up  verbal cueing to facilitate activities of daily living skills. Braces/Orthotics/Lines/Etc:  
· bosch catheter · PIcc line R UE 
· O2 Device: Room air Treatment/Session Assessment: · Response to Treatment:  tolerated fair, weak with rolling · Interdisciplinary Collaboration:  
o Physical Therapist 
o Certified Occupational Therapy Assistant 
o Registered Nurse · After treatment position/precautions:  
o Supine in bed 
o Bed/Chair-wheels locked 
o Bed in low position 
o Call light within reach 
o RN notified 
o Family at bedside 
o Side rails x 3  
· Compliance with Program/Exercises: Compliant all of the time, Will assess as treatment progresses. · Recommendations/Intent for next treatment session: \"Next visit will focus on advancements to more challenging activities and reduction in assistance provided\". Total Treatment Duration: OT Patient Time In/Time Out Time In: 9455 Time Out: 73[facility]

## 2020-10-17 NOTE — PROGRESS NOTES
Pt has bed offer at [facility], however, PT now recommending 9th floor [facility]. CM spoke with spouse at bedside. Wife wishes to discuss [facility] vs SNF with her children and with pt's [facility] care [facility] - [phone] ext. [phone]). Wife also requests that CM call and speak with [facility]; this CM called and left VM for [facility]. Wife to notify CM of decision. Referral to [facility] placed. [place] will be [place] [facility] [place]. CM will continue to follow for needs.

## 2020-10-17 NOTE — PROGRESS NOTES
SUBJECTIVE:                                                    Isabel Carroll is a 66 year old female who presents to clinic today for the following health issues:    Joint Pain - Right shoulder follow up     Onset: Ongoing    Description:   Location: right shoulder  Character: Dull ache    Intensity: mild    Progression of Symptoms: better, same    Accompanying Signs & Symptoms:  Other symptoms: none   History:   Previous similar pain: YES      Precipitating factors:   Trauma or overuse: no     Alleviating factors:  Improved by: Physical therapy       Therapies Tried and outcome: Patient states told by therapist that patient's maxed out on the benefit of therap; physical therapy helped a little bit      Chronic cough- gabapentin and speech therapy is helping but when she went to twice a day dose of 300 mg, this caused too much sedation. Will call pulmonary to discuss.     Problem list and histories reviewed & adjusted, as indicated.  Additional history: as documented    Patient Active Problem List   Diagnosis     Overweight     Chronic rhinitis     Primary osteoarthritis of both hands     Basal cell carcinoma of skin     Coronary atherosclerosis due to calcified coronary lesion     Elevated blood pressure (not hypertension)     Hyperlipidemia     Impaired fasting glucose     Benign neoplasm of colon     Chorioretinal scar of left eye     Sinus tachycardia     Advance care planning     Pes planus     Right shoulder strain, initial encounter     Bronchiectasis without complication (H)     Abrasion of forehead, initial encounter     Past Surgical History:   Procedure Laterality Date      SECTION      x 2     COLONOSCOPY      3 polyps     LENGTHEN TENDON ACHILLES  2011    Procedure:LENGTHEN TENDON ACHILLES; Surgeon:AARON CÁRDENAS; Location:[facility]     LIVER SURGERY      Alex large cyst     MINI ARC SLING OPERATION FOR STRESS INCONTINENCE  2007     OSTEOTOMY ANKLE  2011     END OF SHIFT NOTE: 
 
Intake/Output 10/16 1901 - 10/17 0700 In: 240 [P.O.:240] Out: 350 [Urine:350] Voiding: YES Catheter: YES Drain:   
 
 
 
 
 
Stool:  1 occurrence. Stool Assessment Stool Color: Black (10/16/20 2201) Stool Appearance: Hard (10/16/20 2201) Stool Amount: Medium (10/16/20 2201) Stool Source/Status: Rectum (10/16/20 2201) Emesis:  0 occurrences. VITAL SIGNS Patient Vitals for the past 12 hrs: 
 Temp Pulse Resp BP SpO2  
10/17/20 0306 97.7 °F (36.5 °C) 89 18 110/81 94 % 10/16/20 2304 98.1 °F (36.7 °C) 71 18 (!) 116/59 97 % 10/16/20 1941 98.2 °F (36.8 °C) 75 18 (!) 116/49 96 % 10/16/20 1626    (!) 110/56  Pain Assessment Pain 1 Pain Scale 1: Numeric (0 - 10) (10/17/20 0120) Pain Intensity 1: 0 (10/17/20 0120) Patient Stated Pain Goal: 0 (10/17/20 0120) Pain Reassessment 1: Patient resting w/respiratory rate greater than 10 (10/16/20 1720) Pain Onset 1: last hour (10/16/20 1626) Pain Location 1: Back;Leg (10/16/20 1626) Pain Orientation 1: Right; Lower (10/16/20 1626) Pain Description 1: Aching (10/16/20 1626) Pain Intervention(s) 1: Medication (see MAR) (10/16/20 1626) Ambulating No 
 
Additional Information:  
BP stable overnight. Intermittent confusion. Kept comfortable. Falls precautions in place. Shift report given to oncoming nurse Gabino Shah at the bedside.  
 
Michi Valera RN 
 
 
 
 Procedure:OSTEOTOMY ANKLE; Calcaneous,  Flexor Digitorum Longus Transfer       SALPINGO-OOPHORECTOMY BILATERAL  1998, 2000    Benign prophylactic for FHx ov cancer       Social History   Substance Use Topics     Smoking status: Former Smoker     Years: 16.00     Types: Cigarettes     Quit date: 1/1/1982     Smokeless tobacco: Former User     Quit date: 1/1/1982     Alcohol use 0.0 oz/week     0 Standard drinks or equivalent per week      Comment: 2-7 glasses of wine per week     Family History   Problem Relation Age of Onset     Other Cancer Sister 51     ovarian cancer     CANCER Sister      Colon Cancer Father      Macular Degeneration Mother      DIABETES Maternal Aunt      DIABETES Maternal Uncle      CANCER Paternal Aunt      DIABETES Maternal Grandmother      Hypertension No family hx of      CEREBROVASCULAR DISEASE No family hx of      Thyroid Disease No family hx of      Glaucoma No family hx of          Current Outpatient Prescriptions   Medication Sig Dispense Refill     gabapentin (NEURONTIN) 300 MG capsule Start with 300mg once daily, if tolerated well increase to 300mg twice daily; if tolerated well increase to 300mg three times dialy. 270 capsule 1     simvastatin (ZOCOR) 10 MG tablet Take 1 tablet (10 mg) by mouth At Bedtime 90 tablet 3     metoprolol (TOPROL-XL) 25 MG 24 hr tablet Take 1 tablet (25 mg) by mouth daily 90 tablet 3     diclofenac (VOLTAREN) 1 % GEL 2 grams to hands four times daily using enclosed dosing card. 100 g 1     fluticasone (FLONASE) 50 MCG/ACT nasal spray Spray 1-2 sprays into both nostrils daily 1 Package 11     FISH OIL        Pseudoephedrine HCl (SUDAFED 12 HOUR PO) Take  by mouth.       Calcium-Vitamin D (CALCIUM + D PO) Take  by mouth.       Multiple Vitamins-Minerals (MULTIVITAMIN & MINERAL PO) Take  by mouth.       aspirin 81 MG tablet Take 1 tablet by mouth daily.       Allergies   Allergen Reactions     Codeine Phosphate Nausea and Vomiting     Pentazocine       "Propoxyphene      Darvon     Penicillins Rash     Recent Labs   Lab Test  10/14/16   0815  05/22/16   1708  10/06/15   0818  04/01/14 09/28/13 09/22/12 08/17/12   A1C  5.5  5.8   --    --    --   5.7  6.0   --    LDL  106*   --   113   --   80   --   111   --    HDL  48*   --   47*   --   40*   --   54   --    TRIG  210*   --   209*   --   180*   --   156*   --    ALT   --   39   --    --    --    --    --    --    CR  0.74  0.63  0.72   < >   --    --    --    --    GFRESTIMATED  78  >90  Non  GFR Calc    82   < >   --    --    --    --    GFRESTBLACK  >90   GFR Calc    >90   GFR Calc    >90   GFR Calc     < >   --    --    --    --    POTASSIUM   --   4.1  4.1   --    --    --    --    --    TSH   --    --    --    --    --    --   0.506  0.829    < > = values in this interval not displayed.      BP Readings from Last 3 Encounters:   05/25/17 129/77   04/13/17 (P) 119/76   02/23/17 120/82    Wt Readings from Last 3 Encounters:   05/25/17 146 lb (66.2 kg)   04/13/17 144 lb 10.6 oz (65.6 kg)   04/12/17 150 lb (68 kg)                  Labs reviewed in EPIC    Reviewed and updated as needed this visit by clinical staff       Reviewed and updated as needed this visit by Provider         ROS:  Constitutional, HEENT, cardiovascular, pulmonary, gi and gu systems are negative, except as otherwise noted.    OBJECTIVE:                                                    /77 (BP Location: Right arm, Patient Position: Chair, Cuff Size: Adult Regular)  Pulse 65  Temp 97.1  F (36.2  C) (Oral)  Ht 5' 2.75\" (1.594 m)  Wt 146 lb (66.2 kg)  SpO2 98%  Breastfeeding? No  BMI 26.07 kg/m2  Body mass index is 26.07 kg/(m^2).  GENERAL APPEARANCE: healthy, alert and no distress  EYES: Eyes grossly normal to inspection, PERRL and conjunctivae and sclerae normal  HENT: ear canals and TM's normal, nose and mouth without ulcers or lesions, oropharynx clear and oral " mucous membranes moist  NECK: no adenopathy, no asymmetry, masses, or scars and thyroid normal to palpation  RESP: lungs clear to auscultation - no rales, rhonchi or wheezes  CV: regular rates and rhythm, normal S1 S2, no S3 or S4, no murmur, click or rub, no peripheral edema and peripheral pulses strong  ABDOMEN: soft, nontender, no hepatosplenomegaly, no masses and bowel sounds normal  MS: no musculoskeletal defects are noted and gait is age appropriate without ataxia, some limitation of ROM right shoulder anteversion, otherwise pretty normal exam.   SKIN: no suspicious lesions or rashes  NEURO: Normal strength and tone, sensory exam grossly normal, mentation intact and speech normal  PSYCH: mentation appears normal and affect normal/bright     Diagnostic Test Results:  none      ASSESSMENT/PLAN:                                                              ICD-10-CM    1. Impingement syndrome, shoulder, right M75.41 ORTHO  REFERRAL   2. Chronic cough- close to 20 years. Evaluation and treatment with pulmonary R05 Improved but side effects with higher doses of medication    3. Bronchiectasis without complication (H) J47.9 Mild per CT and may not be causing the cough   4. 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        CONSULTATION/REFERRAL to sports medicine Kalli Ness   FUTURE LABS:       - Schedule fasting labs in 6 months  FUTURE APPOINTMENTS:       - Follow-up visit in 6 months or sooner if any questions or concerns.   Regular exercise    Marry Gonzalez MD  [facility]

## 2020-10-17 NOTE — PROGRESS NOTES
Problem: Mobility Impaired (Adult and Pediatric) Goal: *Acute Goals and Plan of Care (Insert Text) Description: DISCHARGE GOALS : ADDENDUM 10/17/20 
(1.)Mr. Mariajose Blas will move from supine to sit and sit to supine  with MINIMAL ASSIST(consistently) & bed modified PRN. 
(2.)Mr. Mariajose Blas will transfer from bed to chair and chair to bed with MINIMAL ASSIST (consistently) using a slide board, pt helps with board placement. (3.)Mr. Mariajose Blas will perform static sitting on outstretched arms fair for 2-3 min, pt able to shift wt multiple directions while sitting EOB on outstretched arms with CGA. Met 10/17. 
4) pt can prepare WC & place board for transfer. 5) pt can propel & maneuver  ft with supervision. 6) pt can perform static & dynamic balance to complete functional task safely. Outcome: Progressing Towards Goal 
  
PHYSICAL THERAPY: Daily Note and PM 10/17/2020 OBSERVATION: PT Visit Days : 2 Payor: Trevor Brown / Plan: [address] [place] [address] HMO / Product Type: [employer] Medicare /   
  
NAME/AGE/GENDER: Juan Navarro is a 79 y.o. male PRIMARY DIAGNOSIS: Leukocytosis [D72.829] REECE (acute kidney injury) ([facility] [N17.9] Leukocytosis [D72.829] REECE (acute kidney injury) ([facility] [N17.9] Altered mental status Altered mental status ICD-10: Treatment Diagnosis:  
 · Generalized Muscle Weakness (M62.81) · Other lack of cordination (R27.8) Precaution/Allergies: 
Patient has no known allergies. ASSESSMENT:  
 
Mr. Mariajose Blas showed a significantly improved ability to participate in bed mobility, scooting during transfers. Pt was able to manipulate leg rest, arm rests & breaks of his WC. Pt propelled & maneuvered  ft with SBA. Also pt showed better sitting balance on outstretched arms. PT expects this pt to continue to progress quickly with intense therapy in a setting like [facility], 9th floor.  Pt made a major gain from yesterday which offers increased potential to reach his prior level function which was being functional from a  level. This section established at most recent assessment PROBLEM LIST (Impairments causing functional limitations): 1. Decreased Strength 2. Decreased ADL/Functional Activities 3. Decreased Transfer Abilities 4. Decreased Balance 5. Decreased Activity Tolerance 6. Decreased Flexibility/Joint Mobility INTERVENTIONS PLANNED: (Benefits and precautions of physical therapy have been discussed with the patient.) 1. Balance Exercise 2. Bed Mobility 3. Therapeutic Activites 4. Therapeutic Exercise/Strengthening 5. Transfer Training TREATMENT PLAN: Frequency/Duration: daily for duration of hospital stay Rehabilitation Potential For Stated Goals: Good REHAB RECOMMENDATIONS (at time of discharge pending progress):   
Placement: It is my opinion, based on this patient's performance to date, that Mr. Wendy Timmons may benefit from intensive therapy at an [address] [place] [address] after discharge due to potential to make ongoing and sustainable functional improvement that is of practical value. Ryder Maurer Equipment:  
? to be determined HISTORY:  
History of Present Injury/Illness (Reason for Referral): 
69-year-old male with medical history significant for hypertension, CHF, CAD, aortic valve repair, hypothyroidism, A. 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 Wife is present at bedside and states he has had trouble with confusion and slurred speech. She reports he is hallucinating. Also reports he is restless at night and not able to sleep over the past couple of days. Denies any fevers, shortness of breath, chest pain, palpitation, nausea, vomiting, abdominal pain.   
Patient had episode of gout about 2 weeks ago and has completed prednisone and hydrocodone course about 2 days ago. Patient is also following infectious disease and wound care for right heel ulcer and osteomyelitis of the posterior lateral calcaneus and is currently on ertapenem. As per wife he has completed 2 weeks out of 6 weeks antibiotic course in total. Patient has history of indwelling Jeffery's. Last change was 2 weeks ago. Past Medical History/Comorbidities:  
Mr. Dempsey Never  has a past medical history of Adverse effect of anesthesia, Aneurysm ([address] Atrial flutter ([address] CAD (coronary artery disease), Congestive heart failure ([address] Hypertension, Ill-defined condition, Ill-defined condition, Morbid obesity ([address] and Thyroid disease. Mr. Dempsey Never  has a past surgical history that includes hx tonsillectomy; hx orthopaedic; hx appendectomy; hx colonoscopy (5/08); pr chest surgery procedure unlisted (2016, 2017); pr cardiac surg procedure unlist (2016); pr cardiac surg procedure unlist (2017); and pr cardiac surg procedure unlist (06/2016). Social History/Living Environment:  
Home Environment: Private residence Wheelchair Ramp: Yes One/Two Story Residence: One story Living Alone: No 
Support Systems: Spouse/Significant Other/Partner Patient Expects to be Discharged to[de-identified] Skilled nursing facility Current DME Used/Available at Home: Commode, bedside, Tub transfer bench, Wheelchair, Other (comment) Tub or Shower Type: Shower Prior Level of Function/Work/Activity: Pt was performing slide board transfers with SBA prior tot his admission. Personal Factors: Other factors that influence how disability is experienced by the patient:  current & PMH Number of Personal Factors/Comorbidities that affect the Plan of Care: 3+: HIGH COMPLEXITY EXAMINATION:  
Most Recent Physical Functioning:  
Gross Assessment: 
AROM: Grossly decreased, non-functional(both UE , no use both LE's) Strength: Grossly decreased, non-functional(both UE , no use both LE's) Coordination: Grossly decreased, non-functional(both UE , no use both LE's) Balance: 
Sitting: Impaired; Without support Sitting - Static: (fair to fair-) Sitting - Dynamic: (fair to fair-) Bed Mobility: 
Rolling: Moderate assistance Supine to Sit: Moderate assistance Sit to Supine: Moderate assistance Scooting: Minimum assistance Transfers: 
Sliding Board : (bed>WC, with mod A, WC>bed with min A) Duration: 38 Minutes(extra time to work through activity noted) Gait: NA Functional Mobility:  
      Transfers:  Min-mod Bed Mobility:  mod Body Structures Involved: 1. Metabolic 2. Muscles Body Functions Affected: 1. Movement Related 2. Metobolic/Endocrine Activities and Participation Affected: 1. General Tasks and Demands 2. Mobility Number of elements that affect the Plan of Care: 4+: HIGH COMPLEXITY CLINICAL PRESENTATION:  
Presentation: Evolving clinical presentation with unstable and unpredictable characteristics: HIGH COMPLEXITY CLINICAL DECISION MAKIN[address] [place] [address] AM-PAC 6 Clicks Basic Mobility Inpatient Short Form How much difficulty does the patient currently have. .. Unable A Lot A Little None 1. Turning over in bed (including adjusting bedclothes, sheets and blankets)? [] 1   [x] 2   [] 3   [] 4  
2. Sitting down on and standing up from a chair with arms ( e.g., wheelchair, bedside commode, etc.)   [x] 1   [] 2   [] 3   [] 4  
3. Moving from lying on back to sitting on the side of the bed? [] 1   [x] 2   [] 3   [] 4 How much help from another person does the patient currently need. .. Total A Lot A Little None 4. Moving to and from a bed to a chair (including a wheelchair)? [] 1   [x] 2   [] 3   [] 4  
5. Need to walk in hospital room? [x] 1   [] 2   [] 3   [] 4  
6. Climbing 3-5 steps with a railing? [x] 1   [] 2   [] 3   [] 4  
© , Trustees of [address] [place] [address], under license to [employer].  All rights reserved Score:  Initial: 9 Most Recent: X (Date: -- ) Interpretation of Tool:  Represents activities that are increasingly more difficult (i.e. Bed mobility, Transfers, Gait). Medical Necessity:    
· Patient is expected to demonstrate progress in  
· strength, balance, coordination, and functional technique ·  to  
· decrease assistance required with bed mobility & transfers · . Reason for Services/Other Comments: 
· Patient continues to require skilled intervention due to · Pt not manageable for caregiver · . Use of outcome tool(s) and clinical judgement create a POC that gives a: Difficult prediction of patient's progress: HIGH COMPLEXITY  
  
 
 
 
TREATMENT:  
(In addition to Assessment/Re-Assessment sessions the following treatments were rendered) Pre-treatment Symptoms/Complaints:  \" I feel better after therapy \" 
Pain: Initial: numeric scale Pain Intensity 1: 0  Post Session:  0/10 Therapeutic Activity: (  38 Minutes(extra time to work through activity noted) ):  Therapeutic activities including bed mobility, sitting balance static & dynamic sitting balance slide board transfers bed<>WC, along with WC propulsion & safety ( manipulating leg rest & locking WC) to improve mobility, strength, balance, coordination and dynamic movement of arm - bilateral and core to improve functional stability & endurance. Today's treatment session addressed Decreased Strength, Decreased ADL/Functional Activities, Decreased Transfer Abilities, Decreased Balance, Decreased Activity Tolerance and Decreased Work Simplification/Energy Conservation Techniques to progress towards achieving goal(s) 1, 2, 3, 4 5 & 6. During this session, Occupational Therapy addressed self care to progress towards their discipline specific goal(s).   Co-treatment was necessary to improve patient's cognitive participation, ability to follow higher level commands, ability to increase activity demands and ability to return to normal functional activity. Braces/Orthotics/Lines/Etc:  
· IV 
· bosch catheter Treatment/Session Assessment:   
· Response to Treatment:  Pt has made major gains since yesterday, good IRC candidate · Interdisciplinary Collaboration:  
o Registered Nurse 
o  
o Certified Nursing Assistant/Patient Care Technician · After treatment position/precautions:  
o Supine in bed 
o Bed/Chair-wheels locked 
o Bed in low position 
o Call light within reach 
o RN notified 
o CNA with pt · Compliance with Program/Exercises: Will assess as treatment progresses · Recommendations/Intent for next treatment session: \"Next visit will focus on reduction in assistance provided\". Total Treatment Duration: PT Patient Time In/Time Out Time In: 8037 Time Out: 5664 Jhonny Bauer PT

## 2020-10-18 NOTE — PROGRESS NOTES
Care Management Interventions Transition of Care Consult (CM Consult): Home Health, Discharge Planning [address]: No 
Reason Outside [place]: Patient already serviced by other home care/hospice agency Physical Therapy Consult: Yes Occupational Therapy Consult: Yes Current Support Network: Lives with Spouse, Own Home Confirm Follow Up Transport: Family The Patient and/or Patient Representative was Provided with a Choice of Provider and Agrees with the Discharge Plan?: Yes Freedom of Choice List was Provided with Basic Dialogue that Supports the Patient's Individualized Plan of Care/Goals, Treatment Preferences and Shares the Quality Data Associated with the Providers?: Yes Discharge Location Discharge Placement: Skilled nursing facility Rec'd call from [facility] with approval to send to [facility] on Monday 10-19. [address] # [id number].

## 2020-10-18 NOTE — PROGRESS NOTES
Hospitalist Note Admit Date:  10/14/2020  3:37 PM  
Name:  Mariaelena Martinez Age:  79 y.o. 
:  1940 MRN:  [id number] PCP:  Brandon, MD Elvin 
Treatment Team: Attending Provider: Vani Sparks MD; Consulting Provider: Star Duarte MD; Utilization Review: Rashad Coppola RN; : Nita Santiago; Physical Therapist: Cheryl Arroyo PT; Staff Nurse: Rosalva Murray RN; Occupational Therapist: Giacomo Anderson OT 
 
HPI/Subjective:  
48-year-old male with medical history significant for hypertension, CHF, CAD, aortic valve repair, hypothyroidism, A. 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 Altered mental status could be secondary to ertapenem or steroid induced delirium. ID was consulted and antibiotics were switched to ceftriaxone and metronidazole. No acute event reported overnight. Patient is maintaining his blood pressure with maps in 70s. Leukocytosis is improving. He is alert and oriented. Denies any active new complaints. PT recommend 9th floor inpatient rehab. Wife wanted to proceed. Case management consulted. Objective:  
 
Patient Vitals for the past 24 hrs: 
 Temp Pulse Resp BP SpO2  
10/18/20 0750 98.6 °F (37 °C) 80 18 112/63 98 % 10/18/20 0309 98.1 °F (36.7 °C) 81 18 107/79 97 % 10/17/20 2256 98.4 °F (36.9 °C) 76 18 110/61 97 % 10/17/20 1842 98.5 °F (36.9 °C) 76 18 105/60 97 % 10/17/20 1527 99.2 °F (37.3 °C) 76 18 (!) 131/57 97 % 10/17/20 1041 99.3 °F (37.4 °C) 74 17 (!) 132/57 95 % Oxygen Therapy O2 Sat (%): 98 % (10/18/20 0750) O2 Device: Room air (10/18/20 0750) Estimated body mass index is 37.38 kg/m² as calculated from the following: 
  Height as of this encounter: 5' 11\" (1.803 m). Weight as of this encounter: 121.6 kg (268 lb). Intake/Output Summary (Last 24 hours) at 10/18/2020 0945 Last data filed at 10/18/2020 0198 Gross per 24 hour Intake  Output 1251 ml Net -1251 ml  
   
*Note that automatically entered I/Os may not be accurate; dependent on patient compliance with collection and accurate  by techs. General:    Well nourished. Alert. CV:   RRR. No murmur, rub, or gallop. Lungs:   CTAB. No wheezing, rhonchi, or rales. Abdomen:   Soft, nontender, nondistended. Extremities: Warm and dry. Bilateral lower extremity edema, clean dressing Skin:     No rashes or jaundice. Neuro:  No gross focal deficits Data Reviewed: 
I have reviewed all labs, meds, and studies from the last 24 hours: 
Recent Results (from the past 24 hour(s)) PLEASE READ & DOCUMENT PPD TEST IN 48 HRS Collection Time: 10/17/20  3:49 PM  
Result Value Ref Range PPD Negative Negative  
 mm Induration 0 0 - 5 mm CBC WITH AUTOMATED DIFF Collection Time: 10/18/20  5:27 AM  
Result Value Ref Range WBC 11.9 (H) 4.3 - 11.1 K/uL  
 RBC 2.89 (L) 4.23 - 5.6 M/uL HGB 8.4 (L) 13.6 - 17.2 g/dL HCT 26.7 (L) 41.1 - 50.3 % MCV 92.4 79.6 - 97.8 FL  
 MCH 29.1 26.1 - 32.9 PG  
 MCHC 31.5 31.4 - 35.0 g/dL RDW 20.2 (H) 11.9 - 14.6 % PLATELET 595 (L) 358 - 450 K/uL MPV 9.0 (L) 9.4 - 12.3 FL ABSOLUTE NRBC 0.00 0.0 - 0.2 K/uL  
 DF AUTOMATED NEUTROPHILS 76 43 - 78 % LYMPHOCYTES 8 (L) 13 - 44 % MONOCYTES 12 4.0 - 12.0 % EOSINOPHILS 1 0.5 - 7.8 % BASOPHILS 0 0.0 - 2.0 % IMMATURE GRANULOCYTES 2 0.0 - 5.0 %  
 ABS. NEUTROPHILS 9.0 (H) 1.7 - 8.2 K/UL  
 ABS. LYMPHOCYTES 1.0 0.5 - 4.6 K/UL  
 ABS. MONOCYTES 1.4 (H) 0.1 - 1.3 K/UL  
 ABS. EOSINOPHILS 0.1 0.0 - 0.8 K/UL  
 ABS. BASOPHILS 0.0 0.0 - 0.2 K/UL  
 ABS. IMM. GRANS. 0.3 0.0 - 0.5 K/UL METABOLIC PANEL, BASIC Collection Time: 10/18/20  5:27 AM  
Result Value Ref Range Sodium 140 136 - 145 mmol/L  Potassium 3.7 3.5 - 5.1 mmol/L  
 Chloride 109 (H) 98 - 107 mmol/L  
 CO2 28 21 - 32 mmol/L Anion gap 3 (L) 7 - 16 mmol/L Glucose 104 (H) 65 - 100 mg/dL BUN 40 (H) 8 - 23 MG/DL Creatinine 1.13 0.8 - 1.5 MG/DL  
 GFR est AA >60 >60 ml/min/1.73m2 GFR est non-AA >60 >60 ml/min/1.73m2 Calcium 7.9 (L) 8.3 - 10.4 MG/DL Current Meds: 
Current Facility-Administered Medications Medication Dose Route Frequency  lip protectant (BLISTEX) ointment 1 Each  1 Each Topical PRN  
 alcohol 62% (NOZIN) nasal  1 Ampule  1 Ampule Topical Q12H  colchicine tablet 0.6 mg  0.6 mg Oral DAILY  furosemide (LASIX) tablet 20 mg  20 mg Oral BID  midodrine (PROAMATINE) tablet 10 mg  10 mg Oral TID WITH MEALS  metroNIDAZOLE (FLAGYL) tablet 500 mg  500 mg Oral Q8H  
 cefTRIAXone (ROCEPHIN) 2 g in 0.9% sodium chloride (MBP/ADV) 50 mL  2 g IntraVENous Q24H  
 [Held by provider] carvediloL (COREG) tablet 6.25 mg  6.25 mg Oral BID WITH MEALS  
 levothyroxine (SYNTHROID) tablet 112 mcg  112 mcg Oral ACB  sertraline (ZOLOFT) tablet 50 mg  50 mg Oral DAILY  sodium chloride (NS) flush 5-40 mL  5-40 mL IntraVENous Q8H  
 sodium chloride (NS) flush 5-40 mL  5-40 mL IntraVENous PRN  
 acetaminophen (TYLENOL) tablet 650 mg  650 mg Oral Q6H PRN Or  
 acetaminophen (TYLENOL) suppository 650 mg  650 mg Rectal Q6H PRN  polyethylene glycol (MIRALAX) packet 17 g  17 g Oral DAILY PRN  promethazine (PHENERGAN) tablet 12.5 mg  12.5 mg Oral Q6H PRN Or  
 ondansetron (ZOFRAN) injection 4 mg  4 mg IntraVENous Q6H PRN  
 enoxaparin (LOVENOX) injection 40 mg  40 mg SubCUTAneous Q24H Other Studies: No results found for this visit on 10/14/20. No results found. All Micro Results Procedure Component Value Units Date/Time CULTURE, BLOOD [[id number]] Collected:  10/14/20 1602 Order Status:  Completed Specimen:  Blood Updated:  10/18/20 7238   Special Requests: BLUE PICC     
 Culture result: NO GROWTH 4 DAYS     
 CULTURE, BLOOD [[id number]] Collected:  10/14/20 1703 Order Status:  Completed Specimen:  Blood Updated:  10/18/20 4808 Special Requests: --     
  LEFT Antecubital 
  
  Culture result: NO GROWTH 4 DAYS     
 CULTURE, URINE [[id number]] Collected:  10/15/20 0103 Order Status:  Completed Specimen:  Urine Updated:  10/17/20 0754 Special Requests: NO SPECIAL REQUESTS Culture result: NO GROWTH 2 DAYS     
 CULTURE, URINE [[id number]] Collected:  10/14/20 1545 Order Status:  Canceled Specimen:  Urine from Clean catch   
  
 
 
[unfilled] Assessment and Plan:  
 
Hospital Problems as of 10/18/2020 Date Reviewed: 7/13/2020 Codes Class Noted - Resolved POA Osteomyelitis ([facility] ICD-10-CM: M86.9 ICD-9-CM: 730.20  10/15/2020 - Present Unknown Leukocytosis ICD-10-CM: H92.945 ICD-9-CM: 288.60  10/14/2020 - Present Unknown REECE (acute kidney injury) ([facility] ICD-10-CM: N17.9 ICD-9-CM: 584.9  10/14/2020 - Present Unknown * (Principal) Altered mental status ICD-10-CM: R41.82 
ICD-9-CM: 780.97  10/14/2020 - Present Unknown Systolic CHF, chronic (HCC) (Chronic) ICD-10-CM: D85.57 ICD-9-CM: 428.22, 428.0  1/8/2019 - Present Yes HTN (hypertension) (Chronic) ICD-10-CM: I10 
ICD-9-CM: 401.9  6/12/2016 - Present Yes Thoracic aortic aneurysm (HCC) ICD-10-CM: I71.2 ICD-9-CM: 441.2  5/16/2016 - Present Yes Overview Addendum 5/31/2017  1:53 PM by Wilda Ferguson MD  
  May 2016 - 7.3 cm with moderate AI and anterior pericardial effusion,  found incidentally on resting echo prior to planned stress test.  Admitted urgently for transfer to Dr Norberto Jean-Baptiste for repair Jan 2017 - staged repair of descending thoracic aneurysm, Dr Rishabh Denton, complicated by spinal cord dysfunction. Hypothyroidism (Chronic) ICD-10-CM: E03.9 ICD-9-CM: 244.9  2/4/2014 - Present Yes Plan: Altered mental status: Mentation is improved CT head negative for acute pathology Could be a ertapenem or prednisone induced delirium Meropenem has been discontinued by ID and switched to ceftriaxone and metronidazole for osteomyelitis No obvious source of infection other than Osteomyelitis Osteomyelitis: 
Leukocytosis is improving ID consulted, appreciate recommendations Wound care consulted Follow-up on blood culture and urine culture, till date negative Continue antibiotic ceftriaxone and metronidazole, as per ID REECE : 
renal function improving Avoid Nephrotoxic agent Hypothyroidism: 
continue home meds for hypothyroidism CHF, hypertension:  
Patient with borderline blood pressure, will hold antihypertensive at this time Gout: 
Continue colchicine Paraplegia: 
Stable Physical therapy consult DC planning/Dispo:  Medically cleared to discharge. Pending rehab placement Diet:  DIET CARDIAC 
DVT ppx: Lovenox Signed: 
Dana Cline MD

## 2020-10-18 NOTE — PROGRESS NOTES
No significant interval change in multiple bilateral pulmonary nodules.   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     Problem: Mobility Impaired (Adult and Pediatric) Goal: *Acute Goals and Plan of Care (Insert Text) Description: DISCHARGE GOALS : ADDENDUM 10/17/20 
(1.)Mr. Sushila Davis will move from supine to sit and sit to supine  with MINIMAL ASSIST(consistently) & bed modified PRN. 
(2.)Mr. Sushila Davis will transfer from bed to chair and chair to bed with MINIMAL ASSIST (consistently) using a slide board, pt helps with board placement. (3.)Mr. Sushila Davis will perform static sitting on outstretched arms fair for 2-3 min, pt able to shift wt multiple directions while sitting EOB on outstretched arms with CGA. Met 10/17. 
4) pt can prepare WC & place board for transfer. 5) pt can propel & maneuver  ft with supervision. 6) pt can perform static & dynamic balance to complete functional task safely. Outcome: Progressing Towards Goal 
  
PHYSICAL THERAPY: Daily Note and AM 10/18/2020 OBSERVATION: PT Visit Days : 3 Payor: Jeferson Collins / Plan: [address] [place] [address] HMO / Product Type: [employer] /   
  
NAME/AGE/GENDER: Chris Yusuf is a 79 y.o. male PRIMARY DIAGNOSIS: Leukocytosis [D72.829] REECE (acute kidney injury) ([facility] [address] [N17.9] Leukocytosis [D72.829] REECE (acute kidney injury) ([facility] [address] [N17.9] Altered mental status Altered mental status ICD-10: Treatment Diagnosis:  
 · Generalized Muscle Weakness (M62.81) · Other lack of cordination (R27.8) Precaution/Allergies: 
Patient has no known allergies. ASSESSMENT:  
 
Mr. Sushila Davis showed a significantly improved ability to participate in bed mobility, scooting during transfers. Pt was able to manipulate leg rest, arm rests & breaks of his WC. Pt propelled & maneuvered  ft with SBA. Also pt showed better sitting balance on outstretched arms. PT expects this pt to continue to progress quickly with intense therapy in a setting like [facility], 9th floor.  Pt made a major gain from yesterday which offers increased potential to reach his prior level function which was being functional from a WC level. 10/18 Pt reports inc R wrist pain this AM due to gout. He is able to discuss why he is in the hospital, but 30 min later when wife arrived unable to recall reason for current admission. He moved to sit EOB with Axel using bed controls to raise head. [address] [place] for WC transfer using sliding board, partially due to using hospital sliding board which is heavier and thicker than his personal board. He needed mod assist to slide down board due to wrist pain, fatigue, and decreased clothing on his bottom which increased friction at the board. He was able to manipulate foot rests with SBA. Overall he is functioning closer to his baseline but will benefit from rehab to return to [facility] with modified independence at [facility] level. This section established at most recent assessment PROBLEM LIST (Impairments causing functional limitations): 1. Decreased Strength 2. Decreased ADL/Functional Activities 3. Decreased Transfer Abilities 4. Decreased Balance 5. Decreased Activity Tolerance 6. Decreased Flexibility/Joint Mobility INTERVENTIONS PLANNED: (Benefits and precautions of physical therapy have been discussed with the patient.) 1. Balance Exercise 2. Bed Mobility 3. Therapeutic Activites 4. Therapeutic Exercise/Strengthening 5. Transfer Training TREATMENT PLAN: Frequency/Duration: daily for duration of hospital stay Rehabilitation Potential For Stated Goals: Good REHAB RECOMMENDATIONS (at time of discharge pending progress):   
Placement: It is my opinion, based on this patient's performance to date, that Mr. Dempsey Never may benefit from intensive therapy at an [address] [place] [address] after discharge due to potential to make ongoing and sustainable functional improvement that is of practical value. David Greco Equipment:  
? to be determined HISTORY:  
 History of Present Injury/Illness (Reason for Referral): 
79-year-old male with medical history significant for hypertension, CHF, CAD, aortic valve repair, hypothyroidism, A. 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 Wife is present at bedside and states he has had trouble with confusion and slurred speech. She reports he is hallucinating. Also reports he is restless at night and not able to sleep over the past couple of days. Denies any fevers, shortness of breath, chest pain, palpitation, nausea, vomiting, abdominal pain. Patient had episode of gout about 2 weeks ago and has completed prednisone and hydrocodone course about 2 days ago. Patient is also following infectious disease and wound care for right heel ulcer and osteomyelitis of the posterior lateral calcaneus and is currently on ertapenem. As per wife he has completed 2 weeks out of 6 weeks antibiotic course in total. Patient has history of indwelling Jeffery's. Last change was 2 weeks ago. Past Medical History/Comorbidities:  
Mr. Kal Oh  has a past medical history of Adverse effect of anesthesia, Aneurysm ([address] Atrial flutter ([address] CAD (coronary artery disease), Congestive heart failure ([address] Hypertension, Ill-defined condition, Ill-defined condition, Morbid obesity ([address] and Thyroid disease. Mr. Kal Oh  has a past surgical history that includes hx tonsillectomy; hx orthopaedic; hx appendectomy; hx colonoscopy (5/08); pr chest surgery procedure unlisted (2016, 2017); pr cardiac surg procedure unlist (2016); pr cardiac surg procedure unlist (2017); and pr cardiac surg procedure unlist (06/2016). Social History/Living Environment:  
Home Environment: Private residence Wheelchair Ramp: Yes One/Two Story Residence: One story Living Alone: No 
Support Systems: Spouse/Significant Other/Partner Patient Expects to be Discharged to[de-identified] Skilled nursing facility Current DME Used/Available at Home: Commode, bedside, Tub transfer bench, Wheelchair, Other (comment) Tub or Shower Type: Shower Prior Level of Function/Work/Activity: Pt was performing slide board transfers with SBA prior tot his admission. Personal Factors: Other factors that influence how disability is experienced by the patient:  current & PMH Number of Personal Factors/Comorbidities that affect the Plan of Care: 3+: HIGH COMPLEXITY EXAMINATION:  
Most Recent Physical Functioning:  
Gross Assessment: 
AROM: Generally decreased, functional(B UEs) Strength: Generally decreased, functional(B UEs) Balance: 
Sitting: Intact; With support Sitting - Static: (good with UE support) Sitting - Dynamic: Occassional Bed Mobility: 
Rolling: Moderate assistance Supine to Sit: Moderate assistance Scooting: Moderate assistance Transfers: 
Sliding Board : (bed>WC modA) Duration: 40 Minutes Gait: NA Functional Mobility:  
      Transfers:  Min-mod Bed Mobility:  mod Body Structures Involved: 1. Metabolic 2. Muscles Body Functions Affected: 1. Movement Related 2. Metobolic/Endocrine Activities and Participation Affected: 1. General Tasks and Demands 2. Mobility Number of elements that affect the Plan of Care: 4+: HIGH COMPLEXITY CLINICAL PRESENTATION:  
Presentation: Evolving clinical presentation with unstable and unpredictable characteristics: HIGH COMPLEXITY CLINICAL DECISION MAKIN [facility] [address] [address] AM-PAC 6 Clicks Basic Mobility Inpatient Short Form How much difficulty does the patient currently have. .. Unable A Lot A Little None 1. Turning over in bed (including adjusting bedclothes, sheets and blankets)? [] 1   [x] 2   [] 3   [] 4  
2.   Sitting down on and standing up from a chair with arms ( e.g., wheelchair, bedside commode, etc.)   [x] 1   [] 2   [] 3   [] 4  
 3. Moving from lying on back to sitting on the side of the bed? [] 1   [x] 2   [] 3   [] 4 How much help from another person does the patient currently need. .. Total A Lot A Little None 4. Moving to and from a bed to a chair (including a wheelchair)? [] 1   [x] 2   [] 3   [] 4  
5. Need to walk in hospital room? [x] 1   [] 2   [] 3   [] 4  
6. Climbing 3-5 steps with a railing? [x] 1   [] 2   [] 3   [] 4  
© 2007, Trustees of [address] [place] [address] [address] [address], under license to [employer]. All rights reserved Score:  Initial: 9 Most Recent: X (Date: -- ) Interpretation of Tool:  Represents activities that are increasingly more difficult (i.e. Bed mobility, Transfers, Gait). Medical Necessity:    
· Patient is expected to demonstrate progress in  
· strength, balance, coordination, and functional technique ·  to  
· decrease assistance required with bed mobility & transfers · . Reason for Services/Other Comments: 
· Patient continues to require skilled intervention due to · Pt not manageable for caregiver · . Use of outcome tool(s) and clinical judgement create a POC that gives a: Difficult prediction of patient's progress: HIGH COMPLEXITY  
  
 
 
 
TREATMENT:  
(In addition to Assessment/Re-Assessment sessions the following treatments were rendered) Pre-treatment Symptoms/Complaints:  Some inc wrist pain 
Pain: Initial: numeric scale Pain Intensity 1: 5(R wrist gout)  Post Session:  5/10 Therapeutic Activity: (  40 Minutes ):  Therapeutic activities including bed mobility, sitting balance static & dynamic sitting balance slide board transfers bed>WC, along with manipulating leg rests to improve mobility, strength, balance, coordination and dynamic movement of arm - bilateral and core to improve functional stability & endurance.  
Today's treatment session addressed Decreased Strength, Decreased ADL/Functional Activities, Decreased Transfer Abilities, Decreased Balance, Decreased Activity Tolerance and Decreased Work Simplification/Energy Conservation Techniques to progress towards achieving goal(s) 1, 2, 3, 4, & 6. During this session, Occupational Therapy addressed self care to progress towards their discipline specific goal(s). Co-treatment was necessary to improve patient's cognitive participation, ability to follow higher level commands, ability to increase activity demands and ability to return to normal functional activity. Braces/Orthotics/Lines/Etc:  
· IV 
· bosch catheter Treatment/Session Assessment:   
· Response to Treatment: Cont to need mod A for transfers, likely in part due to not using his typical equipment · Interdisciplinary Collaboration:  
o Registered Nurse 
o  
o Certified Nursing Assistant/Patient Care Technician · After treatment position/precautions:  
o Up in chair 
o Bed/Chair-wheels locked 
o Call light within reach 
o Family at bedside 
o OT speaking with SW  
· Compliance with Program/Exercises: Will assess as treatment progresses · Recommendations/Intent for next treatment session: \"Next visit will focus on reduction in assistance provided\". Total Treatment Duration: PT Patient Time In/Time Out Time In: 6461 Time Out: 1030 Yordan Bell, JOSE ALEJANDRO

## 2020-10-18 NOTE — PROGRESS NOTES
Care Management Interventions Transition of Care Consult (CM Consult): Home Health, Discharge Planning [address]: No 
Reason Outside [place]: Patient already serviced by other home care/hospice agency Physical Therapy Consult: Yes Occupational Therapy Consult: Yes Current Support Network: Lives with Spouse, Own Home Confirm Follow Up Transport: Family The Patient and/or Patient Representative was Provided with a Choice of Provider and Agrees with the Discharge Plan?: Yes Freedom of Choice List was Provided with Basic Dialogue that Supports the Patient's Individualized Plan of Care/Goals, Treatment Preferences and Shares the Quality Data Associated with the Providers?: Yes Discharge Location Discharge Placement: Skilled nursing facility PT was recommending 9th floor rehab but wife and pt prefer to proceed with plans to [facility]. Dr. Khadijah Crane declined anyway. [employer] precert approval is still needed. I will send referral today in hopes we can send tomorrow. Monday 10-19. Will need ambulance transport. Wife asking if we can change his indwelling cath before he goes to NH as it is scheduled to be changed in his Urology office this coming Wed. Info sent to Dr. Ursula Garrett.  Pt/wife have encouraged patient to proceed with obtaining a power wheelchair with [place]. Patient had been resistant until now. [facility] but wife plans to call his local [phone] [employer] work tomorrow to see if they can start the process earlier. She also encouraged to  ask about VA's Aide and Attendance support as his care is becoming increasingly difficult for her. They have plans to eventually move to [place] to be near their dtr. We discussed future needs and the  option of [facility] NHP down the road. I explained that the [place] has a very long NH waiting list so they  may want to add him to their waiting list at some point.   It doesn't have to be used right away and they can always decline the bed if it occurs too soon. Will follow.

## 2020-10-18 NOTE — CONSULTS
Oh Osuna MD 
Medical Director Rodrigo Mendez [address], [address] [facility] Tel: [phone] Physical Medicine & Rehabilitation Consult Subjective:  
 
Date of Consultation:  October 18, 2020 Referring Physician: Dr Josr Harding Steve Al is a 79 y.o. male who is being evaluated at the request of the Hospitalist Service consideration for inpatient rehabilitation following admission for AMS with underlying medical comorbidities and chronic debilitation HPI: 43-year-old male with medical history significant for hypertension, CHF, CAD, aortic valve repair, hypothyroidism, A. 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 According to report, pt had been having hallucinations, restlessness, slurred speech and confusion. Hx states that approx 2 wks prior to admission he had been diagnosed with gout and treated with prednisone and narcotics of which he completed 10/12. Patient was also following w/ infectious disease and wound care for a right heel ulcer and osteomyelitis of the posterior lateral calcaneus and was, at that time, on ertapenem. He has had the wound since Feb 2020. As per wife he had completed 2 weeks out of 6 weeks antibiotic course in total. Patient has history of indwelling Jeffery's. Last changed was 2 weeks ago. On presentation VSS, Labs showed a leukocytosis with a WBC of 23.8 and REECE with a creat of 1.49, but had been 2.00 on 10/1/20. His BUN was 83, up from 53. He was admitted by the Hospitalist service for further w/u. ID was consulted and there was a concern for possible ertapenen-induced delirum , possibly steroid induced or an occult bacteremia from his PICC line. Abx were switched to Ceftriaxone + metronidazole by Dr Danica Aaron.  His WBC has normalized, as has his creatinine. H eis admitted under OBSERVATION status still, as of today 10/18 . By HD 2, sensorium has cleared with intermittent hallucinations reported by wife. He has noted anemia with a hgb of 8.4, plts are low at 112K. Has chronic anemia with a hgb of 8.9 in May of this year. plts have been trending down. No mention of possible meropenem-induced immune thrombocytopenia Spouse statefd she was unsure if she could continue to manage pt at home as he had fallen 3x in the last week PTA and she needed the assistance of the fire dept to get pt up. Functionally, at baseline; wc dependent, transfers using a sliding board at [facility] (currently min assist), requires assist for LE ADLs. OT rec SNF, PT now recommending IRC. Pt is now at baseline medical status and mental status clearing. Principal Problem: 
  Altered mental status (10/14/2020) Active Problems: 
  HTN (hypertension) (6/12/2016) Hypothyroidism (2/4/2014) Thoracic aortic aneurysm ([address] (5/16/2016) Overview: May 2016 - 7.3 cm with moderate AI and anterior pericardial effusion,   
    found incidentally on resting echo prior to planned stress test.  Admitted  
    urgently for transfer to Dr Abimael Rocha for repair Jan 2017 - staged repair of descending thoracic aneurysm, Dr Ever Scott,  
    complicated by spinal cord dysfunction. Systolic CHF, chronic ([address] (1/8/2019) Leukocytosis (10/14/2020) REECE (acute kidney injury) ([address] (10/14/2020) Osteomyelitis ([address] (10/15/2020) Past Medical History:  
Diagnosis Date  Adverse effect of anesthesia   
 nighmares  Aneurysm ([address] Thorasic Aortic aneurysm, surger in [facility] 5/24/16  Atrial flutter ([address] ROSENDO guided cardioversion, No thinners  CAD (coronary artery disease)   
 patient denies  Congestive heart failure ([address] EF 45-50% on 11/2019  Hypertension  Ill-defined condition   
 spinal cord injury  Ill-defined condition Neurogenic bladder, self caths  Morbid obesity ([facility]  Thyroid disease Past Surgical History:  
Procedure Laterality Date  CARDIAC SURG PROCEDURE UNLIST    
 repair of aneurysm in acending and transverse arteries  CARDIAC SURG PROCEDURE UNLIST   Aortic valve repair Descending  CARDIAC SURG PROCEDURE UNLIST  2016  
 cardioversion, ROSENDO x2  
 CHEST SURGERY PROCEDURE UNLISTED  ,  Aortic aneursym  HX APPENDECTOMY  HX COLONOSCOPY    
 diverticulosis  HX ORTHOPAEDIC    
 repair of broken jaw  HX TONSILLECTOMY Family History Problem Relation Age of Onset  Stroke Mother Social History Tobacco Use  Smoking status: Former Smoker Packs/day: 3.00 Years: 25.00 Pack years: 75.00 Types: Cigarettes Last attempt to quit: 1986 Years since quittin.8  Smokeless tobacco: Former User Types: Snuff, Chew Quit date: 2014 Substance Use Topics  Alcohol use: Not Currently Comment: advises quitting Prior to Admission medications Medication Sig Start Date End Date Taking? Authorizing Provider  
ferrous gluconate (FERGON) 240 mg (27 mg iron) tablet Take 240 mg by mouth three (3) times daily (with meals). Yes Provider, Historical  
sertraline (ZOLOFT) 50 mg tablet Take 50 mg by mouth daily. Yes Other, MD Elvin  
carvedilol (COREG) 6.25 mg tablet Take 1 Tab by mouth two (2) times daily (with meals). 19  Yes Wilda Ferguson MD  
levothyroxine (SYNTHROID) 112 mcg tablet Take  by mouth Daily (before breakfast). Yes Provider, Historical  
furosemide (LASIX) 40 mg tablet Take 40 mg by mouth two (2) times a day. Yes Provider, Historical  
potassium chloride SR (K-TAB) 20 mEq tablet Take 20 mEq by mouth daily. Yes Provider, Historical  
diclofenac (Voltaren) 1 % gel Apply 4 g to affected area four (4) times daily.  10/1/20   Vida Irby MD  
 meloxicam (MOBIC) 15 mg tablet Take 15 mg by mouth daily. Provider, Historical  
collagenase (SANTYL) 250 unit/gram ointment Apply  to affected area daily. 3/17/20   Other, MD Elvin  
mupirocin (BACTROBAN) 2 % ointment APPLY TO LEFT LEG DAILY 3/3/20   Other, MD Elvin  
psyllium husk (METAMUCIL) 0.4 gram cap Take  by mouth daily. Provider, Historical  
 
No Known Allergies Objective:  
 
Vitals: 
Blood pressure 112/63, pulse 80, temperature 98.6 °F (37 °C), resp. rate 18, height 5' 11\" (1.803 m), weight 268 lb (121.6 kg), SpO2 98 %. Temp (24hrs), Av.7 °F (37.1 °C), Min:98.1 °F (36.7 °C), Max:99.3 °F (37.4 °C) Intake and Output: 
10/16 1901 - 10/18 0700 In: 480 [P.O.:480] Out: 1901 [ANZBC:2158] Labs/Studies: 
Recent Results (from the past 72 hour(s)) SARS-COV-2 Collection Time: 10/15/20  4:00 PM  
Result Value Ref Range Specimen source Nasopharyngeal    
 COVID-19 rapid test Not detected NOTD    
 SARS CoV-2 Negative Negative PLEASE READ & DOCUMENT PPD TEST IN 24 HRS Collection Time: 10/16/20  3:49 PM  
Result Value Ref Range PPD Negative Negative  
 mm Induration 0 0 - 5 mm CBC WITH AUTOMATED DIFF Collection Time: 10/16/20  7:03 PM  
Result Value Ref Range WBC 14.5 (H) 4.3 - 11.1 K/uL  
 RBC 2.79 (L) 4.23 - 5.6 M/uL HGB 8.2 (L) 13.6 - 17.2 g/dL HCT 25.4 (L) 41.1 - 50.3 % MCV 91.0 79.6 - 97.8 FL  
 MCH 29.4 26.1 - 32.9 PG  
 MCHC 32.3 31.4 - 35.0 g/dL RDW 20.1 (H) 11.9 - 14.6 % PLATELET 158 (L) 127 - 450 K/uL MPV 9.4 9.4 - 12.3 FL ABSOLUTE NRBC 0.00 0.0 - 0.2 K/uL  
 DF AUTOMATED NEUTROPHILS 82 (H) 43 - 78 % LYMPHOCYTES 6 (L) 13 - 44 % MONOCYTES 9 4.0 - 12.0 % EOSINOPHILS 1 0.5 - 7.8 % BASOPHILS 0 0.0 - 2.0 % IMMATURE GRANULOCYTES 3 0.0 - 5.0 %  
 ABS. NEUTROPHILS 11.8 (H) 1.7 - 8.2 K/UL  
 ABS. LYMPHOCYTES 0.8 0.5 - 4.6 K/UL  
 ABS. MONOCYTES 1.3 0.1 - 1.3 K/UL  
 ABS. EOSINOPHILS 0.1 0.0 - 0.8 K/UL ABS. BASOPHILS 0.0 0.0 - 0.2 K/UL  
 ABS. IMM. GRANS. 0.4 0.0 - 0.5 K/UL METABOLIC PANEL, BASIC Collection Time: 10/16/20  7:03 PM  
Result Value Ref Range Sodium 141 136 - 145 mmol/L Potassium 3.5 3.5 - 5.1 mmol/L Chloride 108 (H) 98 - 107 mmol/L  
 CO2 27 21 - 32 mmol/L Anion gap 6 (L) 7 - 16 mmol/L Glucose 162 (H) 65 - 100 mg/dL BUN 50 (H) 8 - 23 MG/DL Creatinine 1.32 0.8 - 1.5 MG/DL  
 GFR est AA >60 >60 ml/min/1.73m2 GFR est non-AA 55 (L) >60 ml/min/1.73m2 Calcium 7.7 (L) 8.3 - 10.4 MG/DL  
CBC WITH AUTOMATED DIFF Collection Time: 10/17/20  2:15 AM  
Result Value Ref Range WBC 14.4 (H) 4.3 - 11.1 K/uL  
 RBC 2.91 (L) 4.23 - 5.6 M/uL HGB 8.4 (L) 13.6 - 17.2 g/dL HCT 26.7 (L) 41.1 - 50.3 % MCV 91.8 79.6 - 97.8 FL  
 MCH 28.9 26.1 - 32.9 PG  
 MCHC 31.5 31.4 - 35.0 g/dL RDW 20.2 (H) 11.9 - 14.6 % PLATELET 603 (L) 172 - 450 K/uL MPV 9.6 9.4 - 12.3 FL ABSOLUTE NRBC 0.00 0.0 - 0.2 K/uL  
 DF AUTOMATED NEUTROPHILS 79 (H) 43 - 78 % LYMPHOCYTES 7 (L) 13 - 44 % MONOCYTES 10 4.0 - 12.0 % EOSINOPHILS 1 0.5 - 7.8 % BASOPHILS 0 0.0 - 2.0 % IMMATURE GRANULOCYTES 3 0.0 - 5.0 %  
 ABS. NEUTROPHILS 11.4 (H) 1.7 - 8.2 K/UL  
 ABS. LYMPHOCYTES 1.0 0.5 - 4.6 K/UL  
 ABS. MONOCYTES 1.5 (H) 0.1 - 1.3 K/UL  
 ABS. EOSINOPHILS 0.2 0.0 - 0.8 K/UL  
 ABS. BASOPHILS 0.0 0.0 - 0.2 K/UL  
 ABS. IMM. GRANS. 0.4 0.0 - 0.5 K/UL METABOLIC PANEL, BASIC Collection Time: 10/17/20  2:15 AM  
Result Value Ref Range Sodium 141 136 - 145 mmol/L Potassium 3.7 3.5 - 5.1 mmol/L Chloride 108 (H) 98 - 107 mmol/L  
 CO2 26 21 - 32 mmol/L Anion gap 7 7 - 16 mmol/L Glucose 100 65 - 100 mg/dL BUN 48 (H) 8 - 23 MG/DL Creatinine 1.17 0.8 - 1.5 MG/DL  
 GFR est AA >60 >60 ml/min/1.73m2 GFR est non-AA >60 >60 ml/min/1.73m2 Calcium 8.0 (L) 8.3 - 10.4 MG/DL  
PLEASE READ & DOCUMENT PPD TEST IN 48 HRS  Collection Time: 10/17/20  3:49 PM  
 Result Value Ref Range PPD Negative Negative  
 mm Induration 0 0 - 5 mm CBC WITH AUTOMATED DIFF Collection Time: 10/18/20  5:27 AM  
Result Value Ref Range WBC 11.9 (H) 4.3 - 11.1 K/uL  
 RBC 2.89 (L) 4.23 - 5.6 M/uL HGB 8.4 (L) 13.6 - 17.2 g/dL HCT 26.7 (L) 41.1 - 50.3 % MCV 92.4 79.6 - 97.8 FL  
 MCH 29.1 26.1 - 32.9 PG  
 MCHC 31.5 31.4 - 35.0 g/dL RDW 20.2 (H) 11.9 - 14.6 % PLATELET 335 (L) 490 - 450 K/uL MPV 9.0 (L) 9.4 - 12.3 FL ABSOLUTE NRBC 0.00 0.0 - 0.2 K/uL  
 DF AUTOMATED NEUTROPHILS 76 43 - 78 % LYMPHOCYTES 8 (L) 13 - 44 % MONOCYTES 12 4.0 - 12.0 % EOSINOPHILS 1 0.5 - 7.8 % BASOPHILS 0 0.0 - 2.0 % IMMATURE GRANULOCYTES 2 0.0 - 5.0 %  
 ABS. NEUTROPHILS 9.0 (H) 1.7 - 8.2 K/UL  
 ABS. LYMPHOCYTES 1.0 0.5 - 4.6 K/UL  
 ABS. MONOCYTES 1.4 (H) 0.1 - 1.3 K/UL  
 ABS. EOSINOPHILS 0.1 0.0 - 0.8 K/UL  
 ABS. BASOPHILS 0.0 0.0 - 0.2 K/UL  
 ABS. IMM. GRANS. 0.3 0.0 - 0.5 K/UL METABOLIC PANEL, BASIC Collection Time: 10/18/20  5:27 AM  
Result Value Ref Range Sodium 140 136 - 145 mmol/L Potassium 3.7 3.5 - 5.1 mmol/L Chloride 109 (H) 98 - 107 mmol/L  
 CO2 28 21 - 32 mmol/L Anion gap 3 (L) 7 - 16 mmol/L Glucose 104 (H) 65 - 100 mg/dL BUN 40 (H) 8 - 23 MG/DL Creatinine 1.13 0.8 - 1.5 MG/DL  
 GFR est AA >60 >60 ml/min/1.73m2 GFR est non-AA >60 >60 ml/min/1.73m2 Calcium 7.9 (L) 8.3 - 10.4 MG/DL Functional Assessment: 
Functional Assessment Fall in Past 12 Months: Yes Fall With Injury: No 
Decline in Gait/Transfer/Balance: Yes (comment) Decline in Capacity to Feed/Dress/Bathe: Yes (comment) Developmental Delay: No 
Chewing/Swallowing Problems: No 
Difficulty with Secretions: No 
Speech Slurred/Thick/Garbled: No  
 
Fitzgerald Score: 
   
 
Speech Assessment: 
  
    
      
 
Ambulation: Activity and Safety Activity Level: Up with Assistance(PT working with pt) Ambulate: No (Comment) Activity: In bed, Watching t.v. 
 Activity Assistance: Complete care Weight Bearing Status: WBAT (Weight Bearing as Tolerated) Mode of Transportation: Stretcher Repositioned: Head of bed elevated (degrees) Patient Turned: Turns self Head of Bed Elevated: HOB 45 Activity Response: Fairly tolerated Assistive Device: Fall prevention device Safety Measures: Bed/Chair-Wheels locked, Bed in low position, Call light within reach, Side rails X2 Venipuncture/BP Precautions: Venipuncture/BP precautions RUE Impression / Assessment:  
 
Principal Problem: 
  Altered mental status (10/14/2020) Active Problems: 
  HTN (hypertension) (6/12/2016) Hypothyroidism (2/4/2014) Thoracic aortic aneurysm ([facility] (5/16/2016) Overview: May 2016 - 7.3 cm with moderate AI and anterior pericardial effusion,   
    found incidentally on resting echo prior to planned stress test.  Admitted  
    urgently for transfer to Dr Erika Alvarenga for repair Jan 2017 - staged repair of descending thoracic aneurysm, Dr Adeel Vergara,  
    complicated by spinal cord dysfunction. Systolic CHF, chronic ([facility] (1/8/2019) Leukocytosis (10/14/2020) REECE (acute kidney injury) ([facility] (10/14/2020) Osteomyelitis ([facility] (10/15/2020) Delirium, chronic paraplegia, osteomyelitis of calcaneous, neurogenic bladder with indwelling bosch, Physical Debilitation Plan / Recommendations / Medical Decision Making:  
 
Recommendations:  
Continue Acute Rehab Program 
Coordination of rehab / medical care Counseling of PM&R care issues management Monitoring and management of medical conditions per plan of care / orders 
-based on EMR review only, pt does not meet medicare criteria for inpt rehab in an acute rehab setting such as IRC. Rehab dx would be physical debility, he is under OBS status, is without acute medical issues but chronic medical conditions that were being managed in a [facility] setting. For these reasons, [facility] would not consider [facility] of medical necessity.  He can be managed at a lower level of care such as a SNF. It appears that pts wife is having more difficulty caring for him at home, in that the fire dept has been called 3x in 2 weeks due to pt falling.  
-aging with a SCI with medical comorbidities is quite difficult. More risk of complications associated with advancing age. Reviewed Darlin Badillo / Judy Robert / Jarret Orta / Records Thank you very much for this referral. We appreciate the opportunity to participate in this patient's care. Bouchra Taylor MD, Medical Director [address] [place] [address]

## 2020-10-18 NOTE — PROGRESS NOTES
PT Note: S: RN requesting assistance to get pt back to bed. O: Pt in wheelchair at bedside. A: Pt requires assistance to return to bed. P: Continue to increase mobility ability.

## 2020-10-18 NOTE — PROGRESS NOTES
Problem: Self Care Deficits Care Plan (Adult) Goal: *Acute Goals and Plan of Care (Insert Text) Description: 1. Patient will perform grooming with supervision. 2. Patient will perform Upper body dressing with supervision 3. Patient will perform upper body bathing with supervision. 4. Patient will sit EOB for ADL task with CGA. MET 5. Patient will participate in 30 + minutes of ADL/ therapeutic exercise/therapeutic activity with min rest breaks to increase activity tolerance for self care. Goals to be achieved in 7 days. Outcome: Progressing Towards Goal 
  
OCCUPATIONAL THERAPY: Daily Note and AM 10/18/2020 INPATIENT: OT Visit Days: 3 Payor: Marivel Ramos / Plan: [address] HMO / Product Type: Managed Care Medicare /  
  
NAME/AGE/GENDER: Ivette Calles is a 79 y.o. male PRIMARY DIAGNOSIS:  Leukocytosis [D72.829] REECE (acute kidney injury) ([facility] [address] [N17.9] Leukocytosis [D72.829] REECE (acute kidney injury) ([facility] [address] [N17.9] Altered mental status [R41.82] Altered mental status Altered mental status ICD-10: Treatment Diagnosis:  
 · Other abnormalities of gait and mobility (R26.89) · Generalized Muscle Weakness (M62.81) · Other lack of cordination (R27.8) · Difficulty in walking, Not elsewhere classified (R26.2) · Other abnormalities of gait and mobility (R26.89) · Ataxic gait (R26.0) · Repeated Falls (R29.6) · History of falling (Z91.81) Precautions/Allergies: 
   Patient has no known allergies. ASSESSMENT:  
 
Mr. Elizabeth Soliz presents supine in bed. He was admitted with above diagnosis. Wife present and reported he has fallen a few times at home. He presents with a catheter and wraps on B LE. He has a wound on his R heel and his Left foot has moderate edema. He has a strap on each thigh that he uses to assist his B LE with mobility due to his paraplegia. He has a Picc line in R Upper arm. His arom and strength is grossly 4+/5.  He is supervision with eating and grooming. He is min assist for upper body ADLS in supine and total assist for LE ADLS in supine. He was max assist rolling right and left. He was max assist earlier with PT for a sliding board transfer earlier this am.  He plans to go to rehab due to his current level of care. He is motivated and would benefit from skilled OT services in acute and post acute. Will follow. 10/17/2020 Pt was supine in bed upon arrival. Pt completed bed mobility with mod A X2. Pt completed sliding board transfer with min-mod Ax2. Pt completed grooming with the assistance listed below. Pt completed wheelchair management with supervision. Pt completed sliding board transfer with min-mod A X2 in order for ADL preparation. Good progress made. Continue POC. At this time, patient is appropriate for Co-treatment with occupational therapy due to patient's decreased overall endurance/tolerance levels, as well as need for high level skilled assistance to complete functional transfers/mobility and functional tasks. Mr Leopoldo Herbert is appropriate for a multidisciplinary co-treatment of PT and OT to address goals of both disciplines 10/18/2020 Pt was supine in bed upon arrival. Assisted PCT with bed mobility for bed pan removal and perineal hygiene. Pt was able to roll with moderate assistance. Pt to edge of bed with moderate assistance and able to sit unsupported with stand by assistance. Pt was set-up with wheelchair and was able to position chair, sliding board and transfer to his chair with min to moderate assistance and extra time. Spoke with pt and wife at length about the possibility of moving to a power chair to increase function and decrease energy used on mobility that could afford him energy for other tasks. Also, as the pt is 79 this chapter of his life might warranted a power chair. Pt seem to agree and wife was very much in agreeable.  Pt stated he was well connected with the [place] and that they would take care of him. At this time, patient is appropriate for Co-treatment with occupational therapy due to patient's decreased overall endurance/tolerance levels, as well as need for high level skilled assistance to complete functional transfers/mobility and functional tasks. Mr Dallas Ybarra is appropriate for a multidisciplinary co-treatment of PT and OT to address goals of both disciplines This section established at most recent assessment PROBLEM LIST (Impairments causing functional limitations): 1. Decreased Strength 2. Decreased ADL/Functional Activities 3. Decreased Transfer Abilities 4. Decreased Ambulation Ability/Technique 5. Decreased Balance INTERVENTIONS PLANNED: (Benefits and precautions of occupational therapy have been discussed with the patient.) 1. Activities of daily living training 2. Adaptive equipment training 3. Balance training 4. Clothing management 5. Donning&doffing training 6. Neuromuscular re-eduation 7. Therapeutic activity 8. Therapeutic exercise TREATMENT PLAN: Frequency/Duration: Follow patient 3 times to address above goals. Rehabilitation Potential For Stated Goals: Good REHAB RECOMMENDATIONS (at time of discharge pending progress):   
Placement: It is my opinion, based on this patient's performance to date, that Mr. Dallas Ybarra may benefit from intensive therapy at a [address] [place] [address] after discharge due to the functional deficits listed above that are likely to improve with skilled rehabilitation and concerns that he/she may be unsafe to be unsupervised at home due to level of assistance . Equipment:  
? None at this time OCCUPATIONAL PROFILE AND HISTORY:  
History of Present Injury/Illness (Reason for Referral): 
See  H and P Past Medical History/Comorbidities:  
Mr. Dallas Ybarra  has a past medical history of Adverse effect of anesthesia, Aneurysm ([address] Atrial flutter ([address] CAD (coronary artery disease), Congestive heart failure ([facility] [address] Hypertension, Ill-defined condition, Ill-defined condition, Morbid obesity ([facility] [address] and Thyroid disease. Mr. Alexa Ravi  has a past surgical history that includes hx tonsillectomy; hx orthopaedic; hx appendectomy; hx colonoscopy (5/08); pr chest surgery procedure unlisted (2016, 2017); pr cardiac surg procedure unlist (2016); pr cardiac surg procedure unlist (2017); and pr cardiac surg procedure unlist (06/2016). Social History/Living Environment:  
Home Environment: Private residence Wheelchair Ramp: Yes One/Two Story Residence: One story Living Alone: No 
Support Systems: Spouse/Significant Other/Partner Patient Expects to be Discharged to[de-identified] Skilled nursing facility Current DME Used/Available at Home: Commode, bedside, Tub transfer bench, Wheelchair, Other (comment) Tub or Shower Type: Shower Prior Level of Function/Work/Activity: 
Assist with ADLs  recently Number of Personal Factors/Comorbidities that affect the Plan of Care: Expanded review of therapy/medical records (1-2):  MODERATE COMPLEXITY ASSESSMENT OF OCCUPATIONAL PERFORMANCE[de-identified]  
Activities of Daily Living:  
Basic ADLs (From Assessment) Complex ADLs (From Assessment) Feeding: Supervision Oral Facial Hygiene/Grooming: Supervision Bathing: Moderate assistance, Maximum assistance Upper Body Dressing: Minimum assistance Lower Body Dressing: Total assistance Toileting: Total assistance(catheter) Grooming/Bathing/Dressing Activities of Daily Living Cognitive Retraining Safety/Judgement: Fall prevention; Insight into deficits Bed/Mat Mobility Rolling: Moderate assistance Supine to Sit: Moderate assistance Scooting: Moderate assistance Most Recent Physical Functioning:  
Gross Assessment: 
  
         
  
Posture: 
  
Balance: 
Sitting: Intact; With support Sitting - Static: (good with UE support) Sitting - Dynamic: Occassional Bed Mobility: 
Rolling: Moderate assistance Supine to Sit: Moderate assistance Scooting: Moderate assistance Wheelchair Mobility: 
  
Transfers: 
Sliding Board : (bed>WC modA) Duration: 40 Minutes Patient Vitals for the past 6 hrs: 
 BP BP Patient Position SpO2 Pulse 10/18/20 0750 112/63 At rest 98 % 80 Mental Status Neurologic State: Alert Orientation Level: Oriented to person, Disoriented to situation, Oriented to place Cognition: Follows commands Perception: Verbal, Tactile Perseveration: Tactile cues provided Safety/Judgement: Fall prevention, Insight into deficits Physical Skills Involved: 
1. Balance 2. Strength 3. Activity Tolerance Cognitive Skills Affected (resulting in the inability to perform in a timely and safe manner): 1. Perception Psychosocial Skills Affected: 1. Habits/Routines 2. Environmental Adaptation 3. Self-Awareness Number of elements that affect the Plan of Care: 5+:  HIGH COMPLEXITY CLINICAL DECISION MAKING:  
[facility] AM-PAC 6 Clicks Daily Activity Inpatient Short Form How much help from another person does the patient currently need. .. Total A Lot A Little None 1. Putting on and taking off regular lower body clothing? [x] 1   [] 2   [] 3   [] 4  
2. Bathing (including washing, rinsing, drying)? [] 1   [x] 2   [] 3   [] 4  
3. Toileting, which includes using toilet, bedpan or urinal?   [x] 1   [] 2   [] 3   [] 4  
4. Putting on and taking off regular upper body clothing? [] 1   [] 2   [x] 3   [] 4  
5. Taking care of personal grooming such as brushing teeth? [] 1   [] 2   [] 3   [x] 4  
6. Eating meals? [] 1   [] 2   [] 3   [x] 4  
© 2007, Trustees of [facility], under license to [employer]. All rights reserved Score:  Initial: 15 Most Recent: X (Date: -- ) Interpretation of Tool:  Represents activities that are increasingly more difficult (i.e. Bed mobility, Transfers, Gait). Medical Necessity: · Patient is expected to demonstrate progress in · Self care skills and functional mobility · Reason for Services/Other Comments: 
· Patient continues to require skilled intervention due to · Above listed deficits Use of outcome tool(s) and clinical judgement create a POC that gives a: MODERATE COMPLEXITY  
 
 
 
TREATMENT:  
(In addition to Assessment/Re-Assessment sessions the following treatments were rendered) Pre-treatment Symptoms/Complaints:   
Pain: Initial:  
   Post Session:  0/10 Today's treatment session addressed Decreased Strength, Decreased Transfer Abilities and Decreased Activity Tolerance to progress towards achieving goal(s) 0. During this session,  Physical Therapy addressed  Functional Transfers to progress towards their discipline specific goal(s). Co-treatment was necessary to improve patient's ability to increase activity demands. .Therapeutic Activity: (  45 Minutes ):  Therapeutic activities including Bed transfers and Chair transfers to improve mobility, strength, balance and coordination. Required moderate   to promote dynamic balance in sitting. Braces/Orthotics/Lines/Etc:  
· bosch catheter · PIcc line R UE 
· O2 Device: Room air Treatment/Session Assessment:   
· Response to Treatment:  tolerated fair, weak with rolling · Interdisciplinary Collaboration:  
o Physical Therapist 
o Occupational Therapist 
o Registered Nurse · After treatment position/precautions:  
o Up in chair 
o Bed/Chair-wheels locked 
o Call light within reach 
o RN notified 
o Family at bedside · Compliance with Program/Exercises: Compliant all of the time, Will assess as treatment progresses. · Recommendations/Intent for next treatment session: \"Next visit will focus on advancements to more challenging activities and reduction in assistance provided\". Total Treatment Duration: OT Patient Time In/Time Out Time In: 8580 Time Out: 1035 Sonny Menchaca OT

## 2020-10-19 NOTE — PROGRESS NOTES
Problem: Self Care Deficits Care Plan (Adult) Goal: *Acute Goals and Plan of Care (Insert Text) Description: 1. Patient will perform grooming with supervision. 2. Patient will perform Upper body dressing with supervision 3. Patient will perform upper body bathing with supervision. 4. Patient will sit EOB for ADL task with CGA. MET 5. Patient will participate in 30 + minutes of ADL/ therapeutic exercise/therapeutic activity with min rest breaks to increase activity tolerance for self care. Goals to be achieved in 7 days. Outcome: Progressing Towards Goal 
  
OCCUPATIONAL THERAPY: Daily Note and AM 10/19/2020 INPATIENT: OT Visit Days: 4 Payor: Brielle Timmons / Plan: [address] [place] [address] HMO / Product Type: Managed Care Medicare /  
  
NAME/AGE/GENDER: Alida Jesus is a 79 y.o. male PRIMARY DIAGNOSIS:  Leukocytosis [D72.829] REECE (acute kidney injury) ([facility] [address] [N17.9] Leukocytosis [D72.829] REECE (acute kidney injury) ([facility] [address] [N17.9] Altered mental status [R41.82] Altered mental status Altered mental status ICD-10: Treatment Diagnosis:  
 · Other abnormalities of gait and mobility (R26.89) · Generalized Muscle Weakness (M62.81) · Other lack of cordination (R27.8) · Difficulty in walking, Not elsewhere classified (R26.2) · Other abnormalities of gait and mobility (R26.89) · Ataxic gait (R26.0) · Repeated Falls (R29.6) · History of falling (Z91.81) Precautions/Allergies: 
   Patient has no known allergies. ASSESSMENT:  
 
Mr. Elvi Ramon presents supine in bed. He was admitted with above diagnosis. Wife present and reported he has fallen a few times at home. He presents with a catheter and wraps on B LE. He has a wound on his R heel and his Left foot has moderate edema. He has a strap on each thigh that he uses to assist his B LE with mobility due to his paraplegia. He has a Picc line in R Upper arm. His arom and strength is grossly 4+/5.  He is supervision with eating and grooming. He is min assist for upper body ADLS in supine and total assist for LE ADLS in supine. He was max assist rolling right and left. He was max assist earlier with PT for a sliding board transfer earlier this am.  He plans to go to rehab due to his current level of care. He is motivated and would benefit from skilled OT services in acute and post acute. Will follow. 10/17/2020 Pt was supine in bed upon arrival. Pt completed bed mobility with mod A X2. Pt completed sliding board transfer with min-mod Ax2. Pt completed grooming with the assistance listed below. Pt completed wheelchair management with supervision. Pt completed sliding board transfer with min-mod A X2 in order for ADL preparation. Good progress made. Continue POC. At this time, patient is appropriate for Co-treatment with occupational therapy due to patient's decreased overall endurance/tolerance levels, as well as need for high level skilled assistance to complete functional transfers/mobility and functional tasks. Mr Gladis Garcia is appropriate for a multidisciplinary co-treatment of PT and OT to address goals of both disciplines 10/18/2020 Pt was supine in bed upon arrival. Assisted PCT with bed mobility for bed pan removal and perineal hygiene. Pt was able to roll with moderate assistance. Pt to edge of bed with moderate assistance and able to sit unsupported with stand by assistance. Pt was set-up with wheelchair and was able to position chair, sliding board and transfer to his chair with min to moderate assistance and extra time. Spoke with pt and wife at length about the possibility of moving to a power chair to increase function and decrease energy used on mobility that could afford him energy for other tasks. Also, as the pt is 79 this chapter of his life might warranted a power chair. Pt seem to agree and wife was very much in agreeable.  Pt stated he was well connected with the [place] and that they would take care of him. At this time, patient is appropriate for Co-treatment with occupational therapy due to patient's decreased overall endurance/tolerance levels, as well as need for high level skilled assistance to complete functional transfers/mobility and functional tasks. Mr Elizabeth Soliz is appropriate for a multidisciplinary co-treatment of PT and OT to address goals of both disciplines 10/19/2020 Slide board back to bed with min/mod assist. Mod assist for legs up into bed. Patient seems a little confused on transfers which is a surprise considering how long he has been doing these as a paraplegic. Still below baseline. Recommend SNF. This section established at most recent assessment PROBLEM LIST (Impairments causing functional limitations): 1. Decreased Strength 2. Decreased ADL/Functional Activities 3. Decreased Transfer Abilities 4. Decreased Ambulation Ability/Technique 5. Decreased Balance INTERVENTIONS PLANNED: (Benefits and precautions of occupational therapy have been discussed with the patient.) 1. Activities of daily living training 2. Adaptive equipment training 3. Balance training 4. Clothing management 5. Donning&doffing training 6. Neuromuscular re-eduation 7. Therapeutic activity 8. Therapeutic exercise TREATMENT PLAN: Frequency/Duration: Follow patient 3 times to address above goals. Rehabilitation Potential For Stated Goals: Good REHAB RECOMMENDATIONS (at time of discharge pending progress):   
Placement: It is my opinion, based on this patient's performance to date, that Mr. Elizabeth Soliz may benefit from intensive therapy at a [address] [place] [address] after discharge due to the functional deficits listed above that are likely to improve with skilled rehabilitation and concerns that he/she may be unsafe to be unsupervised at home due to level of assistance . Equipment:  
? None at this time OCCUPATIONAL PROFILE AND HISTORY:  
 History of Present Injury/Illness (Reason for Referral): 
See  H and P Past Medical History/Comorbidities:  
Mr. Laurent Hernandes  has a past medical history of Adverse effect of anesthesia, Aneurysm ([address] Atrial flutter ([address] CAD (coronary artery disease), Congestive heart failure ([address] Hypertension, Ill-defined condition, Ill-defined condition, Morbid obesity ([address] and Thyroid disease. Mr. Laurent Hernandes  has a past surgical history that includes hx tonsillectomy; hx orthopaedic; hx appendectomy; hx colonoscopy (5/08); pr chest surgery procedure unlisted (2016, 2017); pr cardiac surg procedure unlist (2016); pr cardiac surg procedure unlist (2017); and pr cardiac surg procedure unlist (06/2016). Social History/Living Environment:  
Home Environment: Private residence Wheelchair Ramp: Yes One/Two Story Residence: One story Living Alone: No 
Support Systems: Spouse/Significant Other/Partner Patient Expects to be Discharged to[de-identified] Skilled nursing facility Current DME Used/Available at Home: Commode, bedside, Tub transfer bench, Wheelchair, Other (comment) Tub or Shower Type: Shower Prior Level of Function/Work/Activity: 
Assist with ADLs  recently Number of Personal Factors/Comorbidities that affect the Plan of Care: Expanded review of therapy/medical records (1-2):  MODERATE COMPLEXITY ASSESSMENT OF OCCUPATIONAL PERFORMANCE[de-identified]  
Activities of Daily Living:  
Basic ADLs (From Assessment) Complex ADLs (From Assessment) Feeding: Supervision Oral Facial Hygiene/Grooming: Supervision Bathing: Moderate assistance, Maximum assistance Upper Body Dressing: Minimum assistance Lower Body Dressing: Total assistance Toileting: Total assistance(catheter) Grooming/Bathing/Dressing Activities of Daily Living Bed/Mat Mobility Rolling: Minimum assistance Supine to Sit: Minimum assistance Sit to Supine: (NT) Scooting: Moderate assistance Most Recent Physical Functioning: Gross Assessment: 
  
         
  
Posture: 
  
Balance: 
Sitting: Impaired; Without support Sitting - Static: (fair to fair-) Sitting - Dynamic: (fair to fair-) Bed Mobility: 
Rolling: Minimum assistance Supine to Sit: Minimum assistance Sit to Supine: (NT) Scooting: Moderate assistance Wheelchair Mobility: 
  
Transfers: 
Sliding Board : Minimum assistance(bed>WC) Duration: 23 Minutes(extra time to work through activity noted) Patient Vitals for the past 6 hrs: 
 BP SpO2 Pulse 10/19/20 1057 120/68 92 % 69 Mental Status Neurologic State: Alert, Confused(periodic confusion) Orientation Level: Oriented X4 Cognition: Follows commands Perception: Verbal, Tactile Perseveration: Tactile cues provided Safety/Judgement: Fall prevention, Insight into deficits LLE Assessment LLE Assessment (WDL): Exception to [facility] RLE Assessment RLE Assessment (WDL): Exceptions to [facility] Physical Skills Involved: 
1. Balance 2. Strength 3. Activity Tolerance Cognitive Skills Affected (resulting in the inability to perform in a timely and safe manner): 1. Perception Psychosocial Skills Affected: 1. Habits/Routines 2. Environmental Adaptation 3. Self-Awareness Number of elements that affect the Plan of Care: 5+:  HIGH COMPLEXITY CLINICAL DECISION MAKING:  
[employer] AM-PAC 6 Clicks Daily Activity Inpatient Short Form How much help from another person does the patient currently need. .. Total A Lot A Little None 1. Putting on and taking off regular lower body clothing? [x] 1   [] 2   [] 3   [] 4  
2. Bathing (including washing, rinsing, drying)? [] 1   [x] 2   [] 3   [] 4  
3. Toileting, which includes using toilet, bedpan or urinal?   [x] 1   [] 2   [] 3   [] 4  
4. Putting on and taking off regular upper body clothing? [] 1   [] 2   [x] 3   [] 4  
5. Taking care of personal grooming such as brushing teeth? [] 1   [] 2   [] 3   [x] 4 6.  Eating meals? [] 1   [] 2   [] 3   [x] 4  
© 2007, Trustees of [address] [place] [address] [address] [id number], under license to [employer]. All rights reserved Score:  Initial: 15 Most Recent: X (Date: -- ) Interpretation of Tool:  Represents activities that are increasingly more difficult (i.e. Bed mobility, Transfers, Gait). Medical Necessity:    
· Patient is expected to demonstrate progress in · Self care skills and functional mobility · Reason for Services/Other Comments: 
· Patient continues to require skilled intervention due to · Above listed deficits Use of outcome tool(s) and clinical judgement create a POC that gives a: MODERATE COMPLEXITY  
 
 
 
TREATMENT:  
(In addition to Assessment/Re-Assessment sessions the following treatments were rendered) Pre-treatment Symptoms/Complaints:   
Pain: Initial:  
Pain Intensity 1: 0  Post Session:  0/10 Today's treatment session addressed Decreased Strength, Decreased Transfer Abilities and Decreased Activity Tolerance to progress towards achieving goal(s) 0. During this session,  Physical Therapy addressed  Functional Transfers to progress towards their discipline specific goal(s). Co-treatment was necessary to improve patient's ability to increase activity demands. Self Care: (15): Procedure(s) (per grid) utilized to improve and/or restore self-care/home management as related to dressing and transfers. Required moderate manual and tactile cueing to facilitate activities of daily living skills. Braces/Orthotics/Lines/Etc:  
· bosch catheter · PIcc line R UE 
· O2 Device: Room air Treatment/Session Assessment:   
· Response to Treatment:  tolerated fair, weak with rolling · Interdisciplinary Collaboration:  
o Physical Therapist 
o Occupational Therapist 
o Registered Nurse · After treatment position/precautions:  
o Supine in bed 
o Bed/Chair-wheels locked 
o Call light within reach 
o RN notified 
o Family at bedside · Compliance with Program/Exercises: Compliant all of the time, Will assess as treatment progresses. · Recommendations/Intent for next treatment session: \"Next visit will focus on advancements to more challenging activities and reduction in assistance provided\". Total Treatment Duration: OT Patient Time In/Time Out Time In: 1100 Time Out: 1115 Esvin Delgado OT

## 2020-10-19 NOTE — PROGRESS NOTES
SW received call from Lucendia Claude  at the [place] ( [phone] ext [phone] ) & I provided update that pt is discharging today to [facility] as [facility] approved admission. SW explained that per PT they recommend pt needs a power W/C. Lucendia Claude agrees to call spouse & f/u with her.

## 2020-10-19 NOTE — PROGRESS NOTES
Infectious Disease Progress Note Today's Date: 10/19/2020 Admit Date: 10/14/2020 Impression: · Delirium. His wife is at bedside and does report that he has had intermittent delirium · Leukocytosis · Right calcaneal osteomyelitis Plan: · Will plan to complete his course of treatment with rocephin/flagyl as currently ordered. · ID follow up is planned on 11/10/20 at 9:30 AM 
· Pt requested Jeffery cathter change prior to discharge. · ID will sign off. Please call us back with any questions or concerns. Anti-infectives: · Ceftriaxone/flagyl Subjective:  
Doing well; seen in chair; asking about discharge No Known Allergies Review of Systems:  A comprehensive review of systems was negative except for that written in the History of Present Illness. Objective:  
 
Visit Vitals /65 (BP 1 Location: Left arm) Pulse 75 Temp 98.1 °F (36.7 °C) Resp 18 Ht 5' 11\" (1.803 m) Wt 121.4 kg (267 lb 10 oz) SpO2 95% BMI 37.33 kg/m² Temp (24hrs), Av.3 °F (36.8 °C), Min:98.1 °F (36.7 °C), Max:98.4 °F (36.9 °C) Lines:  PICC:    
 
Physical Exam:   
General:  Alert, cooperative, sitting in chair Eyes:  Sclera anicteric. Mouth/Throat: oral pharynx clear Neck: Supple Lungs:   Clear to auscultation bilaterally, no wheezing;  
CV:  Regular rate and rhythm,no murmur Abdomen:   Soft, non-tender. Mildly distended Extremities: Bilateral LE edema noted Skin: R heel wound bandaged Lymph nodes: Musculoskeletal: Bilateral lower extremity edema Lines/Devices:  Intact, no erythema, drainage or tenderness Psych: Alert; delirious Data Review: CBC: 
Recent Labs 10/18/20 
0527 10/17/20 
0215 10/16/20 
1903 WBC 11.9* 14.4* 14.5* GRANS 76 79* 82* MONOS 12 10 9 EOS 1 1 1 ANEU 9.0* 11.4* 11.8* ABL 1.0 1.0 0.8 HGB 8.4* 8.4* 8.2* HCT 26.7* 26.7* 25.4*  
* 123* 116* BMP: 
Recent Labs 10/18/20 
0527 10/17/20 
0215 10/16/20 1903  
CREA 1.13 1.17 1.32  
BUN 40* 48* 50*  141 141  
K 3.7 3.7 3.5 * 108* 108* CO2 28 26 27 AGAP 3* 7 6*  
* 100 162* LFTS: 
No results for input(s): TBILI, ALT, AP, TP, ALB in the last 72 hours. No lab exists for component: SGOT Microbiology:  
 
All Micro Results Procedure Component Value Units Date/Time CULTURE, BLOOD [[id number]] Collected:  10/14/20 1602 Order Status:  Completed Specimen:  Blood Updated:  10/19/20 4713 Special Requests: BLUE PICC Culture result: NO GROWTH 5 DAYS     
 CULTURE, BLOOD [[id number]] Collected:  10/14/20 1703 Order Status:  Completed Specimen:  Blood Updated:  10/19/20 4673 Special Requests: --     
  LEFT Antecubital 
  
  Culture result: NO GROWTH 5 DAYS     
 CULTURE, URINE [[id number]] Collected:  10/15/20 0103 Order Status:  Completed Specimen:  Urine Updated:  10/17/20 0754 Special Requests: NO SPECIAL REQUESTS Culture result: NO GROWTH 2 DAYS     
 CULTURE, URINE [[id number]] Collected:  10/14/20 1545 Order Status:  Canceled Specimen:  Urine from Clean catch Imaging: CXR and CT reviewed Signed By: Amber Mata MD   
 October 19, 2020

## 2020-10-19 NOTE — PROGRESS NOTES
Care Management Interventions Transition of Care Consult (CM Consult): Home Health, Discharge Planning [address]: No 
Reason Outside [place]: Patient already serviced by other home care/hospice agency Physical Therapy Consult: Yes Occupational Therapy Consult: Yes Current Support Network: Lives with Spouse, Own Home Confirm Follow Up Transport: Family The Patient and/or Patient Representative was Provided with a Choice of Provider and Agrees with the Discharge Plan?: Yes Freedom of Choice List was Provided with Basic Dialogue that Supports the Patient's Individualized Plan of Care/Goals, Treatment Preferences and Shares the Quality Data Associated with the Providers?: Yes Discharge Location Discharge Placement: Skilled nursing facility Per MD pt stable for d/c.  JOSEPH spoke with Ferny Vieira at [facility] who confirms pt can admit to room 329 AFroylan Arnold aware pt is on Rocephin once daily. JOSEPH spoke with nurse & transport is arranged for 1:30. JOSEPH spoke with pt & spouse, provided update. Chart copied, nsg called report, transport arranged to [facility].

## 2020-10-19 NOTE — DISCHARGE SUMMARY
Hospitalist Discharge Summary Admit Date:  10/14/2020  3:37 PM  
DC note date: 10/19/2020 Name:  Alida Jesus Age:  79 y.o. 
:  1940 MRN:  [id number] PCP:  Brandon, MD Elvin 
Treatment Team: Attending Provider: Rock Naye MD; Utilization Review: Tila Abdullahi RN; : Maynor Avery; Care Manager: Blanca Stanley; Physical Therapist: Milvia Hickey PT; Occupational Therapist: Srinivasan Ortiz OT; Staff Nurse: Sabrina Wesley RN 
 
Problem List for this Hospitalization: 
Hospital Problems as of 10/19/2020 Date Reviewed: 2020 Codes Class Noted - Resolved POA Osteomyelitis ([facility] ICD-10-CM: M86.9 ICD-9-CM: 730.20  10/15/2020 - Present Unknown Leukocytosis ICD-10-CM: I34.439 ICD-9-CM: 288.60  10/14/2020 - Present Unknown REECE (acute kidney injury) ([facility] ICD-10-CM: N17.9 ICD-9-CM: 584.9  10/14/2020 - Present Unknown * (Principal) Altered mental status ICD-10-CM: R41.82 
ICD-9-CM: 780.97  10/14/2020 - Present Unknown Systolic CHF, chronic (HCC) (Chronic) ICD-10-CM: C55.41 ICD-9-CM: 428.22, 428.0  2019 - Present Yes HTN (hypertension) (Chronic) ICD-10-CM: I10 
ICD-9-CM: 401.9  2016 - Present Yes Thoracic aortic aneurysm (HCC) ICD-10-CM: I71.2 ICD-9-CM: 441.2  2016 - Present Yes Overview Addendum 2017  1:53 PM by Leandra Bird MD  
  May 2016 - 7.3 cm with moderate AI and anterior pericardial effusion,  found incidentally on resting echo prior to planned stress test.  Admitted urgently for transfer to Dr Frederick Conn for repair 2017 - staged repair of descending thoracic aneurysm, Dr Megan Cardona, complicated by spinal cord dysfunction. Hypothyroidism (Chronic) ICD-10-CM: E03.9 ICD-9-CM: 244.9  2014 - Present Yes Admission HPI from 10/14/2020/HOSPITAL COURSE:   
56-year-old male with medical history significant for hypertension, CHF, CAD, aortic valve repair, hypothyroidism, A. 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 Patient is currently on Ertapenem for right calcaneal osteomyelitis. Recently has completed steroid for gout. Altered mental status could be secondary to Ertapenem or steroid induced delirium. ID was consulted and antibiotics were switched to ceftriaxone and metronidazole. Patient's mentation has been improved and back to baseline. REECE improved with fluid resuscitation. PT consulted and recommend rehab. Case management consulted for rehab arrangements. ID recommend to complete the course of treatment with Rocephin/Flagyl for osteomyelitis till 11/10/2020 and follow-up on 11/10/2020 at 9:30 AM.  Jeffery catheter was changed prior to the discharge. Disposition: [facility] Activity: Activity as tolerated Diet: DIET CARDIAC Regular Code Status: Full Code Follow Up Orders: Follow-up Appointments Procedures  FOLLOW UP VISIT Appointment in: 3 - 5 Days PCP  
  PCP Standing Status:   Standing Number of Occurrences:   1 Order Specific Question:   Appointment in Answer:   3 - 5 Days Follow-up Information Follow up With Specialties Details Why Contact Info [address] [facility] [address]   [address] [place] [address] Armani Messi [place] [id number] [phone] Other, MD Elvin    Patient can only remember the practice name and not the physician Bernabe Crandall NP Family Medicine, Nurse Practitioner   [id number] 
[phone] Tarik Lopez MD Infectious Disease On 11/10/2020  [address] [place] [id number] [phone] Discharge meds at bottom of this note. Plan was discussed with patient. All questions answered. Patient was stable at time of discharge. Given instructions to call a physician or return if any concerns. Discharge summary and encounter summary was sent to PCP electronically via \"Comm Mgt\" link in [facility], if possible. Diagnostic Imaging/Tests:  
Xr Hand Rt Min 3 V Result Date: 10/1/2020 EXAMINATION: XR HAND RT MIN 3 V 10/1/2020 7:28 PM COMPARISON: None available. INDICATION: right hand pain/swelling TECHNIQUE: 3 views of the right hand were obtained FINDINGS: There is no fracture or acute abnormality. Joint spacing and alignment are maintained. Bony mineralization is preserved. IMPRESSION: No acute abnormality. Ct Head Wo Cont Result Date: 10/14/2020 NONCONTRAST HEAD CT CLINICAL HISTORY:  Decreased level of consciousness with a history of fall. TECHNIQUE:  Axial images were obtained with spiral technique. Radiation dose reduction was achieved using one or all of the following techniques: automated exposure control, weight-based dosing, iterative reconstruction. COMPARISON:  None. REPORT:   Standard noncontrast head CT demonstrates no definite intracranial mass effect, hemorrhage, or evidence of acute geographic infarction. White matter hypodensities are most consistent with small vessel ischemic disease. The ventricles are normal in size and configuration, accounting for the patient's age. Orbits  and paranasal sinuses are clear where imaged. Bone windows demonstrate no definite fracture or destruction. IMPRESSION:     SMALL VESSEL ISCHEMIC DISEASE WITH NO ACUTE INTRACRANIAL ABNORMALITY OR CALVARIAL FRACTURE IDENTIFIED AT NONCONTRAST CT. Xr Chest [facility] Result Date: 10/14/2020 Portable AP upright chest dated 10/14/2020 Prior chest x-ray 11/8/2016 CLINICAL INFORMATION: Confusion and hallucinations for one week Metallic sternal wires are present.  There is a right PICC line, the tip at the upper superior vena cava. Heart is enlarged. Thoracic aorta is mildly tortuous. Pulmonary vascularity appears normal and lungs clear. No pleural effusion. No pneumothorax. IMPRESSION: No acute abnormality Duplex Lower Ext Venous Bilat Result Date: 10/15/2020 Bilateral lower extremity duplex venous doppler ultrasound Indication: Evaluation for DVT Technique: Gray-scale ultrasound with and without compression and color Doppler evaluation were performed of the deep veins of bilateral lower extremities from the level of the common femoral veins to the level of the peroneal and posterior tibial veins. Findings: Bilateral common femoral veins,  femoral veins, posterior tibial and peroneal veins, and popliteal veins are compressible and opacify with color at color Doppler evaluation. Limited evaluation of the infrapopliteal vessels. There is no evidence of deep vein thrombosis. Impression: No evidence of DVT in the bilateral lower extremities. Echocardiogram results: No results found for this visit on 10/14/20. Procedures done this admission: * No surgery found * All Micro Results Procedure Component Value Units Date/Time CULTURE, BLOOD [[id number]] Collected:  10/14/20 1602 Order Status:  Completed Specimen:  Blood Updated:  10/19/20 8023 Special Requests: BLUE PICC Culture result: NO GROWTH 5 DAYS     
 CULTURE, BLOOD [[id number]] Collected:  10/14/20 1703 Order Status:  Completed Specimen:  Blood Updated:  10/19/20 3025 Special Requests: --     
  LEFT Antecubital 
  
  Culture result: NO GROWTH 5 DAYS     
 CULTURE, URINE [[id number]] Collected:  10/15/20 0103 Order Status:  Completed Specimen:  Urine Updated:  10/17/20 0754 Special Requests: NO SPECIAL REQUESTS Culture result: NO GROWTH 2 DAYS     
 CULTURE, URINE [[id number]] Collected:  10/14/20 1545 Order Status:  Canceled Specimen:  Urine from Clean catch   
  
 
 
[unfilled] Labs: Results: BMP, Mg, Phos Recent Labs 10/18/20 
0527 10/17/20 
0215 10/16/20 
1903  141 141  
K 3.7 3.7 3.5 * 108* 108* CO2 28 26 27 AGAP 3* 7 6*  
BUN 40* 48* 50* CREA 1.13 1.17 1.32  
CA 7.9* 8.0* 7.7*  
* 100 162* CBC Recent Labs 10/18/20 
0527 10/17/20 
0215 10/16/20 
1903 WBC 11.9* 14.4* 14.5*  
RBC 2.89* 2.91* 2.79* HGB 8.4* 8.4* 8.2* HCT 26.7* 26.7* 25.4*  
* 123* 116* GRANS 76 79* 82* LYMPH 8* 7* 6*  
EOS 1 1 1 MONOS 12 10 9 BASOS 0 0 0 IG 2 3 3 ANEU 9.0* 11.4* 11.8* ABL 1.0 1.0 0.8 JANINE 0.1 0.2 0.1 ABM 1.4* 1.5* 1.3 ABB 0.0 0.0 0.0 AIG 0.3 0.4 0.4 LFT No results for input(s): ALT, TBIL, AP, TP, ALB, GLOB, AGRAT in the last 72 hours. No lab exists for component: SGOT, GPT Cardiac Testing Lab Results Component Value Date/Time  06/12/2016 08:49 AM  
 Troponin-I, Qt. <0.02 (L) 05/18/2016 12:24 PM  
 Troponin-I, Qt. <0.02 (L) 05/18/2016 05:50 AM  
 Troponin-I, Qt. <0.02 (L) 05/17/2016 06:32 PM  
  
Coagulation Tests Lab Results Component Value Date/Time Prothrombin time 16.1 (H) 02/06/2020 09:41 AM  
 Prothrombin time 14.9 (H) 11/15/2019 01:29 PM  
 Prothrombin time 13.4 (H) 06/12/2016 08:49 AM  
 INR 1.3 02/06/2020 09:41 AM  
 INR 1.1 11/15/2019 01:29 PM  
 INR 1.2 06/12/2016 08:49 AM  
  
A1c No results found for: HBA1C, HGBE8, [id number] Lipid Panel Lab Results Component Value Date/Time Cholesterol, total 200 (H) 07/27/2016 08:45 AM  
 HDL Cholesterol 39 (L) 07/27/2016 08:45 AM  
 LDL, calculated 147 (H) 07/27/2016 08:45 AM  
 VLDL, calculated 14 07/27/2016 08:45 AM  
 Triglyceride 70 07/27/2016 08:45 AM  
 CHOL/HDL Ratio 4.2 05/17/2016 03:40 AM  
  
Thyroid Panel Lab Results Component Value Date/Time TSH 4.370 08/02/2017 09:50 AM  
 TSH 3.820 (H) 05/18/2017 03:15 PM  
    
Most Recent UA Lab Results Component Value Date/Time  Color YELLOW 10/15/2020 01:03 AM  
 Appearance CLEAR 10/15/2020 01:03 AM  
 Specific gravity 1.008 10/15/2020 01:03 AM  
 pH (UA) 5.5 10/15/2020 01:03 AM  
 Protein Negative 10/15/2020 01:03 AM  
 Glucose Negative 10/15/2020 01:03 AM  
 Ketone Negative 10/15/2020 01:03 AM  
 Bilirubin Negative 10/15/2020 01:03 AM  
 Blood Negative 10/15/2020 01:03 AM  
 Urobilinogen 0.2 10/15/2020 01:03 AM  
 Nitrites Negative 10/15/2020 01:03 AM  
 Leukocyte Esterase Negative 10/15/2020 01:03 AM  
 WBC  02/21/2020 11:10 PM  
 RBC >100 (H) 02/21/2020 11:10 PM  
 Epithelial cells 0 02/21/2020 11:10 PM  
 Bacteria 1+ (H) 02/21/2020 11:10 PM  
 Casts 0-3 02/21/2020 11:10 PM  
 Crystals, urine 0 01/13/2020 02:53 PM  
 Mucus 0 01/13/2020 02:53 PM  
 Other observations MICRO DONE ON UNSPUN URINE 01/13/2020 02:53 PM  
  
 
No Known Allergies Immunization History Administered Date(s) Administered  TB Skin Test (PPD) Intradermal 10/15/2020  Tdap 06/27/2012 All Labs from Last 24 Hrs: 
Recent Results (from the past 24 hour(s)) PLEASE READ & DOCUMENT PPD TEST IN 72 HRS Collection Time: 10/18/20  3:49 PM  
Result Value Ref Range PPD Negative Negative  
 mm Induration 0 0 - 5 0mm Discharge Exam: 
Patient Vitals for the past 24 hrs: 
 Temp Pulse Resp BP SpO2  
10/19/20 1057 97.9 °F (36.6 °C) 69 18 120/68 92 % 10/19/20 0737 98.1 °F (36.7 °C) 75 18 120/65 95 % 10/19/20 0226 98.4 °F (36.9 °C) 65 18 110/61 98 % 10/18/20 2300 98.3 °F (36.8 °C) 71 18 115/60 98 % 10/18/20 1848 98.4 °F (36.9 °C) 70 18 (!) 113/59 98 % 10/18/20 1609 98.4 °F (36.9 °C) 74 18 (!) 117/56 98 % Oxygen Therapy O2 Sat (%): 92 % (10/19/20 1057) O2 Device: Room air (10/19/20 1057) Estimated body mass index is 37.33 kg/m² as calculated from the following: 
  Height as of this encounter: 5' 11\" (1.803 m). Weight as of this encounter: 121.4 kg (267 lb 10 oz). Intake/Output Summary (Last 24 hours) at 10/19/2020 1158 Last data filed at 10/19/2020 1591 Gross per 24 hour Intake 480 ml  
 Output 1325 ml Net -845 ml *Note that automatically entered I/Os may not be accurate; dependent on patient compliance with collection and accurate  by assistants. General:    Well nourished. Alert. Eyes:   Normal sclerae. Extraocular movements intact. ENT:  Normocephalic, atraumatic. Moist mucous membranes CV:   Regular rate and rhythm. No murmur, rub, or gallop. Lungs:  Clear to auscultation bilaterally. No wheezing, rhonchi, or rales. Abdomen: Soft, nontender, nondistended. Extremities: Warm and dry. Bilateral lower extremity edema, clean dressi Neurologic: CN II-XII grossly intact. No gross focal deficits Skin:     No rashes or jaundice. Psych:  Normal mood and affect. Current Med List in Hospital:  
Current Facility-Administered Medications Medication Dose Route Frequency  metroNIDAZOLE (FLAGYL) tablet 500 mg  500 mg Oral Q12H  lip protectant (BLISTEX) ointment 1 Each  1 Each Topical PRN  
 alcohol 62% (NOZIN) nasal  1 Ampule  1 Ampule Topical Q12H  colchicine tablet 0.6 mg  0.6 mg Oral DAILY  furosemide (LASIX) tablet 20 mg  20 mg Oral BID  midodrine (PROAMATINE) tablet 10 mg  10 mg Oral TID WITH MEALS  cefTRIAXone (ROCEPHIN) 2 g in 0.9% sodium chloride (MBP/ADV) 50 mL  2 g IntraVENous Q24H  
 [Held by provider] carvediloL (COREG) tablet 6.25 mg  6.25 mg Oral BID WITH MEALS  
 levothyroxine (SYNTHROID) tablet 112 mcg  112 mcg Oral ACB  sertraline (ZOLOFT) tablet 50 mg  50 mg Oral DAILY  sodium chloride (NS) flush 5-40 mL  5-40 mL IntraVENous Q8H  
 sodium chloride (NS) flush 5-40 mL  5-40 mL IntraVENous PRN  
 acetaminophen (TYLENOL) tablet 650 mg  650 mg Oral Q6H PRN Or  
 acetaminophen (TYLENOL) suppository 650 mg  650 mg Rectal Q6H PRN  polyethylene glycol (MIRALAX) packet 17 g  17 g Oral DAILY PRN  promethazine (PHENERGAN) tablet 12.5 mg  12.5 mg Oral Q6H PRN  Or  
  ondansetron (ZOFRAN) injection 4 mg  4 mg IntraVENous Q6H PRN  
 enoxaparin (LOVENOX) injection 40 mg  40 mg SubCUTAneous Q24H Discharge Info:  
Current Discharge Medication List  
  
START taking these medications Details  
cefTRIAXone 2 gram 2 g, ADDaptor 1 Device IVPB 2 g by IntraVENous route every twenty-four (24) hours for 22 days. Qty: 22 Dose, Refills: 0  
  
metroNIDAZOLE (FLAGYL) 500 mg tablet Take 1 Tab by mouth every twelve (12) hours for 22 days. Qty: 44 Tab, Refills: 0 CONTINUE these medications which have NOT CHANGED Details  
ferrous gluconate (FERGON) 240 mg (27 mg iron) tablet Take 240 mg by mouth three (3) times daily (with meals). sertraline (ZOLOFT) 50 mg tablet Take 50 mg by mouth daily. carvedilol (COREG) 6.25 mg tablet Take 1 Tab by mouth two (2) times daily (with meals). Qty: 180 Tab, Refills: 3  
  
levothyroxine (SYNTHROID) 112 mcg tablet Take  by mouth Daily (before breakfast). furosemide (LASIX) 40 mg tablet Take 40 mg by mouth two (2) times a day. potassium chloride SR (K-TAB) 20 mEq tablet Take 20 mEq by mouth daily. diclofenac (Voltaren) 1 % gel Apply 4 g to affected area four (4) times daily. Qty: 100 g, Refills: 0  
  
meloxicam (MOBIC) 15 mg tablet Take 15 mg by mouth daily. collagenase (SANTYL) 250 unit/gram ointment Apply  to affected area daily. mupirocin (BACTROBAN) 2 % ointment APPLY TO LEFT LEG DAILY Time spent in patient discharge planning and coordination 35 minutes.  
 
Signed: 
Tiffany Mayfield MD

## 2020-10-19 NOTE — PROGRESS NOTES
1230- report given to Estela Gonzáles RN at [facility]. Awaiting pickup by transport. All questions answered. Latia changed. 1545- pt picked up by transport.

## 2020-10-19 NOTE — PROGRESS NOTES
Problem: Mobility Impaired (Adult and Pediatric) Goal: *Acute Goals and Plan of Care (Insert Text) Description: DISCHARGE GOALS : ADDENDUM 10/17/20 
(1.)Mr. Mariajose Blas will move from supine to sit and sit to supine  with MINIMAL ASSIST(consistently) & bed modified PRN. 
(2.)Mr. Mariajose Blas will transfer from bed to chair and chair to bed with MINIMAL ASSIST (consistently) using a slide board, pt helps with board placement. (3.)Mr. Mariajose Blas will perform static sitting on outstretched arms fair for 2-3 min, pt able to shift wt multiple directions while sitting EOB on outstretched arms with CGA. Met 10/17. 
4) pt can prepare WC & place board for transfer. 5) pt can propel & maneuver  ft with supervision. 6) pt can perform static & dynamic balance to complete functional task safely. Outcome: Progressing Towards Goal 
  
PHYSICAL THERAPY: Daily Note and AM 10/19/2020 INPATIENT: PT Visit Days : 4 Payor: Trevor Brown / Plan: [address] [place] [address] HMO / Product Type: Managed Care Medicare /   
  
NAME/AGE/GENDER: Juan Navarro is a 79 y.o. male PRIMARY DIAGNOSIS: Leukocytosis [D72.829] REECE (acute kidney injury) ([facility] [N17.9] Leukocytosis [D72.829] REECE (acute kidney injury) ([facility] [N17.9] Altered mental status [R41.82] Altered mental status Altered mental status ICD-10: Treatment Diagnosis:  
 · Generalized Muscle Weakness (M62.81) · Other lack of cordination (R27.8) Precaution/Allergies: 
Patient has no known allergies. ASSESSMENT:  
 
Mr. Mariajose Blas continues to improve with his bed mobility, balance & transfer skills. Expect pt to progress nicely in rehab. Pt is motivated & hard working. This section established at most recent assessment PROBLEM LIST (Impairments causing functional limitations): 1. Decreased Strength 2. Decreased ADL/Functional Activities 3. Decreased Transfer Abilities 4. Decreased Balance 5. Decreased Activity Tolerance 6. Decreased Flexibility/Joint Mobility INTERVENTIONS PLANNED: (Benefits and precautions of physical therapy have been discussed with the patient.) 1. Balance Exercise 2. Bed Mobility 3. Therapeutic Activites 4. Therapeutic Exercise/Strengthening 5. Transfer Training TREATMENT PLAN: Frequency/Duration: daily for duration of hospital stay Rehabilitation Potential For Stated Goals: Good REHAB RECOMMENDATIONS (at time of discharge pending progress):   
Placement: It is my opinion, based on this patient's performance to date, that Mr. Isidro Knight may benefit from intensive therapy at an [address] [place] [address] after discharge due to potential to make ongoing and sustainable functional improvement that is of practical value. [employer] Baptise Equipment:  
? to be determined HISTORY:  
History of Present Injury/Illness (Reason for Referral): 
19-year-old male with medical history significant for hypertension, CHF, CAD, aortic valve repair, hypothyroidism, A. 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 Wife is present at bedside and states he has had trouble with confusion and slurred speech. She reports he is hallucinating. Also reports he is restless at night and not able to sleep over the past couple of days. Denies any fevers, shortness of breath, chest pain, palpitation, nausea, vomiting, abdominal pain. Patient had episode of gout about 2 weeks ago and has completed prednisone and hydrocodone course about 2 days ago. Patient is also following infectious disease and wound care for right heel ulcer and osteomyelitis of the posterior lateral calcaneus and is currently on ertapenem. As per wife he has completed 2 weeks out of 6 weeks antibiotic course in total. Patient has history of indwelling Jeffery's. Last change was 2 weeks ago. Past Medical History/Comorbidities:  
Mr. Dempsey Never  has a past medical history of Adverse effect of anesthesia, Aneurysm ([address] Atrial flutter ([address] CAD (coronary artery disease), Congestive heart failure ([address] Hypertension, Ill-defined condition, Ill-defined condition, Morbid obesity ([address] and Thyroid disease. Mr. Dempsey Never  has a past surgical history that includes hx tonsillectomy; hx orthopaedic; hx appendectomy; hx colonoscopy (5/08); pr chest surgery procedure unlisted (2016, 2017); pr cardiac surg procedure unlist (2016); pr cardiac surg procedure unlist (2017); and pr cardiac surg procedure unlist (06/2016). Social History/Living Environment:  
Home Environment: Private residence Wheelchair Ramp: Yes One/Two Story Residence: One story Living Alone: No 
Support Systems: Spouse/Significant Other/Partner Patient Expects to be Discharged to[de-identified] Skilled nursing facility Current DME Used/Available at Home: Commode, bedside, Tub transfer bench, Wheelchair, Other (comment) Tub or Shower Type: Shower Prior Level of Function/Work/Activity: Pt was performing slide board transfers with SBA prior tot his admission. Personal Factors: Other factors that influence how disability is experienced by the patient:  current & PMH Number of Personal Factors/Comorbidities that affect the Plan of Care: 3+: HIGH COMPLEXITY EXAMINATION:  
Most Recent Physical Functioning:  
Gross Assessment: 
AROM: Grossly decreased, non-functional(both UE , no use both LE's) Strength: Grossly decreased, non-functional(both UE , no use both LE's) Coordination: Grossly decreased, non-functional(both UE , no use both LE's) Balance: 
Sitting: Impaired; Without support Sitting - Static: (fair to fair-) Sitting - Dynamic: (fair to fair-) Bed Mobility: 
Rolling: Minimum assistance Supine to Sit: Minimum assistance Sit to Supine: (NT) Scooting: Moderate assistance Transfers: Sliding Board : Minimum assistance(bed>WC) Duration: 23 Minutes(extra time to work through activity noted) Gait: NA Functional Mobility:  
      Transfers:  Min Bed Mobility:  Min Body Structures Involved: 1. Metabolic 2. Muscles Body Functions Affected: 1. Movement Related 2. Metobolic/Endocrine Activities and Participation Affected: 1. General Tasks and Demands 2. Mobility Number of elements that affect the Plan of Care: 4+: HIGH COMPLEXITY CLINICAL PRESENTATION:  
Presentation: Evolving clinical presentation with unstable and unpredictable characteristics: HIGH COMPLEXITY CLINICAL DECISION [address] [place] [address] AM-PAC 6 Clicks Basic Mobility Inpatient Short Form How much difficulty does the patient currently have. .. Unable A Lot A Little None 1. Turning over in bed (including adjusting bedclothes, sheets and blankets)? [] 1   [x] 2   [] 3   [] 4  
2. Sitting down on and standing up from a chair with arms ( e.g., wheelchair, bedside commode, etc.)   [x] 1   [] 2   [] 3   [] 4  
3. Moving from lying on back to sitting on the side of the bed? [] 1   [x] 2   [] 3   [] 4 How much help from another person does the patient currently need. .. Total A Lot A Little None 4. Moving to and from a bed to a chair (including a wheelchair)? [] 1   [x] 2   [] 3   [] 4  
5. Need to walk in hospital room? [x] 1   [] 2   [] 3   [] 4  
6. Climbing 3-5 steps with a railing? [x] 1   [] 2   [] 3   [] 4  
© , Trustees of [address] [place] [address], under license to [employer]. All rights reserved Score:  Initial: 9 Most Recent: X (Date: -- ) Interpretation of Tool:  Represents activities that are increasingly more difficult (i.e. Bed mobility, Transfers, Gait). Medical Necessity:    
· Patient is expected to demonstrate progress in  
· strength, balance, coordination, and functional technique ·  to  
· decrease assistance required with bed mobility & transfers · . Reason for Services/Other Comments: 
· Patient continues to require skilled intervention due to · Pt not manageable for caregiver · . Use of outcome tool(s) and clinical judgement create a POC that gives a: Difficult prediction of patient's progress: HIGH COMPLEXITY  
  
 
 
 
TREATMENT:  
(In addition to Assessment/Re-Assessment sessions the following treatments were rendered) Pre-treatment Symptoms/Complaints : pt was agreeable Pain: Initial: numeric scale Pain Intensity 1: 0  Post Session:  0/10 Therapeutic Activity: (  23 Minutes(extra time to work through activity noted) ):  Therapeutic activities including bed mobility, sitting balance static & dynamic sitting balance slide board transfers bed>WC to improve mobility, strength, balance, coordination and dynamic movement of arm - bilateral and core to improve functional stability & endurance. Braces/Orthotics/Lines/Etc:  
· IV 
· bosch catheter Treatment/Session Assessment:   
· Response to Treatment: pt was encouraged by today's performance · Interdisciplinary Collaboration:  
o Registered Nurse 
o Certified Nursing Assistant/Patient Care Technician · After treatment position/precautions:  
o Up in chair 
o Bed/Chair-wheels locked 
o Call light within reach 
o RN notified · Compliance with Program/Exercises: Will assess as treatment progresses · Recommendations/Intent for next treatment session: \"Next visit will focus on reduction in assistance provided\". Total Treatment Duration: PT Patient Time In/Time Out Time In: 0395 Time Out: 8101 Keith Peña PT

## 2020-10-20 NOTE — ADT AUTH CERT NOTES
Patient Demographics Patient Name Trenton Rothman  
[id number]  Sex Male    
1940  Address [address] [facility]  Phone [phone] (Home) [phone] (Mobile) *Preferred*   
CSN:   
[id number] Admit Date:  Admit Time  Room  Bed Oct 14, 2020   3:37 PM  348 [[id number]]  01 [1126] Attending Providers Provider  Pager  From  To Keeley Villasenor MD   10/14/20  10/14/20 Rock Naye MD   10/14/20  10/19/20 Emergency Contact(s) Name  Relation  Home  Work  Mobile Evangelina Garcia  Spouse  [phone] Unique Zaragoza  Daughter  [phone] Utilization Reviews  
 
   
MD NOTE 10/18 by Tila Abdullahi RN  
 
   
Review Entered  Review Status 10/20/2020 08:54  In Primary   
   
Criteria Review CLINICAL 10/18 No acute event reported overnight. Patient is maintaining his blood pressure with maps in 70s. Leukocytosis is improving. He is alert and oriented. Denies any active new complaints. PT recommend 9th floor inpatient rehab. Wife wanted to proceed. Case management consulted.  
  
Plan: Altered mental status: Mentation is improved CT head negative for acute pathology Could be a ertapenem or prednisone induced delirium Meropenem has been discontinued by ID and switched to ceftriaxone and metronidazole for osteomyelitis No obvious source of infection other than Osteomyelitis 
  
Osteomyelitis: 
Leukocytosis is improving ID consulted, appreciate recommendations Wound care consulted Follow-up on blood culture and urine culture, till date negative Continue antibiotic ceftriaxone and metronidazole, as per ID 
  
REECE : 
renal function improving Avoid Nephrotoxic agent 
  
Hypothyroidism: 
continue home meds for hypothyroidism 
  
CHF, hypertension:  
Patient with borderline blood pressure, will hold antihypertensive at this time 
  
Gout: 
Continue colchicine 
  
Paraplegia: 
Stable Physical therapy consult 
  
DC planning/Dispo:  Medically cleared to discharge. Pending rehab placement 
  
   
   
PA RECOMMENDATION by Huan Cote RN  
 
   
Review Entered  Review Status 10/19/2020 14:09  In Primary   
   
Criteria Review We recommend that the following pt's hospitalization under OBSERVATION [104] 
status Name: Justus Israel : 1940 [facility]# : [id number] Insurance: [employer] Clinical summary 79year old admitted for AMS, he has right foot Ulcer, was 
getting treated for Osteo with ertapenem. Vitals Low grade fever 99.3, otherwise unremarkable Labs and Imaging WBC 14.4 MCG criteria applies YES Comments AMS could medication induced- Ertrapenem switched to IV Ceftriaoxne and flagyl by ID 
WBC trending in right direction, head CT negative. This chart was reviewed at 3:12 PM 10/17/2020 Juan Manuel Hinojosa MD MD  
Physician Advisor [facility] Cell [phone]

## 2020-11-11 NOTE — PROGRESS NOTES
Community Care Team documentation for patient in [facility]    The information below provided by:[facility]    PT Update: MOD A BED MOBILITY AND TRANSFERS - MAX A ADLS PT/OT 66/79        Nursing Update:      Discharge Date:Home with wife 11/13/20 [facility]       Assign to [address]

## 2020-11-13 NOTE — PROGRESS NOTES
Initial [place] outreach attempt to was unsuccessful. Left message. Will attempt second outreach within 24 hours.

## 2020-11-16 NOTE — PROGRESS NOTES
Patient was admitted to [facility] on 10/14/2020 and discharged on 10/19/2020 for AMS. Outreach made within 2 business days of discharge: Yes Top Discharge Challenges to be reviewed by the provider Additional needs identified to be addressed with provider no 
none Discussed COVID-19 related testing which was available at this time. Test results were negative. Patient informed of results, if available? yes Method of communication with provider : none Advance Care Planning:  
Does patient have an Advance Directive:  yes; reviewed and current Inpatient Readmission Risk score: 39 Was this a readmission? no  
Patient stated reason for the admission: none Patients top risk factors for readmission: AMS Interventions to address risk factors:  
 
Care Coordination (CC) contacted the family by telephone to perform post hospital discharge assessment. Verified name and  with family as identifiers. Provided introduction to self, and explanation of the CC role. CC reviewed discharge instructions, medical action plan and red flags with family who verbalized understanding. Family given an opportunity to ask questions and does not have any further questions or concerns at this time. The family agrees to contact the PCP office for questions related to their healthcare. Medication reconciliation was performed with family, who verbalizes understanding of administration of home medications. Advised obtaining a 90-day supply of all daily and as-needed medications. Referral to Pharm D needed: no  
 
Home Health/Outpatient orders at discharge: [facility] Home Health company: Down Date of initial visit: 2020 Durable Medical Equipment ordered at discharge: none [address]:  
Durable Medical Equipment received:  
 
Covid Risk Education Patient has following risk factors of: none.  Education provided regarding infection prevention, and signs and symptoms of COVID-19 and when to seek medical attention with family who verbalized understanding. Discussed exposure protocols and quarantine From CDC: Are you at higher risk for severe illness?  and given an opportunity for questions and concerns. The family agrees to contact the COVID-19 hotline 050-021-3198 or PCP office for questions related to COVID-19. For more information on steps you can take to protect yourself, see CDC's How to Protect Yourself Patient/family/caregiver given information for [employer] and agrees to enroll no Patient's preferred e-mail: declines Patient's preferred phone number: declines Discussed follow-up appointments. If no appointment was previously scheduled, appointment scheduling offered: [facility] follow up appointment(s):  
Future Appointments Date Time Provider Jamel Davis 1/13/2021 11:30 AM Patsy Cuadra MD [facility] follow up appointment(s):  
Plan for follow-up call in 10-14 days based on severity of symptoms and risk factors. CC provided contact information for future needs. Goals Addressed None

## 2020-11-25 NOTE — ED NOTES
I have reviewed discharge instructions with the patient. The patient verbalized understanding. Patient left ED via Discharge Method: wheelchair to Home with spouse. Opportunity for questions and clarification provided. Patient given 2 scripts. To continue your aftercare when you leave the hospital, you may receive an automated call from our care team to check in on how you are doing. This is a free service and part of our promise to provide the best care and service to meet your aftercare needs.  If you have questions, or wish to unsubscribe from this service please call [phone]. Thank you for Choosing our [address] [place] [address] Emergency Department.

## 2020-11-25 NOTE — ED PROVIDER NOTES
Patient with previous neurogenic bladder now with indwelling Jeffery catheter present. Has bilateral lower extremity lymphedema with recent hospitalization for right heel wound and osteomyelitis. Received antibiotics and was discharged to a rehab facility. Had some diarrhea in the facility and discharged home 2 weeks ago with continued episodes of diarrhea. 2-3 episodes a day. Went to wound care today who continues to manage his multiple wounds. Per him doing well. Asked them for some medication for the diarrhea when they could not was told to come here. The history is provided by the patient. No  was used. Diarrhea This is a new problem. The current episode started more than 1 week ago. The problem occurs daily. The problem has not changed since onset. The pain is associated with an unknown factor. The patient is experiencing no pain. Associated symptoms include diarrhea and nausea. Pertinent negatives include no fever, no melena, no vomiting, no constipation, no dysuria, no hematuria, no headaches, no chest pain and no back pain. Nothing worsens the pain. The pain is relieved by nothing. The patient's surgical history includes appendectomy. Past Medical History:  
Diagnosis Date  Adverse effect of anesthesia   
 nighmares  Aneurysm ([address] Thorasic Aortic aneurysm, surger in [facility] 5/24/16  Atrial flutter ([address] ROSENDO guided cardioversion, No thinners  CAD (coronary artery disease)   
 patient denies  Congestive heart failure ([address] EF 45-50% on 11/2019  Hypertension  Ill-defined condition   
 spinal cord injury  Ill-defined condition Neurogenic bladder, self caths  Morbid obesity ([address]  Thyroid disease Past Surgical History:  
Procedure Laterality Date  CARDIAC SURG PROCEDURE UNLIST  2016  
 repair of aneurysm in acending and transverse arteries  CARDIAC SURG PROCEDURE UNLIST  2017 Aortic valve repair Descending  CARDIAC SURG PROCEDURE UNLIST  2016  
 cardioversion, ROSENDO x2  
 CHEST SURGERY PROCEDURE UNLISTED  ,  Aortic aneursym  HX APPENDECTOMY  HX COLONOSCOPY    
 diverticulosis  HX ORTHOPAEDIC    
 repair of broken jaw  HX TONSILLECTOMY Family History:  
Problem Relation Age of Onset  Stroke Mother Social History Socioeconomic History  Marital status:  Spouse name: Not on file  Number of children: Not on file  Years of education: Not on file  Highest education level: Not on file Occupational History  Not on file Social Needs  Financial resource strain: Not on file  Food insecurity Worry: Not on file Inability: Not on file  Transportation needs Medical: Not on file Non-medical: Not on file Tobacco Use  Smoking status: Former Smoker Packs/day: 3.00 Years: 25.00 Pack years: 75.00 Types: Cigarettes Last attempt to quit: 1986 Years since quittin.9  Smokeless tobacco: Former User Types: Snuff, Chew Quit date: 2014 Substance and Sexual Activity  Alcohol use: Not Currently Comment: advises quitting  Drug use: No  
 Sexual activity: Yes  
  Partners: Female Lifestyle  Physical activity Days per week: Not on file Minutes per session: Not on file  Stress: Not on file Relationships  Social connections Talks on phone: Not on file Gets together: Not on file Attends [employer] service: Not on file Active member of club or organization: Not on file Attends meetings of clubs or organizations: Not on file Relationship status: Not on file  Intimate partner violence Fear of current or ex partner: Not on file Emotionally abused: Not on file Physically abused: Not on file Forced sexual activity: Not on file Other Topics Concern  Not on file Social History Narrative Lives with wife Currently uses walker for ambulation post-op - [facility] Previously employed as  / , [address] works,  PCP: Dr. Aydin Aguayo ALLERGIES: Patient has no known allergies. Review of Systems Constitutional: Negative for chills and fever. HENT: Negative for rhinorrhea and sore throat. Eyes: Negative for pain and redness. Respiratory: Negative for chest tightness, shortness of breath and wheezing. Cardiovascular: Positive for leg swelling. Negative for chest pain. Gastrointestinal: Positive for diarrhea and nausea. Negative for abdominal pain, constipation, melena and vomiting. Genitourinary: Negative for dysuria and hematuria. Musculoskeletal: Negative for back pain, gait problem, neck pain and neck stiffness. Skin: Negative for color change and rash. Neurological: Negative for weakness, numbness and headaches. Psychiatric/Behavioral: Negative for confusion. Vitals:  
 11/25/20 1534 BP: (!) 102/54 Pulse: 96  
Resp: 16 Temp: 98 °F (36.7 °C) SpO2: 97% Weight: 122.5 kg (270 lb) Height: 5' 11\" (1.803 m) Physical Exam 
Constitutional:   
   Appearance: Normal appearance. He is well-developed. HENT:  
   Head: Normocephalic and atraumatic. Neck: Musculoskeletal: Normal range of motion and neck supple. Cardiovascular:  
   Rate and Rhythm: Normal rate and regular rhythm. Pulmonary:  
   Effort: Pulmonary effort is normal. No respiratory distress. Breath sounds: Normal breath sounds. No wheezing. Abdominal:  
   General: Bowel sounds are normal.  
   Palpations: Abdomen is soft. Tenderness: There is no abdominal tenderness. Musculoskeletal: Normal range of motion. Right lower leg: Edema present. Left lower leg: Edema present. Skin: 
   General: Skin is warm and dry. Neurological:  
   General: No focal deficit present. Mental Status: He is alert and oriented to person, place, and time. MDM Number of Diagnoses or Management Options Diagnosis management comments: Patient with diarrhea and mild nausea. No acute on x-ray or blood work. Will treat both at home. Amount and/or Complexity of Data Reviewed Clinical lab tests: ordered and reviewed Tests in the radiology section of CPT®: ordered and reviewed Tests in the medicine section of CPT®: ordered and reviewed Patient Progress Patient progress: stable Procedures Results Include: 
 
Recent Results (from the past 24 hour(s)) CBC WITH AUTOMATED DIFF Collection Time: 11/25/20  4:20 PM  
Result Value Ref Range WBC 5.6 4.3 - 11.1 K/uL  
 RBC 2.89 (L) 4.23 - 5.6 M/uL HGB 8.4 (L) 13.6 - 17.2 g/dL HCT 26.8 (L) 41.1 - 50.3 % MCV 92.7 79.6 - 97.8 FL  
 MCH 29.1 26.1 - 32.9 PG  
 MCHC 31.3 (L) 31.4 - 35.0 g/dL RDW 22.5 (H) 11.9 - 14.6 % PLATELET 843 (L) 693 - 450 K/uL MPV 9.2 (L) 9.4 - 12.3 FL ABSOLUTE NRBC 0.02 0.0 - 0.2 K/uL  
 DF AUTOMATED NEUTROPHILS 70 43 - 78 % LYMPHOCYTES 13 13 - 44 % MONOCYTES 13 (H) 4.0 - 12.0 % EOSINOPHILS 2 0.5 - 7.8 % BASOPHILS 0 0.0 - 2.0 % IMMATURE GRANULOCYTES 1 0.0 - 5.0 %  
 ABS. NEUTROPHILS 3.9 1.7 - 8.2 K/UL  
 ABS. LYMPHOCYTES 0.7 0.5 - 4.6 K/UL  
 ABS. MONOCYTES 0.7 0.1 - 1.3 K/UL  
 ABS. EOSINOPHILS 0.1 0.0 - 0.8 K/UL  
 ABS. BASOPHILS 0.0 0.0 - 0.2 K/UL  
 ABS. IMM. GRANS. 0.1 0.0 - 0.5 K/UL METABOLIC PANEL, COMPREHENSIVE Collection Time: 11/25/20  4:20 PM  
Result Value Ref Range Sodium 132 (L) 136 - 145 mmol/L Potassium 3.8 3.5 - 5.1 mmol/L Chloride 98 98 - 107 mmol/L  
 CO2 26 21 - 32 mmol/L Anion gap 8 7 - 16 mmol/L Glucose 113 (H) 65 - 100 mg/dL BUN 33 (H) 8 - 23 MG/DL Creatinine 1.35 0.8 - 1.5 MG/DL  
 GFR est AA >60 >60 ml/min/1.73m2 GFR est non-AA 54 (L) >60 ml/min/1.73m2  Calcium 8.4 8.3 - 10.4 MG/DL  
 Bilirubin, total 0.8 0.2 - 1.1 MG/DL  
 ALT (SGPT) 12 12 - 65 U/L  
 AST (SGOT) 8 (L) 15 - 37 U/L Alk. phosphatase 95 50 - 136 U/L Protein, total 7.0 6.3 - 8.2 g/dL Albumin 2.9 (L) 3.2 - 4.6 g/dL Globulin 4.1 (H) 2.3 - 3.5 g/dL A-G Ratio 0.7 (L) 1.2 - 3.5 URINALYSIS W/ RFLX MICROSCOPIC Collection Time: 11/25/20  5:33 PM  
Result Value Ref Range Color RED Appearance CLOUDY Specific gravity 1.018 1.001 - 1.023    
 pH (UA) 5.5 5.0 - 9.0 Protein 100 (A) NEG mg/dL Glucose Negative mg/dL Ketone TRACE (A) NEG mg/dL Bilirubin SMALL (A) NEG Blood LARGE (A) NEG Urobilinogen 1.0 0.2 - 1.0 EU/dL Nitrites Negative NEG Leukocyte Esterase LARGE (A) NEG    
 WBC >100 (H) 0 /hpf  
 RBC >100 (H) 0 /hpf Epithelial cells 0 0 /hpf Bacteria 0 0 /hpf Casts 3-5 0 /lpf  
 
 
 
  
XR CHEST PORT (Final result) Result time 11/25/20 17:38:08 Final result by Home Phillips MD (11/25/20 17:38:08) Impression:   
 IMPRESSION:  
1. Pulmonary edema. 2. No consolidation. 3. Chronic changes in the aorta and prior surgery. Narrative:   
 Chest portable CLINICAL INDICATION: One month of persisting generalized weakness, malaise,  
diarrhea; history of CHF, thoracic aortic aneurysm and previous surgical repair,  
hypertension, spinal cord injury, coronary artery disease COMPARISON: 10/14/2020, 11/8/2016 TECHNIQUE: single AP portable view chest at 5:20 PM semiupright FINDINGS: Levada Segura is no evidence of consolidation, pneumothorax, pleural effusion  
or focal infiltrate. There is mild diffuse pulmonary vascular congestion and  
interstitial prominence. The mediastinal and hilar contours are stable given  
positioning, technique. Thoracic aortic aneurysm and mediastinal surgical  
changes are again evident. There is left hemidiaphragm elevation, not definitely changed from 2016. A previous right PICC has been removed since prior.  Surgical  
clips again project over soft tissues of right axilla.

## 2020-11-25 NOTE — ED TRIAGE NOTES
Pt states he has had diarrhea for about a month after getting out of rehab from [facility]. Pt denies abdominal pain. Pt wearing mask during triage.  
 
Alexandra Cr RN

## 2020-11-25 NOTE — DISCHARGE INSTRUCTIONS
Patient Education        Diarrhea: Care Instructions  Your Care Instructions     Diarrhea is loose, watery stools (bowel movements). The exact cause is often hard to find. Sometimes diarrhea is your body's way of getting rid of what caused an upset stomach. Viruses, food poisoning, and many medicines can cause diarrhea. Some people get diarrhea in response to emotional stress, anxiety, or certain foods. Almost everyone has diarrhea now and then. It usually isn't serious, and your stools will return to normal soon. The important thing to do is replace the fluids you have lost, so you can prevent dehydration. The doctor has checked you carefully, but problems can develop later. If you notice any problems or new symptoms, get medical treatment right away. Follow-up care is a key part of your treatment and safety. Be sure to make and go to all appointments, and call your doctor if you are having problems. It's also a good idea to know your test results and keep a list of the medicines you take. How can you care for yourself at home? · Watch for signs of dehydration, which means your body has lost too much water. Dehydration is a serious condition and should be treated right away. Signs of dehydration are:  ? Increasing thirst and dry eyes and mouth. ? Feeling faint or lightheaded. ? A smaller amount of urine than normal.  · To prevent dehydration, drink plenty of fluids. Choose water and other caffeine-free clear liquids until you feel better. If you have kidney, heart, or liver disease and have to limit fluids, talk with your doctor before you increase the amount of fluids you drink. · Begin eating small amounts of mild foods the next day, if you feel like it. ? Try yogurt that has live cultures of Lactobacillus. (Check the label.)  ? Avoid spicy foods, fruits, alcohol, and caffeine until 48 hours after all symptoms are gone. ? Avoid chewing gum that contains sorbitol. ?  Avoid dairy products (except for yogurt with Lactobacillus) while you have diarrhea and for 3 days after symptoms are gone. · The doctor may recommend that you take over-the-counter medicine, such as loperamide (Imodium), if you still have diarrhea after 6 hours. Read and follow all instructions on the label. Do not use this medicine if you have bloody diarrhea, a high fever, or other signs of serious illness. Call your doctor if you think you are having a problem with your medicine. When should you call for help? Call 911 anytime you think you may need emergency care. For example, call if:    · You passed out (lost consciousness).     · Your stools are maroon or very bloody. Call your doctor now or seek immediate medical care if:    · You are dizzy or lightheaded, or you feel like you may faint.     · Your stools are black and look like tar, or they have streaks of blood.     · You have new or worse belly pain.     · You have symptoms of dehydration, such as:  ? Dry eyes and a dry mouth. ? Passing only a little dark urine. ? Feeling thirstier than usual.     · You have a new or higher fever. Watch closely for changes in your health, and be sure to contact your doctor if:    · Your diarrhea is getting worse.     · You see pus in the diarrhea.     · You are not getting better after 2 days (48 hours). Where can you learn more? Go to http://www.gray.com/  Enter [id number] in the search box to learn more about \"Diarrhea: Care Instructions. \"  Current as of: June 26, 2019               Content Version: 12.6  © 1974-6428 [employer]. Care instructions adapted under license by [employer] (which disclaims liability or warranty for this information). If you have questions about a medical condition or this instruction, always ask your healthcare professional. [place] [address] any warranty or liability for your use of this information.          Patient Education        Nausea and Vomiting: Care Instructions  Your Care Instructions     When you are nauseated, you may feel weak and sweaty and notice a lot of saliva in your mouth. Nausea often leads to vomiting. Most of the time you do not need to worry about nausea and vomiting, but they can be signs of other illnesses. Two common causes of nausea and vomiting are stomach flu and food poisoning. Nausea and vomiting from viral stomach flu will usually start to improve within 24 hours. Nausea and vomiting from food poisoning may last from 12 to 48 hours. The doctor has checked you carefully, but problems can develop later. If you notice any problems or new symptoms, get medical treatment right away. Follow-up care is a key part of your treatment and safety. Be sure to make and go to all appointments, and call your doctor if you are having problems. It's also a good idea to know your test results and keep a list of the medicines you take. How can you care for yourself at home? · To prevent dehydration, drink plenty of fluids, enough so that your urine is light yellow or clear like water. Choose water and other caffeine-free clear liquids until you feel better. If you have kidney, heart, or liver disease and have to limit fluids, talk with your doctor before you increase the amount of fluids you drink. · Rest in bed until you feel better. · When you are able to eat, try clear soups, mild foods, and liquids until all symptoms are gone for 12 to 48 hours. Other good choices include dry toast, crackers, cooked cereal, and gelatin dessert, such as Jell-O. When should you call for help? Call 911 anytime you think you may need emergency care. For example, call if:    · You passed out (lost consciousness). Call your doctor now or seek immediate medical care if:    · You have symptoms of dehydration, such as:  ? Dry eyes and a dry mouth. ? Passing only a little dark urine. ?  Feeling thirstier than usual.     · You have new or worsening belly pain.     · You have a new or higher fever.     · You vomit blood or what looks like coffee grounds. Watch closely for changes in your health, and be sure to contact your doctor if:    · You have ongoing nausea and vomiting.     · Your vomiting is getting worse.     · Your vomiting lasts longer than 2 days.     · You are not getting better as expected. Where can you learn more? Go to http://www.simons.com/  Enter H591 in the search box to learn more about \"Nausea and Vomiting: Care Instructions. \"  Current as of: June 26, 2019               Content Version: 12.6  © 9811-9085 [employer]. Care instructions adapted under license by [employer] (which disclaims liability or warranty for this information). If you have questions about a medical condition or this instruction, always ask your healthcare professional. [address] any warranty or liability for your use of this information.

## 2020-12-27 NOTE — ED PROVIDER NOTES
67-year-old gentleman has some issues with paraparesis. This was due to aortic aneurysm surgery. He is started up with 3-day history of aching in his left shoulder joint. Worse with moving his arm and now he cannot raise his arm above his shoulder. No numbness or weakness in the left arm. No falls or injuries. He is basically wheelchair-bound and transfers using a sliding board. No chest pain or shortness of breath. No rash. No fever. His wife states the patient's had some similar pains on and off the last few months. Always worse with movement. Also history of atrial flutter. The history is provided by the patient. Shoulder Pain The incident occurred more than 2 days ago. There was no injury mechanism. The left shoulder is affected. The pain is moderate. The pain has been constant since onset. The pain does not radiate. There is no history of shoulder injury. There is no history of shoulder surgery. Pertinent negatives include no numbness and no muscle weakness. Past Medical History:  
Diagnosis Date  Adverse effect of anesthesia   
 nighmares  Aneurysm ([address] Thorasic Aortic aneurysm, surger in [facility] 5/24/16  Atrial flutter ([address] ROSENDO guided cardioversion, No thinners  CAD (coronary artery disease)   
 patient denies  Congestive heart failure ([address] EF 45-50% on 11/2019  Hypertension  Ill-defined condition   
 spinal cord injury  Ill-defined condition Neurogenic bladder, self caths  Morbid obesity ([address]  Thyroid disease Past Surgical History:  
Procedure Laterality Date  CARDIAC SURG PROCEDURE UNLIST  2016  
 repair of aneurysm in acending and transverse arteries  CARDIAC SURG PROCEDURE UNLIST  2017 Aortic valve repair Descending  CARDIAC SURG PROCEDURE UNLIST  06/2016  
 cardioversion, ROSENDO x2  
 CHEST SURGERY PROCEDURE UNLISTED  2016, 2017 Aortic aneursym  HX APPENDECTOMY  HX COLONOSCOPY  5/08 diverticulosis  HX ORTHOPAEDIC    
 repair of broken jaw  HX TONSILLECTOMY Family History:  
Problem Relation Age of Onset  Stroke Mother Social History Socioeconomic History  Marital status:  Spouse name: Not on file  Number of children: Not on file  Years of education: Not on file  Highest education level: Not on file Occupational History  Not on file Social Needs  Financial resource strain: Not on file  Food insecurity Worry: Not on file Inability: Not on file  Transportation needs Medical: Not on file Non-medical: Not on file Tobacco Use  Smoking status: Former Smoker Packs/day: 3.00 Years: 25.00 Pack years: 75.00 Types: Cigarettes Quit date: 1986 Years since quittin.0  Smokeless tobacco: Former User Types: Snuff, Chew Quit date: 2014 Substance and Sexual Activity  Alcohol use: Not Currently Comment: advises quitting  Drug use: No  
 Sexual activity: Yes  
  Partners: Female Lifestyle  Physical activity Days per week: Not on file Minutes per session: Not on file  Stress: Not on file Relationships  Social connections Talks on phone: Not on file Gets together: Not on file Attends [employer] service: Not on file Active member of club or organization: Not on file Attends meetings of clubs or organizations: Not on file Relationship status: Not on file  Intimate partner violence Fear of current or ex partner: Not on file Emotionally abused: Not on file Physically abused: Not on file Forced sexual activity: Not on file Other Topics Concern  Not on file Social History Narrative Lives with wife Currently uses walker for ambulation post-op - [facility] Previously employed as  / , [address] works,  PCP: Dr. Robyn Scheuermann ALLERGIES: Patient has no known allergies. Review of Systems Constitutional: Negative for chills and fever. Respiratory: Negative for choking and shortness of breath. Cardiovascular: Negative for chest pain. Musculoskeletal: Negative for neck pain. Skin: Negative for color change and rash. Neurological: Negative for weakness and numbness. Vitals:  
 12/27/20 1515 BP: 126/73 Pulse: 83 Resp: 20 Temp: 98.2 °F (36.8 °C) SpO2: 97% Weight: 122.5 kg (270 lb) Height: 5' 11\" (1.803 m) Physical Exam 
Vitals signs and nursing note reviewed. Constitutional:   
   Appearance: He is not ill-appearing. Neck: Musculoskeletal: Normal range of motion and neck supple. Cardiovascular:  
   Pulses: Normal pulses. Musculoskeletal:  
   Left shoulder: He exhibits tenderness and bony tenderness. He exhibits normal range of motion, no deformity and normal pulse. Comments: Tenderness about humeral head. Pain with any attempted abduction or internal/external rotation. Good distal pulse. No rash. Full shoulder joint appears somewhat more warm than the other joint. Skin: 
   General: Skin is warm and dry. Neurological:  
   Mental Status: He is alert. MDM Number of Diagnoses or Management Options Diagnosis management comments: Patient with history of gout. Check x-ray. Doubt any cardiopulmonary issues. Much pain with moving the shoulder joint. Suspect bursitis. Amount and/or Complexity of Data Reviewed Tests in the radiology section of CPT®: ordered and reviewed Risk of Complications, Morbidity, and/or Mortality Presenting problems: moderate Diagnostic procedures: minimal 
Management options: low Patient Progress Patient progress: stable Procedures X-ray negative for dislocation or fracture per my interpretation. Left message for orthopedics for follow-up.

## 2020-12-28 NOTE — DISCHARGE INSTRUCTIONS
Rest.  May try heating pad or hot water bottle. Medications as directed for inflammation and pain. Be sure to take stool softener. Call orthopedic office tomorrow for appointment to recheck.

## 2021-01-01 ENCOUNTER — APPOINTMENT (OUTPATIENT)
Dept: CT IMAGING | Age: 81
End: 2021-01-01
Attending: INTERNAL MEDICINE

## 2021-01-01 ENCOUNTER — HOSPITAL ENCOUNTER (OUTPATIENT)
Age: 81
Discharge: HOME | End: 2021-03-28
Attending: INTERNAL MEDICINE | Admitting: INTERNAL MEDICINE

## 2021-01-01 ENCOUNTER — HOSPITAL ENCOUNTER (EMERGENCY)
Age: 81
Discharge: OTHER INSTITUTION | DRG: 872 | End: 2021-01-15
Attending: EMERGENCY MEDICINE
Payer: MEDICARE

## 2021-01-01 ENCOUNTER — HOSPITAL ENCOUNTER (OUTPATIENT)
Dept: CT IMAGING | Age: 81
Discharge: HOME | End: 2021-03-15
Attending: INTERNAL MEDICINE

## 2021-01-01 ENCOUNTER — APPOINTMENT (OUTPATIENT)
Dept: GENERAL RADIOLOGY | Age: 81
End: 2021-01-01
Attending: INTERNAL MEDICINE

## 2021-01-01 ENCOUNTER — APPOINTMENT (OUTPATIENT)
Dept: GENERAL RADIOLOGY | Age: 81
DRG: 872 | End: 2021-01-01
Attending: EMERGENCY MEDICINE
Payer: MEDICARE

## 2021-01-01 ENCOUNTER — APPOINTMENT (OUTPATIENT)
Dept: ULTRASOUND IMAGING | Age: 81
End: 2021-01-01
Attending: INTERNAL MEDICINE

## 2021-01-01 ENCOUNTER — HOSPITAL ENCOUNTER (INPATIENT)
Age: 81
LOS: 6 days | Discharge: HOME HEALTH | DRG: 872 | End: 2021-01-21
Attending: FAMILY MEDICINE | Admitting: INTERNAL MEDICINE
Payer: MEDICARE

## 2021-01-01 VITALS
TEMPERATURE: 97.7 F | DIASTOLIC BLOOD PRESSURE: 56 MMHG | SYSTOLIC BLOOD PRESSURE: 102 MMHG | HEART RATE: 82 BPM | OXYGEN SATURATION: 97 % | RESPIRATION RATE: 18 BRPM

## 2021-01-01 VITALS
TEMPERATURE: 99 F | BODY MASS INDEX: 37.8 KG/M2 | SYSTOLIC BLOOD PRESSURE: 104 MMHG | OXYGEN SATURATION: 98 % | RESPIRATION RATE: 14 BRPM | HEART RATE: 106 BPM | HEIGHT: 71 IN | WEIGHT: 270 LBS | DIASTOLIC BLOOD PRESSURE: 59 MMHG

## 2021-01-01 VITALS
HEIGHT: 72 IN | WEIGHT: 247 LBS | OXYGEN SATURATION: 97 % | RESPIRATION RATE: 18 BRPM | BODY MASS INDEX: 33.46 KG/M2 | HEART RATE: 86 BPM | SYSTOLIC BLOOD PRESSURE: 120 MMHG | DIASTOLIC BLOOD PRESSURE: 68 MMHG | TEMPERATURE: 98.3 F

## 2021-01-01 DIAGNOSIS — A41.9 SEPSIS WITHOUT ACUTE ORGAN DYSFUNCTION, DUE TO UNSPECIFIED ORGANISM (HCC): Primary | ICD-10-CM

## 2021-01-01 DIAGNOSIS — G82.20 PARAPLEGIA (HCC): ICD-10-CM

## 2021-01-01 DIAGNOSIS — Z93.59 SUPRAPUBIC CATHETER (HCC): ICD-10-CM

## 2021-01-01 DIAGNOSIS — R78.81 BACTEREMIA: ICD-10-CM

## 2021-01-01 DIAGNOSIS — D72.829 LEUKOCYTOSIS, UNSPECIFIED TYPE: ICD-10-CM

## 2021-01-01 DIAGNOSIS — L02.91 ABSCESS: ICD-10-CM

## 2021-01-01 LAB
ABO + RH BLD: NORMAL
ABO + RH BLD: NORMAL
ACC. NO. FROM MICRO ORDER, ACCP: ABNORMAL
ALBUMIN SERPL-MCNC: 1.5 G/DL (ref 3.2–4.6)
ALBUMIN SERPL-MCNC: 1.6 G/DL (ref 3.2–4.6)
ALBUMIN SERPL-MCNC: 1.6 G/DL (ref 3.2–4.6)
ALBUMIN SERPL-MCNC: 2.9 G/DL (ref 3.2–4.6)
ALBUMIN/GLOB SERPL: 0.3 {RATIO} (ref 1.2–3.5)
ALBUMIN/GLOB SERPL: 0.3 {RATIO} (ref 1.2–3.5)
ALBUMIN/GLOB SERPL: 0.4 {RATIO} (ref 1.2–3.5)
ALBUMIN/GLOB SERPL: 0.4 {RATIO} (ref 1.2–3.5)
ALBUMIN/GLOB SERPL: 0.5 {RATIO} (ref 1.2–3.5)
ALBUMIN/GLOB SERPL: 0.5 {RATIO} (ref 1.2–3.5)
ALBUMIN/GLOB SERPL: 0.6 {RATIO} (ref 1.2–3.5)
ALP SERPL-CCNC: 100 U/L (ref 50–136)
ALP SERPL-CCNC: 120 U/L (ref 50–136)
ALP SERPL-CCNC: 163 U/L (ref 50–136)
ALP SERPL-CCNC: 79 U/L (ref 50–136)
ALP SERPL-CCNC: 79 U/L (ref 50–136)
ALP SERPL-CCNC: 80 U/L (ref 50–136)
ALP SERPL-CCNC: 88 U/L (ref 50–136)
ALT SERPL-CCNC: 12 U/L (ref 12–65)
ALT SERPL-CCNC: 14 U/L (ref 12–65)
ALT SERPL-CCNC: 14 U/L (ref 12–65)
ALT SERPL-CCNC: 18 U/L (ref 12–65)
ALT SERPL-CCNC: 22 U/L (ref 12–65)
ALT SERPL-CCNC: 25 U/L (ref 12–65)
ALT SERPL-CCNC: <6 U/L (ref 12–65)
ANION GAP SERPL CALC-SCNC: 10 MMOL/L (ref 7–16)
ANION GAP SERPL CALC-SCNC: 10 MMOL/L (ref 7–16)
ANION GAP SERPL CALC-SCNC: 11 MMOL/L (ref 7–16)
ANION GAP SERPL CALC-SCNC: 5 MMOL/L (ref 7–16)
ANION GAP SERPL CALC-SCNC: 5 MMOL/L (ref 7–16)
ANION GAP SERPL CALC-SCNC: 6 MMOL/L (ref 7–16)
ANION GAP SERPL CALC-SCNC: 6 MMOL/L (ref 7–16)
ANION GAP SERPL CALC-SCNC: 7 MMOL/L (ref 7–16)
ANION GAP SERPL CALC-SCNC: 8 MMOL/L (ref 7–16)
ANION GAP SERPL CALC-SCNC: 9 MMOL/L (ref 7–16)
APPEARANCE UR: ABNORMAL
APPEARANCE UR: ABNORMAL
APPEARANCE UR: CLEAR
AST SERPL-CCNC: 16 U/L (ref 15–37)
AST SERPL-CCNC: 16 U/L (ref 15–37)
AST SERPL-CCNC: 23 U/L (ref 15–37)
AST SERPL-CCNC: 23 U/L (ref 15–37)
AST SERPL-CCNC: 27 U/L (ref 15–37)
AST SERPL-CCNC: 27 U/L (ref 15–37)
AST SERPL-CCNC: 49 U/L (ref 15–37)
BACTERIA SPEC CULT: ABNORMAL
BACTERIA SPEC CULT: NORMAL
BACTERIA URNS QL MICRO: 0 /HPF
BACTERIA URNS QL MICRO: 0 /HPF
BACTERIA URNS QL MICRO: ABNORMAL /HPF
BASOPHILS # BLD: 0 K/UL (ref 0–0.2)
BASOPHILS # BLD: 0.1 K/UL (ref 0–0.2)
BASOPHILS # BLD: 0.2 K/UL (ref 0–0.2)
BASOPHILS NFR BLD: 0 % (ref 0–2)
BASOPHILS NFR BLD: 1 % (ref 0–2)
BILIRUB SERPL-MCNC: 0.5 MG/DL (ref 0.2–1.1)
BILIRUB SERPL-MCNC: 0.6 MG/DL (ref 0.2–1.1)
BILIRUB SERPL-MCNC: 1.1 MG/DL (ref 0.2–1.1)
BILIRUB UR QL: NEGATIVE
BLD PROD TYP BPU: NORMAL
BLD PROD TYP BPU: NORMAL
BLOOD GROUP ANTIBODIES SERPL: NORMAL
BLOOD GROUP ANTIBODIES SERPL: NORMAL
BNP SERPL-MCNC: ABNORMAL PG/ML
BPU ID: NORMAL
BPU ID: NORMAL
BUN SERPL-MCNC: 21 MG/DL (ref 8–23)
BUN SERPL-MCNC: 23 MG/DL (ref 8–23)
BUN SERPL-MCNC: 26 MG/DL (ref 8–23)
BUN SERPL-MCNC: 26 MG/DL (ref 8–23)
BUN SERPL-MCNC: 27 MG/DL (ref 8–23)
BUN SERPL-MCNC: 28 MG/DL (ref 8–23)
BUN SERPL-MCNC: 37 MG/DL (ref 8–23)
BUN SERPL-MCNC: 40 MG/DL (ref 8–23)
BUN SERPL-MCNC: 40 MG/DL (ref 8–23)
BUN SERPL-MCNC: 43 MG/DL (ref 8–23)
BUN SERPL-MCNC: 45 MG/DL (ref 8–23)
BUN SERPL-MCNC: 48 MG/DL (ref 8–23)
BUN SERPL-MCNC: 49 MG/DL (ref 8–23)
BUN SERPL-MCNC: 49 MG/DL (ref 8–23)
BUN SERPL-MCNC: 50 MG/DL (ref 8–23)
CA-I BLD-MCNC: 4.12 MG/DL (ref 4–5.2)
CALCIUM SERPL-MCNC: 7.7 MG/DL (ref 8.3–10.4)
CALCIUM SERPL-MCNC: 7.7 MG/DL (ref 8.3–10.4)
CALCIUM SERPL-MCNC: 7.8 MG/DL (ref 8.3–10.4)
CALCIUM SERPL-MCNC: 7.9 MG/DL (ref 8.3–10.4)
CALCIUM SERPL-MCNC: 8 MG/DL (ref 8.3–10.4)
CALCIUM SERPL-MCNC: 8.1 MG/DL (ref 8.3–10.4)
CALCIUM SERPL-MCNC: 8.1 MG/DL (ref 8.3–10.4)
CALCIUM SERPL-MCNC: 8.2 MG/DL (ref 8.3–10.4)
CALCIUM SERPL-MCNC: 8.2 MG/DL (ref 8.3–10.4)
CALCIUM SERPL-MCNC: 8.3 MG/DL (ref 8.3–10.4)
CALCIUM SERPL-MCNC: 8.4 MG/DL (ref 8.3–10.4)
CALCIUM SERPL-MCNC: 8.5 MG/DL (ref 8.3–10.4)
CALCIUM SERPL-MCNC: 8.5 MG/DL (ref 8.3–10.4)
CALCIUM SERPL-MCNC: 8.9 MG/DL (ref 8.3–10.4)
CASTS URNS QL MICRO: 0 /LPF
CHLORIDE SERPL-SCNC: 100 MMOL/L (ref 98–107)
CHLORIDE SERPL-SCNC: 101 MMOL/L (ref 98–107)
CHLORIDE SERPL-SCNC: 102 MMOL/L (ref 98–107)
CHLORIDE SERPL-SCNC: 104 MMOL/L (ref 98–107)
CHLORIDE SERPL-SCNC: 105 MMOL/L (ref 98–107)
CHLORIDE SERPL-SCNC: 106 MMOL/L (ref 98–107)
CHLORIDE SERPL-SCNC: 107 MMOL/L (ref 98–107)
CHLORIDE SERPL-SCNC: 107 MMOL/L (ref 98–107)
CHLORIDE SERPL-SCNC: 108 MMOL/L (ref 98–107)
CHLORIDE SERPL-SCNC: 109 MMOL/L (ref 98–107)
CHLORIDE SERPL-SCNC: 111 MMOL/L (ref 98–107)
CHLORIDE SERPL-SCNC: 111 MMOL/L (ref 98–107)
CHLORIDE SERPL-SCNC: 112 MMOL/L (ref 98–107)
CHLORIDE SERPL-SCNC: 112 MMOL/L (ref 98–107)
CHLORIDE SERPL-SCNC: 94 MMOL/L (ref 98–107)
CK MB CFR SERPL CALC: 5 %
CK MB SERPL-MCNC: 4.7 NG/ML (ref 0.5–3.6)
CK SERPL-CCNC: 85 U/L (ref 21–215)
CK SERPL-CCNC: 94 U/L (ref 21–215)
CO2 SERPL-SCNC: 16 MMOL/L (ref 21–32)
CO2 SERPL-SCNC: 19 MMOL/L (ref 21–32)
CO2 SERPL-SCNC: 21 MMOL/L (ref 21–32)
CO2 SERPL-SCNC: 22 MMOL/L (ref 21–32)
CO2 SERPL-SCNC: 23 MMOL/L (ref 21–32)
CO2 SERPL-SCNC: 25 MMOL/L (ref 21–32)
CO2 SERPL-SCNC: 26 MMOL/L (ref 21–32)
CO2 SERPL-SCNC: 27 MMOL/L (ref 21–32)
CO2 SERPL-SCNC: 28 MMOL/L (ref 21–32)
CO2 SERPL-SCNC: 28 MMOL/L (ref 21–32)
COLOR UR: ABNORMAL
COLOR UR: YELLOW
COLOR UR: YELLOW
COVID-19 RAPID TEST, COVR: NOT DETECTED
CREAT SERPL-MCNC: 0.75 MG/DL (ref 0.8–1.5)
CREAT SERPL-MCNC: 0.89 MG/DL (ref 0.8–1.5)
CREAT SERPL-MCNC: 0.92 MG/DL (ref 0.8–1.5)
CREAT SERPL-MCNC: 0.93 MG/DL (ref 0.8–1.5)
CREAT SERPL-MCNC: 1.04 MG/DL (ref 0.8–1.5)
CREAT SERPL-MCNC: 1.07 MG/DL (ref 0.8–1.5)
CREAT SERPL-MCNC: 1.08 MG/DL (ref 0.8–1.5)
CREAT SERPL-MCNC: 1.09 MG/DL (ref 0.8–1.5)
CREAT SERPL-MCNC: 1.09 MG/DL (ref 0.8–1.5)
CREAT SERPL-MCNC: 1.12 MG/DL (ref 0.8–1.5)
CREAT SERPL-MCNC: 1.16 MG/DL (ref 0.8–1.5)
CREAT SERPL-MCNC: 1.21 MG/DL (ref 0.8–1.5)
CREAT SERPL-MCNC: 1.3 MG/DL (ref 0.8–1.5)
CREAT SERPL-MCNC: 1.43 MG/DL (ref 0.8–1.5)
CREAT SERPL-MCNC: 1.48 MG/DL (ref 0.8–1.5)
CREAT SERPL-MCNC: 1.61 MG/DL (ref 0.8–1.5)
CREAT SERPL-MCNC: 1.69 MG/DL (ref 0.8–1.5)
CREAT SERPL-MCNC: 1.7 MG/DL (ref 0.8–1.5)
CREAT SERPL-MCNC: 1.72 MG/DL (ref 0.8–1.5)
CREAT SERPL-MCNC: 1.79 MG/DL (ref 0.8–1.5)
CREAT SERPL-MCNC: 1.83 MG/DL (ref 0.8–1.5)
CREAT UR-MCNC: 117 MG/DL
CREAT UR-MCNC: 121 MG/DL
CROSSMATCH RESULT,%XM: NORMAL
CROSSMATCH RESULT,%XM: NORMAL
D DIMER PPP FEU-MCNC: 1.37 UG/ML(FEU)
DIFFERENTIAL METHOD BLD: ABNORMAL
EOSINOPHIL # BLD: 0 K/UL (ref 0–0.8)
EOSINOPHIL # BLD: 0.1 K/UL (ref 0–0.8)
EOSINOPHIL # BLD: 0.2 K/UL (ref 0–0.8)
EOSINOPHIL NFR BLD: 0 % (ref 0.5–7.8)
EOSINOPHIL NFR BLD: 1 % (ref 0.5–7.8)
EPI CELLS #/AREA URNS HPF: 0 /HPF
EPI CELLS #/AREA URNS HPF: ABNORMAL /HPF
EPI CELLS #/AREA URNS HPF: ABNORMAL /HPF
ERYTHROCYTE [DISTWIDTH] IN BLOOD BY AUTOMATED COUNT: 19.7 % (ref 11.9–14.6)
ERYTHROCYTE [DISTWIDTH] IN BLOOD BY AUTOMATED COUNT: 19.8 % (ref 11.9–14.6)
ERYTHROCYTE [DISTWIDTH] IN BLOOD BY AUTOMATED COUNT: 20 % (ref 11.9–14.6)
ERYTHROCYTE [DISTWIDTH] IN BLOOD BY AUTOMATED COUNT: 20.2 % (ref 11.9–14.6)
ERYTHROCYTE [DISTWIDTH] IN BLOOD BY AUTOMATED COUNT: 20.2 % (ref 11.9–14.6)
ERYTHROCYTE [DISTWIDTH] IN BLOOD BY AUTOMATED COUNT: 20.3 % (ref 11.9–14.6)
ERYTHROCYTE [DISTWIDTH] IN BLOOD BY AUTOMATED COUNT: 20.6 % (ref 11.9–14.6)
ERYTHROCYTE [DISTWIDTH] IN BLOOD BY AUTOMATED COUNT: 21.1 % (ref 11.9–14.6)
ERYTHROCYTE [DISTWIDTH] IN BLOOD BY AUTOMATED COUNT: 21.1 % (ref 11.9–14.6)
ERYTHROCYTE [DISTWIDTH] IN BLOOD BY AUTOMATED COUNT: 21.7 % (ref 11.9–14.6)
GLOBULIN SER CALC-MCNC: 3.4 G/DL (ref 2.3–3.5)
GLOBULIN SER CALC-MCNC: 3.4 G/DL (ref 2.3–3.5)
GLOBULIN SER CALC-MCNC: 3.7 G/DL (ref 2.3–3.5)
GLOBULIN SER CALC-MCNC: 4.1 G/DL (ref 2.3–3.5)
GLOBULIN SER CALC-MCNC: 4.4 G/DL (ref 2.3–3.5)
GLOBULIN SER CALC-MCNC: 4.4 G/DL (ref 2.3–3.5)
GLOBULIN SER CALC-MCNC: 4.6 G/DL (ref 2.3–3.5)
GLUCOSE BLD STRIP.AUTO-MCNC: 155 MG/DL (ref 65–100)
GLUCOSE SERPL-MCNC: 101 MG/DL (ref 65–100)
GLUCOSE SERPL-MCNC: 106 MG/DL (ref 65–100)
GLUCOSE SERPL-MCNC: 127 MG/DL (ref 65–100)
GLUCOSE SERPL-MCNC: 135 MG/DL (ref 65–100)
GLUCOSE SERPL-MCNC: 58 MG/DL (ref 65–100)
GLUCOSE SERPL-MCNC: 68 MG/DL (ref 65–100)
GLUCOSE SERPL-MCNC: 72 MG/DL (ref 65–100)
GLUCOSE SERPL-MCNC: 73 MG/DL (ref 65–100)
GLUCOSE SERPL-MCNC: 74 MG/DL (ref 65–100)
GLUCOSE SERPL-MCNC: 77 MG/DL (ref 65–100)
GLUCOSE SERPL-MCNC: 80 MG/DL (ref 65–100)
GLUCOSE SERPL-MCNC: 80 MG/DL (ref 65–100)
GLUCOSE SERPL-MCNC: 83 MG/DL (ref 65–100)
GLUCOSE SERPL-MCNC: 85 MG/DL (ref 65–100)
GLUCOSE SERPL-MCNC: 88 MG/DL (ref 65–100)
GLUCOSE SERPL-MCNC: 89 MG/DL (ref 65–100)
GLUCOSE SERPL-MCNC: 89 MG/DL (ref 65–100)
GLUCOSE SERPL-MCNC: 90 MG/DL (ref 65–100)
GLUCOSE SERPL-MCNC: 92 MG/DL (ref 65–100)
GLUCOSE UR STRIP.AUTO-MCNC: NEGATIVE MG/DL
GRAM STN SPEC: ABNORMAL
HCT VFR BLD AUTO: 23.1 % (ref 41.1–50.3)
HCT VFR BLD AUTO: 23.2 % (ref 41.1–50.3)
HCT VFR BLD AUTO: 23.2 % (ref 41.1–50.3)
HCT VFR BLD AUTO: 23.7 % (ref 41.1–50.3)
HCT VFR BLD AUTO: 24.7 % (ref 41.1–50.3)
HCT VFR BLD AUTO: 24.7 % (ref 41.1–50.3)
HCT VFR BLD AUTO: 25.3 % (ref 41.1–50.3)
HCT VFR BLD AUTO: 25.3 % (ref 41.1–50.3)
HCT VFR BLD AUTO: 25.8 % (ref 41.1–50.3)
HCT VFR BLD AUTO: 26 % (ref 41.1–50.3)
HCT VFR BLD AUTO: 26.2 % (ref 41.1–50.3)
HCT VFR BLD AUTO: 27.5 % (ref 41.1–50.3)
HCT VFR BLD AUTO: 30 % (ref 41.1–50.3)
HGB BLD-MCNC: 7 G/DL (ref 13.6–17.2)
HGB BLD-MCNC: 7.1 G/DL (ref 13.6–17.2)
HGB BLD-MCNC: 7.2 G/DL (ref 13.6–17.2)
HGB BLD-MCNC: 7.4 G/DL (ref 13.6–17.2)
HGB BLD-MCNC: 7.5 G/DL (ref 13.6–17.2)
HGB BLD-MCNC: 7.6 G/DL (ref 13.6–17.2)
HGB BLD-MCNC: 7.7 G/DL (ref 13.6–17.2)
HGB BLD-MCNC: 8 G/DL (ref 13.6–17.2)
HGB BLD-MCNC: 8.1 G/DL (ref 13.6–17.2)
HGB BLD-MCNC: 8.1 G/DL (ref 13.6–17.2)
HGB BLD-MCNC: 8.4 G/DL (ref 13.6–17.2)
HGB BLD-MCNC: 9.4 G/DL (ref 13.6–17.2)
HGB UR QL STRIP: ABNORMAL
HGB UR QL STRIP: ABNORMAL
HGB UR QL STRIP: NEGATIVE
HISTORY CHECKED?,CKHIST: NORMAL
HISTORY CHECKED?,CKHIST: NORMAL
IMM GRANULOCYTES # BLD AUTO: 0.3 K/UL (ref 0–0.5)
IMM GRANULOCYTES # BLD AUTO: 0.3 K/UL (ref 0–0.5)
IMM GRANULOCYTES # BLD AUTO: 0.4 K/UL (ref 0–0.5)
IMM GRANULOCYTES # BLD AUTO: 0.4 K/UL (ref 0–0.5)
IMM GRANULOCYTES # BLD AUTO: 0.5 K/UL (ref 0–0.5)
IMM GRANULOCYTES # BLD AUTO: 0.5 K/UL (ref 0–0.5)
IMM GRANULOCYTES # BLD AUTO: 0.6 K/UL (ref 0–0.5)
IMM GRANULOCYTES # BLD AUTO: 0.6 K/UL (ref 0–0.5)
IMM GRANULOCYTES # BLD AUTO: 0.7 K/UL (ref 0–0.5)
IMM GRANULOCYTES # BLD AUTO: 0.8 K/UL (ref 0–0.5)
IMM GRANULOCYTES # BLD AUTO: 0.9 K/UL (ref 0–0.5)
IMM GRANULOCYTES # BLD AUTO: 1 K/UL (ref 0–0.5)
IMM GRANULOCYTES # BLD AUTO: 1.6 K/UL (ref 0–0.5)
IMM GRANULOCYTES NFR BLD AUTO: 2 % (ref 0–5)
IMM GRANULOCYTES NFR BLD AUTO: 3 % (ref 0–5)
IMM GRANULOCYTES NFR BLD AUTO: 3 % (ref 0–5)
IMM GRANULOCYTES NFR BLD AUTO: 4 % (ref 0–5)
IMM GRANULOCYTES NFR BLD AUTO: 4 % (ref 0–5)
IMM GRANULOCYTES NFR BLD AUTO: 5 % (ref 0–5)
IMM GRANULOCYTES NFR BLD AUTO: 6 % (ref 0–5)
IMM GRANULOCYTES NFR BLD AUTO: 6 % (ref 0–5)
IMM GRANULOCYTES NFR BLD AUTO: 7 % (ref 0–5)
IMM GRANULOCYTES NFR BLD AUTO: 9 % (ref 0–5)
INR PPP: 1.4
INTERPRETATION: ABNORMAL
KETONES UR QL STRIP.AUTO: ABNORMAL MG/DL
KETONES UR QL STRIP.AUTO: NEGATIVE MG/DL
KETONES UR QL STRIP.AUTO: NEGATIVE MG/DL
LACTATE SERPL-SCNC: 2.7 MMOL/L (ref 0.4–2)
LEUKOCYTE ESTERASE UR QL STRIP.AUTO: ABNORMAL
LYMPHOCYTES # BLD: 0.5 K/UL (ref 0.5–4.6)
LYMPHOCYTES # BLD: 0.7 K/UL (ref 0.5–4.6)
LYMPHOCYTES # BLD: 0.7 K/UL (ref 0.5–4.6)
LYMPHOCYTES # BLD: 0.8 K/UL (ref 0.5–4.6)
LYMPHOCYTES # BLD: 0.9 K/UL (ref 0.5–4.6)
LYMPHOCYTES # BLD: 1.2 K/UL (ref 0.5–4.6)
LYMPHOCYTES # BLD: 1.2 K/UL (ref 0.5–4.6)
LYMPHOCYTES # BLD: 2.6 K/UL (ref 0.5–4.6)
LYMPHOCYTES NFR BLD: 15 % (ref 13–44)
LYMPHOCYTES NFR BLD: 2 % (ref 13–44)
LYMPHOCYTES NFR BLD: 4 % (ref 13–44)
LYMPHOCYTES NFR BLD: 5 % (ref 13–44)
LYMPHOCYTES NFR BLD: 5 % (ref 13–44)
LYMPHOCYTES NFR BLD: 6 % (ref 13–44)
LYMPHOCYTES NFR BLD: 7 % (ref 13–44)
LYMPHOCYTES NFR BLD: 7 % (ref 13–44)
LYMPHOCYTES NFR BLD: 8 % (ref 13–44)
MAGNESIUM SERPL-MCNC: 1.9 MG/DL (ref 1.8–2.4)
MAGNESIUM SERPL-MCNC: 2 MG/DL (ref 1.8–2.4)
MAGNESIUM SERPL-MCNC: 2.2 MG/DL (ref 1.8–2.4)
MAGNESIUM SERPL-MCNC: 2.4 MG/DL (ref 1.8–2.4)
MCH RBC QN AUTO: 29.6 PG (ref 26.1–32.9)
MCH RBC QN AUTO: 29.7 PG (ref 26.1–32.9)
MCH RBC QN AUTO: 29.9 PG (ref 26.1–32.9)
MCH RBC QN AUTO: 30.3 PG (ref 26.1–32.9)
MCH RBC QN AUTO: 30.4 PG (ref 26.1–32.9)
MCH RBC QN AUTO: 30.4 PG (ref 26.1–32.9)
MCH RBC QN AUTO: 30.5 PG (ref 26.1–32.9)
MCH RBC QN AUTO: 30.6 PG (ref 26.1–32.9)
MCH RBC QN AUTO: 30.7 PG (ref 26.1–32.9)
MCH RBC QN AUTO: 30.8 PG (ref 26.1–32.9)
MCH RBC QN AUTO: 30.9 PG (ref 26.1–32.9)
MCH RBC QN AUTO: 31.1 PG (ref 26.1–32.9)
MCH RBC QN AUTO: 31.2 PG (ref 26.1–32.9)
MCH RBC QN AUTO: 31.4 PG (ref 26.1–32.9)
MCH RBC QN AUTO: 31.4 PG (ref 26.1–32.9)
MCHC RBC AUTO-ENTMCNC: 28.6 G/DL (ref 31.4–35)
MCHC RBC AUTO-ENTMCNC: 30 G/DL (ref 31.4–35)
MCHC RBC AUTO-ENTMCNC: 30.3 G/DL (ref 31.4–35)
MCHC RBC AUTO-ENTMCNC: 30.4 G/DL (ref 31.4–35)
MCHC RBC AUTO-ENTMCNC: 30.4 G/DL (ref 31.4–35)
MCHC RBC AUTO-ENTMCNC: 30.5 G/DL (ref 31.4–35)
MCHC RBC AUTO-ENTMCNC: 30.8 G/DL (ref 31.4–35)
MCHC RBC AUTO-ENTMCNC: 31 G/DL (ref 31.4–35)
MCHC RBC AUTO-ENTMCNC: 31 G/DL (ref 31.4–35)
MCHC RBC AUTO-ENTMCNC: 31.2 G/DL (ref 31.4–35)
MCHC RBC AUTO-ENTMCNC: 31.3 G/DL (ref 31.4–35)
MCHC RBC AUTO-ENTMCNC: 31.6 G/DL (ref 31.4–35)
MCHC RBC AUTO-ENTMCNC: 31.6 G/DL (ref 31.4–35)
MCHC RBC AUTO-ENTMCNC: 31.9 G/DL (ref 31.4–35)
MCHC RBC AUTO-ENTMCNC: 32 G/DL (ref 31.4–35)
MCV RBC AUTO: 100 FL (ref 79.6–97.8)
MCV RBC AUTO: 100.4 FL (ref 79.6–97.8)
MCV RBC AUTO: 101.2 FL (ref 79.6–97.8)
MCV RBC AUTO: 102.5 FL (ref 79.6–97.8)
MCV RBC AUTO: 109.6 FL (ref 79.6–97.8)
MCV RBC AUTO: 94.9 FL (ref 79.6–97.8)
MCV RBC AUTO: 95.9 FL (ref 79.6–97.8)
MCV RBC AUTO: 96.2 FL (ref 79.6–97.8)
MCV RBC AUTO: 97.1 FL (ref 79.6–97.8)
MCV RBC AUTO: 97.5 FL (ref 79.6–97.8)
MCV RBC AUTO: 97.9 FL (ref 79.6–97.8)
MCV RBC AUTO: 98.3 FL (ref 79.6–97.8)
MCV RBC AUTO: 98.8 FL (ref 79.6–97.8)
MCV RBC AUTO: 99.3 FL (ref 79.6–97.8)
MCV RBC AUTO: 99.6 FL (ref 79.6–97.8)
MONOCYTES # BLD: 0.5 K/UL (ref 0.1–1.3)
MONOCYTES # BLD: 0.7 K/UL (ref 0.1–1.3)
MONOCYTES # BLD: 0.7 K/UL (ref 0.1–1.3)
MONOCYTES # BLD: 0.8 K/UL (ref 0.1–1.3)
MONOCYTES # BLD: 0.9 K/UL (ref 0.1–1.3)
MONOCYTES # BLD: 1 K/UL (ref 0.1–1.3)
MONOCYTES # BLD: 1.1 K/UL (ref 0.1–1.3)
MONOCYTES # BLD: 1.1 K/UL (ref 0.1–1.3)
MONOCYTES # BLD: 1.2 K/UL (ref 0.1–1.3)
MONOCYTES # BLD: 1.2 K/UL (ref 0.1–1.3)
MONOCYTES NFR BLD: 3 % (ref 4–12)
MONOCYTES NFR BLD: 3 % (ref 4–12)
MONOCYTES NFR BLD: 5 % (ref 4–12)
MONOCYTES NFR BLD: 6 % (ref 4–12)
MONOCYTES NFR BLD: 7 % (ref 4–12)
MONOCYTES NFR BLD: 8 % (ref 4–12)
MUCOUS THREADS URNS QL MICRO: ABNORMAL /LPF
NEUTS SEG # BLD: 10.5 K/UL (ref 1.7–8.2)
NEUTS SEG # BLD: 10.6 K/UL (ref 1.7–8.2)
NEUTS SEG # BLD: 10.9 K/UL (ref 1.7–8.2)
NEUTS SEG # BLD: 11.1 K/UL (ref 1.7–8.2)
NEUTS SEG # BLD: 11.5 K/UL (ref 1.7–8.2)
NEUTS SEG # BLD: 11.6 K/UL (ref 1.7–8.2)
NEUTS SEG # BLD: 12.1 K/UL (ref 1.7–8.2)
NEUTS SEG # BLD: 12.7 K/UL (ref 1.7–8.2)
NEUTS SEG # BLD: 12.7 K/UL (ref 1.7–8.2)
NEUTS SEG # BLD: 13.5 K/UL (ref 1.7–8.2)
NEUTS SEG # BLD: 15.8 K/UL (ref 1.7–8.2)
NEUTS SEG # BLD: 16.8 K/UL (ref 1.7–8.2)
NEUTS SEG # BLD: 26.3 K/UL (ref 1.7–8.2)
NEUTS SEG # BLD: 8.5 K/UL (ref 1.7–8.2)
NEUTS SEG # BLD: 8.9 K/UL (ref 1.7–8.2)
NEUTS SEG NFR BLD: 72 % (ref 43–78)
NEUTS SEG NFR BLD: 76 % (ref 43–78)
NEUTS SEG NFR BLD: 78 % (ref 43–78)
NEUTS SEG NFR BLD: 79 % (ref 43–78)
NEUTS SEG NFR BLD: 80 % (ref 43–78)
NEUTS SEG NFR BLD: 81 % (ref 43–78)
NEUTS SEG NFR BLD: 81 % (ref 43–78)
NEUTS SEG NFR BLD: 82 % (ref 43–78)
NEUTS SEG NFR BLD: 82 % (ref 43–78)
NEUTS SEG NFR BLD: 83 % (ref 43–78)
NEUTS SEG NFR BLD: 83 % (ref 43–78)
NEUTS SEG NFR BLD: 85 % (ref 43–78)
NEUTS SEG NFR BLD: 87 % (ref 43–78)
NEUTS SEG NFR BLD: 88 % (ref 43–78)
NEUTS SEG NFR BLD: 92 % (ref 43–78)
NITRITE UR QL STRIP.AUTO: NEGATIVE
NITRITE UR QL STRIP.AUTO: NEGATIVE
NITRITE UR QL STRIP.AUTO: POSITIVE
NRBC # BLD: 0 K/UL (ref 0–0.2)
NRBC # BLD: 0.02 K/UL (ref 0–0.2)
NRBC # BLD: 0.03 K/UL (ref 0–0.2)
NRBC # BLD: 0.05 K/UL (ref 0–0.2)
NRBC # BLD: 0.23 K/UL (ref 0–0.2)
OTHER OBSERVATIONS,UCOM: ABNORMAL
OTHER OBSERVATIONS,UCOM: ABNORMAL
PH UR STRIP: 5 [PH] (ref 5–9)
PH UR STRIP: 5.5 [PH] (ref 5–9)
PH UR STRIP: 5.5 [PH] (ref 5–9)
PHOSPHATE SERPL-MCNC: 2.8 MG/DL (ref 2.3–3.7)
PHOSPHATE SERPL-MCNC: 5 MG/DL (ref 2.3–3.7)
PLATELET # BLD AUTO: 152 K/UL (ref 150–450)
PLATELET # BLD AUTO: 169 K/UL (ref 150–450)
PLATELET # BLD AUTO: 180 K/UL (ref 150–450)
PLATELET # BLD AUTO: 184 K/UL (ref 150–450)
PLATELET # BLD AUTO: 188 K/UL (ref 150–450)
PLATELET # BLD AUTO: 189 K/UL (ref 150–450)
PLATELET # BLD AUTO: 198 K/UL (ref 150–450)
PLATELET # BLD AUTO: 202 K/UL (ref 150–450)
PLATELET # BLD AUTO: 202 K/UL (ref 150–450)
PLATELET # BLD AUTO: 203 K/UL (ref 150–450)
PLATELET # BLD AUTO: 218 K/UL (ref 150–450)
PLATELET # BLD AUTO: 221 K/UL (ref 150–450)
PLATELET # BLD AUTO: 223 K/UL (ref 150–450)
PLATELET # BLD AUTO: 245 K/UL (ref 150–450)
PLATELET # BLD AUTO: 255 K/UL (ref 150–450)
PLATELET COMMENTS,PCOM: ABNORMAL
PLATELET COMMENTS,PCOM: ADEQUATE
PMV BLD AUTO: 10.3 FL (ref 9.4–12.3)
PMV BLD AUTO: 8.7 FL (ref 9.4–12.3)
PMV BLD AUTO: 8.8 FL (ref 9.4–12.3)
PMV BLD AUTO: 9 FL (ref 9.4–12.3)
PMV BLD AUTO: 9 FL (ref 9.4–12.3)
PMV BLD AUTO: 9.1 FL (ref 9.4–12.3)
PMV BLD AUTO: 9.2 FL (ref 9.4–12.3)
PMV BLD AUTO: 9.3 FL (ref 9.4–12.3)
PMV BLD AUTO: 9.4 FL (ref 9.4–12.3)
POTASSIUM SERPL-SCNC: 3.6 MMOL/L (ref 3.5–5.1)
POTASSIUM SERPL-SCNC: 3.7 MMOL/L (ref 3.5–5.1)
POTASSIUM SERPL-SCNC: 3.8 MMOL/L (ref 3.5–5.1)
POTASSIUM SERPL-SCNC: 3.8 MMOL/L (ref 3.5–5.1)
POTASSIUM SERPL-SCNC: 4 MMOL/L (ref 3.5–5.1)
POTASSIUM SERPL-SCNC: 4.1 MMOL/L (ref 3.5–5.1)
POTASSIUM SERPL-SCNC: 4.1 MMOL/L (ref 3.5–5.1)
POTASSIUM SERPL-SCNC: 4.2 MMOL/L (ref 3.5–5.1)
POTASSIUM SERPL-SCNC: 4.3 MMOL/L (ref 3.5–5.1)
POTASSIUM SERPL-SCNC: 4.3 MMOL/L (ref 3.5–5.1)
POTASSIUM SERPL-SCNC: 4.4 MMOL/L (ref 3.5–5.1)
POTASSIUM SERPL-SCNC: 4.5 MMOL/L (ref 3.5–5.1)
POTASSIUM SERPL-SCNC: 4.5 MMOL/L (ref 3.5–5.1)
POTASSIUM SERPL-SCNC: 5 MMOL/L (ref 3.5–5.1)
POTASSIUM SERPL-SCNC: 5.2 MMOL/L (ref 3.5–5.1)
POTASSIUM SERPL-SCNC: 5.6 MMOL/L (ref 3.5–5.1)
POTASSIUM SERPL-SCNC: 5.9 MMOL/L (ref 3.5–5.1)
PROCALCITONIN SERPL-MCNC: 0.3 NG/ML
PROCALCITONIN SERPL-MCNC: 0.34 NG/ML
PROT SERPL-MCNC: 5 G/DL (ref 6.3–8.2)
PROT SERPL-MCNC: 5 G/DL (ref 6.3–8.2)
PROT SERPL-MCNC: 5.2 G/DL (ref 6.3–8.2)
PROT SERPL-MCNC: 5.6 G/DL (ref 6.3–8.2)
PROT SERPL-MCNC: 5.9 G/DL (ref 6.3–8.2)
PROT SERPL-MCNC: 5.9 G/DL (ref 6.3–8.2)
PROT SERPL-MCNC: 7.5 G/DL (ref 6.3–8.2)
PROT UR STRIP-MCNC: 100 MG/DL
PROT UR STRIP-MCNC: NEGATIVE MG/DL
PROT UR STRIP-MCNC: NEGATIVE MG/DL
PROT UR-MCNC: 120 MG/DL
PROT/CREAT UR-RTO: 1
PROTHROMBIN TIME: 17.2 SEC (ref 12.5–14.7)
RBC # BLD AUTO: 2.3 M/UL (ref 4.23–5.6)
RBC # BLD AUTO: 2.36 M/UL (ref 4.23–5.6)
RBC # BLD AUTO: 2.37 M/UL (ref 4.23–5.6)
RBC # BLD AUTO: 2.39 M/UL (ref 4.23–5.6)
RBC # BLD AUTO: 2.4 M/UL (ref 4.23–5.6)
RBC # BLD AUTO: 2.41 M/UL (ref 4.23–5.6)
RBC # BLD AUTO: 2.43 M/UL (ref 4.23–5.6)
RBC # BLD AUTO: 2.44 M/UL (ref 4.23–5.6)
RBC # BLD AUTO: 2.48 M/UL (ref 4.23–5.6)
RBC # BLD AUTO: 2.53 M/UL (ref 4.23–5.6)
RBC # BLD AUTO: 2.55 M/UL (ref 4.23–5.6)
RBC # BLD AUTO: 2.63 M/UL (ref 4.23–5.6)
RBC # BLD AUTO: 2.71 M/UL (ref 4.23–5.6)
RBC # BLD AUTO: 2.77 M/UL (ref 4.23–5.6)
RBC # BLD AUTO: 3.16 M/UL (ref 4.23–5.6)
RBC #/AREA URNS HPF: ABNORMAL /HPF
RBC MORPH BLD: ABNORMAL
SARS COV-2, XPGCVT: NEGATIVE
SERVICE CMNT-IMP: ABNORMAL
SERVICE CMNT-IMP: NORMAL
SODIUM SERPL-SCNC: 129 MMOL/L (ref 136–145)
SODIUM SERPL-SCNC: 134 MMOL/L (ref 136–145)
SODIUM SERPL-SCNC: 134 MMOL/L (ref 136–145)
SODIUM SERPL-SCNC: 135 MMOL/L (ref 138–145)
SODIUM SERPL-SCNC: 136 MMOL/L (ref 136–145)
SODIUM SERPL-SCNC: 136 MMOL/L (ref 138–145)
SODIUM SERPL-SCNC: 136 MMOL/L (ref 138–145)
SODIUM SERPL-SCNC: 137 MMOL/L (ref 136–145)
SODIUM SERPL-SCNC: 137 MMOL/L (ref 136–145)
SODIUM SERPL-SCNC: 137 MMOL/L (ref 138–145)
SODIUM SERPL-SCNC: 138 MMOL/L (ref 136–145)
SODIUM SERPL-SCNC: 138 MMOL/L (ref 136–145)
SODIUM SERPL-SCNC: 139 MMOL/L (ref 138–145)
SODIUM SERPL-SCNC: 140 MMOL/L (ref 136–145)
SODIUM SERPL-SCNC: 141 MMOL/L (ref 136–145)
SODIUM SERPL-SCNC: 141 MMOL/L (ref 136–145)
SODIUM SERPL-SCNC: 142 MMOL/L (ref 138–145)
SODIUM UR-SCNC: 13 MMOL/L
SOURCE, COVRS: NORMAL
SP GR UR REFRACTOMETRY: 1.01 (ref 1–1.02)
SP GR UR REFRACTOMETRY: 1.01 (ref 1–1.02)
SP GR UR REFRACTOMETRY: 1.02 (ref 1–1.02)
SPECIMEN EXP DATE BLD: NORMAL
SPECIMEN EXP DATE BLD: NORMAL
STATUS OF UNIT,%ST: NORMAL
STATUS OF UNIT,%ST: NORMAL
STREPTOCOCCUS , STPSP: DETECTED
TROPONIN-HIGH SENSITIVITY: 48.1 PG/ML (ref 0–14)
UNIT DIVISION, %UDIV: 0
UNIT DIVISION, %UDIV: 0
URATE SERPL-MCNC: 10.6 MG/DL (ref 2.6–6)
URATE SERPL-MCNC: 10.9 MG/DL (ref 2.6–6)
UROBILINOGEN UR QL STRIP.AUTO: 0.2 EU/DL (ref 0.2–1)
UROBILINOGEN UR QL STRIP.AUTO: 1 EU/DL (ref 0.2–1)
UROBILINOGEN UR QL STRIP.AUTO: 1 EU/DL (ref 0.2–1)
WBC # BLD AUTO: 10.6 K/UL (ref 4.3–11.1)
WBC # BLD AUTO: 11 K/UL (ref 4.3–11.1)
WBC # BLD AUTO: 12.9 K/UL (ref 4.3–11.1)
WBC # BLD AUTO: 13.2 K/UL (ref 4.3–11.1)
WBC # BLD AUTO: 13.3 K/UL (ref 4.3–11.1)
WBC # BLD AUTO: 14.5 K/UL (ref 4.3–11.1)
WBC # BLD AUTO: 14.6 K/UL (ref 4.3–11.1)
WBC # BLD AUTO: 14.7 K/UL (ref 4.3–11.1)
WBC # BLD AUTO: 14.7 K/UL (ref 4.3–11.1)
WBC # BLD AUTO: 14.9 K/UL (ref 4.3–11.1)
WBC # BLD AUTO: 16.4 K/UL (ref 4.3–11.1)
WBC # BLD AUTO: 17.6 K/UL (ref 4.3–11.1)
WBC # BLD AUTO: 18 K/UL (ref 4.3–11.1)
WBC # BLD AUTO: 19.3 K/UL (ref 4.3–11.1)
WBC # BLD AUTO: 28.5 K/UL (ref 4.3–11.1)
WBC MORPH BLD: ABNORMAL
WBC URNS QL MICRO: >100 /HPF
WBC URNS QL MICRO: >100 /HPF
WBC URNS QL MICRO: ABNORMAL /HPF

## 2021-01-01 PROCEDURE — 84550 ASSAY OF BLOOD/URIC ACID: CPT

## 2021-01-01 PROCEDURE — 80053 COMPREHEN METABOLIC PANEL: CPT

## 2021-01-01 PROCEDURE — 85025 COMPLETE CBC W/AUTO DIFF WBC: CPT

## 2021-01-01 PROCEDURE — 74011250637 HC RX REV CODE- 250/637: Performed by: INTERNAL MEDICINE

## 2021-01-01 PROCEDURE — 36415 COLL VENOUS BLD VENIPUNCTURE: CPT

## 2021-01-01 PROCEDURE — 65270000029 HC RM PRIVATE

## 2021-01-01 PROCEDURE — 74011000258 HC RX REV CODE- 258: Performed by: INTERNAL MEDICINE

## 2021-01-01 PROCEDURE — 2709999900 HC NON-CHARGEABLE SUPPLY

## 2021-01-01 PROCEDURE — 84484 ASSAY OF TROPONIN QUANT: CPT

## 2021-01-01 PROCEDURE — 81001 URINALYSIS AUTO W/SCOPE: CPT

## 2021-01-01 PROCEDURE — 87040 BLOOD CULTURE FOR BACTERIA: CPT

## 2021-01-01 PROCEDURE — 83880 ASSAY OF NATRIURETIC PEPTIDE: CPT

## 2021-01-01 PROCEDURE — 80048 BASIC METABOLIC PNL TOTAL CA: CPT

## 2021-01-01 PROCEDURE — P9040 RBC LEUKOREDUCED IRRADIATED: HCPCS

## 2021-01-01 PROCEDURE — 71045 X-RAY EXAM CHEST 1 VIEW: CPT

## 2021-01-01 PROCEDURE — 82550 ASSAY OF CK (CPK): CPT

## 2021-01-01 PROCEDURE — 74011250636 HC RX REV CODE- 250/636: Performed by: EMERGENCY MEDICINE

## 2021-01-01 PROCEDURE — 30233N1 TRANSFUSION OF NONAUTOLOGOUS RED BLOOD CELLS INTO PERIPHERAL VEIN, PERCUTANEOUS APPROACH: ICD-10-PCS | Performed by: HOSPITALIST

## 2021-01-01 PROCEDURE — 77030040393 HC DRSG OPTIFOAM GENT MDII -B

## 2021-01-01 PROCEDURE — 84145 PROCALCITONIN (PCT): CPT

## 2021-01-01 PROCEDURE — 74011250636 HC RX REV CODE- 250/636: Performed by: INTERNAL MEDICINE

## 2021-01-01 PROCEDURE — 82330 ASSAY OF CALCIUM: CPT

## 2021-01-01 PROCEDURE — 87150 DNA/RNA AMPLIFIED PROBE: CPT

## 2021-01-01 PROCEDURE — P9016 RBC LEUKOCYTES REDUCED: HCPCS

## 2021-01-01 PROCEDURE — 74011000250 HC RX REV CODE- 250: Performed by: INTERNAL MEDICINE

## 2021-01-01 PROCEDURE — 96365 THER/PROPH/DIAG IV INF INIT: CPT

## 2021-01-01 PROCEDURE — 74011000250 HC RX REV CODE- 250: Performed by: RADIOLOGY

## 2021-01-01 PROCEDURE — 85379 FIBRIN DEGRADATION QUANT: CPT

## 2021-01-01 PROCEDURE — 85610 PROTHROMBIN TIME: CPT

## 2021-01-01 PROCEDURE — 83735 ASSAY OF MAGNESIUM: CPT

## 2021-01-01 PROCEDURE — 86923 COMPATIBILITY TEST ELECTRIC: CPT

## 2021-01-01 PROCEDURE — 96360 HYDRATION IV INFUSION INIT: CPT

## 2021-01-01 PROCEDURE — 84100 ASSAY OF PHOSPHORUS: CPT

## 2021-01-01 PROCEDURE — C1729 CATH, DRAINAGE: HCPCS

## 2021-01-01 PROCEDURE — 86901 BLOOD TYPING SEROLOGIC RH(D): CPT

## 2021-01-01 PROCEDURE — 82962 GLUCOSE BLOOD TEST: CPT

## 2021-01-01 PROCEDURE — 82565 ASSAY OF CREATININE: CPT

## 2021-01-01 PROCEDURE — 74178 CT ABD&PLV WO CNTR FLWD CNTR: CPT

## 2021-01-01 PROCEDURE — 93926 LOWER EXTREMITY STUDY: CPT

## 2021-01-01 PROCEDURE — 84300 ASSAY OF URINE SODIUM: CPT

## 2021-01-01 PROCEDURE — 87205 SMEAR GRAM STAIN: CPT

## 2021-01-01 PROCEDURE — 74011000636 HC RX REV CODE- 636: Performed by: INTERNAL MEDICINE

## 2021-01-01 PROCEDURE — 99284 EMERGENCY DEPT VISIT MOD MDM: CPT

## 2021-01-01 PROCEDURE — 77030018836 HC SOL IRR NACL ICUM -A

## 2021-01-01 PROCEDURE — 87077 CULTURE AEROBIC IDENTIFY: CPT

## 2021-01-01 PROCEDURE — 74011000258 HC RX REV CODE- 258: Performed by: EMERGENCY MEDICINE

## 2021-01-01 PROCEDURE — 87186 SC STD MICRODIL/AGAR DIL: CPT

## 2021-01-01 PROCEDURE — 76770 US EXAM ABDO BACK WALL COMP: CPT

## 2021-01-01 PROCEDURE — 82553 CREATINE MB FRACTION: CPT

## 2021-01-01 PROCEDURE — C8929 TTE W OR WO FOL WCON,DOPPLER: HCPCS

## 2021-01-01 PROCEDURE — 82570 ASSAY OF URINE CREATININE: CPT

## 2021-01-01 PROCEDURE — 84156 ASSAY OF PROTEIN URINE: CPT

## 2021-01-01 PROCEDURE — 96361 HYDRATE IV INFUSION ADD-ON: CPT

## 2021-01-01 PROCEDURE — 74011250636 HC RX REV CODE- 250/636: Performed by: RADIOLOGY

## 2021-01-01 PROCEDURE — 87635 SARS-COV-2 COVID-19 AMP PRB: CPT

## 2021-01-01 PROCEDURE — 36430 TRANSFUSION BLD/BLD COMPNT: CPT

## 2021-01-01 PROCEDURE — 83605 ASSAY OF LACTIC ACID: CPT

## 2021-01-01 PROCEDURE — 86900 BLOOD TYPING SEROLOGIC ABO: CPT

## 2021-01-01 PROCEDURE — 87086 URINE CULTURE/COLONY COUNT: CPT

## 2021-01-01 RX ORDER — ONDANSETRON 2 MG/ML
4 INJECTION INTRAMUSCULAR; INTRAVENOUS
Status: CANCELLED | OUTPATIENT
Start: 2021-01-01

## 2021-01-01 RX ORDER — SODIUM CHLORIDE 9 MG/ML
50 INJECTION, SOLUTION INTRAVENOUS CONTINUOUS
Status: CANCELLED | OUTPATIENT
Start: 2021-01-01 | End: 2021-01-01

## 2021-01-01 RX ORDER — ENOXAPARIN SODIUM 100 MG/ML
40 INJECTION SUBCUTANEOUS DAILY
Status: CANCELLED | OUTPATIENT
Start: 2021-01-01

## 2021-01-01 RX ORDER — TRAZODONE HYDROCHLORIDE 50 MG/1
50 TABLET ORAL
Status: DISCONTINUED | OUTPATIENT
Start: 2021-01-01 | End: 2021-01-01 | Stop reason: HOSPADM

## 2021-01-01 RX ORDER — POLYETHYLENE GLYCOL 3350 17 G/17G
17 POWDER, FOR SOLUTION ORAL DAILY PRN
Status: CANCELLED | OUTPATIENT
Start: 2021-01-01

## 2021-01-01 RX ORDER — LIDOCAINE HYDROCHLORIDE 20 MG/ML
20-200 INJECTION, SOLUTION INFILTRATION; PERINEURAL
Status: DISCONTINUED | OUTPATIENT
Start: 2021-01-01 | End: 2021-01-01

## 2021-01-01 RX ORDER — ONDANSETRON 2 MG/ML
4 INJECTION INTRAMUSCULAR; INTRAVENOUS
Status: DISCONTINUED | OUTPATIENT
Start: 2021-01-01 | End: 2021-01-01 | Stop reason: HOSPADM

## 2021-01-01 RX ORDER — POLYETHYLENE GLYCOL 3350 17 G/17G
17 POWDER, FOR SOLUTION ORAL DAILY PRN
Status: DISCONTINUED | OUTPATIENT
Start: 2021-01-01 | End: 2021-01-01 | Stop reason: HOSPADM

## 2021-01-01 RX ORDER — MIDAZOLAM HYDROCHLORIDE 1 MG/ML
.5-2 INJECTION, SOLUTION INTRAMUSCULAR; INTRAVENOUS
Status: DISCONTINUED | OUTPATIENT
Start: 2021-01-01 | End: 2021-01-01

## 2021-01-01 RX ORDER — SODIUM CHLORIDE 0.9 % (FLUSH) 0.9 %
10 SYRINGE (ML) INJECTION
Status: ACTIVE | OUTPATIENT
Start: 2021-01-01 | End: 2021-01-01

## 2021-01-01 RX ORDER — SODIUM CHLORIDE 0.9 % (FLUSH) 0.9 %
10 SYRINGE (ML) INJECTION
Status: COMPLETED | OUTPATIENT
Start: 2021-01-01 | End: 2021-01-01

## 2021-01-01 RX ORDER — ACETAMINOPHEN 325 MG/1
650 TABLET ORAL
Status: DISCONTINUED | OUTPATIENT
Start: 2021-01-01 | End: 2021-01-01 | Stop reason: HOSPADM

## 2021-01-01 RX ORDER — SODIUM CHLORIDE 0.9 % (FLUSH) 0.9 %
5-40 SYRINGE (ML) INJECTION AS NEEDED
Status: CANCELLED | OUTPATIENT
Start: 2021-01-01

## 2021-01-01 RX ORDER — ACETAMINOPHEN 650 MG/1
650 SUPPOSITORY RECTAL
Status: CANCELLED | OUTPATIENT
Start: 2021-01-01

## 2021-01-01 RX ORDER — SODIUM CHLORIDE 0.9 % (FLUSH) 0.9 %
5-40 SYRINGE (ML) INJECTION EVERY 8 HOURS
Status: DISCONTINUED | OUTPATIENT
Start: 2021-01-01 | End: 2021-01-01 | Stop reason: HOSPADM

## 2021-01-01 RX ORDER — FENTANYL CITRATE 50 UG/ML
25-100 INJECTION, SOLUTION INTRAMUSCULAR; INTRAVENOUS
Status: DISCONTINUED | OUTPATIENT
Start: 2021-01-01 | End: 2021-01-01

## 2021-01-01 RX ORDER — ACETAMINOPHEN 325 MG/1
650 TABLET ORAL
Status: CANCELLED | OUTPATIENT
Start: 2021-01-01

## 2021-01-01 RX ORDER — SODIUM CHLORIDE 0.9 % (FLUSH) 0.9 %
5-40 SYRINGE (ML) INJECTION EVERY 8 HOURS
Status: CANCELLED | OUTPATIENT
Start: 2021-01-01

## 2021-01-01 RX ORDER — SODIUM CHLORIDE 9 MG/ML
50 INJECTION, SOLUTION INTRAVENOUS CONTINUOUS
Status: DISCONTINUED | OUTPATIENT
Start: 2021-01-01 | End: 2021-01-01

## 2021-01-01 RX ORDER — AMOXICILLIN AND CLAVULANATE POTASSIUM 875; 125 MG/1; MG/1
1 TABLET, FILM COATED ORAL 2 TIMES DAILY WITH MEALS
Status: DISCONTINUED | OUTPATIENT
Start: 2021-01-01 | End: 2021-01-01 | Stop reason: HOSPADM

## 2021-01-01 RX ORDER — ACETAMINOPHEN 650 MG/1
650 SUPPOSITORY RECTAL
Status: DISCONTINUED | OUTPATIENT
Start: 2021-01-01 | End: 2021-01-01 | Stop reason: HOSPADM

## 2021-01-01 RX ORDER — VANCOMYCIN/0.9 % SOD CHLORIDE 1.5G/250ML
1500 PLASTIC BAG, INJECTION (ML) INTRAVENOUS EVERY 24 HOURS
Status: DISCONTINUED | OUTPATIENT
Start: 2021-01-01 | End: 2021-01-01

## 2021-01-01 RX ORDER — PROMETHAZINE HYDROCHLORIDE 25 MG/1
12.5 TABLET ORAL
Status: DISCONTINUED | OUTPATIENT
Start: 2021-01-01 | End: 2021-01-01 | Stop reason: HOSPADM

## 2021-01-01 RX ORDER — SODIUM CHLORIDE 9 MG/ML
250 INJECTION, SOLUTION INTRAVENOUS AS NEEDED
Status: DISCONTINUED | OUTPATIENT
Start: 2021-01-01 | End: 2021-01-01 | Stop reason: HOSPADM

## 2021-01-01 RX ORDER — SODIUM CHLORIDE 9 MG/ML
500 INJECTION, SOLUTION INTRAVENOUS CONTINUOUS
Status: DISCONTINUED | OUTPATIENT
Start: 2021-01-01 | End: 2021-01-01

## 2021-01-01 RX ORDER — PROMETHAZINE HYDROCHLORIDE 25 MG/1
12.5 TABLET ORAL
Status: CANCELLED | OUTPATIENT
Start: 2021-01-01

## 2021-01-01 RX ORDER — SODIUM CHLORIDE 0.9 % (FLUSH) 0.9 %
5-40 SYRINGE (ML) INJECTION AS NEEDED
Status: DISCONTINUED | OUTPATIENT
Start: 2021-01-01 | End: 2021-01-01 | Stop reason: HOSPADM

## 2021-01-01 RX ORDER — ENOXAPARIN SODIUM 100 MG/ML
40 INJECTION SUBCUTANEOUS EVERY 24 HOURS
Status: DISCONTINUED | OUTPATIENT
Start: 2021-01-01 | End: 2021-01-01 | Stop reason: HOSPADM

## 2021-01-01 RX ORDER — AMOXICILLIN AND CLAVULANATE POTASSIUM 875; 125 MG/1; MG/1
1 TABLET, FILM COATED ORAL 2 TIMES DAILY WITH MEALS
Qty: 12 TAB | Refills: 0 | Status: SHIPPED | OUTPATIENT
Start: 2021-01-01 | End: 2021-01-01

## 2021-01-01 RX ADMIN — TRAZODONE HYDROCHLORIDE 50 MG: 50 TABLET ORAL at 00:10

## 2021-01-01 RX ADMIN — SODIUM CHLORIDE 50 ML/HR: 900 INJECTION, SOLUTION INTRAVENOUS at 09:37

## 2021-01-01 RX ADMIN — AMOXICILLIN AND CLAVULANATE POTASSIUM 1 TABLET: 875; 125 TABLET, FILM COATED ORAL at 08:15

## 2021-01-01 RX ADMIN — Medication 10 ML: at 20:22

## 2021-01-01 RX ADMIN — PIPERACILLIN SODIUM AND TAZOBACTAM SODIUM 3.38 G: 3; .375 INJECTION, POWDER, LYOPHILIZED, FOR SOLUTION INTRAVENOUS at 08:14

## 2021-01-01 RX ADMIN — Medication 10 ML: at 05:49

## 2021-01-01 RX ADMIN — Medication 20 ML: at 00:01

## 2021-01-01 RX ADMIN — VANCOMYCIN HYDROCHLORIDE 2500 MG: 10 INJECTION, POWDER, LYOPHILIZED, FOR SOLUTION INTRAVENOUS at 12:24

## 2021-01-01 RX ADMIN — Medication 1 AMPULE: at 08:15

## 2021-01-01 RX ADMIN — DAKIN'S SOLUTION 0.125% (QUARTER STRENGTH): 0.12 SOLUTION at 15:26

## 2021-01-01 RX ADMIN — Medication 10 ML: at 22:19

## 2021-01-01 RX ADMIN — LIDOCAINE HYDROCHLORIDE 60 MG: 20 INJECTION, SOLUTION INFILTRATION; PERINEURAL at 15:43

## 2021-01-01 RX ADMIN — Medication 1 AMPULE: at 20:22

## 2021-01-01 RX ADMIN — TRAZODONE HYDROCHLORIDE 50 MG: 50 TABLET ORAL at 21:56

## 2021-01-01 RX ADMIN — PIPERACILLIN SODIUM AND TAZOBACTAM SODIUM 3.38 G: 3; .375 INJECTION, POWDER, LYOPHILIZED, FOR SOLUTION INTRAVENOUS at 00:41

## 2021-01-01 RX ADMIN — PIPERACILLIN SODIUM AND TAZOBACTAM SODIUM 3.38 G: 3; .375 INJECTION, POWDER, LYOPHILIZED, FOR SOLUTION INTRAVENOUS at 17:04

## 2021-01-01 RX ADMIN — PIPERACILLIN SODIUM AND TAZOBACTAM SODIUM 3.38 G: 3; .375 INJECTION, POWDER, LYOPHILIZED, FOR SOLUTION INTRAVENOUS at 08:59

## 2021-01-01 RX ADMIN — SODIUM CHLORIDE 1000 ML: 900 INJECTION, SOLUTION INTRAVENOUS at 15:16

## 2021-01-01 RX ADMIN — ENOXAPARIN SODIUM 40 MG: 40 INJECTION SUBCUTANEOUS at 18:16

## 2021-01-01 RX ADMIN — Medication 10 ML: at 05:25

## 2021-01-01 RX ADMIN — Medication 1 AMPULE: at 22:00

## 2021-01-01 RX ADMIN — ENOXAPARIN SODIUM 40 MG: 40 INJECTION SUBCUTANEOUS at 20:40

## 2021-01-01 RX ADMIN — ACETAMINOPHEN 650 MG: 325 TABLET, FILM COATED ORAL at 11:15

## 2021-01-01 RX ADMIN — VANCOMYCIN HYDROCHLORIDE 1500 MG: 10 INJECTION, POWDER, LYOPHILIZED, FOR SOLUTION INTRAVENOUS at 16:20

## 2021-01-01 RX ADMIN — PIPERACILLIN SODIUM AND TAZOBACTAM SODIUM 3.38 G: 3; .375 INJECTION, POWDER, LYOPHILIZED, FOR SOLUTION INTRAVENOUS at 00:55

## 2021-01-01 RX ADMIN — ACETAMINOPHEN 650 MG: 325 TABLET, FILM COATED ORAL at 20:32

## 2021-01-01 RX ADMIN — DAKIN'S SOLUTION 0.125% (QUARTER STRENGTH): 0.12 SOLUTION at 08:58

## 2021-01-01 RX ADMIN — ENOXAPARIN SODIUM 40 MG: 40 INJECTION SUBCUTANEOUS at 18:00

## 2021-01-01 RX ADMIN — Medication 10 ML: at 16:46

## 2021-01-01 RX ADMIN — PIPERACILLIN SODIUM AND TAZOBACTAM SODIUM 3.38 G: 3; .375 INJECTION, POWDER, LYOPHILIZED, FOR SOLUTION INTRAVENOUS at 15:44

## 2021-01-01 RX ADMIN — ENOXAPARIN SODIUM 40 MG: 40 INJECTION SUBCUTANEOUS at 17:57

## 2021-01-01 RX ADMIN — TRAZODONE HYDROCHLORIDE 50 MG: 50 TABLET ORAL at 18:34

## 2021-01-01 RX ADMIN — SODIUM CHLORIDE 50 ML/HR: 900 INJECTION, SOLUTION INTRAVENOUS at 19:00

## 2021-01-01 RX ADMIN — Medication 1 AMPULE: at 22:19

## 2021-01-01 RX ADMIN — PIPERACILLIN SODIUM AND TAZOBACTAM SODIUM 3.38 G: 3; .375 INJECTION, POWDER, LYOPHILIZED, FOR SOLUTION INTRAVENOUS at 00:08

## 2021-01-01 RX ADMIN — Medication 10 ML: at 15:26

## 2021-01-01 RX ADMIN — PIPERACILLIN SODIUM AND TAZOBACTAM SODIUM 3.38 G: 3; .375 INJECTION, POWDER, LYOPHILIZED, FOR SOLUTION INTRAVENOUS at 16:36

## 2021-01-01 RX ADMIN — FENTANYL CITRATE 25 MCG: 50 INJECTION, SOLUTION INTRAMUSCULAR; INTRAVENOUS at 15:37

## 2021-01-01 RX ADMIN — ACETAMINOPHEN 650 MG: 325 TABLET, FILM COATED ORAL at 20:22

## 2021-01-01 RX ADMIN — PIPERACILLIN SODIUM AND TAZOBACTAM SODIUM 3.38 G: 3; .375 INJECTION, POWDER, LYOPHILIZED, FOR SOLUTION INTRAVENOUS at 09:19

## 2021-01-01 RX ADMIN — Medication 10 ML: at 22:15

## 2021-01-01 RX ADMIN — Medication 10 ML: at 16:20

## 2021-01-01 RX ADMIN — Medication 10 ML: at 14:36

## 2021-01-01 RX ADMIN — PIPERACILLIN SODIUM AND TAZOBACTAM SODIUM 3.38 G: 3; .375 INJECTION, POWDER, LYOPHILIZED, FOR SOLUTION INTRAVENOUS at 08:57

## 2021-01-01 RX ADMIN — Medication 10 ML: at 06:46

## 2021-01-01 RX ADMIN — AMOXICILLIN AND CLAVULANATE POTASSIUM 1 TABLET: 875; 125 TABLET, FILM COATED ORAL at 16:13

## 2021-01-01 RX ADMIN — PIPERACILLIN SODIUM AND TAZOBACTAM SODIUM 3.38 G: 3; .375 INJECTION, POWDER, LYOPHILIZED, FOR SOLUTION INTRAVENOUS at 00:01

## 2021-01-01 RX ADMIN — PIPERACILLIN SODIUM AND TAZOBACTAM SODIUM 3.38 G: 3; .375 INJECTION, POWDER, LYOPHILIZED, FOR SOLUTION INTRAVENOUS at 00:11

## 2021-01-01 RX ADMIN — Medication 10 ML: at 06:05

## 2021-01-01 RX ADMIN — Medication 10 ML: at 21:33

## 2021-01-01 RX ADMIN — DAKIN'S SOLUTION 0.125% (QUARTER STRENGTH): 0.12 SOLUTION at 09:05

## 2021-01-01 RX ADMIN — IOPAMIDOL 100 ML: 755 INJECTION, SOLUTION INTRAVENOUS at 19:50

## 2021-01-01 RX ADMIN — ACETAMINOPHEN 650 MG: 325 TABLET, FILM COATED ORAL at 00:10

## 2021-01-01 RX ADMIN — Medication 10 ML: at 14:41

## 2021-01-01 RX ADMIN — PERFLUTREN 1 ML: 6.52 INJECTION, SUSPENSION INTRAVENOUS at 13:50

## 2021-01-01 RX ADMIN — Medication 10 ML: at 06:20

## 2021-01-01 RX ADMIN — Medication 10 ML: at 19:50

## 2021-01-01 RX ADMIN — PIPERACILLIN SODIUM AND TAZOBACTAM SODIUM 3.38 G: 3; .375 INJECTION, POWDER, LYOPHILIZED, FOR SOLUTION INTRAVENOUS at 08:11

## 2021-01-01 RX ADMIN — PIPERACILLIN SODIUM AND TAZOBACTAM SODIUM 3.38 G: 3; .375 INJECTION, POWDER, LYOPHILIZED, FOR SOLUTION INTRAVENOUS at 16:20

## 2021-01-01 RX ADMIN — AMOXICILLIN AND CLAVULANATE POTASSIUM 1 TABLET: 875; 125 TABLET, FILM COATED ORAL at 16:46

## 2021-01-01 RX ADMIN — ACETAMINOPHEN 650 MG: 325 TABLET, FILM COATED ORAL at 00:45

## 2021-01-01 RX ADMIN — PIPERACILLIN SODIUM AND TAZOBACTAM SODIUM 4.5 G: 4; .5 INJECTION, POWDER, LYOPHILIZED, FOR SOLUTION INTRAVENOUS at 15:49

## 2021-01-01 RX ADMIN — Medication 1 AMPULE: at 08:57

## 2021-01-01 RX ADMIN — Medication 1 AMPULE: at 09:26

## 2021-01-01 RX ADMIN — Medication 10 ML: at 14:01

## 2021-01-01 RX ADMIN — Medication 1 AMPULE: at 08:11

## 2021-01-01 RX ADMIN — ENOXAPARIN SODIUM 40 MG: 40 INJECTION SUBCUTANEOUS at 18:14

## 2021-01-01 RX ADMIN — SODIUM CHLORIDE 100 ML: 900 INJECTION, SOLUTION INTRAVENOUS at 19:50

## 2021-01-01 RX ADMIN — Medication 10 ML: at 22:00

## 2021-01-15 PROBLEM — U07.1 COVID-19: Status: ACTIVE | Noted: 2021-01-01

## 2021-01-15 PROBLEM — R65.20 SEVERE SEPSIS (HCC): Status: ACTIVE | Noted: 2021-01-01

## 2021-01-15 PROBLEM — L03.90 CELLULITIS: Status: ACTIVE | Noted: 2021-01-01

## 2021-01-15 PROBLEM — L89.90 PRESSURE SORE: Status: ACTIVE | Noted: 2021-01-01

## 2021-01-15 PROBLEM — A41.9 SEVERE SEPSIS (HCC): Status: ACTIVE | Noted: 2021-01-01

## 2021-01-15 NOTE — PROGRESS NOTES
History and Physical 
 
Patient: Mabel Wyman MRN: [id number]  SSN: [id number] YOB: 1940  Age: 79 y.o. Sex: male Subjective:  
  
Mabel Wyman is a 79 y.o. male who came to ER due to having chills today. Patient has had wound on buttocks with foul smell and swelling of both legs for months. He was found to have worsening of symptoms today with chills and so EMS was called and he was brought to ER. Patient is a poor historian and could not provide history much at this time. Other PMH include the problems listed below. He is found to have severe cellulitis on both lower legs with swelling and also with buttock wound with foul smell. Part of work up shows CXR with some? Infiltrates. He is being tested for possible COVID infection. Rapid test is negative so far. Hospitalist service is requested to admit the patient. Past Medical History:  
Diagnosis Date  Adverse effect of anesthesia   
 nighmares  Aneurysm ([address] Thorasic Aortic aneurysm, surger in [facility] 5/24/16  Atrial flutter ([address] ROSENDO guided cardioversion, No thinners  CAD (coronary artery disease)   
 patient denies  Congestive heart failure ([address] EF 45-50% on 11/2019  Hypertension  Ill-defined condition   
 spinal cord injury  Ill-defined condition Neurogenic bladder, self caths  Morbid obesity ([address]  Thyroid disease Past Surgical History:  
Procedure Laterality Date  HX APPENDECTOMY  HX COLONOSCOPY  5/08  
 diverticulosis  HX ORTHOPAEDIC    
 repair of broken jaw  HX TONSILLECTOMY  PA CARDIAC SURG PROCEDURE UNLIST  2016  
 repair of aneurysm in acending and transverse arteries  PA CARDIAC SURG PROCEDURE UNLIST  2017 Aortic valve repair Descending  PA CARDIAC SURG PROCEDURE UNLIST  06/2016  
 cardioversion, ROSENDO x2  
 PA CHEST SURGERY PROCEDURE UNLISTED  2016, 2017 Aortic aneursym Family History Problem Relation Age of Onset  Stroke Mother Social History Tobacco Use  Smoking status: Former Smoker Packs/day: 3.00 Years: 25.00 Pack years: 75.00 Types: Cigarettes Quit date: 1986 Years since quittin.0  Smokeless tobacco: Former User Types: Snuff, Chew Quit date: 2014 Substance Use Topics  Alcohol use: Not Currently Comment: advises quitting Prior to Admission medications Medication Sig Start Date End Date Taking? Authorizing Provider  
diclofenac (VOLTAREN) 1 % gel Apply 2 g to affected area four (4) times daily. Provider, Historical  
meloxicam (MOBIC) 7.5 mg tablet Take 1 Tab by mouth daily. 20   Vernadine MD Lynne  
ferrous gluconate (FERGON) 240 mg (27 mg iron) tablet Take 240 mg by mouth three (3) times daily (with meals). Provider, Historical  
mupirocin (BACTROBAN) 2 % ointment APPLY TO LEFT LEG DAILY 3/3/20   Other, MD Elvin  
sertraline (ZOLOFT) 50 mg tablet Take 50 mg by mouth daily. Other, MD Elvin  
carvedilol (COREG) 6.25 mg tablet Take 1 Tab by mouth two (2) times daily (with meals). 19   Sybil Turner MD  
levothyroxine (SYNTHROID) 112 mcg tablet Take  by mouth Daily (before breakfast). Provider, Historical  
furosemide (LASIX) 40 mg tablet Take 40 mg by mouth two (2) times a day. Provider, Historical  
potassium chloride SR (K-TAB) 20 mEq tablet Take 20 mEq by mouth daily. Provider, Historical  
psyllium husk (METAMUCIL) 0.4 gram cap Take  by mouth daily. Provider, Historical  
  
 
No Known Allergies Review of Systems: 
 
10-point review of systems is negative except what is mentioned in the present illness section. Objective:  
 
Vitals:  
 01/15/21 1345 01/15/21 1348 01/15/21 1636 01/15/21 1643 BP:    (!) 98/53 Pulse: 89 93 (!) 106 Resp:      
Temp:      
SpO2: 97% 96% 98% Weight:      
Height:      
  
 
Physical Exam: General:                    The patient is an elderly male in no acute respiratory distress. Patient appears chronically ill. Head:                                   Normocephalic/atraumatic. Eyes:                                   palpebral pallor, no scleral icterus. ENT:                                    External auricular and nasal exam within normal limits. Mucous membranes are dry. Neck:                                   Supple, non-tender, no JVD. Lungs:                       diminished to auscultation bilaterally without wheezes or crackles. No respiratory distress or accessory muscle use. Heart:                                 irregular rate and rhythm, without murmurs, rubs, or gallops. Abdomen:                  Soft, non-tender, distended due to truncal obesity with normoactive bowel sounds. Genitourinary:           No tenderness over the bladder or bilateral CVAs. Extremities:               Without clubbing, cyanosis, severe edema of both lower legs. Skin:                                    weeping serous fluid from lower legs, with redness. Pressure sores of buttock with foul smell. Pulses:                      Radial and dorsalis pedis pulses present 2+ bilaterally. Capillary refill <2s. Neurologic:                CN II-XII grossly intact and symmetrical.  
                                            Moving all four extremities well with no focal deficits. Psychiatric:                Pleasant demeanor, appropriate affect. Alert and oriented x 3 Lab and data Recent Results (from the past 24 hour(s)) CBC WITH AUTOMATED DIFF Collection Time: 01/15/21  2:22 PM  
Result Value Ref Range WBC 28.5 (H) 4.3 - 11.1 K/uL  
 RBC 3.16 (L) 4.23 - 5.6 M/uL HGB 9.4 (L) 13.6 - 17.2 g/dL HCT 30.0 (L) 41.1 - 50.3 %  MCV 94.9 79.6 - 97.8 FL  
 MCH 29.7 26.1 - 32.9 PG  
 MCHC 31.3 (L) 31.4 - 35.0 g/dL RDW 20.0 (H) 11.9 - 14.6 % PLATELET 891 923 - 311 K/uL MPV 8.8 (L) 9.4 - 12.3 FL ABSOLUTE NRBC 0.00 0.0 - 0.2 K/uL  
 DF AUTOMATED NEUTROPHILS 92 (H) 43 - 78 % LYMPHOCYTES 2 (L) 13 - 44 % MONOCYTES 3 (L) 4.0 - 12.0 % EOSINOPHILS 0 (L) 0.5 - 7.8 % BASOPHILS 0 0.0 - 2.0 % IMMATURE GRANULOCYTES 3 0.0 - 5.0 %  
 ABS. NEUTROPHILS 26.3 (H) 1.7 - 8.2 K/UL  
 ABS. LYMPHOCYTES 0.5 0.5 - 4.6 K/UL  
 ABS. MONOCYTES 0.9 0.1 - 1.3 K/UL  
 ABS. EOSINOPHILS 0.0 0.0 - 0.8 K/UL  
 ABS. BASOPHILS 0.0 0.0 - 0.2 K/UL  
 ABS. IMM. GRANS. 0.8 (H) 0.0 - 0.5 K/UL METABOLIC PANEL, COMPREHENSIVE Collection Time: 01/15/21  2:22 PM  
Result Value Ref Range Sodium 129 (L) 136 - 145 mmol/L Potassium 5.0 3.5 - 5.1 mmol/L Chloride 94 (L) 98 - 107 mmol/L  
 CO2 26 21 - 32 mmol/L Anion gap 9 7 - 16 mmol/L Glucose 89 65 - 100 mg/dL BUN 43 (H) 8 - 23 MG/DL Creatinine 1.43 0.8 - 1.5 MG/DL  
 GFR est AA >60 >60 ml/min/1.73m2 GFR est non-AA 50 (L) >60 ml/min/1.73m2 Calcium 8.9 8.3 - 10.4 MG/DL Bilirubin, total 1.1 0.2 - 1.1 MG/DL  
 ALT (SGPT) 14 12 - 65 U/L  
 AST (SGOT) 16 15 - 37 U/L Alk. phosphatase 120 50 - 136 U/L Protein, total 7.5 6.3 - 8.2 g/dL Albumin 2.9 (L) 3.2 - 4.6 g/dL Globulin 4.6 (H) 2.3 - 3.5 g/dL A-G Ratio 0.6 (L) 1.2 - 3.5 LACTIC ACID Collection Time: 01/15/21  2:22 PM  
Result Value Ref Range Lactic acid 2.7 (H) 0.4 - 2.0 MMOL/L  
SARS-COV-2 Collection Time: 01/15/21  3:18 PM  
Result Value Ref Range Specimen source Nasopharyngeal    
 COVID-19 rapid test Not detected NOTD    
 SARS CoV-2 PENDING   
 
XR chest  
1- Impression: Ill-defined density left lower lung suspicious for infiltrate, or 
atelectasis I have reviewed chest x-ray myself. Assessment:  
 
Hospital Problems  Date Reviewed: 1/13/2021 Codes Class Noted POA * (Principal) Severe sepsis ([facility] ICD-10-CM: A41.9, R65.20 ICD-9-CM: 038.9, 995.92  1/15/2021 Unknown COVID-19 ICD-10-CM: U07.1 ICD-9-CM: 079.89  1/15/2021 Unknown Cellulitis ICD-10-CM: L03.90 ICD-9-CM: 682.9  1/15/2021 Unknown Pressure sore ICD-10-CM: L89.90 ICD-9-CM: 707.00, 707.20  1/15/2021 Unknown REECE (acute kidney injury) ([facility] ICD-10-CM: N17.9 ICD-9-CM: 584.9  10/14/2020 Yes Systolic CHF, chronic (HCC) (Chronic) ICD-10-CM: A12.63 ICD-9-CM: 428.22, 428.0  1/8/2019 Yes HTN (hypertension) (Chronic) ICD-10-CM: I10 
ICD-9-CM: 401.9  6/12/2016 Yes Atrial fibrillation ([facility] ICD-10-CM: I48.91 
ICD-9-CM: 427.31  6/12/2016 Yes Overview Signed 12/30/2016  7:51 AM by Giovanni Fuentes MD  
  Post-op aneurysm repair, brief course amiodarone and xarelto stopped with resolution of afib/flutter following cardioversion CAD (coronary artery disease) (Chronic) ICD-10-CM: I25.10 ICD-9-CM: 414.00  6/12/2016 Yes Limited mobility ICD-10-CM: Z74.09 
ICD-9-CM: V49.89  6/8/2016 Yes Hypothyroidism (Chronic) ICD-10-CM: E03.9 ICD-9-CM: 244.9  2/4/2014 Yes Plan:  
 
Severe sepsis Presented with chills Admit to medical floor Likely sources of infection include buttock wounds and leg wound Empiric IV antibiotics. Blood culture Wound culture if discharge can be obtained. Wound care consultation. suspicious for COVID infection Will send for PCR test 
No hypoxia Will not give steroid yet. CAD Continue home medications. Hypothyroidism Continue home medications. Hypertension Monitor blood pressure and manage accordingly. Continue home medications. AF Monitor Heart rate is acceptable Patient is not on [address] REECE Likely from sepsis and volume depletion IV fluid. Monitor renal function and intake and output. Avoid nephrotoxic agents. Patient requires hospital stay as an in-patient and anticipated stay is more than 2 midnights due to the serious nature of the illness. I have discussed with patient regarding advance directive. Patient would like to have a full-code status. Healthcare power of  is wife. Patient has no pain now. Will monitor. Further treatments will depend on initial responses and findings. I have discussed the plan of care with patient. DVT prophylaxis : Lovenox SC Signed By: Marcelina Murdock MD   
 January 15, 2021

## 2021-01-15 NOTE — PROGRESS NOTES
Patient received from [facility]. Patient now in room 811. Patient awake in bed. Respirations present. On room air. No signs of distress. AxO x4. No needs expressed. S1 and S2 noted. Radial pulses palpable bilaterally. Bowel sounds active. Last BM today, 1/15/2021. Abdomen distended. BLE swollen. Dual skin assessment completed with Adriana Arzate RN. Sacral wound noted. Dressing reinforced. Wound noted on right heel. Dressing present. BLE red, dry, flaky. Bed low and locked. Call light within reach. Will continue to monitor.

## 2021-01-15 NOTE — ED TRIAGE NOTES
Pt sent to ER with EMS from home stating he has been having chills. Pt has a wound on buttocks with foul smell. Pt has swelling to legs and feet which has been present for years. Pt is already asking for the doctor and ready to go back home. Wife cares for pt at home per EMS. Pt has a mask for triage.

## 2021-01-15 NOTE — PROGRESS NOTES
TRANSFER - IN REPORT: 
 
Verbal report received from Aiyana(name) on Mabel Wmyan  being [facility]unit) for routine progression of care Report consisted of patients Situation, Background, Assessment and  
Recommendations(SBAR). Information from the following report(s) SBAR was reviewed with the receiving nurse. Opportunity for questions and clarification was provided.

## 2021-01-15 NOTE — H&P
History and Physical 
  
Patient: Jesus Wynn MRN: [id number]  SSN: [id number] YOB: 1940  Age: 79 y.o. Sex: male   
  
Subjective:  
  
Jesus Wynn is a 79 y.o. male who came to ER due to having chills today.  
  
Patient has had wound on buttocks with foul smell and swelling of both legs for months. He was found to have worsening of symptoms today with chills and so EMS was called and he was brought to ER.  
  
Patient is a poor historian and could not provide history much at this time.  
  
Other PMH include the problems listed below.  
  
He is found to have severe cellulitis on both lower legs with swelling and also with buttock wound with foul smell.  
  
Part of work up shows CXR with some? Infiltrates. He is being tested for possible COVID infection. Rapid test is negative so far.  
  
Hospitalist service is requested to admit the patient.   
  
    
Past Medical History:  
Diagnosis Date  Adverse effect of anesthesia    
  nighmares  Aneurysm ([address]    
  Thorasic Aortic aneurysm, surger in [facility] 5/24/16  Atrial flutter ([address]    
  ROSENDO guided cardioversion, No thinners  CAD (coronary artery disease)    
  patient denies  Congestive heart failure ([address]    
  EF 45-50% on 11/2019  Hypertension    
 Ill-defined condition    
  spinal cord injury  Ill-defined condition    
  Neurogenic bladder, self caths  Morbid obesity ([address]    
 Thyroid disease    
  
     
Past Surgical History:  
Procedure Laterality Date  HX APPENDECTOMY      
 HX COLONOSCOPY   5/08  
  diverticulosis  HX ORTHOPAEDIC      
  repair of broken jaw  HX TONSILLECTOMY      
 DE CARDIAC SURG PROCEDURE UNLIST   2016  
  repair of aneurysm in acending and transverse arteries  DE CARDIAC SURG PROCEDURE UNLIST   2017  
  Aortic valve repair Descending  DE CARDIAC SURG PROCEDURE UNLIST   06/2016  
  cardioversion, ROSENDO x2  
 DE CHEST SURGERY PROCEDURE UNLISTED   2016, 2017   Aortic aneursym Family History Problem Relation Age of Onset  Stroke Mother    
  
Social History  
  
     
Tobacco Use  Smoking status: Former Smoker  
    Packs/day: 3.00  
    Years: 25.00  
    Pack years: 75.00  
    Types: Cigarettes  
    Quit date: 1986  
    Years since quittin.0  Smokeless tobacco: Former User  
    Types: Snuff, Chew  
    Quit date: 2014 Substance Use Topics  Alcohol use: Not Currently  
    Comment: advises quitting Prior to Admission medications Medication Sig Start Date End Date Taking? Authorizing Provider  
diclofenac (VOLTAREN) 1 % gel Apply 2 g to affected area four (4) times daily.       Provider, Historical  
meloxicam (MOBIC) 7.5 mg tablet Take 1 Tab by mouth daily. 20     Susana Larry MD  
ferrous gluconate [facility]) 240 mg (27 mg iron) tablet Take 240 mg by mouth three (3) times daily (with meals).       Provider, Historical  
mupirocin (BACTROBAN) 2 % ointment APPLY TO LEFT LEG DAILY 3/3/20     Other, MD Elvin  
sertraline (ZOLOFT) 50 mg tablet Take 50 mg by mouth daily.       Other, MD Elvin  
carvedilol (COREG) 6.25 mg tablet Take 1 Tab by mouth two (2) times daily (with meals). 19     Sarabjit Jewell MD  
levothyroxine (SYNTHROID) 112 mcg tablet Take  by mouth Daily (before breakfast).       Provider, Historical  
furosemide (LASIX) 40 mg tablet Take 40 mg by mouth two (2) times a day.       Provider, Historical  
potassium chloride SR (K-TAB) 20 mEq tablet Take 20 mEq by mouth daily.       Provider, Historical  
psyllium husk (METAMUCIL) 0.4 gram cap Take  by mouth daily.       Provider, Historical  
  
  
No Known Allergies 
  
Review of Systems: 
  
10-point review of systems is negative except what is mentioned in the present illness section.  
  
Objective:  
  
      
Vitals:  
  01/15/21 1345 01/15/21 1348 01/15/21 1636 01/15/21 1643 BP:       (!) 87/45 Pulse: 89 93 (!) 106    
 Resp:          
Temp:          
SpO2: 97% 96% 98%    
Weight:          
Height:          
  
  
Physical Exam: 
  
General:                    The patient is an elderly male in no acute respiratory distress. Patient appears chronically ill. XMTK:                                   [id number]/[id number]. Eyes:                                   palpebral pallor, no scleral icterus. ENT:                                    External auricular and nasal exam within normal limits.                                             YTPCMW membranes are dry. Neck:                                   Supple, non-tender, no JVD. Lungs:                       diminished to auscultation bilaterally without wheezes or crackles.                                             No respiratory distress or accessory muscle use. Heart:                                 irregular rate and rhythm, without murmurs, rubs, or gallops. Abdomen:                  Soft, non-tender, distended due to truncal obesity with normoactive bowel sounds. Genitourinary:           No tenderness over the bladder or bilateral CVAs. Extremities:               Without clubbing, cyanosis, severe edema of both lower legs. Skin:                                    weeping serous fluid from lower legs, with redness. Pressure sores of buttock with foul smell. Pulses:                      Radial and dorsalis pedis pulses present 2+ bilaterally.  
                                            Capillary refill <2s. Neurologic:                CN II-XII grossly intact and symmetrical.  
                                            Moving all four extremities well with no focal deficits. Psychiatric:                Pleasant demeanor, appropriate affect. Alert and oriented x 3 
  
Lab and data  
  
Recent Results Recent Results (from the past 24 hour(s)) CBC WITH AUTOMATED DIFF  
  Collection Time: 01/15/21  2:22 PM  
Result Value Ref Range   WBC 28.5 (H) 4.3 - 11.1 K/uL  
  RBC 3.16 (L) 4.23 - 5.6 M/uL  
  HGB 9.4 (L) 13.6 - 17.2 g/dL  
  HCT 30.0 (L) 41.1 - 50.3 %  
  MCV 94.9 79.6 - 97.8 FL  
  MCH 29.7 26.1 - 32.9 PG  
  MCHC 31.3 (L) 31.4 - 35.0 g/dL  
  RDW 20.0 (H) 11.9 - 14.6 %  
  PLATELET 339 546 - 475 K/uL  
  MPV 8.8 (L) 9.4 - 12.3 FL  
  ABSOLUTE NRBC 0.00 0.0 - 0.2 K/uL  
  DF AUTOMATED    
  NEUTROPHILS 92 (H) 43 - 78 %  
  LYMPHOCYTES 2 (L) 13 - 44 %  
  MONOCYTES 3 (L) 4.0 - 12.0 %  
  EOSINOPHILS 0 (L) 0.5 - 7.8 %  
  BASOPHILS 0 0.0 - 2.0 %  
  IMMATURE GRANULOCYTES 3 0.0 - 5.0 %  
  ABS. NEUTROPHILS 26.3 (H) 1.7 - 8.2 K/UL  
  ABS. LYMPHOCYTES 0.5 0.5 - 4.6 K/UL  
  ABS. MONOCYTES 0.9 0.1 - 1.3 K/UL  
  ABS. EOSINOPHILS 0.0 0.0 - 0.8 K/UL  
  ABS. BASOPHILS 0.0 0.0 - 0.2 K/UL  
  ABS. IMM. GRANS. 0.8 (H) 0.0 - 0.5 K/UL METABOLIC PANEL, COMPREHENSIVE  
  Collection Time: 01/15/21  2:22 PM  
Result Value Ref Range  
  Sodium 129 (L) 136 - 145 mmol/L  
  Potassium 5.0 3.5 - 5.1 mmol/L  
  Chloride 94 (L) 98 - 107 mmol/L  
  CO2 26 21 - 32 mmol/L  
  Anion gap 9 7 - 16 mmol/L  
  Glucose 89 65 - 100 mg/dL  
  BUN 43 (H) 8 - 23 MG/DL  
  Creatinine 1.43 0.8 - 1.5 MG/DL  
  GFR est AA >60 >60 ml/min/1.73m2  
  GFR est non-AA 50 (L) >60 ml/min/1.73m2  
  Calcium 8.9 8.3 - 10.4 MG/DL  
  Bilirubin, total 1.1 0.2 - 1.1 MG/DL  
  ALT (SGPT) 14 12 - 65 U/L  
  AST (SGOT) 16 15 - 37 U/L  
  Alk. phosphatase 120 50 - 136 U/L  
  Protein, total 7.5 6.3 - 8.2 g/dL  
  Albumin 2.9 (L) 3.2 - 4.6 g/dL  
  Globulin 4.6 (H) 2.3 - 3.5 g/dL  
  A-G Ratio 0.6 (L) 1.2 - 3.5 LACTIC ACID  
  Collection Time: 01/15/21  2:22 PM  
Result Value Ref Range  
  Lactic acid 2.7 (H) 0.4 - 2.0 MMOL/L  
SARS-COV-2  
  Collection Time: 01/15/21  3:18 PM  
Result Value Ref Range  
  Specimen source Nasopharyngeal    
  COVID-19 rapid test Not detected NOTD    
  SARS CoV-2 PENDING    
  
  
XR chest  
1- Impression: Ill-defined density left lower lung suspicious for infiltrate, or 
atelectasis 
  
I have reviewed chest x-ray myself.  
  
Assessment:  
  
           
Hospital Problems  Date Reviewed: 1/13/2021  
        Codes Class Noted POA  
  * (Principal) Severe sepsis ([facility] ICD-10-CM: A41.9, R65.20 ICD-9-CM: 038.9, 995.92   1/15/2021 Unknown  
     
  COVID-19 ICD-10-CM: U07.1 ICD-9-CM: 079.89   1/15/2021 Unknown  
     
  Cellulitis ICD-10-CM: L03.90 ICD-9-CM: 576. 9   1/15/2021 Unknown  
     
  Pressure sore ICD-10-CM: L89.90 ICD-9-CM: 707.00, 707.20   1/15/2021 Unknown  
     
  REECE (acute kidney injury) ([facility] ICD-10-CM: N17.9 ICD-9-CM: 276. 9   10/14/2020 Yes  
     
  Systolic CHF, chronic (HCC) (Chronic) ICD-10-CM: M82.44 ICD-9-CM: 428.22, 428.0   1/8/2019 Yes  
     
  HTN (hypertension) (Chronic) ICD-10-CM: I10 
ICD-9-CM: 401. 9   6/12/2016 Yes  
     
  Atrial fibrillation [facility]) ICD-10-CM: I48.91 
ICD-9-CM: 427.31   6/12/2016 Yes  
  Overview Signed 12/30/2016  7:51 AM by Celina Sharma MD  
    Post-op aneurysm repair, brief course amiodarone and xarelto stopped with resolution of afib/flutter following cardioversion 
   
  
     
  CAD (coronary artery disease) (Chronic) ICD-10-CM: I25.10 ICD-9-CM: 414.00   6/12/2016 Yes  
     
  Limited mobility ICD-10-CM: Z74.09 
ICD-9-CM: V49.89   6/8/2016 Yes  
     
  Hypothyroidism (Chronic) ICD-10-CM: E03.9 ICD-9-CM: 589. 9   2/4/2014 Yes  
     
   
  
  
Plan:  
  
Severe sepsis Presented with chills Admit to medical floor Likely sources of infection include buttock wounds and leg wound Empiric IV antibiotics. Blood culture Wound culture if discharge can be obtained. Wound care consultation.  
  
suspicious for COVID infection Will send for PCR test 
No hypoxia Will not give steroid yet.  
  
CAD Continue home medications.   
  
Hypothyroidism Continue home medications.   
  
Hypertension Monitor blood pressure and manage accordingly. Continue home medications.   
  
AF Monitor Heart rate is acceptable Patient is not on [address] 
  
REECE Likely from sepsis and volume depletion IV fluid. Monitor renal function and intake and output. Avoid nephrotoxic agents. Patient requires hospital stay as an in-patient and anticipated stay is more than 2 midnights due to the serious nature of the illness.  
  
I have discussed with patient regarding advance directive. Patient would like to have a full-code status.   
  
Healthcare power of  is wife.  
  
Patient has no pain now. Will monitor.  Further treatments will depend on initial responses and findings.  
  
I have discussed the plan of care with patient.   
  
DVT prophylaxis : Lovenox SC  
  
  
  
  
  
  
Signed By: Radha Mathis MD   
  January 15, 2021

## 2021-01-16 NOTE — PROGRESS NOTES
Progress Note Patient: Jesus Wynn MRN: [id number]  SSN: [id number] YOB: 1940  Age: 79 y.o. Sex: male Admit Date: 1/15/2021 LOS: 1 day Subjective:  
 
Jesus Kirby a 79 y. o. male who came to ER due to having chills on the day of admission.  
  
Patient has had wound on buttocks with foul smell and swelling of both legs for months. He was found to have worsening of symptoms on the day of admission  with chills and so EMS was called and he was brought to ER.  
  
He is found to have severe cellulitis on both lower legs with swelling and also with buttock wound with foul smell.  
  
Part of work up shows CXR with some? Infiltrates. He is being tested for possible COVID infection. Rapid test is negative so far.  
  
PMH includes;  Adverse effect of anesthesia    
  nighmares  Aneurysm ([address]    
  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    
  ROSENDO guided cardioversion, No thinners  CAD (coronary artery disease)    
  patient denies  Congestive heart failure ([address]    
  EF 45-50% on 11/2019  Hypertension    
 Ill-defined condition    
  spinal cord injury  Ill-defined condition    
  Neurogenic bladder, self caths  Morbid obesity ([address]    
 Thyroid disease Patient is started on Zosyn for skin infection and buttock wound. No fever in the past 24 hours. He is feeling better. Talking well this morning. Objective:  
 
Vitals:  
 01/15/21 2102 01/16/21 7689 01/16/21 8952 01/16/21 1975 BP: 128/84 102/64 (!) 95/56 108/61 Pulse: (!) 103 81 98 69 Resp: 16 18 18 18 Temp: 99.2 °F (37.3 °C) 98.2 °F (36.8 °C) 98 °F (36.7 °C) 97.8 °F (36.6 °C) SpO2: 95% 95% 93% 90% Intake and Output: 
Current Shift: 01/16 0701 - 01/16 1900 In: -  
Out: 700 [Urine:700] Last three shifts: 01/14 1901 - 01/16 0700 In: -  
Out: 1000 [Urine:1000] Physical Exam: General:                    The patient is an elderly male in no acute respiratory distress. Patient appears chronically ill.   
QMXA:                                   BDJOKXDZBOTOW/SJRJXYOTDG. Eyes:                                   palpebral pallor, no scleral icterus. ENT:                                    External auricular and nasal exam within normal limits.                                             NLVGIP membranes are dry.  
Neck:                                   Supple, non-tender, no JVD. Lungs:                       diminished to auscultation bilaterally without wheezes or crackles.                                             No respiratory distress or accessory muscle use. Heart:                                 irregular rate and rhythm, without murmurs, rubs, or gallops. Abdomen:                  Soft, non-tender, distended due to truncal obesity with normoactive bowel sounds. Genitourinary:           No tenderness over the bladder or bilateral CVAs. Extremities:               Without clubbing, cyanosis, severe edema of both lower legs.  
Skin:                                    swelling of lower legs, with redness. Pressure sores of buttock with foul smell.  
Pulses:                      Radial and dorsalis pedis pulses present 2+ bilaterally.  
                                            Capillary refill <2s. Neurologic:                CN II-XII grossly intact and symmetrical.  
                                            Moving all four extremities well with no focal deficits. Psychiatric:                Pleasant demeanor, appropriate affect. Alert and oriented x 3 Lab/Data Review: 
 
Recent Results (from the past 24 hour(s)) CBC WITH AUTOMATED DIFF Collection Time: 01/15/21  2:22 PM  
Result Value Ref Range WBC 28.5 (H) 4.3 - 11.1 K/uL  
 RBC 3.16 (L) 4.23 - 5.6 M/uL HGB 9.4 (L) 13.6 - 17.2 g/dL HCT 30.0 (L) 41.1 - 50.3 %  MCV 94.9 79.6 - 97.8 FL  
 MCH 29.7 26.1 - 32.9 PG  
 MCHC 31.3 (L) 31.4 - 35.0 g/dL RDW 20.0 (H) 11.9 - 14.6 % PLATELET 990 769 - 637 K/uL MPV 8.8 (L) 9.4 - 12.3 FL ABSOLUTE NRBC 0.00 0.0 - 0.2 K/uL  
 DF AUTOMATED NEUTROPHILS 92 (H) 43 - 78 % LYMPHOCYTES 2 (L) 13 - 44 % MONOCYTES 3 (L) 4.0 - 12.0 % EOSINOPHILS 0 (L) 0.5 - 7.8 % BASOPHILS 0 0.0 - 2.0 % IMMATURE GRANULOCYTES 3 0.0 - 5.0 %  
 ABS. NEUTROPHILS 26.3 (H) 1.7 - 8.2 K/UL  
 ABS. LYMPHOCYTES 0.5 0.5 - 4.6 K/UL  
 ABS. MONOCYTES 0.9 0.1 - 1.3 K/UL  
 ABS. EOSINOPHILS 0.0 0.0 - 0.8 K/UL  
 ABS. BASOPHILS 0.0 0.0 - 0.2 K/UL  
 ABS. IMM. GRANS. 0.8 (H) 0.0 - 0.5 K/UL METABOLIC PANEL, COMPREHENSIVE Collection Time: 01/15/21  2:22 PM  
Result Value Ref Range Sodium 129 (L) 136 - 145 mmol/L Potassium 5.0 3.5 - 5.1 mmol/L Chloride 94 (L) 98 - 107 mmol/L  
 CO2 26 21 - 32 mmol/L Anion gap 9 7 - 16 mmol/L Glucose 89 65 - 100 mg/dL BUN 43 (H) 8 - 23 MG/DL Creatinine 1.43 0.8 - 1.5 MG/DL  
 GFR est AA >60 >60 ml/min/1.73m2 GFR est non-AA 50 (L) >60 ml/min/1.73m2 Calcium 8.9 8.3 - 10.4 MG/DL Bilirubin, total 1.1 0.2 - 1.1 MG/DL  
 ALT (SGPT) 14 12 - 65 U/L  
 AST (SGOT) 16 15 - 37 U/L Alk. phosphatase 120 50 - 136 U/L Protein, total 7.5 6.3 - 8.2 g/dL Albumin 2.9 (L) 3.2 - 4.6 g/dL Globulin 4.6 (H) 2.3 - 3.5 g/dL A-G Ratio 0.6 (L) 1.2 - 3.5 LACTIC ACID Collection Time: 01/15/21  2:22 PM  
Result Value Ref Range Lactic acid 2.7 (H) 0.4 - 2.0 MMOL/L  
CULTURE, BLOOD Collection Time: 01/15/21  2:22 PM  
 Specimen: Blood Result Value Ref Range Special Requests: RIGHT Antecubital 
    
 GRAM STAIN AEROBIC AND ANAEROBIC BOTTLES    
 GRAM STAIN GRAM POSITIVE COCCI    
 GRAM STAIN     
  RESULTS VERIFIED, PHONED TO AND READ BACK BY ALAYNA LINO 8TH DT AT 3095 ON 1/16/21 HA Culture result: CULTURE IN PROGRESS,FURTHER UPDATES TO FOLLOW CULTURE, BLOOD  Collection Time: 01/15/21  2:22 PM  
 Specimen: Blood Result Value Ref Range Special Requests: LEFT 
HAND 
    
 GRAM STAIN AEROBIC AND ANAEROBIC BOTTLES    
 GRAM STAIN GRAM POSITIVE RODS    
 GRAM STAIN     
  RESULTS VERIFIED, PHONED TO AND READ BACK BY MARY LINO DT 8TH FLOOR AT 0254 ON 1/16/21 HA Culture result: CULTURE IN PROGRESS,FURTHER UPDATES TO FOLLOW PROCALCITONIN Collection Time: 01/15/21  2:22 PM  
Result Value Ref Range Procalcitonin 0.30 ng/mL SARS-COV-2 Collection Time: 01/15/21  3:18 PM  
Result Value Ref Range Specimen source Nasopharyngeal    
 COVID-19 rapid test Not detected NOTD    
 SARS CoV-2 PENDING   
CBC WITH AUTOMATED DIFF Collection Time: 01/16/21  4:02 AM  
Result Value Ref Range WBC 18.0 (H) 4.3 - 11.1 K/uL  
 RBC 2.71 (L) 4.23 - 5.6 M/uL HGB 8.1 (L) 13.6 - 17.2 g/dL HCT 26.0 (L) 41.1 - 50.3 % MCV 95.9 79.6 - 97.8 FL  
 MCH 29.9 26.1 - 32.9 PG  
 MCHC 31.2 (L) 31.4 - 35.0 g/dL RDW 20.2 (H) 11.9 - 14.6 % PLATELET 501 539 - 525 K/uL MPV 9.4 9.4 - 12.3 FL ABSOLUTE NRBC 0.00 0.0 - 0.2 K/uL  
 DF AUTOMATED NEUTROPHILS 88 (H) 43 - 78 % LYMPHOCYTES 4 (L) 13 - 44 % MONOCYTES 6 4.0 - 12.0 % EOSINOPHILS 0 (L) 0.5 - 7.8 % BASOPHILS 0 0.0 - 2.0 % IMMATURE GRANULOCYTES 2 0.0 - 5.0 %  
 ABS. NEUTROPHILS 15.8 (H) 1.7 - 8.2 K/UL  
 ABS. LYMPHOCYTES 0.7 0.5 - 4.6 K/UL  
 ABS. MONOCYTES 1.1 0.1 - 1.3 K/UL  
 ABS. EOSINOPHILS 0.0 0.0 - 0.8 K/UL  
 ABS. BASOPHILS 0.0 0.0 - 0.2 K/UL  
 ABS. IMM. GRANS. 0.4 0.0 - 0.5 K/UL METABOLIC PANEL, BASIC Collection Time: 01/16/21  4:02 AM  
Result Value Ref Range Sodium 134 (L) 136 - 145 mmol/L Potassium 4.5 3.5 - 5.1 mmol/L Chloride 101 98 - 107 mmol/L  
 CO2 22 21 - 32 mmol/L Anion gap 11 7 - 16 mmol/L Glucose 80 65 - 100 mg/dL BUN 40 (H) 8 - 23 MG/DL Creatinine 1.30 0.8 - 1.5 MG/DL  
 GFR est AA >60 >60 ml/min/1.73m2 GFR est non-AA 56 (L) >60 ml/min/1.73m2  Calcium 8.2 (L) 8.3 - 10.4 MG/DL  
 
 Results Procedure Component Value Units Date/Time Tyesha Saldana [[id number]] Collected: 01/15/21 1445 Order Status: Canceled Specimen: Urine from Clean catch CULTURE, BLOOD [[id number]] Collected: 01/15/21 1422 Order Status: Completed Specimen: Blood Updated: 01/16/21 7682 Special Requests: --     
  RIGHT Antecubital 
  
  GRAM STAIN    
  AEROBIC AND ANAEROBIC BOTTLES  
     
   GRAM POSITIVE COCCI RESULTS VERIFIED, PHONED TO AND READ BACK BY ALAYNA LINO 8TH DT AT 0905 ON 1/16/21 HA Culture result:    
  CULTURE IN PROGRESS,FURTHER UPDATES TO FOLLOW CULTURE, BLOOD [[id number]] Collected: 01/15/21 1422 Order Status: Completed Specimen: Blood Updated: 01/16/21 0941 Special Requests: --     
  LEFT 
HAND 
  
  GRAM STAIN    
  AEROBIC AND ANAEROBIC BOTTLES  
     
   GRAM POSITIVE RODS RESULTS VERIFIED, PHONED TO AND READ BACK BY MARY LINO DT 8TH FLOOR AT 3349 ON 1/16/21 HA Culture result:    
  CULTURE IN PROGRESS,FURTHER UPDATES TO FOLLOW  
     
 BLOOD CULTURE ID PANEL [[id number]] Collected: 01/15/21 1422 Order Status: Completed Updated: 01/16/21 1019 XR chest  
1- Impression: Ill-defined density left lower lung suspicious for infiltrate, or 
atelectasis 
  
Current Outpatient Medications Medication Instructions  carvediloL (COREG) 6.25 mg, Oral, 2 TIMES DAILY WITH MEALS  diclofenac (VOLTAREN) 2 g, Topical, 4 TIMES DAILY  ferrous gluconate (FERGON) 240 mg, Oral, 3 TIMES DAILY WITH MEALS  furosemide (LASIX) 40 mg, Oral, 2 TIMES DAILY  levothyroxine (SYNTHROID) 112 mcg tablet Oral, DAILY BEFORE BREAKFAST  meloxicam (MOBIC) 7.5 mg, Oral, DAILY  mupirocin (BACTROBAN) 2 % ointment APPLY TO LEFT LEG DAILY  potassium chloride SR (K-TAB) 20 mEq tablet 20 mEq, Oral, DAILY  psyllium husk (METAMUCIL) 0.4 gram cap Oral, DAILY  sertraline (ZOLOFT) 50 mg, Oral, DAILY Current Facility-Administered Medications:  
  acetaminophen (TYLENOL) tablet 650 mg, 650 mg, Oral, Q6H PRN **OR** acetaminophen (TYLENOL) suppository 650 mg, 650 mg, Rectal, Q6H PRN, Paul Galdamez MD 
  enoxaparin (LOVENOX) injection 40 mg, 40 mg, SubCUTAneous, Q24H, Paul Galdamez MD 
  polyethylene glycol (MIRALAX) packet 17 g, 17 g, Oral, DAILY PRN, Paul Galdamez MD 
  promethazine (PHENERGAN) tablet 12.5 mg, 12.5 mg, Oral, Q6H PRN **OR** ondansetron (ZOFRAN) injection 4 mg, 4 mg, IntraVENous, Q6H PRN, Paul Galdamez MD 
  sodium chloride (NS) flush 5-40 mL, 5-40 mL, IntraVENous, Q8H, Paul Galdamez MD, 10 mL at 01/16/21 6427   sodium chloride (NS) flush 5-40 mL, 5-40 mL, IntraVENous, PRN, Paul Galdamez MD 
  0.9% sodium chloride infusion, 50 mL/hr, IntraVENous, CONTINUOUS, Paul Galdamez MD, Last Rate: 50 mL/hr at 01/16/21 0937, 50 mL/hr at 01/16/21 6368   piperacillin-tazobactam (ZOSYN) 3.375 g in 0.9% sodium chloride (MBP/ADV) 100 mL MBP, 3.375 g, IntraVENous, Q8H, Pual Galdamez MD, Last Rate: 25 mL/hr at 01/16/21 0859, 3.375 g at 01/16/21 1467   traZODone (DESYREL) tablet 50 mg, 50 mg, Oral, QHS PRN, Princella Sports, DO, 50 mg at 01/15/21 5469 Assessment:  
 
Active Problems: 
  HTN (hypertension) (6/12/2016) Hypothyroidism (2/4/2014) Atrial fibrillation ([address] (6/12/2016) Overview: Post-op aneurysm repair, brief course amiodarone and xarelto stopped with  
    resolution of afib/flutter following cardioversion CAD (coronary artery disease) (6/12/2016) Acute renal failure ([facility]F) ([facility] (6/12/2016) Systolic CHF, chronic ([facility] (1/8/2019) Severe sepsis ([facility] (1/15/2021) COVID-19 (1/15/2021) Cellulitis (1/15/2021) Pressure sore (1/15/2021) Plan:  
 
Severe sepsis Presented with chills Likely sources of infection include buttock wounds and leg wound Empiric IV antibiotic, Zosyn. Blood culture is done. He grew Gram positive cocci and Gram positive huber. Wound care consultation. Will add Vancomycin.  
  
suspicious for COVID infection Pending PCR test 
No hypoxia Will not give steroid yet.  
  
CAD  
Continue home medications.   
  
Hypothyroidism Continue home medications.   
  
Hypertension Monitor blood pressure and manage accordingly. Continue home medications.   
  
AF Monitor Heart rate is acceptable Patient is not on [facility] 
  
REECE Likely from sepsis and volume depletion  
IV fluid.   
Monitor renal function and intake and output. Avoid nephrotoxic agents.  
  
Healthcare power of  is wife.  
  
I have discussed the plan of care with patient.   
  
DVT prophylaxis : Lovenox SC  
  
 
Signed By: Lety Crocker MD   
 January 16, 2021

## 2021-01-16 NOTE — PROGRESS NOTES
Patient in bed resting c/o anxiety. Patient received trazodone @ [id number]. No other needs assessed at this time. Bed low and locked. Call light within reach. Preparing to give bedside report to oncoming RN.

## 2021-01-16 NOTE — PROGRESS NOTES
Care Management Interventions PCP Verified by CM: Yes Physical Therapy Consult: No 
Occupational Therapy Consult: No 
Current Support Network: Lives with Spouse Confirm Follow Up Transport: Other (see comment) Freedom of Choice List was Provided with Basic Dialogue that Supports the Patient's Individualized Plan of Care/Goals, Treatment Preferences and Shares the Quality Data Associated with the Providers?: Yes [employer] Information Provided?: Yes Discharge Location Discharge Placement: Unable to determine at this time CM called patient's wife to discuss discharge plans. Patient is wc bound. He has a caregiver that gets him up in the morning and then comes back in the evening to help put him to bed. DME: wc, sliding board, cpap Patient has a ramp to get into his home. Wife has minimal support outside the aid that comes into the home. Patient has VA benefits as well as [employer]. CM gave wife information on [employer] and then was given verbal permission to contact [place] with [employer]. This service can assist her in exploring support for the home possibly through [place] benefits. CM following.

## 2021-01-16 NOTE — PROGRESS NOTES
Bryant Pat, from [place] lab called to notify this RN that patient's anaerobic blood culture grew gram positive rods. Dr. Oliver Hazard notified via perfect serve and acknowledged update. No new orders at this time.

## 2021-01-16 NOTE — ED PROVIDER NOTES
Paraplegia since AAA surgery in past. Has suprapublic catheter and scheduled for surgery for decubitus. Here with rigors this afternoon - was covered with blankets and heating pads. No cough/ smell change... no known COVID exposure . Lives at home. No new skin break down that he is aware of. Had thoracic aneurysm surgery in [place] initially and did well/ subsequent AAA was awa cord (? Infarct).  
 
The history is provided by the patient.  
Chills  
This is a new problem. The problem has been rapidly worsening. His temperature was unmeasured prior to arrival. Pertinent negatives include no chest pain, no vomiting, no cough and no shortness of breath.  
  
 
Past Medical History:  
Diagnosis Date  
• Adverse effect of anesthesia   
 nighmares  
• Aneurysm (HCC)   
 Thorasic Aortic aneurysm, surger in [place] 5/24/16  
• Atrial flutter (HCC)   
 ROSENDO guided cardioversion, No thinners  
• CAD (coronary artery disease)   
 patient denies  
• Congestive heart failure (HCC)   
 EF 45-50% on 11/2019  
• Hypertension   
• Ill-defined condition   
 spinal cord injury  
• Ill-defined condition   
 Neurogenic bladder, self caths  
• Morbid obesity (HCC)   
• Thyroid disease   
 
 
Past Surgical History:  
Procedure Laterality Date  
• HX APPENDECTOMY    
• HX COLONOSCOPY  5/08  
 diverticulosis  
• HX ORTHOPAEDIC    
 repair of broken jaw  
• HX TONSILLECTOMY    
• NE CARDIAC SURG PROCEDURE UNLIST  2016  
 repair of aneurysm in acending and transverse arteries  
• NE CARDIAC SURG PROCEDURE UNLIST  2017  
 Aortic valve repair Descending  
• NE CARDIAC SURG PROCEDURE UNLIST  06/2016  
 cardioversion, ROSENDO x2  
• NE CHEST SURGERY PROCEDURE UNLISTED  2016, 2017  
 Aortic aneursym  
 
   
Family History:  
Problem Relation Age of Onset  
• Stroke Mother   
 
 
Social History  
 
Socioeconomic History  
• Marital status:   
  Spouse name: Not on file  
• Number of children: Not on file  
• Years of education: Not on file  
  Highest education level: Not on file Occupational History  Not on file Social Needs  Financial resource strain: Not on file  Food insecurity Worry: Not on file Inability: Not on file  Transportation needs Medical: Not on file Non-medical: Not on file Tobacco Use  Smoking status: Former Smoker Packs/day: 3.00 Years: 25.00 Pack years: 75.00 Types: Cigarettes Quit date: 1986 Years since quittin.0  Smokeless tobacco: Former User Types: Snuff, Chew Quit date: 2014 Substance and Sexual Activity  Alcohol use: Not Currently Comment: advises quitting  Drug use: No  
 Sexual activity: Yes  
  Partners: Female Lifestyle  Physical activity Days per week: Not on file Minutes per session: Not on file  Stress: Not on file Relationships  Social connections Talks on phone: Not on file Gets together: Not on file Attends Yazidism service: Not on file Active member of club or organization: Not on file Attends meetings of clubs or organizations: Not on file Relationship status: Not on file  Intimate partner violence Fear of current or ex partner: Not on file Emotionally abused: Not on file Physically abused: Not on file Forced sexual activity: Not on file Other Topics Concern  Not on file Social History Narrative Lives with wife Currently uses walker for ambulation post-op - [facility] Previously employed as  / , [address] works,  PCP: Dr. Maddie Peres ALLERGIES: Patient has no known allergies. Review of Systems Constitutional: Positive for chills. Negative for diaphoresis. HENT: Negative. Respiratory: Negative for cough and shortness of breath. Cardiovascular: Negative for chest pain. Gastrointestinal: Negative for vomiting. Genitourinary:  
     Chronic suprapubic Psychiatric/Behavioral: Negative for confusion and decreased concentration. All other systems reviewed and are negative. Vitals:  
 01/15/21 1348 01/15/21 1636 01/15/21 1643 01/15/21 1652 BP:   (!) 98/53 (!) 104/59 Pulse: 93 (!) 106 Resp:      
Temp:      
SpO2: 96% 98% Weight:      
Height:      
      
 
Physical Exam 
Vitals signs and nursing note reviewed. Constitutional:   
   General: He is not in acute distress. Appearance: He is obese. He is ill-appearing. He is not diaphoretic. Comments: On right side Conversant and alert HENT:  
   Right Ear: External ear normal.  
   Left Ear: External ear normal.  
   Nose: Nose normal.  
Eyes:  
   General: No scleral icterus. Cardiovascular:  
   Rate and Rhythm: Regular rhythm. Pulses: Normal pulses. Pulmonary:  
   Breath sounds: No wheezing or rhonchi. Abdominal:  
   Tenderness: There is no abdominal tenderness. There is no guarding or rebound. Musculoskeletal:     
   General: Deformity present. Comments: Heel/foot pads in place No visible breakdown but not completely undressed Lymphadenopathy:  
   Cervical: No cervical adenopathy. Skin: 
   General: Skin is warm and dry. Capillary Refill: Capillary refill takes less than 2 seconds. Neurological:  
   General: No focal deficit present. Psychiatric:     
   Thought Content: Thought content normal.  
 
  
 
MDM Number of Diagnoses or Management Options Diagnosis management comments: Rigors and leukocytosis. Doubt COVID. Expect bacteremia. Possible urine or skin breakdown source. Hydrate/ IV antibiotics/Admit Amount and/or Complexity of Data Reviewed Clinical lab tests: ordered and reviewed Tests in the radiology section of CPT®: reviewed and ordered Review and summarize past medical records: yes Discuss the patient with other providers: yes Independent visualization of images, tracings, or specimens: yes Risk of Complications, Morbidity, and/or Mortality Presenting problems: high Diagnostic procedures: low Management options: moderate Critical Care Performed by: Anabelle Nolan MD 
Authorized by: Anabelle Nolan MD  
 
Critical care provider statement:  
  Critical care time (minutes):  35 
  Critical care was necessary to treat or prevent imminent or life-threatening deterioration of the following conditions:  Sepsis Critical care was time spent personally by me on the following activities:  Discussions with consultants, ordering and review of laboratory studies, ordering and review of radiographic studies, re-evaluation of patient's condition, pulse oximetry, review of old charts, obtaining history from patient or surrogate, examination of patient and evaluation of patient's response to treatment   I assumed direction of critical care for this patient from another provider in my specialty: no   
 
 
 
 
 
 
 
  
 
 
 
 
 
 
 
 Oriented - self; Oriented - place; Oriented - time

## 2021-01-16 NOTE — PROGRESS NOTES
Received report from [facility]. Patient awake resting in bed. Respirations present. On room air. No signs of distress. AxO x4. No needs expressed. NS @ 50 ml/hr. Bed low and locked. Call light within reach. Will continue to monitor.

## 2021-01-16 NOTE — PROGRESS NOTES
Pharmacokinetic Consult to Pharmacist 
 
Bryn Ann is a 79 y.o. male being treated for bloodstream infection with vancomycin. Lab Results Component Value Date/Time BUN 40 (H) 01/16/2021 04:02 AM  
 Creatinine 1.30 01/16/2021 04:02 AM  
 WBC 18.0 (H) 01/16/2021 04:02 AM  
 Procalcitonin 0.30 01/15/2021 02:22 PM  
 Lactic acid 2.7 (H) 01/15/2021 02:22 PM  
  
Estimated Creatinine Clearance: 59.4 mL/min (based on SCr of 1.3 mg/dL). No results found for: Meera Moore Day 1 of vancomycin. Goal trough is 15-20. Vancomycin dose initiated at 2500 mg IV x 1 followed by 1500 mg IV q24h. Levels will be ordered as clinically indicated. Pharmacy will continue to follow. Please call with any questions. Thank you, Darline Barron, PharmD, [facility] Clinical Pharmacist 
[phone]

## 2021-01-16 NOTE — PROGRESS NOTES
Patient refused lovenox. Patient is anxious and frustrated. Yelling \"I want to go home\". Jerri Vieira, oncoming RN, present in patient room at this time. Report given to oncoming RN, Jerri Vieira.   Jerri Vieira addressing patient concerns with MD.

## 2021-01-16 NOTE — PROGRESS NOTES
[facility] Patient lying in bed appearing anxious and yelling at staff, \"I want to go home. \" Explained to patient the importance of staying in the hospital for treatment. Respirations present on room air, no s/sx of respiratory distress at this time. BLE swollen and sacral wound noted. Jeffery catheter in place, draining amanuel urine. Zosyn infusing at 25 mL/hr without complications Normal saline infusing at 50 mL/hr without complications. Safety measures in place, call light in reac

## 2021-01-17 NOTE — PROGRESS NOTES
Patient voices no concerns at this time. Patient's bilateral legs with 3+ pitting edema but no weeping noted. Patient denies any pain at this time. Call light within reach and patient instructed to call if assistance is needed. Will continue to monitor.

## 2021-01-17 NOTE — PROGRESS NOTES
Patient resting in bed, alert and oriented, cooperative with care. Patient on room air. Patient denies pain or distress, safety measures in place, call light within reach.

## 2021-01-17 NOTE — PROGRESS NOTES
Patient stable with no complaints at this time. Patient is resting in bed watching TV. Patient denies any pain and appears comfortable at this time. Patient's bilateral leg with 3+ edema but no weeping. Call light within reach and patient instructed to call if assistance is needed. Report to be given to oncoming RN 7p-7a

## 2021-01-17 NOTE — PROGRESS NOTES
Patient resting in bed with no complaints at this time. Patient is alert and orientated with no distress noted. IV intact and patent with no s/s of infection noted. Respirations even and unlabored with heart rate regular. Patient unable to ambulate independently without assistance; bedrest at this time. Bed in low locked position with call light within reach. Patient instructed to call if assistance is needed. Will continue to monitor.

## 2021-01-17 NOTE — PROGRESS NOTES
Progress Note Patient: Prince Quesada MRN: [id number]  SSN: [id number] YOB: 1940  Age: 79 y.o. Sex: male Admit Date: 1/15/2021 LOS: 2 days Subjective:  
 
Dickson Alvarenga a 79 y. o. male who came to ER due to having chills on the day of admission.  
  
Patient has had wound on buttocks with foul smell and swelling of both legs for months. He was found to have worsening of symptoms on the day of admission  with chills and so EMS was called and he was brought to ER.  
  
He is found to have severe cellulitis on both lower legs with swelling and also with buttock wound with foul smell.  
  
Part of work up shows CXR with some? Infiltrates. He is being tested for possible COVID infection. Rapid test is negative so far.  PCR is still pending.  
  
PMH includes;  Adverse effect of anesthesia    
  nighmares  Aneurysm ([address]    
  Thorasic Aortic aneurysm, surger in [facility] 5/24/16  Atrial flutter ([address]    
  ROSENDO guided cardioversion, No thinners  CAD (coronary artery disease)    
  patient denies  Congestive heart failure ([address]    
  EF 45-50% on 11/2019  Hypertension    
 Ill-defined condition    
  spinal cord injury  Ill-defined condition    
  Neurogenic bladder, self caths  Morbid obesity ([address]    
 Thyroid disease Patient is started on Zosyn for skin infection and buttock wound. No fever in the past 24 hours. Objective:  
 
Vitals:  
 01/16/21 1922 01/16/21 2352 01/17/21 0425 01/17/21 9106 BP: 110/60 106/62 102/61 (!) 101/58 Pulse: 89 88 100 94 Resp: 19 18 18 18 Temp: 98.1 °F (36.7 °C) 98.3 °F (36.8 °C) 97.9 °F (36.6 °C) 97.6 °F (36.4 °C) SpO2: 97% 93% 94% 96% Intake and Output: 
Current Shift: No intake/output data recorded. Last three shifts: 01/15 1901 - 01/17 0700 In: 690 [P.O.:690] Out: 2500 [Urine:2500] Physical Exam: General:                    The patient is an elderly male in no acute respiratory distress. Patient appears chronically ill. Patient appears with more energy compared to yesterday. EGKY:                                   FEAAIKXZEBMSH/TVITMRQWZM. Eyes:                                   palpebral pallor, no scleral icterus. ENT:                                    External auricular and nasal exam within normal limits.                                             KSHVYR membranes are dry.  
Neck:                                   Supple, non-tender, no JVD. Lungs:                       diminished to auscultation bilaterally without wheezes or crackles.                                             No respiratory distress or accessory muscle use. Heart:                                 irregular rate and rhythm, without murmurs, rubs, or gallops. Abdomen:                  Soft, non-tender, distended due to truncal obesity with normoactive bowel sounds. Genitourinary:           No tenderness over the bladder or bilateral CVAs. Extremities:               Without clubbing, cyanosis, severe edema of both lower legs.  
Skin:                                    swelling of lower legs, with redness. Pressure sores of buttock with foul smell.  
Pulses:                      Radial and dorsalis pedis pulses present 2+ bilaterally.  
                                            Capillary refill <2s. Neurologic:                CN II-XII grossly intact and symmetrical.  
                                            Moving all four extremities well with no focal deficits. Psychiatric:                Pleasant demeanor, appropriate affect. Alert and oriented x 3 Lab/Data Review: 
 
Recent Results (from the past 24 hour(s)) URINALYSIS W/ RFLX MICROSCOPIC Collection Time: 01/16/21 12:48 PM  
Result Value Ref Range Color YELLOW Appearance CLEAR  Specific gravity 1.012 1.001 - 1.023    
 pH (UA) 5.5 5.0 - 9.0 Protein Negative NEG mg/dL Glucose Negative mg/dL Ketone Negative NEG mg/dL Bilirubin Negative NEG Blood Negative NEG Urobilinogen 1.0 0.2 - 1.0 EU/dL Nitrites Negative NEG Leukocyte Esterase MODERATE (A) NEG    
 WBC 20-50 0 /hpf  
 RBC 0-3 0 /hpf Epithelial cells 0 0 /hpf Bacteria 0 0 /hpf Casts 0 0 /lpf CULTURE, URINE Collection Time: 01/16/21 12:48 PM  
 Specimen: Cath Urine Result Value Ref Range Special Requests: NO SPECIAL REQUESTS Culture result: >100,000 COLONIES/mL MIXED SKIN RAHUL ISOLATED Results Procedure Component Value Units Date/Time CULTURE, URINE [[id number]] Collected: 01/16/21 1248 Order Status: Completed Specimen: Cath Urine Updated: 01/17/21 5396 Special Requests: NO SPECIAL REQUESTS Culture result:    
  >100,000 COLONIES/mL MIXED SKIN RAHUL ISOLATED  
     
 CULTURE, URINE [[id number]] Collected: 01/15/21 1445 Order Status: Canceled Specimen: Urine from Clean catch CULTURE, BLOOD [[id number]]  (Abnormal) Collected: 01/15/21 1422 Order Status: Completed Specimen: Blood Updated: 01/17/21 2107 Special Requests: --     
  RIGHT Antecubital 
  
  GRAM STAIN    
  GRAM POSITIVE COCCI AEROBIC AND ANAEROBIC BOTTLES RESULTS VERIFIED, PHONED TO AND READ BACK BY ALAYNA LINO 8TH DT AT 0267 ON 1/16/21 HA Culture result:    
  ALPHA STREPTOCOCCUS IDENTIFICATION AND SUSCEPTIBILITY TO FOLLOW Refer to Blood Culture ID Panel Accession [id number] CULTURE, BLOOD [[id number]]  (Abnormal) Collected: 01/15/21 1422 Order Status: Completed Specimen: Blood Updated: 01/17/21 8096 Special Requests: --     
  LEFT 
HAND 
  
  GRAM STAIN    
  GRAM POSITIVE RODS GRAM POSITIVE COCCI AEROBIC AND ANAEROBIC BOTTLES RESULTS VERIFIED, PHONED TO AND READ BACK BY MARY LINO DT 8TH FLOOR AT 8706 ON 1/16/21 HA Culture result: ALPHA STREPTOCOCCUS SUBCULTURE IN PROGRESS  
     
 BLOOD CULTURE ID PANEL [[id number]]  (Abnormal) Collected: 01/15/21 1422 Order Status: Completed Specimen: Blood Updated: 01/16/21 1452 Acc. no. from [employer] [id number] Streptococcus Detected Comment: RESULTS VERIFIED, PHONED TO AND READ BACK BY 
Morena Corona, [address] [facility] @[facility] 1.16.21 SC INTERPRETATION Gram positive cocci. Identified by realtime PCR as Streptococcus species (not agalactiae, pyogenes, or pneumoniae). Comment: Consider discontinuation of IV vancomycin and using an anti-streptococcal beta-lactam (ceftriaxone/cefotaxime (peds)) XR chest  
1- Impression: Ill-defined density left lower lung suspicious for infiltrate, or 
atelectasis 
  
Current Outpatient Medications Medication Instructions  carvediloL (COREG) 6.25 mg, Oral, 2 TIMES DAILY WITH MEALS  diclofenac (VOLTAREN) 2 g, Topical, 4 TIMES DAILY  ferrous gluconate (FERGON) 240 mg, Oral, 3 TIMES DAILY WITH MEALS  furosemide (LASIX) 40 mg, Oral, 2 TIMES DAILY  levothyroxine (SYNTHROID) 112 mcg tablet Oral, DAILY BEFORE BREAKFAST  meloxicam (MOBIC) 7.5 mg, Oral, DAILY  mupirocin (BACTROBAN) 2 % ointment APPLY TO LEFT LEG DAILY  potassium chloride SR (K-TAB) 20 mEq tablet 20 mEq, Oral, DAILY  psyllium husk (METAMUCIL) 0.4 gram cap Oral, DAILY  sertraline (ZOLOFT) 50 mg, Oral, DAILY Current Facility-Administered Medications:  
  vancomycin (VANCOCIN) 1500 mg in  ml infusion, 1,500 mg, IntraVENous, Q24H, Paul Galdamez MD 
  acetaminophen (TYLENOL) tablet 650 mg, 650 mg, Oral, Q6H PRN **OR** acetaminophen (TYLENOL) suppository 650 mg, 650 mg, Rectal, Q6H PRN, Paul Galdamez MD 
  enoxaparin (LOVENOX) injection 40 mg, 40 mg, SubCUTAneous, Q24H, Paul Galdamez MD, 40 mg at 01/16/21 1800   polyethylene glycol (MIRALAX) packet 17 g, 17 g, Oral, DAILY PRN, Yovani Lou MD 
   promethazine (PHENERGAN) tablet 12.5 mg, 12.5 mg, Oral, Q6H PRN **OR** ondansetron (ZOFRAN) injection 4 mg, 4 mg, IntraVENous, Q6H PRN, Paul Galdamez MD 
  sodium chloride (NS) flush 5-40 mL, 5-40 mL, IntraVENous, Q8H, Paul Galdamez MD, 10 mL at 01/17/21 8576   sodium chloride (NS) flush 5-40 mL, 5-40 mL, IntraVENous, PRN, Paul Galdamez MD 
  piperacillin-tazobactam (ZOSYN) 3.375 g in 0.9% sodium chloride (MBP/ADV) 100 mL MBP, 3.375 g, IntraVENous, Q8H, Paul Galdamez MD, Last Rate: 25 mL/hr at 01/17/21 0919, 3.375 g at 01/17/21 2919   traZODone (DESYREL) tablet 50 mg, 50 mg, Oral, QHS PRN, Brandi Zhang DO, 50 mg at 01/16/21 7773 Assessment:  
 
Principal Problem: 
  Severe sepsis ([facility] (1/15/2021) Active Problems: 
  HTN (hypertension) (6/12/2016) Hypothyroidism (2/4/2014) Atrial fibrillation ([facility] (6/12/2016) Overview: Post-op aneurysm repair, brief course amiodarone and xarelto stopped with  
    resolution of afib/flutter following cardioversion CAD (coronary artery disease) (6/12/2016) Acute renal failure (ARF) ([facility] (6/12/2016) Systolic CHF, chronic ([facility] (1/8/2019) COVID-19 (1/15/2021) Cellulitis (1/15/2021) Pressure sore (1/15/2021) Plan:  
 
Severe sepsis Presented with chills Likely sources of infection include buttock wounds and leg wound Empiric IV antibiotic, Zosyn and Vancomycin now. Blood culture grew streptococcus. Wound care consultation. Will wait for sensitivity result.  
  
Suspicious for COVID infection Pending PCR test 
No hypoxia Will not give steroid.  
  
CAD  
Continue home medications.   
  
Hypothyroidism Continue home medications.   
  
Hypertension Monitor blood pressure and manage accordingly. Continue home medications.   
  
AF Monitor Heart rate is acceptable Patient is not on [facility] 
  
REECE Likely from sepsis and volume depletion  
IV fluid.    
 Monitor renal function and intake and output. Avoid nephrotoxic agents.  
  
Healthcare power of  is wife.  
  
I have discussed the plan of care with patient. I called wife, Fidel Rodriguez on the phone and updated her about the patient.  
  
DVT prophylaxis : Lovenox SC  
  
 
Signed By: Marie King MD   
 January 17, 2021

## 2021-01-18 NOTE — PROGRESS NOTES
END OF SHIFT NOTE: 
 
INTAKE/OUTPUT 
01/17 0701 - 01/18 0700 In: 26 [P.O.:460] Out: 1100 [Urine:1100] Voiding: NO 
Catheter: YES Drain:   
 
 
 
 
 
Flatus: Patient does have flatus present. Stool:  0 occurrences. Characteristics: 
Stool Assessment Stool Color: Sandhu Kayla Stool Appearance: Soft Stool Amount: Small Stool Source/Status: Rectum Emesis: 0 occurrences. Characteristics: VITAL SIGNS Patient Vitals for the past 12 hrs: 
 Temp Pulse Resp BP SpO2  
01/18/21 0339 98.6 °F (37 °C) 79 18 103/62 97 % 01/17/21 2207 98.9 °F (37.2 °C) 97 18 119/71 97 % 01/17/21 2021 98.1 °F (36.7 °C) 96 18 100/65 97 % Pain Assessment Pain Intensity 1: 0 (01/17/21 2040) Patient Stated Pain Goal: 0 Ambulating No 
Turned several times. Slept intermittently. Shift report given to oncoming nurse at the bedside.  
 
Mary Holder RN

## 2021-01-18 NOTE — PROGRESS NOTES
Chart review complete, pt moved to second floor over weekend, pt continues with iv antibx, unsure of dc needs at this time, CM will remain available to assist as needed.

## 2021-01-18 NOTE — PROGRESS NOTES
Progress Note Patient: Afshan Gavin MRN: [id number]  SSN: [id number] YOB: 1940  Age: 79 y.o. Sex: male Admit Date: 1/15/2021 LOS: 3 days Subjective:  
 
[facility] a 79 y. o. male who came to ER due to having chills on the day of admission.  
  
Patient has had wound on buttocks with foul smell and swelling of both legs for months. He was found to have worsening of symptoms on the day of admission  with chills and so EMS was called and he was brought to ER.  
  
He is found to have severe cellulitis on both lower legs with swelling and also with buttock wound with foul smell.  
  
Part of work up shows CXR with some? Infiltrates. He is being tested for possible COVID infection. COVID tests are negative for both rapid test and PCR.  
  
PMH includes;  Adverse effect of anesthesia    
  nighmares  Aneurysm ([address]    
  Thorasic Aortic aneurysm, surger in [facility] 5/24/16  Atrial flutter ([address]    
  ROSENDO guided cardioversion, No thinners  CAD (coronary artery disease)    
  patient denies  Congestive heart failure ([address]    
  EF 45-50% on 11/2019  Hypertension    
 Ill-defined condition    
  spinal cord injury  Ill-defined condition    
  Neurogenic bladder, self caths  Morbid obesity ([address]    
 Thyroid disease Patient is started on Zosyn for skin infection and buttock wound. No fever in the past 24 hours. Objective:  
 
Vitals:  
 01/17/21 2207 01/18/21 0021 01/18/21 4358 01/18/21 4711 BP: 119/71  103/62 (!) 96/55 Pulse: 97  79 85 Resp: 18 18 17 Temp: 98.9 °F (37.2 °C)  98.6 °F (37 °C) 97.8 °F (36.6 °C) SpO2: 97%  97% 97% Weight:  110.9 kg (244 lb 7.8 oz) Height:  6' (1.829 m) Intake and Output: 
Current Shift: No intake/output data recorded. Last three shifts: 01/16 1901 - 01/18 0700 In: 710 [P.O.:710] Out: 1600 [Urine:1600] Physical Exam: General:                    The patient is an elderly male in no acute respiratory distress. patient is argumentative today. He is upset that he did not get attention from nursing staff as quickly as he wanted. EPXN:                                   [id number]. Eyes:                                   palpebral pallor, no scleral icterus. ENT:                                    External auricular and nasal exam within normal limits.                                             OXSMTY membranes are dry.  
Neck:                                   Supple, non-tender, no JVD. Lungs:                       diminished to auscultation bilaterally without wheezes or crackles.                                             No respiratory distress or accessory muscle use. Heart:                                 irregular rate and rhythm, without murmurs, rubs, or gallops. Abdomen:                  Soft, non-tender, distended due to truncal obesity with normoactive bowel sounds. Genitourinary:           No tenderness over the bladder or bilateral CVAs. Extremities:               Without clubbing, cyanosis, severe edema of both lower legs.  
Skin:                                    swelling of lower legs, with redness. Pressure sores of buttock with foul smell.  
Pulses:                      Radial and dorsalis pedis pulses present 2+ bilaterally.  
                                            Capillary refill <2s. Neurologic:                CN II-XII grossly intact and symmetrical.  
                                            Moving all four extremities well with no focal deficits. Psychiatric:                Pleasant demeanor, appropriate affect. Alert and oriented x 3 Lab/Data Review: 
 
Recent Results (from the past 24 hour(s)) GLUCOSE, POC Collection Time: 01/17/21  9:44 PM  
Result Value Ref Range Glucose (POC) 155 (H) 65 - 100 mg/dL Results Procedure Component Value Units Date/Time CULTURE, URINE [[id number]] Collected: 01/16/21 1248 Order Status: Completed Specimen: Cath Urine Updated: 01/18/21 7911 Special Requests: NO SPECIAL REQUESTS Culture result:    
  >100,000 COLONIES/mL MIXED SKIN RAHUL ISOLATED THREE OR MORE TYPES OF ORGANISMS ARE PRESENT. THIS IS INDICATIVE OF CONTAMINATION DUE TO IMPROPER COLLECTION TECHNIQUE. PLEASE REPEAT COLLECTION UNLESS PATIENT HAS STARTED ANTIBIOTIC TREATMENT. Alexandru Croft [[id number]] Collected: 01/15/21 1445 Order Status: Canceled Specimen: Urine from Clean catch CULTURE, BLOOD [[id number]]  (Abnormal)  (Susceptibility) Collected: 01/15/21 1422 Order Status: Completed Specimen: Blood Updated: 01/18/21 0560 Special Requests: --     
  RIGHT Antecubital 
  
  GRAM STAIN    
  GRAM POSITIVE COCCI AEROBIC AND ANAEROBIC BOTTLES RESULTS VERIFIED, PHONED TO AND READ BACK BY ALAYNA LINO 8TH DT AT 0010 ON 1/16/21 HA Culture result: STREPTOCOCCUS ANGINOSUS Refer to Blood Culture ID Panel Accession [id number] Susceptibility Streptococcus anginosus MARILIN Amoxicillin Susceptible Cefepime ($$) Susceptible Ceftriaxone ($) Susceptible Clindamycin ($) Resistant Penicillin G ($$) Susceptible Vancomycin ($) Susceptible CULTURE, BLOOD [[id number]]  (Abnormal) Collected: 01/15/21 1422 Order Status: Completed Specimen: Blood Updated: 01/18/21 3454 Special Requests: --     
  LEFT 
HAND 
  
  GRAM STAIN    
  GRAM POSITIVE RODS GRAM POSITIVE COCCI AEROBIC AND ANAEROBIC BOTTLES RESULTS VERIFIED, PHONED TO AND READ BACK BY MARY LINO DT 8TH FLOOR AT 6599 ON 1/16/21 HA Culture result:    
  ALPHA STREPTOCOCCUS, NOT S. PNEUMONIAE REFER TO ACCESSION [id number] FOR ID AND SUSCEPTIBILITY BLOOD CULTURE ID PANEL [[id number]]  (Abnormal) Collected: 01/15/21 1422 Order Status: Completed Specimen: Blood Updated: 01/16/21 1452 Acc. no. from [employer] [id number] Streptococcus Detected Comment: RESULTS VERIFIED, PHONED TO AND READ BACK BY 
Doron Levinekirk, [address] [facility] @[facility] 1.16.21 SC INTERPRETATION Gram positive cocci. Identified by realtime PCR as Streptococcus species (not agalactiae, pyogenes, or pneumoniae). Comment: Consider discontinuation of IV vancomycin and using an anti-streptococcal beta-lactam (ceftriaxone/cefotaxime (peds)) XR chest  
1- Impression: Ill-defined density left lower lung suspicious for infiltrate, or 
atelectasis 
  
Current Outpatient Medications Medication Instructions  carvediloL (COREG) 6.25 mg, Oral, 2 TIMES DAILY WITH MEALS  diclofenac (VOLTAREN) 2 g, Topical, 4 TIMES DAILY  ferrous gluconate (FERGON) 240 mg, Oral, 3 TIMES DAILY WITH MEALS  furosemide (LASIX) 40 mg, Oral, 2 TIMES DAILY  levothyroxine (SYNTHROID) 112 mcg tablet Oral, DAILY BEFORE BREAKFAST  meloxicam (MOBIC) 7.5 mg, Oral, DAILY  mupirocin (BACTROBAN) 2 % ointment APPLY TO LEFT LEG DAILY  potassium chloride SR (K-TAB) 20 mEq tablet 20 mEq, Oral, DAILY  psyllium husk (METAMUCIL) 0.4 gram cap Oral, DAILY  sertraline (ZOLOFT) 50 mg, Oral, DAILY Current Facility-Administered Medications:  
  VANCOMYIN TROUGH REMINDER, , Other, ONCE, Paul Galdamez MD 
  alcohol 62% (NOZIN) nasal  1 Ampule, 1 Ampule, Topical, Q12H, Paul Galdamez MD 
  influenza vaccine 2020-21 (6 mos+)(PF) (FLUARIX/FLULAVAL/FLUZONE QUAD) injection 0.5 mL, 0.5 mL, IntraMUSCular, PRIOR TO DISCHARGE, Paul Galdamez MD 
  vancomycin (VANCOCIN) 1500 mg in  ml infusion, 1,500 mg, IntraVENous, Q24H, Paul Galdamez MD, Last Rate: 333.3 mL/hr at 01/17/21 1620, 1,500 mg at 01/17/21 1620   acetaminophen (TYLENOL) tablet 650 mg, 650 mg, Oral, Q6H PRN, 650 mg at 01/18/21 0010 **OR** acetaminophen (TYLENOL) suppository 650 mg, 650 mg, Rectal, Q6H PRN, Paul Galdamez MD 
  enoxaparin (LOVENOX) injection 40 mg, 40 mg, SubCUTAneous, Q24H, Paul Galdamez MD, 40 mg at 01/17/21 2040   polyethylene glycol (MIRALAX) packet 17 g, 17 g, Oral, DAILY PRN, Paul Galdamez MD 
  promethazine (PHENERGAN) tablet 12.5 mg, 12.5 mg, Oral, Q6H PRN **OR** ondansetron (ZOFRAN) injection 4 mg, 4 mg, IntraVENous, Q6H PRN, Paul Galdamez MD 
  sodium chloride (NS) flush 5-40 mL, 5-40 mL, IntraVENous, Q8H, Paul Galdamez MD, 10 mL at 01/18/21 7333   sodium chloride (NS) flush 5-40 mL, 5-40 mL, IntraVENous, PRN, Paul Galdamez MD 
  piperacillin-tazobactam (ZOSYN) 3.375 g in 0.9% sodium chloride (MBP/ADV) 100 mL MBP, 3.375 g, IntraVENous, Q8H, Paul Galdamez MD, Last Rate: 25 mL/hr at 01/18/21 0814, 3.375 g at 01/18/21 8988   traZODone (DESYREL) tablet 50 mg, 50 mg, Oral, QHS PRN, Brandi Zhang, DO, 50 mg at 01/18/21 0010 Assessment:  
 
Principal Problem: 
  Se[facility] (1/15/2021) Active Problems: 
  HTN (hypertension) (6/12/2016) Hypothyroidism (2/4/2014) Atrial fibrillation ([facility] (6/12/2016) Overview: Post-op aneurysm repair, brief course amiodarone and xarelto stopped with  
    resolution of afib/flutter following cardioversion CAD (coronary artery disease) (6/12/2016) Acute renal failure (ARF) ([facility] (6/12/2016) Systolic CHF, chronic ([facility] (1/8/2019) COVID-19 (1/15/2021) Cellulitis (1/15/2021) Pressure sore (1/15/2021) Plan:  
 
Severe sepsis Presented with chills Likely sources of infection include buttock wounds and leg wound Empiric IV antibiotic, Zosyn and Vancomycin. Blood culture shoes streptococcus. Will stop Vancomycin.   
Wound care.  
  
Ruled out for COVID infection.  
  
CAD  
 Continue home medications.   
  
Hypothyroidism  
Continue home medications.   
  
Hypertension 
Monitor blood pressure and manage accordingly.  
Continue home medications.   
  
AF  
Monitor  
Heart rate is acceptable  
Patient is not on OAC. I confirmed with spouse. He had previous bleeding episodes.  
  
REECE  
Likely from sepsis and volume depletion  
IV fluid.   
Monitor renal function and intake and output.  
Avoid nephrotoxic agents.  
  
Healthcare power of  is wife.  
  
I have discussed the plan of care with patient. I discussed with patient's wife at bedside and gave her update.  
  
DVT prophylaxis : Lovenox SC  
  
 
Signed By: Paul Galdamez MD   
 January 18, 2021

## 2021-01-18 NOTE — CONSULTS
Left buttock wound inspected and probed at the bedside. Some black escar and slough noted but no tracking, drainage or deeper cavity found. Wound overall is only 2.5-3cm deep and partially granulated. While this will require further follow up and debridement by his surgeon, it is not the source of his current sepsis and problems. Recommend follow up as scheduled with his surgeon at [facility] after discharge.

## 2021-01-18 NOTE — PROGRESS NOTES
TRANSFER - OUT REPORT: 
 
Verbal report given to Kelby Dunlap RN (name) on Soheila Gonzalez  being transferred to [phone] (unit) for routine progression of care Report consisted of patients Situation, Background, Assessment and  
Recommendations(SBAR). Information from the following report(s) SBAR, Kardex, Intake/Output and MAR was reviewed with the receiving nurse. Opportunity for questions and clarification was provided. Patient transported with: 
 CR2

## 2021-01-18 NOTE — WOUND CARE
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 Patient is wheel chair bound, has Clarise Fish cushion\" on wheelchair and has a power chair ordered through the [place], not received yet, but also supposed to have a cushion for the seat. Sacrum with 6x4x0.1cm area of erythema with partial thickness skin loss, bordered silicone foam dressing in use, would continue. Patient is willing to tilt, however refuses use of wedge for turning, states it hurts to much. Left buttock with 6x2m9tp with soft loose eschar and slough as well as granulation foul odor, recommend surgical debridement of slough eschar, patient states he was to have debridement with a different surgeon not on staff, however if it needs here is is willing. Dr. Cassidy Jackson notified of possible need for debridement per perfect serve. Right lower shin at ankle with 2x1cm pink well healing wound. Left mid upper shin with 2x1x0.3cm wound, pink well healing. Right posterior heel with 3x2x0.2cm wound, with callous surrounding serosanguinous drainage. Recommend aquacel ag and foam dressings over both leg and right heel wounds, silicone border to sacral wound and Dakin's moist to dry dressings and await surgery recommendations. Will monitor.

## 2021-01-18 NOTE — PROGRESS NOTES
TRANSFER - IN REPORT: 
 
Verbal report received from Lindsey(name) on [place]  being received from [facility](unit) for routine progression of care Report consisted of patients Situation, Background, Assessment and  
Recommendations(SBAR). Information from the following report(s) Kardex was reviewed with the receiving nurse. Opportunity for questions and clarification was provided. Assessment completed upon patients arrival to unit and care assumed. Arrived awake, alert, oriented x4. Venetie. Deep sacral wound covered with bue wound gel/outer layer of abds saturated and changed with heavy drainage pack. Has reddened peeling skin to sacrum covered with allevyn which was replaced. Has scattered rash to back, excoriation to leg folds. 3-4+ edema to both feet/legs which are reddened with drsg to right lower leg intact; this was not removed at this time. Both feet in prevalon boots. Turned on right side with wedge. Call light in reach. Phone charging.

## 2021-01-18 NOTE — PROGRESS NOTES
END OF SHIFT NOTE: 
 
INTAKE/OUTPUT 
01/17 0701 - 01/18 0700 In: 26 [P.O.:460] Out: 1100 [Urine:1100] Voiding: NO 
Catheter: YES Drain:   
 
 
 
 
 
Flatus: Patient does have flatus present. Stool:  0 occurrences. Characteristics: 
Stool Assessment Stool Color: Doren Box Stool Appearance: Soft Stool Amount: Small Stool Source/Status: Rectum Emesis: 0 occurrences. Characteristics: VITAL SIGNS Patient Vitals for the past 12 hrs: 
 Temp Pulse Resp BP SpO2  
01/18/21 1518 97.8 °F (36.6 °C) 89 18 111/62 98 % 01/18/21 1124 97.9 °F (36.6 °C) 77 18 115/66 95 % 01/18/21 0704 97.8 °F (36.6 °C) 85 17 (!) 96/55 97 % Pain Assessment Pain Intensity 1: 0 (01/18/21 0815) Patient Stated Pain Goal: 0 Ambulating No 
 
Shift report given to oncoming nurse at the bedside. Peggy Medina

## 2021-01-19 NOTE — PROGRESS NOTES
END OF SHIFT NOTE:  
 
Hourly rounds were conducted. INTAKE/OUTPUT 
01/18 0701 - 01/19 0700 In: 377 [I.V.:377] Out: 1000 [Urine:1000] Voiding: YES Catheter: YES Drain:   
 
 
 
 
 
Flatus: Patient does have flatus present. Stool:  0 occurrences. Characteristics: 
Stool Assessment Stool Color: Sherrell Dollar Stool Appearance: Soft Stool Amount: Small Stool Source/Status: Rectum Emesis: 0 occurrences. Characteristics: VITAL SIGNS Patient Vitals for the past 12 hrs: 
 Temp Pulse Resp BP SpO2  
01/19/21 0322 98.8 °F (37.1 °C) 93 18 (!) 96/53 95 % 01/18/21 2311 98.4 °F (36.9 °C) 89 18 (!) 94/57 95 % 01/18/21 1915 98.5 °F (36.9 °C) 83 18 116/62 96 % Pain Assessment Pain Intensity 1: 7 (01/18/21 2030) Pain Location 1: Buttocks Pain Intervention(s) 1: Medication (see MAR) Patient Stated Pain Goal: 0 Ambulating No 
 
Shift report given to oncoming nurse at the bedside. Barbara Rogers

## 2021-01-19 NOTE — PROGRESS NOTES
Progress Note Patient: Demetrio Avila MRN: [id number]  SSN: [id number] YOB: 1940  Age: 79 y.o. Sex: male Admit Date: 1/15/2021 LOS: 4 days Subjective:  
 
Yolanda Newton a 79 y. o. male who came to ER due to having chills on the day of admission.  
  
Patient has had wound on buttocks with foul smell and swelling of both legs for months. He was found to have worsening of symptoms on the day of admission  with chills and so EMS was called and he was brought to ER.  
  
He is found to have severe cellulitis on both lower legs with swelling and also with buttock wound with foul smell.  
  
Part of work up shows CXR with some? Infiltrates. So he was sent from [facility] to [facility] while being tested and pending COVID result. COVID tests are negative for both rapid test and PCR. On 1/18/2021, I tried to transfer him to [facility] to his surgeon to help with debridement. I have talked with Dr. Cher Saunders who informed me that patient did not need to go to [facility] for debridement. I asked for surgical consultation here and he was evaluated and the decision from surgical service is that he can be treated with antibiotic for now and can follow up with his surgeon (wound clinic) as out-patient.  
  
No fever in the past 24 hours. Patient is lying comfortably in bed. He offers apology for being \"rude and abrupt\" with me yesterday. PMH includes;  Adverse effect of anesthesia    
  nighmares  Aneurysm ([address]    
  Thorasic Aortic aneurysm, surger in [facility] 5/24/16  Atrial flutter ([address]    
  ROSENDO guided cardioversion, No thinners  CAD (coronary artery disease)    
  patient denies  Congestive heart failure ([address]    
  EF 45-50% on 11/2019  Hypertension    
 Ill-defined condition    
  spinal cord injury  Ill-defined condition    
  Neurogenic bladder, self caths  Morbid obesity ([address]    
 Thyroid disease Objective:  
 
Vitals: 01/18/21 2311 01/19/21 6720 01/19/21 0603 01/19/21 6289 BP: (!) 94/57 (!) 96/53  (!) 93/52 Pulse: 89 93  71 Resp: 18 18 18 Temp: 98.4 °F (36.9 °C) 98.8 °F (37.1 °C)  98.9 °F (37.2 °C) SpO2: 95% 95%  98% Weight:   111.5 kg (245 lb 14.4 oz) Height:      
  
 
Intake and Output: 
Current Shift: No intake/output data recorded. Last three shifts: 01/17 1901 - 01/19 0700 In: 477 [P.O.:100; I.V.:377] Out: 1600 [Urine:1600] Physical Exam:  
 
General:                    The patient is an elderly male in no acute respiratory distress.  
DAGQ:                                   [id number]. Eyes:                                   palpebral pallor, no scleral icterus. ENT:                                    External auricular and nasal exam within normal limits.                                             DPPFAJ membranes are dry.  
Neck:                                   Supple, non-tender, no JVD. Lungs:                       diminished to auscultation bilaterally without wheezes or crackles.                                             No respiratory distress or accessory muscle use. Heart:                                 irregular rate and rhythm, without murmurs, rubs, or gallops. Abdomen:                  Soft, non-tender, distended due to truncal obesity with normoactive bowel sounds. Genitourinary:           No tenderness over the bladder or bilateral CVAs. Extremities:               Without clubbing, cyanosis, severe edema of both lower legs.  
Skin:                                    less swelling of lower legs, with less redness. Pressure sores of buttock. Pulses:                      Radial and dorsalis pedis pulses present 2+ bilaterally.  
                                            Capillary refill <2s.   
Neurologic:                CN II-XII grossly intact and symmetrical.  
                                             XORSGH all four extremities well with no focal deficits. Psychiatric:                Pleasant demeanor, appropriate affect. Alert and oriented x 3 Lab/Data Review: 
 
Recent Results (from the past 24 hour(s)) CREATININE Collection Time: 01/18/21 10:36 AM  
Result Value Ref Range Creatinine 1.12 0.8 - 1.5 MG/DL Results Procedure Component Value Units Date/Time CULTURE, URINE [[id number]] Collected: 01/16/21 1248 Order Status: Completed Specimen: Cath Urine Updated: 01/18/21 9082 Special Requests: NO SPECIAL REQUESTS Culture result:    
  >100,000 COLONIES/mL MIXED SKIN RAHUL ISOLATED THREE OR MORE TYPES OF ORGANISMS ARE PRESENT. THIS IS INDICATIVE OF CONTAMINATION DUE TO IMPROPER COLLECTION TECHNIQUE. PLEASE REPEAT COLLECTION UNLESS PATIENT HAS STARTED ANTIBIOTIC TREATMENT. Geovanny Miguel [[id number]] Collected: 01/15/21 1445 Order Status: Canceled Specimen: Urine from Clean catch CULTURE, BLOOD [[id number]]  (Abnormal)  (Susceptibility) Collected: 01/15/21 1422 Order Status: Completed Specimen: Blood Updated: 01/18/21 2007 Special Requests: --     
  RIGHT Antecubital 
  
  GRAM STAIN    
  GRAM POSITIVE COCCI AEROBIC AND ANAEROBIC BOTTLES RESULTS VERIFIED, PHONED TO AND READ BACK BY ALAYNA LINO 8TH DT AT 9502 ON 1/16/21 HA Culture result: STREPTOCOCCUS ANGINOSUS Refer to Blood Culture ID Panel Accession [id number] Susceptibility Streptococcus anginosus MARILIN Amoxicillin Susceptible Cefepime ($$) Susceptible Ceftriaxone ($) Susceptible Clindamycin ($) Resistant Penicillin G ($$) Susceptible Vancomycin ($) Susceptible CULTURE, BLOOD [[id number]]  (Abnormal) Collected: 01/15/21 1422 Order Status: Completed Specimen: Blood Updated: 01/18/21 1920 Special Requests: --     
  LEFT 
HAND GRAM STAIN    
  GRAM POSITIVE RODS GRAM POSITIVE COCCI AEROBIC AND ANAEROBIC BOTTLES RESULTS VERIFIED, PHONED TO AND READ BACK BY MARY LINO DT 8TH FLOOR AT 8190 ON 1/16/21 HA Culture result:    
  ALPHA STREPTOCOCCUS, NOT S. PNEUMONIAE REFER TO ACCESSION [id number] FOR ID AND SUSCEPTIBILITY  
 BLOOD CULTURE ID PANEL [[id number]]  (Abnormal) Collected: 01/15/21 1422 Order Status: Completed Specimen: Blood Updated: 01/16/21 1452 Acc. no. from [employer] [id number] Streptococcus Detected Comment: RESULTS VERIFIED, PHONED TO AND READ BACK BY 
Carolynn Be, [facility] @[facility] 1.16.21 SC INTERPRETATION Gram positive cocci. Identified by realtime PCR as Streptococcus species (not agalactiae, pyogenes, or pneumoniae). Comment: Consider discontinuation of IV vancomycin and using an anti-streptococcal beta-lactam (ceftriaxone/cefotaxime (peds)) XR chest  
1- Impression: Ill-defined density left lower lung suspicious for infiltrate, or 
atelectasis 
  
Current Outpatient Medications Medication Instructions  carvediloL (COREG) 6.25 mg, Oral, 2 TIMES DAILY WITH MEALS  diclofenac (VOLTAREN) 2 g, Topical, 4 TIMES DAILY  ferrous gluconate (FERGON) 240 mg, Oral, 3 TIMES DAILY WITH MEALS  furosemide (LASIX) 40 mg, Oral, 2 TIMES DAILY  levothyroxine (SYNTHROID) 112 mcg tablet Oral, DAILY BEFORE BREAKFAST  meloxicam (MOBIC) 7.5 mg, Oral, DAILY  mupirocin (BACTROBAN) 2 % ointment APPLY TO LEFT LEG DAILY  potassium chloride SR (K-TAB) 20 mEq tablet 20 mEq, Oral, DAILY  psyllium husk (METAMUCIL) 0.4 gram cap Oral, DAILY  sertraline (ZOLOFT) 50 mg, Oral, DAILY Current Facility-Administered Medications:  
  alcohol 62% (NOZIN) nasal  1 Ampule, 1 Ampule, Topical, Q12H, Paul Galdamez MD, 1 Ampule at 01/19/21 2922   influenza vaccine 2020-21 (6 mos+)(PF) (FLUARIX/FLULAVAL/FLUZONE QUAD) injection 0.5 mL, 0.5 mL, IntraMUSCular, PRIOR TO DISCHARGE, Paul Galdamez MD 
  sodium hypochlorite (QUARTER STRENGTH DAKIN'S) 0.125% irrigation (bottle), , Topical, DAILY, Paul Galdamez MD 
  acetaminophen (TYLENOL) tablet 650 mg, 650 mg, Oral, Q6H PRN, 650 mg at 01/18/21 2032 **OR** acetaminophen (TYLENOL) suppository 650 mg, 650 mg, Rectal, Q6H PRN, Paul Galdamez MD 
  enoxaparin (LOVENOX) injection 40 mg, 40 mg, SubCUTAneous, Q24H, Paul Galdamez MD, 40 mg at 01/18/21 1816   polyethylene glycol (MIRALAX) packet 17 g, 17 g, Oral, DAILY PRN, Paul Galdamez MD 
  promethazine (PHENERGAN) tablet 12.5 mg, 12.5 mg, Oral, Q6H PRN **OR** ondansetron (ZOFRAN) injection 4 mg, 4 mg, IntraVENous, Q6H PRN, Paul Galdamez MD 
  sodium chloride (NS) flush 5-40 mL, 5-40 mL, IntraVENous, Q8H, Paul Galdamez MD, 10 mL at 01/19/21 0620 
  sodium chloride (NS) flush 5-40 mL, 5-40 mL, IntraVENous, PRN, Paul Galdamez MD 
  piperacillin-tazobactam (ZOSYN) 3.375 g in 0.9% sodium chloride (MBP/ADV) 100 mL MBP, 3.375 g, IntraVENous, Q8H, Paul Galdamez MD, Last Rate: 25 mL/hr at 01/19/21 0811, 3.375 g at 01/19/21 0811 
  traZODone (DESYREL) tablet 50 mg, 50 mg, Oral, QHS PRN, Brandi Zhang DO, 50 mg at 01/18/21 0010 Assessment:  
 
Principal Problem: 
  Severe sepsis ([address] (1/15/2021) Active Problems: 
  HTN (hypertension) (6/12/2016) Hypothyroidism (2/4/2014) Atrial fibrillation ([facility] (6/12/2016) Overview: Post-op aneurysm repair, brief course amiodarone and xarelto stopped with  
    resolution of afib/flutter following cardioversion CAD (coronary artery disease) (6/12/2016) Acute renal failure (ARF) ([facility] (6/12/2016) Systolic CHF, chronic ([facility] (1/8/2019) COVID-19 (1/15/2021) Cellulitis (1/15/2021) Pressure sore (1/15/2021) Plan:  
 
Severe sepsis Presented with chills Likely sources of infection include buttock wounds and leg wound Empiric IV antibiotic, initially he received Zosyn and Vancomycin. Blood culture shoes streptococcus. Vancomycin was stopped. Wound care. BP is on the low side today. No symptoms regarding low BP. Will monitor closely.  
  
Ruled out for COVID infection.  
  
CAD  
Continue home medications.   
  
Hypothyroidism Continue home medications.   
  
Hypertension Monitor blood pressure and manage accordingly. Continue home medications.   
  
AF Monitor Heart rate is acceptable Patient is not on [address]. I confirmed with spouse. He had previous bleeding episodes.  
  
REECE Likely from sepsis and volume depletion  
IV fluid.   
Monitor renal function and intake and output. Avoid nephrotoxic agents.  
  
Healthcare power of  is wife.  
  
DVT prophylaxis : Lovenox SC  
  
 
Signed By: Baldomero Boswell MD   
 January 19, 2021

## 2021-01-19 NOTE — PROGRESS NOTES
Problem: Falls - Risk of 
Goal: *Absence of Falls Description: Document Hillary Brigid Fall Risk and appropriate interventions in the flowsheet. Outcome: Progressing Towards Goal 
Note: Fall Risk Interventions: 
Mobility Interventions: Communicate number of staff needed for ambulation/transfer, Patient to call before getting OOB Mentation Interventions: Adequate sleep, hydration, pain control, Door open when patient unattended, Familiar objects from home, More frequent rounding, Room close to nurse's station, Toileting rounds, Update white board Medication Interventions: Patient to call before getting OOB, Teach patient to arise slowly Elimination Interventions: Call light in reach, Patient to call for help with toileting needs, Toileting schedule/hourly rounds Problem: Patient Education: Go to Patient Education Activity Goal: Patient/Family Education Outcome: Progressing Towards Goal 
  
Problem: Pressure Injury - Risk of 
Goal: *Prevention of pressure injury Description: Document Kareem Scale and appropriate interventions in the flowsheet. Outcome: Progressing Towards Goal 
Note: Pressure Injury Interventions: 
Sensory Interventions: Assess changes in LOC, Avoid rigorous massage over bony prominences, Float heels, Keep linens dry and wrinkle-free, Minimize linen layers, Pressure redistribution bed/mattress (bed type), Turn and reposition approx. every two hours (pillows and wedges if needed) Moisture Interventions: Absorbent underpads, Check for incontinence Q2 hours and as needed, Internal/External urinary devices, Minimize layers Activity Interventions: Pressure redistribution bed/mattress(bed type) Mobility Interventions: HOB 30 degrees or less, Pressure redistribution bed/mattress (bed type) Nutrition Interventions: Document food/fluid/supplement intake Friction and Shear Interventions: Foam dressings/transparent film/skin sealants, HOB 30 degrees or less, Minimize layers, Feet elevated on foot rest 
 
  
 
 
 
  
Problem: Patient Education: Go to Patient Education Activity Goal: Patient/Family Education Outcome: Progressing Towards Goal 
  
Problem: General Medical Care Plan Goal: *Vital signs within specified parameters Outcome: Progressing Towards Goal 
Goal: *Labs within defined limits Outcome: Progressing Towards Goal 
Goal: *Absence of infection signs and symptoms Outcome: Progressing Towards Goal 
Goal: *Optimal pain control at patient's stated goal 
Outcome: Progressing Towards Goal 
Goal: *Skin integrity maintained Outcome: Progressing Towards Goal 
Goal: *Fluid volume balance Outcome: Progressing Towards Goal 
Goal: *Optimize nutritional status Outcome: Progressing Towards Goal 
Goal: *Anxiety reduced or absent Outcome: Progressing Towards Goal 
Goal: *Progressive mobility and function (eg: ADL's) Outcome: Progressing Towards Goal 
  
Problem: Patient Education: Go to Patient Education Activity Goal: Patient/Family Education Outcome: Progressing Towards Goal

## 2021-01-19 NOTE — ACP (ADVANCE CARE PLANNING)
Met with pt at bedside pt confirmed spouse is primary decision maker for byron care needs. HCPOA and Willing will confirmed and in media section of the chart.

## 2021-01-19 NOTE — PROGRESS NOTES
END OF SHIFT NOTE: 
 
INTAKE/OUTPUT 
01/18 0701 - 01/19 0700 In: 377 [I.V.:377] Out: 1000 [Urine:1000] Voiding: NO 
Catheter: YES Drain:   
 
 
 
 
 
Flatus: Patient does have flatus present. Stool:  2 occurrences. Characteristics: 
Stool Assessment Stool Color: Nicole Medina (Comment) Stool Appearance: Loose Stool Amount: Small Stool Source/Status: Rectum Emesis: 0 occurrences. Characteristics: VITAL SIGNS Patient Vitals for the past 12 hrs: 
 Temp Pulse Resp BP SpO2  
01/19/21 1548 98.1 °F (36.7 °C) (!) 106 18 103/65 93 % 01/19/21 1127 97.6 °F (36.4 °C) 78 18 (!) 108/56 96 % 01/19/21 0719 98.9 °F (37.2 °C) 71 18 (!) 93/52 98 % Pain Assessment Pain Intensity 1: 0 (01/19/21 1445) Pain Location 1: Buttocks Pain Intervention(s) 1: Medication (see MAR) Patient Stated Pain Goal: 0 Ambulating Not applicable Shift report given to oncoming nurse at the bedside. Jose Berg

## 2021-01-20 NOTE — PROGRESS NOTES
Progress Note Patient: Laina Costa MRN: [id number]  SSN: [id number] YOB: 1940  Age: 79 y.o. Sex: male Admit Date: 1/15/2021 LOS: 5 days Subjective:  
 
Sandy Delvalle a 79 y. o. male who came to ER due to having chills on the day of admission.  
  
Patient has had wound on buttocks with foul smell and swelling of both legs for months. He was found to have worsening of symptoms on the day of admission  with chills and so EMS was called and he was brought to ER.  
  
He is found to have severe cellulitis on both lower legs with swelling and also with buttock wound with foul smell.  
  
Part of work up shows CXR with some? Infiltrates. So he was sent from [facility] to [facility] while being tested and pending COVID result. COVID tests are negative for both rapid test and PCR. On 1/18/2021, I tried to transfer him to [facility] to his surgeon to help with debridement. I have talked with Dr. Sera Sheth who informed me that patient did not need to go to [facility] for debridement. I asked for surgical consultation here and he was evaluated and the decision from surgical service is that he can be treated with antibiotic for now and can follow up with his surgeon (wound clinic) as out-patient.  
  
No fever in the past 24 hours. I discussed with wife about discharge planning. I told her that patient should be ready for discharge by tomorrow and she states that she needs more time to arrange for help at home. PMH includes;  Adverse effect of anesthesia    
  nighmares  Aneurysm ([address]    
  Thorasic Aortic aneurysm, surger in [facility] 5/24/16  Atrial flutter ([address]    
  ROSENDO guided cardioversion, No thinners  CAD (coronary artery disease)    
  patient denies  Congestive heart failure ([address]    
  EF 45-50% on 11/2019  Hypertension    
 Ill-defined condition    
  spinal cord injury  Ill-defined condition    
  Neurogenic bladder, self caths  Morbid obesity ([facility]    
 Thyroid disease Objective:  
 
Vitals:  
 01/20/21 0403 01/20/21 0618 01/20/21 0744 01/20/21 1104 BP: 107/64  104/64 107/66 Pulse: 89  73 81 Resp: 18  16 16 Temp: 99.1 °F (37.3 °C)  97.5 °F (36.4 °C) 97.7 °F (36.5 °C) SpO2: 96%  97% 98% Weight:  111.8 kg (246 lb 8 oz) Height:      
  
 
Intake and Output: 
Current Shift: 01/20 0701 - 01/20 1900 In: 120 [P.O.:120] Out: - Last three shifts: 01/18 1901 - 01/20 0700 In: 377 [I.V.:377] Out: 1500 [Urine:1500] Physical Exam:  
 
General:                    The patient is an elderly male in no acute respiratory distress.  
OBDO:                                   YHBOCDLPCHYOD/SFSSQOGGSI. Eyes:                                   palpebral pallor, no scleral icterus. ENT:                                    External auricular and nasal exam within normal limits.                                             TISXHH membranes are dry.  
Neck:                                   Supple, non-tender, no JVD. Lungs:                       diminished to auscultation bilaterally without wheezes or crackles.                                             No respiratory distress or accessory muscle use. Heart:                                 irregular rate and rhythm, without murmurs, rubs, or gallops. Abdomen:                  Soft, non-tender, distended due to truncal obesity with normoactive bowel sounds. Genitourinary:           No tenderness over the bladder or bilateral CVAs. Extremities:               Without clubbing, cyanosis, severe edema of both lower legs.  
Skin:                                    less swelling of lower legs, with less redness. Pressure sores of buttock. Pulses:                      Radial and dorsalis pedis pulses present 2+ bilaterally.  
                                            Capillary refill <2s.   
Neurologic:                CN II-XII grossly intact and symmetrical.  
                                             LYFXVF all four extremities well with no focal deficits. Psychiatric:                Pleasant demeanor, appropriate affect. Alert and oriented x 3 Lab/Data Review: 
 
 
Results Procedure Component Value Units Date/Time CULTURE, URINE [[id number]] Collected: 01/16/21 1248 Order Status: Completed Specimen: Cath Urine Updated: 01/18/21 5348 Special Requests: NO SPECIAL REQUESTS Culture result:    
  >100,000 COLONIES/mL MIXED SKIN RAHUL ISOLATED THREE OR MORE TYPES OF ORGANISMS ARE PRESENT. THIS IS INDICATIVE OF CONTAMINATION DUE TO IMPROPER COLLECTION TECHNIQUE. PLEASE REPEAT COLLECTION UNLESS PATIENT HAS STARTED ANTIBIOTIC TREATMENT. [place] Section [[id number]] Collected: 01/15/21 1445 Order Status: Canceled Specimen: Urine from Clean catch CULTURE, BLOOD [[id number]]  (Abnormal)  (Susceptibility) Collected: 01/15/21 1422 Order Status: Completed Specimen: Blood Updated: 01/18/21 8578 Special Requests: --     
  RIGHT Antecubital 
  
  GRAM STAIN    
  GRAM POSITIVE COCCI AEROBIC AND ANAEROBIC BOTTLES RESULTS VERIFIED, PHONED TO AND READ BACK BY ALAYNA LINO 8TH DT AT 1089 ON 1/16/21 HA Culture result: STREPTOCOCCUS ANGINOSUS Refer to Blood Culture ID Panel Accession [id number] Susceptibility Streptococcus anginosus MARILIN Amoxicillin Susceptible Cefepime ($$) Susceptible Ceftriaxone ($) Susceptible Clindamycin ($) Resistant Penicillin G ($$) Susceptible Vancomycin ($) Susceptible CULTURE, BLOOD [[id number]]  (Abnormal) Collected: 01/15/21 1422 Order Status: Completed Specimen: Blood Updated: 01/18/21 9755   Special Requests: --     
  LEFT 
HAND 
  
  GRAM STAIN    
  GRAM POSITIVE RODS GRAM POSITIVE COCCI AEROBIC AND ANAEROBIC BOTTLES  
     
      
 RESULTS VERIFIED, PHONED TO AND READ BACK BY MARY LINO DT 8TH FLOOR AT 8045 ON 1/16/21 HA Culture result:    
  ALPHA STREPTOCOCCUS, NOT S. PNEUMONIAE REFER TO ACCESSION [id number] FOR ID AND SUSCEPTIBILITY  
 BLOOD CULTURE ID PANEL [[id number]]  (Abnormal) Collected: 01/15/21 1422 Order Status: Completed Specimen: Blood Updated: 01/16/21 1452 Acc. no. from [employer] [id number] Streptococcus Detected Comment: RESULTS VERIFIED, PHONED TO AND READ BACK BY 
Felipe Bronson, [facility] @[facility] 1.16.21 SC INTERPRETATION Gram positive cocci. Identified by realtime PCR as Streptococcus species (not agalactiae, pyogenes, or pneumoniae). Comment: Consider discontinuation of IV vancomycin and using an anti-streptococcal beta-lactam (ceftriaxone/cefotaxime (peds)) XR chest  
1- Impression: Ill-defined density left lower lung suspicious for infiltrate, or 
atelectasis 
  
Current Outpatient Medications Medication Instructions  carvediloL (COREG) 6.25 mg, Oral, 2 TIMES DAILY WITH MEALS  diclofenac (VOLTAREN) 2 g, Topical, 4 TIMES DAILY  ferrous gluconate (FERGON) 240 mg, Oral, 3 TIMES DAILY WITH MEALS  furosemide (LASIX) 40 mg, Oral, 2 TIMES DAILY  levothyroxine (SYNTHROID) 112 mcg tablet Oral, DAILY BEFORE BREAKFAST  meloxicam (MOBIC) 7.5 mg, Oral, DAILY  mupirocin (BACTROBAN) 2 % ointment APPLY TO LEFT LEG DAILY  potassium chloride SR (K-TAB) 20 mEq tablet 20 mEq, Oral, DAILY  psyllium husk (METAMUCIL) 0.4 gram cap Oral, DAILY  sertraline (ZOLOFT) 50 mg, Oral, DAILY Current Facility-Administered Medications:  
  alcohol 62% (NOZIN) nasal  1 Ampule, 1 Ampule, Topical, Q12H, Paul Galdamez MD, 1 Ampule at 01/20/21 3201   influenza vaccine 2020-21 (6 mos+)(PF) (FLUARIX/FLULAVAL/FLUZONE QUAD) injection 0.5 mL, 0.5 mL, IntraMUSCular, PRIOR TO DISCHARGE, Blanca Swenson MD 
   sodium hypochlorite (QUARTER STRENGTH DAKIN'S) 0.125% irrigation (bottle), , Topical, DAILY, Paul Galdamez MD, Given at 01/20/21 8137   acetaminophen (TYLENOL) tablet 650 mg, 650 mg, Oral, Q6H PRN, 650 mg at 01/20/21 1115 **OR** acetaminophen (TYLENOL) suppository 650 mg, 650 mg, Rectal, Q6H PRN, Paul Galdamez MD 
  enoxaparin (LOVENOX) injection 40 mg, 40 mg, SubCUTAneous, Q24H, Paul Galdamez MD, 40 mg at 01/19/21 1814   polyethylene glycol (MIRALAX) packet 17 g, 17 g, Oral, DAILY PRN, Paul Galdamez MD 
  promethazine (PHENERGAN) tablet 12.5 mg, 12.5 mg, Oral, Q6H PRN **OR** ondansetron (ZOFRAN) injection 4 mg, 4 mg, IntraVENous, Q6H PRN, Paul Galdamez MD 
  sodium chloride (NS) flush 5-40 mL, 5-40 mL, IntraVENous, Q8H, Paul Galdamez MD, 10 mL at 01/20/21 2276   sodium chloride (NS) flush 5-40 mL, 5-40 mL, IntraVENous, PRN, Paul Galdamez MD 
  piperacillin-tazobactam (ZOSYN) 3.375 g in 0.9% sodium chloride (MBP/ADV) 100 mL MBP, 3.375 g, IntraVENous, Q8H, Paul Galdamez MD, Last Rate: 25 mL/hr at 01/20/21 0857, 3.375 g at 01/20/21 0857   traZODone (DESYREL) tablet 50 mg, 50 mg, Oral, QHS PRN, Brandi Zhang DO, 50 mg at 01/18/21 0010 Assessment:  
 
Principal Problem: 
  Severe sepsis ([facility] (1/15/2021) Active Problems: 
  HTN (hypertension) (6/12/2016) Hypothyroidism (2/4/2014) Atrial fibrillation ([address] (6/12/2016) Overview: Post-op aneurysm repair, brief course amiodarone and xarelto stopped with  
    resolution of afib/flutter following cardioversion CAD (coronary artery disease) (6/12/2016) Acute renal failure (ARF) ([facility] (6/12/2016) Systolic CHF, chronic ([facility] (1/8/2019) COVID-19 (1/15/2021) Cellulitis (1/15/2021) Pressure sore (1/15/2021) Plan:  
 
Severe sepsis on admission Presented with chills Likely sources of infection include buttock wounds and leg wound Empiric IV antibiotic, initially he received Zosyn and Vancomycin. Blood culture shoes streptococcus. Vancomycin was stopped. Wound care. Will change IV Zosyn to PO Augmentin. Monitor for the next 24 hours. Get  to help with discharge planning set up. Check CBC, BMP tomorrow.  
  
Ruled out for COVID infection.  
  
CAD  
Continue home medications.   
  
Hypothyroidism Continue home medications.   
  
Hypertension Monitor blood pressure and manage accordingly. Continue home medications.   
  
AF Monitor Heart rate is acceptable Patient is not on [address]. I confirmed with spouse. He had previous bleeding episodes.  
  
REECE Likely from sepsis and volume depletion  
IV fluid.   
Monitor renal function and intake and output. Avoid nephrotoxic agents.  
  
Healthcare power of  is wife. I discussed with patient and wife today.  
  
DVT prophylaxis : Lovenox SC  
  
Disposition plan :  Likely can discharge home on PO antibiotic tomorrow. Follow up with his out-patient wound clinic. Signed By: Marbella Davis MD   
 January 20, 2021

## 2021-01-20 NOTE — PROGRESS NOTES
Per MD he plans to dc pt in am home with spouse, CM has called and spoke with spouse and she is agreeable to this plan denies any needs at home at this time, agreeable to have [facility] home care resumed for RN, RT/OT, order/referral faxed, spouse also states she has contacted the home care aid to start back tomorrow as well.

## 2021-01-21 PROBLEM — D62 ACUTE ON CHRONIC BLOOD LOSS ANEMIA: Status: ACTIVE | Noted: 2021-01-01

## 2021-01-21 NOTE — ROUTINE PROCESS
Wound to left shin removed. Wound bed dry with brown scab covering entire wound bed. Cleansed with NS. Opticell dressing placed over wound, covered with foam dressing. Wound to right heel removed. Wound bed dry with black scab covering entire wound bed. Cleansed with NS/Opticell/ foam dressing applied. Dressing to right lateral lower leg/ foot removed. Wound bed dry with black wound bed covered entire wound surface. Cleansed with NS, covered with Opticell/ foam dressing. . Dressing removed from anterior right lower leg/ foot removed. Wound bed moist/pale tan. Cleansed with NS, covered with Opticell/ foam dressing. Patient tolerated well.

## 2021-01-21 NOTE — PROGRESS NOTES
Pt is medically ready for dc home today with spouse, pt to receive blood transfusion prior to dc home, resume orders faxed to [facility] care for PT/OT, RN, per conversation with spouse on yesterday she will provide transportation home for pt. Care Management Interventions PCP Verified by CM: Yes Transition of Care Consult (CM Consult): Home Health [address]: No 
Reason Outside [place]: Patient already serviced by other home care/hospice agency([facility] ) Discharge Durable Medical Equipment: No 
Physical Therapy Consult: No 
Occupational Therapy Consult: No 
Speech Therapy Consult: No 
Current Support Network: Own Home, Lives with Spouse Confirm Follow Up Transport: Family The Plan for Transition of Care is Related to the Following Treatment Goals : home care for continued care The Patient and/or Patient Representative was Provided with a Choice of Provider and Agrees with the Discharge Plan?: Yes Name of the Patient Representative Who was Provided with a Choice of Provider and Agrees with the Discharge Plan: spouse Freedom of Choice List was Provided with Basic Dialogue that Supports the Patient's Individualized Plan of Care/Goals, Treatment Preferences and Shares the Quality Data Associated with the Providers?: Yes [employer] Information Provided?: Yes Discharge Location Discharge Placement: Home with home health

## 2021-01-21 NOTE — PROGRESS NOTES
Called lab x 2 to have blood drawn for type and cross. Stated he was on the list.  Explained pt was to be discharged after blood received.

## 2021-01-21 NOTE — PROGRESS NOTES
Assessed pt this morning. Refused to turn over so his sacral and buttocks wound could be assessed. Also, remarked that he was wheezing and stated he needed to work on the IS today and he replied \"No I don't\"

## 2021-01-21 NOTE — DISCHARGE INSTRUCTIONS
Wound Debridement: Before Your Procedure  What is wound debridement? When a doctor removes dead tissue from a wound, it's called debridement. Doctors do this to help a wound heal.  It's a good idea to remove dead tissue for a few reasons. First, dead tissue gives bacteria a place to grow. This can cause infection. Second, dead tissue can slow the growth of healthy tissue. Your doctor will clean the wound. There are a few ways to remove the dead tissue, such as cutting it out or using an ointment. If your wound goes into your muscles and deep tissues, you may need a skin graft. This helps new tissue grow. How long it takes your wound to heal depends on how serious it is. It also depends on whether you have other health problems that may slow healing. In some cases, people need to have a wound debrided again. Follow-up care is a key part of your treatment and safety. Be sure to make and go to all appointments, and call your doctor if you are having problems. It's also a good idea to know your test results and keep a list of the medicines you take. How do you prepare for the procedure? Procedures can be stressful. This information will help you understand what you can expect. And it will help you safely prepare for your procedure. Preparing for the procedure    · Be sure you have someone to take you home. Anesthesia and pain medicine will make it unsafe for you to drive or get home on your own. · Understand exactly what procedure is planned, along with the risks, benefits, and other options.     · Tell your doctor ALL the medicines, vitamins, supplements, and herbal remedies you take. Some may increase the risk of problems during your procedure. Your doctor will tell you if you should stop taking any of them before the procedure and how soon to do it.     · If you take aspirin or some other blood thinner, ask your doctor if you should stop taking it before your procedure.  Make sure that you understand exactly what your doctor wants you to do. These medicines increase the risk of bleeding.     · Make sure your doctor and the hospital have a copy of your advance directive. If you don't have one, you may want to prepare one. It lets others know your health care wishes. It's a good thing to have before any type of surgery or procedure. What happens on the day of the procedure? · Follow the instructions exactly about when to stop eating and drinking. If you don't, your procedure may be canceled. If your doctor told you to take your medicines on the day of the procedure, take them with only a sip of water.     · Take a bath or shower before you come in for your procedure. Do not apply lotions, perfumes, deodorants, or nail polish.     · Do not shave the surgical site yourself.     · Take off all jewelry and piercings. And take out contact lenses, if you wear them. At the hospital or surgery center   · Bring a picture ID.     · You will be kept comfortable and safe by your anesthesia provider. The anesthesia may make you sleep. Or it may just numb the area being worked on.     · The procedure will take about 20 to 30 minutes. But it can take longer. It depends on how your doctor does the debridement. It also depends on where the wound is, how big it is, and how serious it is. When should you call your doctor? · You have questions or concerns.     · You don't understand how to prepare for your procedure.     · You become ill before the procedure (such as fever, flu, or a cold).     · You need to reschedule or have changed your mind about having the procedure. Where can you learn more? Go to http://www.gray.com/  Enter A530 in the search box to learn more about \"Wound Debridement: Before Your Procedure. \"  Current as of: March 4, 2020               Content Version: 12.6  © 1251-3488 [employer].    Care instructions adapted under license by [employer] (which disclaims liability or warranty for this information). If you have questions about a medical condition or this instruction, always ask your healthcare professional. [place] [address] any warranty or liability for your use of this information. Sepsis: Care Instructions  Overview     Sepsis is an intense reaction to an infection. It can cause damage to the body and lead to dangerously low blood pressure. You may have inflammation across large areas of your body. It can damage tissue and even go deep into your organs. Infections that can lead to sepsis include:  · A skin infection such as from a cut. · A lung infection like pneumonia. · A kidney infection. · A gut infection such as E. coli. Sepsis is treated with antibiotics. Your doctor will try to find the infection that led to sepsis. Mitchel Scarlett also get fluids through a vein (IV). Machines will track your vital signs, including temperature, blood pressure, breathing rate, and pulse rate. The physical and mental effects of sepsis may not be seen for several weeks after treatment. And they may last long after the infection is gone. Physical problems may include:  · Feeling weak and tired. · Feeling out of breath. · Aches and pains. · Problems with getting around. · Trouble falling asleep or staying asleep. · Dry and itchy skin, brittle nails, and hair loss. Some of these effects can lead to problems with your organs or your feet, legs, hands, or arms. Sepsis can also affect your mind and emotions. Problems may include:  · Self-doubt. · Anxiety. · Nightmares. · Depression and mood problems. · Wanting to avoid other people. · Confusion. · Flashbacks and bad memories of your illness. It's important to care for yourself and try to avoid infections. This may lower your risk of getting sepsis again. Follow-up care is a key part of your treatment and safety.  Be sure to make and go to all appointments, and call your doctor if you are having problems. It's also a good idea to know your test results and keep a list of the medicines you take. How can you care for yourself at home? · Be safe with medicines. Take your medicines exactly as prescribed. Call your doctor if you think you are having a problem with your medicine. · If your doctor prescribed antibiotics, take them as directed. Do not stop taking them just because you feel better. You need to take the full course of antibiotics. · Help prevent infections that could again lead to sepsis. ? Try to avoid colds and flu. If you must be around people who have a cold or the flu, wash your hands often. And get a flu vaccine every year. ? Ask your doctor if you need a pneumococcal vaccine (to prevent pneumonia, meningitis, and other infections). If you have had one before, ask your doctor if you need another dose. ? Clean any wounds or scrapes. · Do not smoke or use other tobacco products. When you quit smoking, you are less likely to get a cold, the flu, bronchitis, and pneumonia. If you need help quitting, talk to your doctor about stop-smoking programs and medicines. These can increase your chances of quitting for good. · Drink plenty of fluids to prevent dehydration. Choose water and other caffeine-free clear liquids until you feel better. If you have kidney, heart, or liver disease and have to limit fluids, talk with your doctor before you increase the amount of fluids you drink. · Eat a healthy diet. Include fruits, vegetables, and whole grains in your diet every day. · If your doctor recommends it, try doing some physical activity. Walking is a good choice. Bit by bit, increase the amount you walk every day. · Talk with your family and friends about your challenges. Ask for help if you need it. · Keep a journal. Writing down your thoughts and feelings can help reduce your stress. · Ask family members to fill in gaps in your memory.   · Set small goals for yourself that you can reach. Reward yourself for success. When should you call for help? Call  911 anytime you think you may need emergency care. For example, call if:    · You passed out (lost consciousness). Call your doctor now or seek immediate medical care if:    · You have symptoms such as:  ? Shortness of breath. ? Feeling very sick. ? Severe pain. ? A fast heart rate. ? Cool, pale, or clammy skin. ? Feeling confused. ? Feeling very sleepy, or you are hard to wake up.     · You are dizzy or lightheaded, or you feel like you may faint.     · You have a fever or chills. Watch closely for changes in your health, and be sure to contact your doctor if:    · You do not get better as expected. Where can you learn more? Go to http://www.gray.com/  Enter T383 in the search box to learn more about \"Sepsis: Care Instructions. \"  Current as of: February 11, 2020               Content Version: 12.6  © 2006-2020 [employer]. Care instructions adapted under license by [employer] (which disclaims liability or warranty for this information). If you have questions about a medical condition or this instruction, always ask your healthcare professional. [address] any warranty or liability for your use of this information.

## 2021-01-21 NOTE — ROUTINE PROCESS
Blood transfusion complete. Discharge instructions reviewed with patient's wife, Bienvenido Feng, via phone. Patient's wife verbalized agreement/ understanding. Informed wife that transport, by ambulance, would be arranged asap for transport home. Patient/ wife verbalized agreement/ understanding.

## 2021-01-21 NOTE — PROGRESS NOTES
Physician Progress Note Andrés Mcarthur 
CSN #:                  [id number] :                       1940 ADMIT DATE:       1/15/2021 6:13 PM 
DISCH DATE: 
RESPONDING 
PROVIDER #:        Amira ERICKSON MD 
 
 
 
 
QUERY TEXT: 
 
Pt admitted with Severe Sepsis . Pt noted to have Cellulitis, buttock pressure ulcer and leg wound. If possible, please document in the progress notes and discharge summary if you are evaluating and /or treating any of the following: The medical record reflects the following: 
Risk Factors: Hx partial paralysis of legs, wheel chair bound, pressure ulcer to buttocks, wound to leg, CAD, CHF, HTN Clinical Indicators:1/15 BC + Streptococcus,  PN Severe Sepsis likely sources of infection include buttock wound and leg wound Treatment: Monitoring, Surgical consult, Wound care, PO Augementin, IV Zosyn Q 8hrs, Thank you, Oleksandr Rao RN.C BSN [employer] 
[email or web address] 
Options provided: 
-- Streptococcus  Sepsis, present on admission 
-- Other - I will add my own diagnosis -- Disagree - Not applicable / Not valid -- Disagree - Clinically unable to determine / Unknown 
-- Refer to Clinical Documentation Reviewer PROVIDER RESPONSE TEXT: 
 
This patient has Streptococcus sepsis which was present on admission. Query created by:  Johana Alarcon on 2021 12:38 PM 
 
 
Electronically signed by:  Jd Green MD 2021 2:32 PM

## 2021-01-21 NOTE — DISCHARGE SUMMARY
Hospitalist Discharge Summary Patient ID: Laina Costa [id number] 
69 y.o. 
1940 Admit date: 1/15/2021  6:13 PM 
Discharge date and time: 1/21/2021 Attending: Angelito Ozuna MD 
PCP:  Army Charles MD 
Treatment Team: Attending Provider: Angelito Ozuna MD; Utilization Review: Chloe Barrera; Care Manager: Bertha Espino RN; Utilization Review: Mira Rodriguez RN; Staff Nurse: Cintia Tierney RN 
 
Principal Diagnosis Severe sepsis [facility]) Principal Problem: 
  Severe sepsis ([facility] [address] (1/15/2021) Active Problems: 
  HTN (hypertension) (6/12/2016) Hypothyroidism (2/4/2014) Atrial fibrillation ([facility] [address] (6/12/2016) Overview: Post-op aneurysm repair, brief course amiodarone and xarelto stopped with  
    resolution of afib/flutter following cardioversion CAD (coronary artery disease) (6/12/2016) Acute renal failure (ARF) ([facility] [address] (6/12/2016) Systolic CHF, chronic ([facility] [address] (1/8/2019) COVID-19 (1/15/2021) Cellulitis (1/15/2021) Pressure sore (1/15/2021) Acute on chronic blood loss anemia (1/21/2021) Hospital Course: 
Please refer to the admission H&P for details of presentation. In summary, the patient is a 39-year-old male with history of physical deconditioning, debility, chronic systolic CHF EF 45 to 79%, CAD, atrial flutter not on [facility], morbid obesity, hypothyroidism admitted on 1/15/2021 for severe sepsis secondary to cellulitis of sacral decubitus and bilateral lower extremity wounds. Patient was empirically started on IV Zosyn and vancomycin. Blood culture was positive for Streptococcus, and was subsequently transitioned to oral Augmentin. Patient was evaluated by general surgery, recommended outpatient follow-up with possible requirement of debridement a patient's primary surgeon. They recommend patient to follow-up with his surgeon at [facility] after discharge. Patient has developed acute on chronic anemia likely from chronic blood loss from sacral decubitus. Patient is mostly wheelchair-bound and is not much ambulatory at baseline. He will be discharged today after getting 1 unit of PRBC with home health aide, home PT. Rest of the hospital course was uneventful, for further details please refer to daily progress notes. Significant Diagnostic Studies:  
Portable AP semiupright view   
  
Date:  1/15/2021  at 1456 hours 
  
comparison chest x-ray : 11/25/2020 
  
Clinical Information: Chills 
  Findings: Heart is enlarged and thoracic aorta mildly tortuous. Mild prominence 
of the right superior mediastinum is unchanged. Pulmonary vascularity is normal. 
Hazy density left lower lung is consistent with infiltrate, or atelectasis. Right lung clear. No pleural effusion.  
  
IMPRESSION Impression: Ill-defined density left lower lung suspicious for infiltrate, or 
atelectasis Labs: Results:  
   
Chemistry Recent Labs  
  01/21/21 
0354 01/18/21 
1036 GLU 85  --   
  --   
K 3.6  --   
  --   
CO2 23  --   
BUN 21  --   
CREA 0.92 1.12  
CA 8.1*  --   
AGAP 8  --   
  
CBC w/Diff Recent Labs  
  01/21/21 
0354 WBC 12.9*  
RBC 2.40* HGB 7.1*  
HCT 23.7*  
 GRANS 82* LYMPH 7*  
EOS 1 Cardiac Enzymes No results for input(s): CPK, CKND1, JUANCARLOS in the last 72 hours. No lab exists for component: Smith Sprinkle Coagulation No results for input(s): PTP, INR, APTT, INREXT in the last 72 hours. Lipid Panel Lab Results Component Value Date/Time Cholesterol, total 200 (H) 07/27/2016 08:45 AM  
 HDL Cholesterol 39 (L) 07/27/2016 08:45 AM  
 LDL, calculated 147 (H) 07/27/2016 08:45 AM  
 VLDL, calculated 14 07/27/2016 08:45 AM  
 Triglyceride 70 07/27/2016 08:45 AM  
 CHOL/HDL Ratio 4.2 05/17/2016 03:40 AM  
  
BNP No results for input(s): BNPP in the last 72 hours. Liver Enzymes No results for input(s): TP, ALB, TBIL, AP in the last 72 hours. No lab exists for component: SGOT, GPT, DBIL Thyroid Studies Lab Results Component Value Date/Time TSH 3.550 12/11/2020 04:18 PM  
    
 
 
Discharge Exam: 
Visit Vitals /66 Pulse 78 Temp 99.3 °F (37.4 °C) Resp 18 Ht 6' (1.829 m) Wt 112 kg (247 lb) SpO2 91% BMI 33.50 kg/m² General appearance: alert, cooperative, no distress, morbidly obese, on room air Lungs: clear to auscultation bilaterally Heart: regular rate and rhythm, S1, S2 normal, no murmur, click, rub or gallop Abdomen: soft, non-tender. Bowel sounds normal. No masses,  no organomegaly Extremities: no cyanosis or edema Neurologic: Grossly normal 
Skin: Black eschar and slough noted but no tracking, drainage or deeper cavity found. Wound overall 2.5 to 3 cm deep and partially granulated. Disposition: Home with home health aide, PT Discharge Condition: stable Patient Instructions:  
Current Discharge Medication List  
  
START taking these medications Details  
amoxicillin-clavulanate (AUGMENTIN) 875-125 mg per tablet Take 1 Tab by mouth two (2) times daily (with meals) for 6 days. Qty: 12 Tab, Refills: 0 CONTINUE these medications which have NOT CHANGED Details  
diclofenac (VOLTAREN) 1 % gel Apply 2 g to affected area four (4) times daily. meloxicam (MOBIC) 7.5 mg tablet Take 1 Tab by mouth daily. Qty: 10 Tab, Refills: 0  
  
ferrous gluconate (FERGON) 240 mg (27 mg iron) tablet Take 240 mg by mouth three (3) times daily (with meals). mupirocin (BACTROBAN) 2 % ointment APPLY TO LEFT LEG DAILY  
  
sertraline (ZOLOFT) 50 mg tablet Take 50 mg by mouth daily. carvedilol (COREG) 6.25 mg tablet Take 1 Tab by mouth two (2) times daily (with meals). Qty: 180 Tab, Refills: 3  
  
levothyroxine (SYNTHROID) 112 mcg tablet Take  by mouth Daily (before breakfast). furosemide (LASIX) 40 mg tablet Take 40 mg by mouth two (2) times a day. potassium chloride SR (K-TAB) 20 mEq tablet Take 20 mEq by mouth daily. psyllium husk (METAMUCIL) 0.4 gram cap Take  by mouth daily. Activity: PT/OT per Home Health Diet: Cardiac Diet Wound Care: Recommend aquacel ag and foam dressings over both leg and right heel wounds, silicone border to sacral wound and Dakin's moist to dry dressings Follow-up · Follow-up with primary general surgeon at [facility] in 1 to 2 weeks. Time spent to discharge patient 35 minutes Signed: 
Jose Gaona MD 
1/21/2021 
7:10 AM

## 2021-01-21 NOTE — WOUND CARE
Noted surgery had no plans for debridement of left buttock. Updated orders. Will discharge with home health. Will rubina.

## 2021-01-22 NOTE — PROGRESS NOTES
D/C from unit with belongings. Accompanied by EMS personnel. No distress at time of D/C. Transported stretcher.

## 2021-03-12 NOTE — PROGRESS NOTES
The CTA ordered on 3/6/21 has been cancelled due to lack of IV access. Please reorder if needed.  Call CT with questions

## 2021-03-15 NOTE — PROGRESS NOTES
TRANSFER - OUT REPORT:    Verbal report given to primary RN on Mabel Wyman  being transferred to [facility] room 411 for routine post - op       Report consisted of patients Situation, Background, Assessment and   Recommendations(SBAR). Information from the following report(s) SBAR, Procedure Summary and MAR was reviewed with the receiving nurse. Opportunity for questions and clarification was provided. Conscious Sedation:   25 Mcg of Fentanyl administered  0 Mg of Versed administered    Pt tolerated procedure well.      Visit Vitals  BP (!) 102/56   Pulse 82   Temp 97.7 °F (36.5 °C)   Resp 18   SpO2 97%     Past Medical History:   Diagnosis Date    Adverse effect of anesthesia     nighmares    Aneurysm ([facility] [address]     Thorasic Aortic aneurysm, surger in [facility] 5/24/16    Atrial flutter ([address]     ROSENDO guided cardioversion, No thinners    CAD (coronary artery disease)     patient denies    Congestive heart failure (HCC)     EF 45-50% on 11/2019    Hypertension     Ill-defined condition     spinal cord injury    Ill-defined condition     Neurogenic bladder, self caths    Morbid obesity ([address]     Thyroid disease      Peripheral IV 03/13/21 (Active)

## 2021-03-15 NOTE — PROGRESS NOTES
IR Nurse Pre-Procedure Checklist Part 2          Consent signed: Yes    H&P complete:  Yes    Antibiotics: Not applicable    Airway/Mallampati Done: Not applicable    Shaved: Not applicable    Pregnancy Form:Not applicable    Patient Position: Yes    MD Side: Yes     Biopsy Worksheet: Yes    Specimen Medium: Yes    Pt to CT via stretcher with RN

## 2021-03-15 NOTE — H&P
History and Physical    Patient: Evy Ceron MRN: [id number]  SSN: [id number]    YOB: 1940  Age: 79 y.o. Sex: male      Subjective:      Evy Ceron is a 79 y.o. male who has a post op fluid collection. Concern for abscess. NPO. Past Medical History:   Diagnosis Date    Adverse effect of anesthesia     nighmares    Aneurysm ([address]     Thorasic Aortic aneurysm, surger in [facility]    Atrial flutter ([address]     ROSENDO guided cardioversion, No thinners    CAD (coronary artery disease)     patient denies    Congestive heart failure (HCC)     EF 45-50% on 2019    Hypertension     Ill-defined condition     spinal cord injury    Ill-defined condition     Neurogenic bladder, self caths    Morbid obesity ([address]     Thyroid disease      Past Surgical History:   Procedure Laterality Date    HX APPENDECTOMY      HX COLONOSCOPY      diverticulosis    HX ORTHOPAEDIC      repair of broken jaw    HX TONSILLECTOMY      NE CARDIAC SURG PROCEDURE UNLIST      repair of aneurysm in acending and transverse arteries    NE CARDIAC SURG PROCEDURE UNLIST      Aortic valve repair Descending    NE CARDIAC SURG PROCEDURE UNLIST  2016    cardioversion, ROSENDO x2    NE CHEST SURGERY PROCEDURE UNLISTED  ,     Aortic aneursym      Family History   Problem Relation Age of Onset    Stroke Mother      Social History     Tobacco Use    Smoking status: Former Smoker     Packs/day: 3.00     Years: 25.00     Pack years: 75.00     Types: Cigarettes     Quit date: 1986     Years since quittin.2    Smokeless tobacco: Former User     Types: Snuff, Chew     Quit date: 2014   Substance Use Topics    Alcohol use: Not Currently     Comment: advises quitting      Prior to Admission medications    Medication Sig Start Date End Date Taking? Authorizing Provider   diclofenac (VOLTAREN) 1 % gel Apply 2 g to affected area four (4) times daily.     Provider, Historical meloxicam (MOBIC) 7.5 mg tablet Take 1 Tab by mouth daily. 12/27/20   Kesha Sarah MD   ferrous gluconate (FERGON) 240 mg (27 mg iron) tablet Take 240 mg by mouth three (3) times daily (with meals). Provider, Historical   mupirocin (BACTROBAN) 2 % ointment APPLY TO LEFT LEG DAILY 3/3/20   Other, MD Elvin   sertraline (ZOLOFT) 50 mg tablet Take 50 mg by mouth daily. Other, MD Elvin   carvedilol (COREG) 6.25 mg tablet Take 1 Tab by mouth two (2) times daily (with meals). 9/9/19   Betty Reich MD   levothyroxine (SYNTHROID) 112 mcg tablet Take  by mouth Daily (before breakfast). Provider, Historical   furosemide (LASIX) 40 mg tablet Take 40 mg by mouth two (2) times a day. Provider, Historical   potassium chloride SR (K-TAB) 20 mEq tablet Take 20 mEq by mouth daily. Provider, Historical   psyllium husk (METAMUCIL) 0.4 gram cap Take  by mouth daily. Provider, Historical        No Known Allergies    Review of Systems:  Pertinent items are noted in the History of Present Illness. Objective:     Vitals:    03/15/21 1500   BP: (!) 106/55   Pulse: 78   Resp: 18   Temp: 97.7 °F (36.5 °C)   SpO2: 93%        Physical Exam:  LUNG: clear to auscultation bilaterally  HEART: regular rate and rhythm    Assessment:     Hospital Problems  Date Reviewed: 1/13/2021    None          Plan:     CT guided drain placement.   Considering single agent for pain    Signed By: Hollie Garcia MD     March 15, 2021

## 2022-01-01 NOTE — PROGRESS NOTES
Steve Perez  : 1940  Primary: [facility]  Secondary:  [address] at [facility] HENRY WATSON  [address], [address] [facility] [address], [facility] [address]  [phone]   [phone]          OUTPATIENT PHYSICAL [address] Last Note and Aquatic Note  2018   ICD-10: Treatment Diagnosis: Paraplegia, incomplete (G82.22), Difficulty in walking, not elsewhere classified (R26.2), Muscle weakness (generalized) (M62.81)  Precautions/Allergies:   Patient has no known allergies. MD Orders: Evaluate and treat, aquatic therapy  MEDICAL/REFERRING DIAGNOSIS:  Paraplegia, unspecified [G82.20]   DATE OF ONSET: 1-3-18  REFERRING PHYSICIAN: Emiliano Michel  RETURN PHYSICIAN APPOINTMENT: 18     INITIAL ASSESSMENT:  Mr. Mary Jo Salazar presents with paraplegia following a surgical procedure nearly 1 year ago. Patient exhibits reduced B LE strength, impaired functional mobility, inability to ambulate and reduced independence with ADLs. Patient is likely to benefit from skilled PT intervention to strengthen B LEs in order to improve gait and mobilty. PROBLEM LIST (Impacting functional limitations):  1. Decreased Strength  2. Decreased ADL/Functional Activities  3. Decreased Transfer Abilities  4. Decreased Ambulation Ability/Technique  5. Decreased Balance  6. Decreased Gans with Home Exercise Program INTERVENTIONS PLANNED:  1. Gait Training  2. Home Exercise Program (HEP)  3. Neuromuscular Re-education/Strengthening  4. Therapeutic Activites  5. Therapeutic Exercise/Strengthening  6. Transfer Training  7. Aquatic Therapy   TREATMENT PLAN:  Effective Dates: 2018 TO 2019. Frequency/Duration: 2 visits per week for 10 weeks (in anticipation of approval of more visits)   GOALS: (Goals have been discussed and agreed upon with patient.)  Short-Term Functional Goals: Time Frame: 5 weeks  1. Patient will demonstrate 3-/5 B LE strength  2.  Patient will be able to tolerate 45 minutes of aquatic therapy  3. Patient will be able to stand with max assist on level ground. Discharge Goals: Time Frame: 10 weeks  1. Patient will demonstrate 3+/5 strength in B LEs  2. Patient will be able to ambulate 10 feet with use of appropriate assistive device  3. Patient will be able to perform stand-pivot transfer with appropriate assistive device    Rehabilitation Potential For Stated Goals: Good               The information in this section was collected on 11-28-18 (except where otherwise noted). HISTORY:   History of Present Injury/Illness (Reason for Referral):  Patient underwent two surgeries to address an aortic aneurysm, the second of which occurred in [facility] on 1/31/17. He has been unable to walk due to significant loss of strength in B LEs since this surgery, leaving him unable to ambulate. Was ambulating without any assistive devices prior to this surgery. Utilizes a transfer board at home to transfer into/out of wheelchair, onto/off of toilet and into/out of the shower and into/out of bed. Past Medical History/Comorbidities:   Mr. Boo Samaniego  has a past medical history of Aneurysm [facility]), Atrial flutter ([facility] [address] and Thyroid disease. Mr. Boo Samaniego  has a past surgical history that includes hx tonsillectomy; hx orthopaedic; hx appendectomy; hx colonoscopy (5/08); pr cardiac surg procedure unlist (2016); pr cardiac surg procedure unlist (2016); THORACENTESIS holding xarelto (Left, 6/28/2016); ULTRASOUND (Bilateral, 6/28/2016); THORACENTESIS (Left, 6/12/2016); and ULTRASOUND (Bilateral, 6/12/2016). Social History/Living Environment:    Patient lives with his spouse. Prior Level of Function/Work/Activity:  Patient was independent with all mobility prior to surgery. Is modified independent with wheelchair now. Requires assist from his spouse to complete getting dressed with socks and pulling pants on.        Ambulatory/Rehab Services H2 Model Falls Risk Assessment    Risk Factors:       (1)  Gender [Male] Ability to Rise from Chair:       (4)  Unable to rise without assistance    Falls Prevention Plan: Mobility Assistance Device (specify):  wheelchair, transfer board, shower chair   Total: (5 or greater = High Risk): 5    ©2010 [facility] [employer] Patent #[id number]. Federal Law prohibits the replication, distribution or use without written permission from [facility] [employer]     Current Medications:       Current Outpatient Medications:     ciprofloxacin HCl (CIPRO) 250 mg tablet, Take 1 Tab by mouth two (2) times a day., Disp: 14 Tab, Rfl: 0  - not taking this currently    carvedilol (COREG) 3.125 mg tablet, Take  by mouth two (2) times daily (with meals). , Disp: , Rfl:     furosemide (LASIX) 40 mg tablet, Take  by mouth daily. , Disp: , Rfl:     lisinopril (PRINIVIL, ZESTRIL) 10 mg tablet, Take 5 mg by mouth daily. , Disp: , Rfl:     potassium chloride SR (K-TAB) 20 mEq tablet, Take 20 mEq by mouth daily. , Disp: , Rfl:     psyllium husk (METAMUCIL) 0.4 gram cap, Take  by mouth three (3) times daily. , Disp: , Rfl:     sertraline (ZOLOFT) 50 mg tablet, TAKE 1 TABLET EVERY DAY, Disp: 90 Tab, Rfl: 1    levothyroxine (SYNTHROID) 100 mcg tablet, TAKE 1 TABLET EVERY DAY BEFORE BREAKFAST, Disp: 90 Tab, Rfl: 1    OTHER,NON-FORMULARY,, Air mattress Dx: prevention of decubiti, Disp: 1 Each, Rfl: 0    OTHER,NON-FORMULARY,, Ultra light wheelchair with drop arms Dx: hemiplegia, Disp: 1 Each, Rfl: 0    OTHER,NON-FORMULARY,, Transfer board Dx: limitation in mobility, Disp: 1 Each, Rfl: 0    OTHER,NON-FORMULARY,, Bariatric potty chair w/drop arms  Dx: limitation in mobility, Disp: 1 Each, Rfl: 0    OTHER,NON-FORMULARY,, Trapeze bar Dx: limitation in mobility, Disp: 1 Each, Rfl: 0    OTHER,NON-FORMULARY,, 14 Fr. In and out urinary catheter. Dx: hemiplegia, Disp: 150 Each, Rfl: 11    aspirin delayed-release 81 mg tablet, Take 81 mg by mouth daily. , Disp: , Rfl:    Date Last Reviewed: 12/31/2018   Number of Personal Factors/Comorbidities that affect the Plan of Care: 1-2: MODERATE COMPLEXITY   EXAMINATION:   11-28-18    Observation/Orthostatic Postural Assessment:          Wears compression stockings on B LEs. Distal swelling to B LEs. Arrives to PT in lightweight wheelchair. Palpation:          Swelling noted to B LEs.   ROM:          Passive range of motion to bilateral knees is within normal limits. Hip flexion contracture bilaterally. Ankle range of motion within normal limits bilaterally passively. Strength:          Hip flexion: 2/5   Neurological Screen:       Sensation along all B LEs dermatomes normal to light touch. Functional Mobility:         Transfers: Independent with use of transfer board into/out of wheelchair to/from mat table        Wheelchair mobility: Independent with use of B UEs        Bed mobility: not assessed as patient sleeps in hospital bed at home   Body Structures Involved:  1. Nerves  2. Bones  3. Joints  4. Muscles Body Functions Affected:  1. Neuromusculoskeletal  2. Movement Related Activities and Participation Affected:  1. Mobility  2. Self Care  3. Domestic Life  4. Interpersonal Interactions and Relationships  5. Community, Social and [place]   Number of elements (examined above) that affect the Plan of Care: 4+: HIGH COMPLEXITY   CLINICAL PRESENTATION:   Presentation: Evolving clinical presentation with changing clinical characteristics: MODERATE COMPLEXITY   CLINICAL DECISION MAKING:   Outcome Measure: Tool Used: Lower Extremity Functional Scale (LEFS)  Score:  Initial: 16/80 Most Recent: X/80 (Date: -- )   Interpretation of Score: 20 questions each scored on a 5 point scale with 0 representing \"extreme difficulty or unable to perform\" and 4 representing \"no difficulty\". The lower the score, the greater the functional disability. 80/80 represents no disability. Minimal detectable change is 9 points.   Score 80 79-63 62-48 47-32 31-16 15-1 0 Modifier CH CI CJ CK CL CM CN       Medical Necessity:   · Skilled intervention continues to be required due to limitations with functional mobility seconadry to B LE weakness. Reason for Services/Other Comments:  · Patient continues to require skilled intervention due to decreased B LE strength and impaired functional mobility. Use of outcome tool(s) and clinical judgement create a POC that gives a: Questionable prediction of patient's progress: MODERATE COMPLEXITY            TREATMENT:   (In addition to Assessment/Re-Assessment sessions the following treatments were rendered)  Pre-treatment Symptoms/Complaints:  Pt states he was took a nap after last visit. Pain: Initial:     0/10 Post Session:  0/10   Patient's spouse attended therapy session. Patient transferred to and from with chair left with assist with sliding board. Aquatic Therapy (45 minutes ) Aquatic treatment performed per flow grid for Decreased muscle strength, Decreased endurance, Decreased range of motion and Decreased activity endurance. Cues provided for body awareness. Assistance by therapist provided for balance and control .       Aquatic Exercise Log       Date  12-11-18 Date  12/14/18   Date  12/18/18 Date  12/26/18 Date    12/31/18   Activity/ Exercise Parameters Parameters Parameters Parameters Parameters   Walking forward    With noodle in 4.5 with assist 4 Using noodle and assit in 4 ft   Walking backward    4 4   Walking sideways          Marching          Goose Step          Tip toes          Heels          Lunges        Side step squats        LE Exercises Rings in deep  Used noodle sometimes in front sometimes behind and 4# wts Supine with head support, noodle, and blue rings 4# 8# with noodle and assist     Hip Flex/Ext 2 X 10 B 2 x 10 LAQs while supine 4# wts 10 with noodle and assist 10     Hip Abd/Add 2 X 10 B 2 x 10  2 x 10 10     Hip IR/ER          Calf raises          Knee Flex X 10 B  Sitting on bench with assist Seated with 4# and assist 2 x 10 10 x3 pushing off wall Supported standing x 10 AA  5 for stretching     Squats          Leg Circles          Step Ups Working on just standing. With diffiuclty. Standing in 4.5 max assist   Standing in 4.5 with noodle and support- worked on extending at hip and knees 4 x 5 with multiple rests Standing with assist   UE Exercises          Squeeze In          Push Down          Pull Down          Bicep/Tricep        Rows/Press outs         Chi Positions          Trunk Rotation        Deep H2O/ Noodles Rings  noodle Blue rings in 7' water and 4# wts Noodle  4# Noodle 8#     Stabilization  2 min x 2 2 min  2 min 2 min x 2     Arms only          Legs only Rings and sitting on noodle in shallow - 2 laps forward and backward jog1 min Bicycle 3 min Jog x 2min Jog 2 min   Cross   [place] X 10  1 min 3 min 3 min 3 min     Scissors X 10   Bicycle - 2 min 1 min 3 min 2 min 2 min     Crab walk        Lower abdominal   work  X 10  Standing in 4.5 trying to stand erect stretching hip flexors and abs        Supine floating  2 x 2 min with pedaling  2 min With assist for pedaling 3 min     Jogging        Lap   Swimming          Semi sit to stand  2 x 5   Bearing weight through legs with Mod /max A  In 4. 5' with assist for keeping feet grounded and pushing through feet x 10 with rest break between each     Seated on bench  Hamstring curls x 10   Seated marching x 10    Seated with asssist      Ankle pumps  Assisted in sittting. Heel cord stretch in supine Heel cord stretch in chair Stretching and AA for ankle in chair. Stretching to BLE in all planes while supine with floats. · Treatment/Session Assessment: Pt more comfortable in the water but still requires increased assistance. The L LE has less volitional motion than R.   More extended in semi standing posture - semi standing as he has significant hip flexion contracture and requires assist to stay in a standing position. · Response to Treatment:  Patient demonstrates significant B LE weakness. · Compliance with Program/Exercises: Will assess as treatment progresses. · Recommendations/Intent for next treatment session: \"Next visit will focus on aquatic therapy to address B LE weakness and mobility deficits\".   Total Treatment Duration: 45 minutes   PT Patient Time In/Time Out  Time In: 1115  Time Out: 1333 [facility],  x 2/yes

## 2024-06-26 NOTE — ED NOTES
I have reviewed discharge instructions with the patient. The patient verbalized understanding. Patient left ED via Discharge Method: ambulatory to Home with self. Opportunity for questions and clarification provided. Patient given 1 scripts. To continue your aftercare when you leave the hospital, you may receive an automated call from our care team to check in on how you are doing. This is a free service and part of our promise to provide the best care and service to meet your aftercare needs.  If you have questions, or wish to unsubscribe from this service please call [phone]. Thank you for Choosing our [employer] [facility] Department.
Oriented - self; Oriented - place; Oriented - time

## (undated) DEVICE — SYRINGE,TOOMEY,IRRIGATION,70CC,STERILE: Brand: MEDLINE

## (undated) DEVICE — PACK PROCEDURE SURG TRANSURETHRAL RESECT OF PROST CDS

## (undated) DEVICE — GOWN,REINFORCED,POLY,AURORA,XXLARGE,STR: Brand: MEDLINE

## (undated) DEVICE — REM POLYHESIVE ADULT PATIENT RETURN ELECTRODE: Brand: VALLEYLAB

## (undated) DEVICE — TRAY PREP DRY W/ PREM GLV 2 APPL 6 SPNG 2 UNDPD 1 OVERWRAP

## (undated) DEVICE — CATHETER URETH 24FR BLLN 30CC STD LTX 3 W TWO OPP DRNGE EYE

## (undated) DEVICE — GARMENT,MEDLINE,DVT,INT,CALF,LG, GEN2: Brand: MEDLINE

## (undated) DEVICE — SOLUTION IRRIG 1000ML H2O STRL BLT

## (undated) DEVICE — ELECTRD CUT LP ANG 27FR BRN

## (undated) DEVICE — Y-TYPE TUR/BLADDER IRRIGATION SET, REGULATING CLAMP

## (undated) DEVICE — CYSTO/BLADDER IRRIGATION SET, REGULATING CLAMP

## (undated) DEVICE — SOLUTION IRRIG 3000ML H2O STRL BAG

## (undated) DEVICE — CONTAINER SPEC FRMLN 120ML --

## (undated) DEVICE — ELECTRODE,CUTTING,STERILE.24FR: Brand: N.A.

## (undated) DEVICE — BAG DRNGE 4000ML CONT IRRIG ROUNDED TEARDROP SHP DISP

## (undated) DEVICE — PACK SURGICAL PROCEDURE KIT CYSTOSCOPY TOTE

## (undated) DEVICE — SOL IRR GLYC 1.5 % 3000ML --

## (undated) DEVICE — CATH URETH FOL 2W MED 18FRX5 --

## (undated) DEVICE — TUBING, SUCTION, 1/4" X 10', STRAIGHT: Brand: MEDLINE

## (undated) DEVICE — DEVICE SECUREMENT AD W/ TRICOT ANCHR PD FOR F LTX SIL CATH

## (undated) DEVICE — SYR IRR CATH TIP LR ADPT 70ML -- CONVERT TO ITEM 363120